# Patient Record
Sex: FEMALE | Race: ASIAN | NOT HISPANIC OR LATINO | URBAN - METROPOLITAN AREA
[De-identification: names, ages, dates, MRNs, and addresses within clinical notes are randomized per-mention and may not be internally consistent; named-entity substitution may affect disease eponyms.]

---

## 2023-05-20 ENCOUNTER — EMERGENCY (EMERGENCY)
Facility: HOSPITAL | Age: 38
LOS: 0 days | Discharge: LEFT BEFORE DISCHARGE | End: 2023-05-20
Attending: EMERGENCY MEDICINE
Payer: MEDICAID

## 2023-05-20 VITALS
OXYGEN SATURATION: 99 % | DIASTOLIC BLOOD PRESSURE: 98 MMHG | WEIGHT: 158.95 LBS | HEIGHT: 61 IN | RESPIRATION RATE: 18 BRPM | TEMPERATURE: 98 F | HEART RATE: 100 BPM | SYSTOLIC BLOOD PRESSURE: 161 MMHG

## 2023-05-20 DIAGNOSIS — Z53.21 PROCEDURE AND TREATMENT NOT CARRIED OUT DUE TO PATIENT LEAVING PRIOR TO BEING SEEN BY HEALTH CARE PROVIDER: ICD-10-CM

## 2023-05-20 PROCEDURE — L9991: CPT

## 2023-05-20 NOTE — ED ADULT NURSE NOTE - CHIEF COMPLAINT QUOTE
pt c/o " I need x ray my stomach keeps getting bigger and bigger " denies any pain; denies is pregnant states LMP was last week

## 2024-05-09 NOTE — ED ADULT TRIAGE NOTE - CHIEF COMPLAINT QUOTE
EP catheter removed from the coronary sinus. pt c/o " I need x ray my stomach keeps getting bigger and bigger " denies any pain pt c/o " I need x ray my stomach keeps getting bigger and bigger " denies any pain; denies is pregnant states LMP was last week

## 2025-05-06 ENCOUNTER — INPATIENT (INPATIENT)
Facility: HOSPITAL | Age: 40
LOS: 20 days | Discharge: HOME | DRG: 182 | End: 2025-05-27
Attending: INTERNAL MEDICINE | Admitting: INTERNAL MEDICINE
Payer: MEDICAID

## 2025-05-06 VITALS
HEART RATE: 118 BPM | OXYGEN SATURATION: 99 % | TEMPERATURE: 99 F | SYSTOLIC BLOOD PRESSURE: 222 MMHG | DIASTOLIC BLOOD PRESSURE: 137 MMHG | WEIGHT: 149.91 LBS | RESPIRATION RATE: 18 BRPM | HEIGHT: 61 IN

## 2025-05-06 DIAGNOSIS — R65.10 SYSTEMIC INFLAMMATORY RESPONSE SYNDROME (SIRS) OF NON-INFECTIOUS ORIGIN WITHOUT ACUTE ORGAN DYSFUNCTION: ICD-10-CM

## 2025-05-06 DIAGNOSIS — D59.4 OTHER NONAUTOIMMUNE HEMOLYTIC ANEMIAS: ICD-10-CM

## 2025-05-06 DIAGNOSIS — E66.9 OBESITY, UNSPECIFIED: ICD-10-CM

## 2025-05-06 DIAGNOSIS — N30.90 CYSTITIS, UNSPECIFIED WITHOUT HEMATURIA: ICD-10-CM

## 2025-05-06 DIAGNOSIS — B97.4 RESPIRATORY SYNCYTIAL VIRUS AS THE CAUSE OF DISEASES CLASSIFIED ELSEWHERE: ICD-10-CM

## 2025-05-06 DIAGNOSIS — K55.8 OTHER VASCULAR DISORDERS OF INTESTINE: ICD-10-CM

## 2025-05-06 DIAGNOSIS — I12.9 HYPERTENSIVE CHRONIC KIDNEY DISEASE WITH STAGE 1 THROUGH STAGE 4 CHRONIC KIDNEY DISEASE, OR UNSPECIFIED CHRONIC KIDNEY DISEASE: ICD-10-CM

## 2025-05-06 DIAGNOSIS — I63.81 OTHER CEREBRAL INFARCTION DUE TO OCCLUSION OR STENOSIS OF SMALL ARTERY: ICD-10-CM

## 2025-05-06 DIAGNOSIS — D50.9 IRON DEFICIENCY ANEMIA, UNSPECIFIED: ICD-10-CM

## 2025-05-06 DIAGNOSIS — D52.9 FOLATE DEFICIENCY ANEMIA, UNSPECIFIED: ICD-10-CM

## 2025-05-06 DIAGNOSIS — Z11.52 ENCOUNTER FOR SCREENING FOR COVID-19: ICD-10-CM

## 2025-05-06 DIAGNOSIS — D59.30 HEMOLYTIC-UREMIC SYNDROME, UNSPECIFIED: ICD-10-CM

## 2025-05-06 DIAGNOSIS — R80.9 PROTEINURIA, UNSPECIFIED: ICD-10-CM

## 2025-05-06 DIAGNOSIS — K92.1 MELENA: ICD-10-CM

## 2025-05-06 DIAGNOSIS — D51.9 VITAMIN B12 DEFICIENCY ANEMIA, UNSPECIFIED: ICD-10-CM

## 2025-05-06 DIAGNOSIS — N17.0 ACUTE KIDNEY FAILURE WITH TUBULAR NECROSIS: ICD-10-CM

## 2025-05-06 DIAGNOSIS — A09 INFECTIOUS GASTROENTERITIS AND COLITIS, UNSPECIFIED: ICD-10-CM

## 2025-05-06 DIAGNOSIS — I67.4 HYPERTENSIVE ENCEPHALOPATHY: ICD-10-CM

## 2025-05-06 DIAGNOSIS — D61.818 OTHER PANCYTOPENIA: ICD-10-CM

## 2025-05-06 DIAGNOSIS — D69.59 OTHER SECONDARY THROMBOCYTOPENIA: ICD-10-CM

## 2025-05-06 DIAGNOSIS — Z91.199 PATIENT'S NONCOMPLIANCE WITH OTHER MEDICAL TREATMENT AND REGIMEN DUE TO UNSPECIFIED REASON: ICD-10-CM

## 2025-05-06 DIAGNOSIS — Z53.29 PROCEDURE AND TREATMENT NOT CARRIED OUT BECAUSE OF PATIENT'S DECISION FOR OTHER REASONS: ICD-10-CM

## 2025-05-06 DIAGNOSIS — E88.09 OTHER DISORDERS OF PLASMA-PROTEIN METABOLISM, NOT ELSEWHERE CLASSIFIED: ICD-10-CM

## 2025-05-06 DIAGNOSIS — F39 UNSPECIFIED MOOD [AFFECTIVE] DISORDER: ICD-10-CM

## 2025-05-06 LAB
ALBUMIN SERPL ELPH-MCNC: 2.8 G/DL — LOW (ref 3.5–5.2)
ALP SERPL-CCNC: 63 U/L — SIGNIFICANT CHANGE UP (ref 30–115)
ALT FLD-CCNC: 10 U/L — SIGNIFICANT CHANGE UP (ref 0–41)
ANION GAP SERPL CALC-SCNC: 15 MMOL/L — HIGH (ref 7–14)
AST SERPL-CCNC: 30 U/L — SIGNIFICANT CHANGE UP (ref 0–41)
BASOPHILS # BLD AUTO: 0.03 K/UL — SIGNIFICANT CHANGE UP (ref 0–0.2)
BASOPHILS NFR BLD AUTO: 0.2 % — SIGNIFICANT CHANGE UP (ref 0–1)
BILIRUB DIRECT SERPL-MCNC: 0.2 MG/DL — SIGNIFICANT CHANGE UP (ref 0–0.3)
BILIRUB INDIRECT FLD-MCNC: 0.5 MG/DL — SIGNIFICANT CHANGE UP (ref 0.2–1.2)
BILIRUB SERPL-MCNC: 0.7 MG/DL — SIGNIFICANT CHANGE UP (ref 0.2–1.2)
BUN SERPL-MCNC: 64 MG/DL — CRITICAL HIGH (ref 10–20)
CALCIUM SERPL-MCNC: 7.6 MG/DL — LOW (ref 8.4–10.5)
CHLORIDE SERPL-SCNC: 100 MMOL/L — SIGNIFICANT CHANGE UP (ref 98–110)
CO2 SERPL-SCNC: 20 MMOL/L — SIGNIFICANT CHANGE UP (ref 17–32)
CREAT SERPL-MCNC: 3.5 MG/DL — HIGH (ref 0.7–1.5)
EGFR: 16 ML/MIN/1.73M2 — LOW
EGFR: 16 ML/MIN/1.73M2 — LOW
EOSINOPHIL # BLD AUTO: 0.02 K/UL — SIGNIFICANT CHANGE UP (ref 0–0.7)
EOSINOPHIL NFR BLD AUTO: 0.1 % — SIGNIFICANT CHANGE UP (ref 0–8)
GLUCOSE SERPL-MCNC: 138 MG/DL — HIGH (ref 70–99)
HCG SERPL QL: NEGATIVE — SIGNIFICANT CHANGE UP
HCT VFR BLD CALC: 26.2 % — LOW (ref 37–47)
HGB BLD-MCNC: 9 G/DL — LOW (ref 12–16)
IMM GRANULOCYTES NFR BLD AUTO: 0.7 % — HIGH (ref 0.1–0.3)
LYMPHOCYTES # BLD AUTO: 0.88 K/UL — LOW (ref 1.2–3.4)
LYMPHOCYTES # BLD AUTO: 4.7 % — LOW (ref 20.5–51.1)
MCHC RBC-ENTMCNC: 30 PG — SIGNIFICANT CHANGE UP (ref 27–31)
MCHC RBC-ENTMCNC: 34.4 G/DL — SIGNIFICANT CHANGE UP (ref 32–37)
MCV RBC AUTO: 87.3 FL — SIGNIFICANT CHANGE UP (ref 81–99)
MONOCYTES # BLD AUTO: 1.26 K/UL — HIGH (ref 0.1–0.6)
MONOCYTES NFR BLD AUTO: 6.7 % — SIGNIFICANT CHANGE UP (ref 1.7–9.3)
NEUTROPHILS # BLD AUTO: 16.4 K/UL — HIGH (ref 1.4–6.5)
NEUTROPHILS NFR BLD AUTO: 87.6 % — HIGH (ref 42.2–75.2)
NRBC BLD AUTO-RTO: 0 /100 WBCS — SIGNIFICANT CHANGE UP (ref 0–0)
PLATELET # BLD AUTO: 86 K/UL — LOW (ref 130–400)
PMV BLD: 13.8 FL — HIGH (ref 7.4–10.4)
POTASSIUM SERPL-MCNC: 3.6 MMOL/L — SIGNIFICANT CHANGE UP (ref 3.5–5)
POTASSIUM SERPL-SCNC: 3.6 MMOL/L — SIGNIFICANT CHANGE UP (ref 3.5–5)
PROT SERPL-MCNC: 4.6 G/DL — LOW (ref 6–8)
RBC # BLD: 3 M/UL — LOW (ref 4.2–5.4)
RBC # FLD: 15.6 % — HIGH (ref 11.5–14.5)
SODIUM SERPL-SCNC: 135 MMOL/L — SIGNIFICANT CHANGE UP (ref 135–146)
WBC # BLD: 18.72 K/UL — HIGH (ref 4.8–10.8)
WBC # FLD AUTO: 18.72 K/UL — HIGH (ref 4.8–10.8)

## 2025-05-06 PROCEDURE — 99285 EMERGENCY DEPT VISIT HI MDM: CPT

## 2025-05-06 RX ORDER — ONDANSETRON HCL/PF 4 MG/2 ML
4 VIAL (ML) INJECTION ONCE
Refills: 0 | Status: COMPLETED | OUTPATIENT
Start: 2025-05-06 | End: 2025-05-06

## 2025-05-06 RX ADMIN — Medication 4 MILLIGRAM(S): at 22:38

## 2025-05-06 RX ADMIN — Medication 1000 MILLILITER(S): at 22:38

## 2025-05-06 NOTE — ED ADULT TRIAGE NOTE - CHIEF COMPLAINT QUOTE
My stomach really hurt for three days, I vomit , diarrhea - patient  Patient reports HTN history, did not take her amlodipine today, pain diffuse around abdomen, reports LMP a few days prior

## 2025-05-07 ENCOUNTER — RESULT REVIEW (OUTPATIENT)
Age: 40
End: 2025-05-07

## 2025-05-07 LAB
ABO RH CONFIRMATION: SIGNIFICANT CHANGE UP
ALBUMIN SERPL ELPH-MCNC: 2.6 G/DL — LOW (ref 3.5–5.2)
ALP SERPL-CCNC: 61 U/L — SIGNIFICANT CHANGE UP (ref 30–115)
ALT FLD-CCNC: 8 U/L — SIGNIFICANT CHANGE UP (ref 0–41)
AMMONIA BLD-MCNC: 20 UMOL/L — SIGNIFICANT CHANGE UP (ref 11–55)
ANION GAP SERPL CALC-SCNC: 16 MMOL/L — HIGH (ref 7–14)
APPEARANCE UR: CLEAR — SIGNIFICANT CHANGE UP
APTT BLD: 29.1 SEC — SIGNIFICANT CHANGE UP (ref 27–39.2)
APTT BLD: 29.8 SEC — SIGNIFICANT CHANGE UP (ref 27–39.2)
AST SERPL-CCNC: 18 U/L — SIGNIFICANT CHANGE UP (ref 0–41)
BACTERIA # UR AUTO: ABNORMAL /HPF
BASE EXCESS BLDV CALC-SCNC: -3.5 MMOL/L — LOW (ref -2–3)
BASOPHILS # BLD AUTO: 0 K/UL — SIGNIFICANT CHANGE UP (ref 0–0.2)
BASOPHILS # BLD AUTO: 0.03 K/UL — SIGNIFICANT CHANGE UP (ref 0–0.2)
BASOPHILS # BLD AUTO: 0.03 K/UL — SIGNIFICANT CHANGE UP (ref 0–0.2)
BASOPHILS NFR BLD AUTO: 0 % — SIGNIFICANT CHANGE UP (ref 0–1)
BASOPHILS NFR BLD AUTO: 0.1 % — SIGNIFICANT CHANGE UP (ref 0–1)
BASOPHILS NFR BLD AUTO: 0.1 % — SIGNIFICANT CHANGE UP (ref 0–1)
BILIRUB SERPL-MCNC: 0.5 MG/DL — SIGNIFICANT CHANGE UP (ref 0.2–1.2)
BILIRUB UR-MCNC: NEGATIVE — SIGNIFICANT CHANGE UP
BLD GP AB SCN SERPL QL: SIGNIFICANT CHANGE UP
BUN SERPL-MCNC: 57 MG/DL — HIGH (ref 10–20)
CA-I SERPL-SCNC: 1.07 MMOL/L — LOW (ref 1.15–1.33)
CALCIUM SERPL-MCNC: 7 MG/DL — LOW (ref 8.4–10.5)
CAST: 1 /LPF — SIGNIFICANT CHANGE UP (ref 0–4)
CHLORIDE SERPL-SCNC: 98 MMOL/L — SIGNIFICANT CHANGE UP (ref 98–110)
CO2 SERPL-SCNC: 18 MMOL/L — SIGNIFICANT CHANGE UP (ref 17–32)
COLOR SPEC: YELLOW — SIGNIFICANT CHANGE UP
CREAT SERPL-MCNC: 3 MG/DL — HIGH (ref 0.7–1.5)
CRP SERPL-MCNC: 139 MG/L — HIGH
D DIMER BLD IA.RAPID-MCNC: 3326 NG/ML DDU — HIGH
DIFF PNL FLD: ABNORMAL
DIR ANTIGLOB POLYSPECIFIC INTERPRETATION: SIGNIFICANT CHANGE UP
EGFR: 20 ML/MIN/1.73M2 — LOW
EGFR: 20 ML/MIN/1.73M2 — LOW
EOSINOPHIL # BLD AUTO: 0 K/UL — SIGNIFICANT CHANGE UP (ref 0–0.7)
EOSINOPHIL # BLD AUTO: 0.03 K/UL — SIGNIFICANT CHANGE UP (ref 0–0.7)
EOSINOPHIL # BLD AUTO: 0.04 K/UL — SIGNIFICANT CHANGE UP (ref 0–0.7)
EOSINOPHIL NFR BLD AUTO: 0 % — SIGNIFICANT CHANGE UP (ref 0–8)
EOSINOPHIL NFR BLD AUTO: 0.1 % — SIGNIFICANT CHANGE UP (ref 0–8)
EOSINOPHIL NFR BLD AUTO: 0.2 % — SIGNIFICANT CHANGE UP (ref 0–8)
ERYTHROCYTE [SEDIMENTATION RATE] IN BLOOD: 45 MM/HR — HIGH (ref 0–20)
FERRITIN SERPL-MCNC: 362 NG/ML — HIGH (ref 15–150)
FLUAV AG NPH QL: SIGNIFICANT CHANGE UP
FLUBV AG NPH QL: SIGNIFICANT CHANGE UP
FOLATE SERPL-MCNC: 3.3 NG/ML — LOW
GAS PNL BLDV: 131 MMOL/L — LOW (ref 136–145)
GAS PNL BLDV: SIGNIFICANT CHANGE UP
GAS PNL BLDV: SIGNIFICANT CHANGE UP
GLUCOSE BLDC GLUCOMTR-MCNC: 152 MG/DL — HIGH (ref 70–99)
GLUCOSE BLDC GLUCOMTR-MCNC: 337 MG/DL — HIGH (ref 70–99)
GLUCOSE SERPL-MCNC: 169 MG/DL — HIGH (ref 70–99)
GLUCOSE UR QL: 100 MG/DL
HCO3 BLDV-SCNC: 21 MMOL/L — LOW (ref 22–29)
HCT VFR BLD CALC: 20.3 % — LOW (ref 37–47)
HCT VFR BLD CALC: 21.3 % — LOW (ref 37–47)
HCT VFR BLD CALC: 24.1 % — LOW (ref 37–47)
HCT VFR BLDA CALC: 26 % — LOW (ref 34.5–46.5)
HGB BLD CALC-MCNC: 8.7 G/DL — LOW (ref 11.7–16.1)
HGB BLD-MCNC: 6.8 G/DL — CRITICAL LOW (ref 12–16)
HGB BLD-MCNC: 7.2 G/DL — LOW (ref 12–16)
HGB BLD-MCNC: 8.1 G/DL — LOW (ref 12–16)
IMM GRANULOCYTES NFR BLD AUTO: 0.5 % — HIGH (ref 0.1–0.3)
IMM GRANULOCYTES NFR BLD AUTO: 0.8 % — HIGH (ref 0.1–0.3)
INR BLD: 0.9 RATIO — SIGNIFICANT CHANGE UP (ref 0.65–1.3)
INR BLD: 0.9 RATIO — SIGNIFICANT CHANGE UP (ref 0.65–1.3)
IRON SATN MFR SERPL: 11 % — LOW (ref 15–50)
IRON SATN MFR SERPL: 18 UG/DL — LOW (ref 35–150)
KETONES UR-MCNC: NEGATIVE MG/DL — SIGNIFICANT CHANGE UP
LACTATE BLDV-MCNC: 0.6 MMOL/L — SIGNIFICANT CHANGE UP (ref 0.5–2)
LACTATE SERPL-SCNC: 0.8 MMOL/L — SIGNIFICANT CHANGE UP (ref 0.7–2)
LACTATE SERPL-SCNC: 1.1 MMOL/L — SIGNIFICANT CHANGE UP (ref 0.7–2)
LDH SERPL L TO P-CCNC: 659 — HIGH (ref 50–242)
LDH SERPL L TO P-CCNC: 739 — HIGH (ref 50–242)
LEUKOCYTE ESTERASE UR-ACNC: ABNORMAL
LIDOCAIN IGE QN: 71 U/L — HIGH (ref 7–60)
LYMPHOCYTES # BLD AUTO: 0.38 K/UL — LOW (ref 1.2–3.4)
LYMPHOCYTES # BLD AUTO: 0.82 K/UL — LOW (ref 1.2–3.4)
LYMPHOCYTES # BLD AUTO: 0.91 K/UL — LOW (ref 1.2–3.4)
LYMPHOCYTES # BLD AUTO: 1.9 % — LOW (ref 20.5–51.1)
LYMPHOCYTES # BLD AUTO: 4.1 % — LOW (ref 20.5–51.1)
LYMPHOCYTES # BLD AUTO: 4.4 % — LOW (ref 20.5–51.1)
MCHC RBC-ENTMCNC: 29.6 PG — SIGNIFICANT CHANGE UP (ref 27–31)
MCHC RBC-ENTMCNC: 29.9 PG — SIGNIFICANT CHANGE UP (ref 27–31)
MCHC RBC-ENTMCNC: 30 PG — SIGNIFICANT CHANGE UP (ref 27–31)
MCHC RBC-ENTMCNC: 33.5 G/DL — SIGNIFICANT CHANGE UP (ref 32–37)
MCHC RBC-ENTMCNC: 33.6 G/DL — SIGNIFICANT CHANGE UP (ref 32–37)
MCHC RBC-ENTMCNC: 33.8 G/DL — SIGNIFICANT CHANGE UP (ref 32–37)
MCV RBC AUTO: 88.3 FL — SIGNIFICANT CHANGE UP (ref 81–99)
MCV RBC AUTO: 88.8 FL — SIGNIFICANT CHANGE UP (ref 81–99)
MCV RBC AUTO: 88.9 FL — SIGNIFICANT CHANGE UP (ref 81–99)
MONOCYTES # BLD AUTO: 0.51 K/UL — SIGNIFICANT CHANGE UP (ref 0.1–0.6)
MONOCYTES # BLD AUTO: 0.54 K/UL — SIGNIFICANT CHANGE UP (ref 0.1–0.6)
MONOCYTES # BLD AUTO: 1.67 K/UL — HIGH (ref 0.1–0.6)
MONOCYTES NFR BLD AUTO: 2.5 % — SIGNIFICANT CHANGE UP (ref 1.7–9.3)
MONOCYTES NFR BLD AUTO: 2.6 % — SIGNIFICANT CHANGE UP (ref 1.7–9.3)
MONOCYTES NFR BLD AUTO: 8.3 % — SIGNIFICANT CHANGE UP (ref 1.7–9.3)
MRSA PCR RESULT.: NEGATIVE — SIGNIFICANT CHANGE UP
NEUTROPHILS # BLD AUTO: 17.46 K/UL — HIGH (ref 1.4–6.5)
NEUTROPHILS # BLD AUTO: 19.17 K/UL — HIGH (ref 1.4–6.5)
NEUTROPHILS # BLD AUTO: 19.18 K/UL — HIGH (ref 1.4–6.5)
NEUTROPHILS NFR BLD AUTO: 86.9 % — HIGH (ref 42.2–75.2)
NEUTROPHILS NFR BLD AUTO: 93 % — HIGH (ref 42.2–75.2)
NEUTROPHILS NFR BLD AUTO: 94.5 % — HIGH (ref 42.2–75.2)
NITRITE UR-MCNC: NEGATIVE — SIGNIFICANT CHANGE UP
NRBC BLD AUTO-RTO: 0 /100 WBCS — SIGNIFICANT CHANGE UP (ref 0–0)
NRBC BLD AUTO-RTO: 0 /100 WBCS — SIGNIFICANT CHANGE UP (ref 0–0)
NT-PROBNP SERPL-SCNC: 8122 PG/ML — HIGH (ref 0–300)
PCO2 BLDV: 37 MMHG — LOW (ref 39–42)
PH BLDV: 7.37 — SIGNIFICANT CHANGE UP (ref 7.32–7.43)
PH UR: 6 — SIGNIFICANT CHANGE UP (ref 5–8)
PLATELET # BLD AUTO: 70 K/UL — LOW (ref 130–400)
PLATELET # BLD AUTO: 76 K/UL — LOW (ref 130–400)
PLATELET # BLD AUTO: 76 K/UL — LOW (ref 130–400)
PMV BLD: 13.5 FL — HIGH (ref 7.4–10.4)
PMV BLD: SIGNIFICANT CHANGE UP (ref 7.4–10.4)
PMV BLD: SIGNIFICANT CHANGE UP (ref 7.4–10.4)
PO2 BLDV: 53 MMHG — HIGH (ref 25–45)
POTASSIUM BLDV-SCNC: 3.8 MMOL/L — SIGNIFICANT CHANGE UP (ref 3.5–5.1)
POTASSIUM SERPL-MCNC: 3.6 MMOL/L — SIGNIFICANT CHANGE UP (ref 3.5–5)
POTASSIUM SERPL-SCNC: 3.6 MMOL/L — SIGNIFICANT CHANGE UP (ref 3.5–5)
PROT SERPL-MCNC: 4.2 G/DL — LOW (ref 6–8)
PROT UR-MCNC: 300 MG/DL
PROTHROM AB SERPL-ACNC: 10.6 SEC — SIGNIFICANT CHANGE UP (ref 9.95–12.87)
PROTHROM AB SERPL-ACNC: 10.6 SEC — SIGNIFICANT CHANGE UP (ref 9.95–12.87)
RBC # BLD: 2.3 M/UL — LOW (ref 4.2–5.4)
RBC # BLD: 2.3 M/UL — LOW (ref 4.2–5.4)
RBC # BLD: 2.4 M/UL — LOW (ref 4.2–5.4)
RBC # BLD: 2.4 M/UL — LOW (ref 4.2–5.4)
RBC # BLD: 2.71 M/UL — LOW (ref 4.2–5.4)
RBC # FLD: 15.8 % — HIGH (ref 11.5–14.5)
RBC # FLD: 15.9 % — HIGH (ref 11.5–14.5)
RBC # FLD: 16.1 % — HIGH (ref 11.5–14.5)
RBC CASTS # UR COMP ASSIST: 1 /HPF — SIGNIFICANT CHANGE UP (ref 0–4)
RETICS #: 174.2 K/UL — HIGH (ref 25–125)
RETICS #: 177.6 K/UL — HIGH (ref 25–125)
RETICS/RBC NFR: 7.3 % — HIGH (ref 0.5–1.5)
RETICS/RBC NFR: 7.7 % — HIGH (ref 0.5–1.5)
RSV RNA NPH QL NAA+NON-PROBE: DETECTED
SAO2 % BLDV: 87.3 % — SIGNIFICANT CHANGE UP (ref 67–88)
SARS-COV-2 RNA SPEC QL NAA+PROBE: SIGNIFICANT CHANGE UP
SODIUM SERPL-SCNC: 132 MMOL/L — LOW (ref 135–146)
SOURCE RESPIRATORY: SIGNIFICANT CHANGE UP
SP GR SPEC: 1.02 — SIGNIFICANT CHANGE UP (ref 1–1.03)
SQUAMOUS # UR AUTO: 7 /HPF — HIGH (ref 0–5)
TIBC SERPL-MCNC: 166 UG/DL — LOW (ref 220–430)
TROPONIN T, HIGH SENSITIVITY RESULT: 46 NG/L — HIGH (ref 6–13)
TROPONIN T, HIGH SENSITIVITY RESULT: 57 NG/L — CRITICAL HIGH (ref 6–13)
TSH SERPL-MCNC: 2.05 UIU/ML — SIGNIFICANT CHANGE UP (ref 0.27–4.2)
UIBC SERPL-MCNC: 148 UG/DL — SIGNIFICANT CHANGE UP (ref 110–370)
UROBILINOGEN FLD QL: 0.2 MG/DL — SIGNIFICANT CHANGE UP (ref 0.2–1)
VIT B12 SERPL-MCNC: 245 PG/ML — SIGNIFICANT CHANGE UP (ref 232–1245)
WBC # BLD: 20.12 K/UL — HIGH (ref 4.8–10.8)
WBC # BLD: 20.3 K/UL — HIGH (ref 4.8–10.8)
WBC # BLD: 20.62 K/UL — HIGH (ref 4.8–10.8)
WBC # FLD AUTO: 20.12 K/UL — HIGH (ref 4.8–10.8)
WBC # FLD AUTO: 20.3 K/UL — HIGH (ref 4.8–10.8)
WBC # FLD AUTO: 20.62 K/UL — HIGH (ref 4.8–10.8)
WBC UR QL: 12 /HPF — HIGH (ref 0–5)

## 2025-05-07 PROCEDURE — 84165 PROTEIN E-PHORESIS SERUM: CPT

## 2025-05-07 PROCEDURE — 87507 IADNA-DNA/RNA PROBE TQ 12-25: CPT

## 2025-05-07 PROCEDURE — 36415 COLL VENOUS BLD VENIPUNCTURE: CPT

## 2025-05-07 PROCEDURE — 70551 MRI BRAIN STEM W/O DYE: CPT

## 2025-05-07 PROCEDURE — 93970 EXTREMITY STUDY: CPT

## 2025-05-07 PROCEDURE — 80053 COMPREHEN METABOLIC PANEL: CPT

## 2025-05-07 PROCEDURE — 83520 IMMUNOASSAY QUANT NOS NONAB: CPT

## 2025-05-07 PROCEDURE — 76770 US EXAM ABDO BACK WALL COMP: CPT

## 2025-05-07 PROCEDURE — 86036 ANCA SCREEN EACH ANTIBODY: CPT

## 2025-05-07 PROCEDURE — 83521 IG LIGHT CHAINS FREE EACH: CPT

## 2025-05-07 PROCEDURE — 87045 FECES CULTURE AEROBIC BACT: CPT

## 2025-05-07 PROCEDURE — 84156 ASSAY OF PROTEIN URINE: CPT

## 2025-05-07 PROCEDURE — 84145 PROCALCITONIN (PCT): CPT

## 2025-05-07 PROCEDURE — 86901 BLOOD TYPING SEROLOGIC RH(D): CPT

## 2025-05-07 PROCEDURE — 86850 RBC ANTIBODY SCREEN: CPT

## 2025-05-07 PROCEDURE — 83010 ASSAY OF HAPTOGLOBIN QUANT: CPT

## 2025-05-07 PROCEDURE — 80307 DRUG TEST PRSMV CHEM ANLYZR: CPT

## 2025-05-07 PROCEDURE — 93307 TTE W/O DOPPLER COMPLETE: CPT

## 2025-05-07 PROCEDURE — 87640 STAPH A DNA AMP PROBE: CPT

## 2025-05-07 PROCEDURE — 86160 COMPLEMENT ANTIGEN: CPT

## 2025-05-07 PROCEDURE — 83880 ASSAY OF NATRIURETIC PEPTIDE: CPT

## 2025-05-07 PROCEDURE — 82306 VITAMIN D 25 HYDROXY: CPT

## 2025-05-07 PROCEDURE — 36514 APHERESIS PLASMA: CPT

## 2025-05-07 PROCEDURE — 86880 COOMBS TEST DIRECT: CPT

## 2025-05-07 PROCEDURE — 85652 RBC SED RATE AUTOMATED: CPT

## 2025-05-07 PROCEDURE — 71045 X-RAY EXAM CHEST 1 VIEW: CPT

## 2025-05-07 PROCEDURE — 87389 HIV-1 AG W/HIV-1&-2 AB AG IA: CPT

## 2025-05-07 PROCEDURE — 71045 X-RAY EXAM CHEST 1 VIEW: CPT | Mod: 26

## 2025-05-07 PROCEDURE — 86900 BLOOD TYPING SEROLOGIC ABO: CPT

## 2025-05-07 PROCEDURE — 93005 ELECTROCARDIOGRAM TRACING: CPT

## 2025-05-07 PROCEDURE — 82607 VITAMIN B-12: CPT

## 2025-05-07 PROCEDURE — 93880 EXTRACRANIAL BILAT STUDY: CPT

## 2025-05-07 PROCEDURE — 83516 IMMUNOASSAY NONANTIBODY: CPT

## 2025-05-07 PROCEDURE — 86038 ANTINUCLEAR ANTIBODIES: CPT

## 2025-05-07 PROCEDURE — 85397 CLOTTING FUNCT ACTIVITY: CPT

## 2025-05-07 PROCEDURE — 83036 HEMOGLOBIN GLYCOSYLATED A1C: CPT

## 2025-05-07 PROCEDURE — 0241U: CPT

## 2025-05-07 PROCEDURE — 83605 ASSAY OF LACTIC ACID: CPT

## 2025-05-07 PROCEDURE — 83921 ORGANIC ACID SINGLE QUANT: CPT

## 2025-05-07 PROCEDURE — 87046 STOOL CULTR AEROBIC BACT EA: CPT

## 2025-05-07 PROCEDURE — 82533 TOTAL CORTISOL: CPT

## 2025-05-07 PROCEDURE — 83090 ASSAY OF HOMOCYSTEINE: CPT

## 2025-05-07 PROCEDURE — 84484 ASSAY OF TROPONIN QUANT: CPT

## 2025-05-07 PROCEDURE — 86157 COLD AGGLUTININ TITER: CPT

## 2025-05-07 PROCEDURE — 71250 CT THORAX DX C-: CPT

## 2025-05-07 PROCEDURE — 85385 FIBRINOGEN ANTIGEN: CPT

## 2025-05-07 PROCEDURE — 82570 ASSAY OF URINE CREATININE: CPT

## 2025-05-07 PROCEDURE — 86923 COMPATIBILITY TEST ELECTRIC: CPT

## 2025-05-07 PROCEDURE — 86334 IMMUNOFIX E-PHORESIS SERUM: CPT

## 2025-05-07 PROCEDURE — 85379 FIBRIN DEGRADATION QUANT: CPT

## 2025-05-07 PROCEDURE — 86140 C-REACTIVE PROTEIN: CPT

## 2025-05-07 PROCEDURE — 83615 LACTATE (LD) (LDH) ENZYME: CPT

## 2025-05-07 PROCEDURE — 85027 COMPLETE CBC AUTOMATED: CPT

## 2025-05-07 PROCEDURE — 84585 ASSAY OF URINE VMA: CPT

## 2025-05-07 PROCEDURE — 94760 N-INVAS EAR/PLS OXIMETRY 1: CPT

## 2025-05-07 PROCEDURE — 86162 COMPLEMENT TOTAL (CH50): CPT

## 2025-05-07 PROCEDURE — 86146 BETA-2 GLYCOPROTEIN ANTIBODY: CPT

## 2025-05-07 PROCEDURE — 86747 PARVOVIRUS ANTIBODY: CPT

## 2025-05-07 PROCEDURE — 84443 ASSAY THYROID STIM HORMONE: CPT

## 2025-05-07 PROCEDURE — 83550 IRON BINDING TEST: CPT

## 2025-05-07 PROCEDURE — 80061 LIPID PANEL: CPT

## 2025-05-07 PROCEDURE — 85730 THROMBOPLASTIN TIME PARTIAL: CPT

## 2025-05-07 PROCEDURE — 84100 ASSAY OF PHOSPHORUS: CPT

## 2025-05-07 PROCEDURE — 93306 TTE W/DOPPLER COMPLETE: CPT | Mod: 26

## 2025-05-07 PROCEDURE — 93010 ELECTROCARDIOGRAM REPORT: CPT

## 2025-05-07 PROCEDURE — 83735 ASSAY OF MAGNESIUM: CPT

## 2025-05-07 PROCEDURE — 83540 ASSAY OF IRON: CPT

## 2025-05-07 PROCEDURE — 85025 COMPLETE CBC W/AUTO DIFF WBC: CPT

## 2025-05-07 PROCEDURE — 82330 ASSAY OF CALCIUM: CPT

## 2025-05-07 PROCEDURE — 93975 VASCULAR STUDY: CPT | Mod: 26

## 2025-05-07 PROCEDURE — 85610 PROTHROMBIN TIME: CPT

## 2025-05-07 PROCEDURE — P9016: CPT

## 2025-05-07 PROCEDURE — 86225 DNA ANTIBODY NATIVE: CPT

## 2025-05-07 PROCEDURE — 87641 MR-STAPH DNA AMP PROBE: CPT

## 2025-05-07 PROCEDURE — 99223 1ST HOSP IP/OBS HIGH 75: CPT

## 2025-05-07 PROCEDURE — 82728 ASSAY OF FERRITIN: CPT

## 2025-05-07 PROCEDURE — 83970 ASSAY OF PARATHORMONE: CPT

## 2025-05-07 PROCEDURE — 74176 CT ABD & PELVIS W/O CONTRAST: CPT

## 2025-05-07 PROCEDURE — 80354 DRUG SCREENING FENTANYL: CPT

## 2025-05-07 PROCEDURE — P9059: CPT

## 2025-05-07 PROCEDURE — 85613 RUSSELL VIPER VENOM DILUTED: CPT

## 2025-05-07 PROCEDURE — 80048 BASIC METABOLIC PNL TOTAL CA: CPT

## 2025-05-07 PROCEDURE — 36430 TRANSFUSION BLD/BLD COMPNT: CPT

## 2025-05-07 PROCEDURE — 86235 NUCLEAR ANTIGEN ANTIBODY: CPT

## 2025-05-07 PROCEDURE — 74174 CTA ABD&PLVS W/CONTRAST: CPT | Mod: 26

## 2025-05-07 PROCEDURE — 85045 AUTOMATED RETICULOCYTE COUNT: CPT

## 2025-05-07 PROCEDURE — 70450 CT HEAD/BRAIN W/O DYE: CPT

## 2025-05-07 PROCEDURE — 93975 VASCULAR STUDY: CPT

## 2025-05-07 PROCEDURE — 86147 CARDIOLIPIN ANTIBODY EA IG: CPT

## 2025-05-07 PROCEDURE — 82746 ASSAY OF FOLIC ACID SERUM: CPT

## 2025-05-07 PROCEDURE — 74176 CT ABD & PELVIS W/O CONTRAST: CPT | Mod: 26,59

## 2025-05-07 PROCEDURE — 84244 ASSAY OF RENIN: CPT

## 2025-05-07 PROCEDURE — 82962 GLUCOSE BLOOD TEST: CPT

## 2025-05-07 PROCEDURE — 82550 ASSAY OF CK (CPK): CPT

## 2025-05-07 PROCEDURE — 82088 ASSAY OF ALDOSTERONE: CPT

## 2025-05-07 PROCEDURE — 82784 ASSAY IGA/IGD/IGG/IGM EACH: CPT

## 2025-05-07 PROCEDURE — 87177 OVA AND PARASITES SMEARS: CPT

## 2025-05-07 PROCEDURE — 84155 ASSAY OF PROTEIN SERUM: CPT

## 2025-05-07 PROCEDURE — 83835 ASSAY OF METANEPHRINES: CPT

## 2025-05-07 PROCEDURE — 80074 ACUTE HEPATITIS PANEL: CPT

## 2025-05-07 PROCEDURE — 99291 CRITICAL CARE FIRST HOUR: CPT

## 2025-05-07 RX ORDER — SODIUM CHLORIDE 9 G/1000ML
1000 INJECTION, SOLUTION INTRAVENOUS
Refills: 0 | Status: DISCONTINUED | OUTPATIENT
Start: 2025-05-07 | End: 2025-05-08

## 2025-05-07 RX ORDER — PIPERACILLIN-TAZO-DEXTROSE,ISO 3.375G/5
3.38 IV SOLUTION, PIGGYBACK PREMIX FROZEN(ML) INTRAVENOUS EVERY 12 HOURS
Refills: 0 | Status: DISCONTINUED | OUTPATIENT
Start: 2025-05-07 | End: 2025-05-08

## 2025-05-07 RX ORDER — DIPHENHYDRAMINE HCL 12.5MG/5ML
25 ELIXIR ORAL ONCE
Refills: 0 | Status: COMPLETED | OUTPATIENT
Start: 2025-05-07 | End: 2025-05-07

## 2025-05-07 RX ORDER — DEXTROSE 50 % IN WATER 50 %
25 SYRINGE (ML) INTRAVENOUS ONCE
Refills: 0 | Status: DISCONTINUED | OUTPATIENT
Start: 2025-05-07 | End: 2025-05-08

## 2025-05-07 RX ORDER — MAGNESIUM, ALUMINUM HYDROXIDE 200-200 MG
30 TABLET,CHEWABLE ORAL EVERY 4 HOURS
Refills: 0 | Status: DISCONTINUED | OUTPATIENT
Start: 2025-05-07 | End: 2025-05-07

## 2025-05-07 RX ORDER — NICARDIPINE HCL 30 MG
5 CAPSULE ORAL
Qty: 40 | Refills: 0 | Status: DISCONTINUED | OUTPATIENT
Start: 2025-05-07 | End: 2025-05-07

## 2025-05-07 RX ORDER — ACETAMINOPHEN 500 MG/5ML
650 LIQUID (ML) ORAL EVERY 6 HOURS
Refills: 0 | Status: DISCONTINUED | OUTPATIENT
Start: 2025-05-07 | End: 2025-05-07

## 2025-05-07 RX ORDER — SODIUM CHLORIDE 9 G/1000ML
1000 INJECTION, SOLUTION INTRAVENOUS
Refills: 0 | Status: DISCONTINUED | OUTPATIENT
Start: 2025-05-07 | End: 2025-05-07

## 2025-05-07 RX ORDER — SODIUM CHLORIDE 9 G/1000ML
1000 INJECTION, SOLUTION INTRAVENOUS ONCE
Refills: 0 | Status: COMPLETED | OUTPATIENT
Start: 2025-05-07 | End: 2025-05-07

## 2025-05-07 RX ORDER — ONDANSETRON HCL/PF 4 MG/2 ML
4 VIAL (ML) INJECTION EVERY 8 HOURS
Refills: 0 | Status: DISCONTINUED | OUTPATIENT
Start: 2025-05-07 | End: 2025-05-07

## 2025-05-07 RX ORDER — DEXTROSE 50 % IN WATER 50 %
12.5 SYRINGE (ML) INTRAVENOUS ONCE
Refills: 0 | Status: DISCONTINUED | OUTPATIENT
Start: 2025-05-07 | End: 2025-05-08

## 2025-05-07 RX ORDER — ACETAMINOPHEN 500 MG/5ML
1000 LIQUID (ML) ORAL ONCE
Refills: 0 | Status: COMPLETED | OUTPATIENT
Start: 2025-05-07 | End: 2025-05-07

## 2025-05-07 RX ORDER — METHYLPREDNISOLONE ACETATE 80 MG/ML
1000 INJECTION, SUSPENSION INTRA-ARTICULAR; INTRALESIONAL; INTRAMUSCULAR; SOFT TISSUE DAILY
Refills: 0 | Status: COMPLETED | OUTPATIENT
Start: 2025-05-07 | End: 2025-05-10

## 2025-05-07 RX ORDER — METOPROLOL SUCCINATE 50 MG/1
10 TABLET, EXTENDED RELEASE ORAL ONCE
Refills: 0 | Status: COMPLETED | OUTPATIENT
Start: 2025-05-07 | End: 2025-05-07

## 2025-05-07 RX ORDER — GLUCAGON 3 MG/1
1 POWDER NASAL ONCE
Refills: 0 | Status: DISCONTINUED | OUTPATIENT
Start: 2025-05-07 | End: 2025-05-11

## 2025-05-07 RX ORDER — PIPERACILLIN-TAZO-DEXTROSE,ISO 3.375G/5
3.38 IV SOLUTION, PIGGYBACK PREMIX FROZEN(ML) INTRAVENOUS ONCE
Refills: 0 | Status: COMPLETED | OUTPATIENT
Start: 2025-05-07 | End: 2025-05-07

## 2025-05-07 RX ORDER — MELATONIN 5 MG
3 TABLET ORAL AT BEDTIME
Refills: 0 | Status: DISCONTINUED | OUTPATIENT
Start: 2025-05-07 | End: 2025-05-12

## 2025-05-07 RX ORDER — NICARDIPINE HCL 30 MG
5 CAPSULE ORAL
Qty: 40 | Refills: 0 | Status: DISCONTINUED | OUTPATIENT
Start: 2025-05-07 | End: 2025-05-11

## 2025-05-07 RX ORDER — DEXTROSE 50 % IN WATER 50 %
15 SYRINGE (ML) INTRAVENOUS ONCE
Refills: 0 | Status: DISCONTINUED | OUTPATIENT
Start: 2025-05-07 | End: 2025-05-08

## 2025-05-07 RX ORDER — INSULIN LISPRO 100 U/ML
INJECTION, SOLUTION INTRAVENOUS; SUBCUTANEOUS
Refills: 0 | Status: DISCONTINUED | OUTPATIENT
Start: 2025-05-07 | End: 2025-05-08

## 2025-05-07 RX ADMIN — Medication 40 MILLIGRAM(S): at 18:22

## 2025-05-07 RX ADMIN — Medication 3.38 GRAM(S): at 05:30

## 2025-05-07 RX ADMIN — SODIUM CHLORIDE 50 MILLILITER(S): 9 INJECTION, SOLUTION INTRAVENOUS at 08:03

## 2025-05-07 RX ADMIN — Medication 25 GRAM(S): at 18:22

## 2025-05-07 RX ADMIN — Medication 25 MG/HR: at 08:07

## 2025-05-07 RX ADMIN — Medication 50 MILLIEQUIVALENT(S): at 15:24

## 2025-05-07 RX ADMIN — SODIUM CHLORIDE 1000 MILLILITER(S): 9 INJECTION, SOLUTION INTRAVENOUS at 05:46

## 2025-05-07 RX ADMIN — Medication 5 MG/HR: at 03:16

## 2025-05-07 RX ADMIN — Medication 25 MG/HR: at 03:16

## 2025-05-07 RX ADMIN — Medication 1000 MILLIGRAM(S): at 15:53

## 2025-05-07 RX ADMIN — Medication 40 MILLIGRAM(S): at 04:47

## 2025-05-07 RX ADMIN — Medication 4 MILLIGRAM(S): at 03:16

## 2025-05-07 RX ADMIN — Medication 400 MILLIGRAM(S): at 15:23

## 2025-05-07 RX ADMIN — SODIUM CHLORIDE 1000 MILLILITER(S): 9 INJECTION, SOLUTION INTRAVENOUS at 03:48

## 2025-05-07 RX ADMIN — METOPROLOL SUCCINATE 10 MILLIGRAM(S): 50 TABLET, EXTENDED RELEASE ORAL at 00:40

## 2025-05-07 RX ADMIN — Medication 50 MILLIEQUIVALENT(S): at 08:47

## 2025-05-07 RX ADMIN — Medication 25 MG/HR: at 02:51

## 2025-05-07 RX ADMIN — Medication 200 GRAM(S): at 04:47

## 2025-05-07 RX ADMIN — Medication 25 MILLIGRAM(S): at 15:23

## 2025-05-07 RX ADMIN — METHYLPREDNISOLONE ACETATE 50 MILLIGRAM(S): 80 INJECTION, SUSPENSION INTRA-ARTICULAR; INTRALESIONAL; INTRAMUSCULAR; SOFT TISSUE at 15:54

## 2025-05-07 NOTE — PROCEDURAL SAFETY CHECKLIST WITH OR WITHOUT SEDATION - ALL CONSENTS ARE PRESENT, ACCURATE, SIGNED
Addended by: Jarrett Duncan on: 11/14/2023 10:16 AM     Modules accepted: Orders Present, accurate, and signed n/a

## 2025-05-07 NOTE — CONSULT NOTE ADULT - SUBJECTIVE AND OBJECTIVE BOX
NEPHROLOGY CONSULTATION NOTE    THIS CONSULT IS INCOMPLETE / FULL CONSULT TO FOLLOW    Patient is a 39y Female whom presented to the hospital with     PAST MEDICAL & SURGICAL HISTORY:  HTN (hypertension)        Allergies:  No Known Allergies    Home Medications Reviewed  Hospital Medications:   MEDICATIONS  (STANDING):  chlorhexidine 2% Cloths 1 Application(s) Topical <User Schedule>  dextrose 5% + lactated ringers. 1000 milliLiter(s) (50 mL/Hr) IV Continuous <Continuous>  niCARdipine Infusion 5 mG/Hr (25 mL/Hr) IV Continuous <Continuous>  pantoprazole  Injectable 40 milliGRAM(s) IV Push two times a day  piperacillin/tazobactam IVPB.. 3.375 Gram(s) IV Intermittent every 12 hours  potassium chloride  20 mEq/100 mL IVPB 20 milliEquivalent(s) IV Intermittent every 2 hours      SOCIAL HISTORY:  Denies ETOH,Smoking,   FAMILY HISTORY:        REVIEW OF SYSTEMS:  CONSTITUTIONAL: No weakness, fevers or chills  EYES/ENT: No visual changes;  No vertigo or throat pain   NECK: No pain or stiffness  RESPIRATORY: No cough, wheezing, hemoptysis; No shortness of breath  CARDIOVASCULAR: No chest pain or palpitations.  GASTROINTESTINAL: No abdominal or epigastric pain. No nausea, vomiting, or hematemesis; No diarrhea or constipation. No melena or hematochezia.  GENITOURINARY: No dysuria, frequency, foamy urine, urinary urgency, incontinence or hematuria  NEUROLOGICAL: No numbness or weakness  SKIN: No itching, burning, rashes, or lesions   VASCULAR: No bilateral lower extremity edema.   All other review of systems is negative unless indicated above.    VITALS:  T(F): 99.1 (25 @ 06:30), Max: 99.2 (25 @ 23:44)  HR: 113 (25 @ 07:00)  BP: 151/86 (25 @ 03:39)  RR: 26 (25 @ 07:00)  SpO2: 95% (25 @ 07:00)    Height (cm): 154.9 (:30)  Weight (kg): 65.8 ( 06:30)  BMI (kg/m2): 27.4 ( 06:30)  BSA (m2): 1.65 (05-07 @ 06:30)    I&O's Detail        PHYSICAL EXAM:  Constitutional: NAD  HEENT: anicteric sclera, oropharynx clear, MMM  Neck: No JVD  Respiratory: CTAB, no wheezes, rales or rhonchi  Cardiovascular: S1, S2, RRR  Gastrointestinal: BS+, soft, NT/ND  Extremities: No cyanosis or clubbing. No peripheral edema  Neurological: A/O x 3, no focal deficits  Psychiatric: Normal mood, normal affect  : No CVA tenderness. No wick.   Skin: No rashes  Vascular Access:    LABS:      135  |  100  |  64[HH]  ----------------------------<  138[H]  3.6   |  20  |  3.5[H]    Ca    7.6[L]      06 May 2025 22:32    TPro  4.6[L]  /  Alb  2.8[L]  /  TBili  0.7  /  DBili  0.2  /  AST  30  /  ALT  10  /  AlkPhos  63      Creatinine Trend: 3.5 <--                        8.1    20.12 )-----------( 70       ( 07 May 2025 04:40 )             24.1     Urine Studies:  Urinalysis Basic - ( 07 May 2025 00:55 )    Color: Yellow / Appearance: Clear / S.016 / pH:   Gluc:  / Ketone: Negative mg/dL  / Bili: Negative / Urobili: 0.2 mg/dL   Blood:  / Protein: 300 mg/dL / Nitrite: Negative   Leuk Esterase: Trace / RBC: 1 /HPF / WBC 12 /HPF   Sq Epi:  / Non Sq Epi: 7 /HPF / Bacteria: Few /HPF                    RADIOLOGY & ADDITIONAL STUDIES:                 NEPHROLOGY CONSULTATION NOTE    39-year-old female with history of hypertension presenting to ER with 5 days of multiple episodes of nonbloody nonbilious vomiting and diarrhea with associated generalized abdominal pain and distention. Patient states that on Saturday she had acute onset of nausea, vomiting, diarrhea and crampy abdominal pain. She has had 4-5 episodes of non-bloody, non-bilious vomiting per day and 4-5 episodes of black diarrhea per day. She initially thought that she had gastroenteritis, but when her symptoms didn't resolve, she decided come to the ED. She denies any current abdominal pain, nausea or vomiting, fever, chest pain, shortness of breath, clotting disorder, past intra-abdominal surgeries, kidney issues, leg pain or swelling.    renal called for JOYCE / HTN rule out TTP     at bedside answers questions appropriately , denied rash gross hematuria any recent fever or complaints     PAST MEDICAL & SURGICAL HISTORY:  HTN (hypertension)        Allergies:  No Known Allergies    Home Medications Reviewed  Hospital Medications:   MEDICATIONS  (STANDING):  chlorhexidine 2% Cloths 1 Application(s) Topical <User Schedule>  dextrose 5% + lactated ringers. 1000 milliLiter(s) (50 mL/Hr) IV Continuous <Continuous>  niCARdipine Infusion 5 mG/Hr (25 mL/Hr) IV Continuous <Continuous>  pantoprazole  Injectable 40 milliGRAM(s) IV Push two times a day  piperacillin/tazobactam IVPB.. 3.375 Gram(s) IV Intermittent every 12 hours  potassium chloride  20 mEq/100 mL IVPB 20 milliEquivalent(s) IV Intermittent every 2 hours      SOCIAL HISTORY:  Denies ETOH,Smoking,   FAMILY HISTORY:        REVIEW OF SYSTEMS:  All other review of systems is negative unless indicated above.    VITALS:  T(F): 99.1 (25 @ 06:30), Max: 99.2 (25 @ 23:44)  HR: 113 (25 @ 07:00)  BP: 151/86 (25 @ 03:39)  RR: 26 (25 @ 07:00)  SpO2: 95% (25 @ 07:00)    Height (cm): 154.9 ( 06:30)  Weight (kg): 65.8 (:30)  BMI (kg/m2): 27.4 (05-07 @ 06:30)  BSA (m2): 1.65 ( @ 06:30)    I&O's Detail        PHYSICAL EXAM:  Constitutional: NAD  Respiratory: CTAB, no wheezes, rales or rhonchi  Cardiovascular: S1, S2, RRR  Gastrointestinal: BS+, soft, NT/ND  Extremities: No cyanosis or clubbing. No peripheral edema  Neurological: A/O x 3, no focal deficits  Psychiatric: Normal mood, normal affect  : No CVA tenderness. No wick.   Skin: No rashes  Vascular Access:    LABS:      135  |  100  |  64[HH]  ----------------------------<  138[H]  3.6   |  20  |  3.5[H]    Ca    7.6[L]      06 May 2025 22:32    TPro  4.6[L]  /  Alb  2.8[L]  /  TBili  0.7  /  DBili  0.2  /  AST  30  /  ALT  10  /  AlkPhos  63      Creatinine Trend: 3.5 <--                        8.1    20.12 )-----------( 70       ( 07 May 2025 04:40 )             24.1     Urine Studies:  Urinalysis Basic - ( 07 May 2025 00:55 )    Color: Yellow / Appearance: Clear / S.016 / pH:   Gluc:  / Ketone: Negative mg/dL  / Bili: Negative / Urobili: 0.2 mg/dL   Blood:  / Protein: 300 mg/dL / Nitrite: Negative   Leuk Esterase: Trace / RBC: 1 /HPF / WBC 12 /HPF   Sq Epi:  / Non Sq Epi: 7 /HPF / Bacteria: Few /HPF                    RADIOLOGY & ADDITIONAL STUDIES:

## 2025-05-07 NOTE — CONSULT NOTE ADULT - ASSESSMENT
ASSESSMENT:  39-year-old female with history of hypertension presenting to ER with 5 days of multiple episodes of nonbloody nonbilious vomiting and diarrhea with associated generalized abdominal pain and distention. Labs significant for WBC 18.72, creatinine 3.5. CTAP with finding of "Edematous distal duodenum and proximal jejunal loops with associated dilatation measuring up to 3.4 cm. Associated marked interloop ascites, mesenteric fat stranding, and prominent mesenteric lymph nodes. Mild-to-moderate ascites. Findings concerning for small bowel ischemia." General surgery was consulted for this finding. Physical exam findings, imaging, and labs as documented above.     PLAN:  - Pending discussion with attending     Above plan to be discussed with Attending Surgeon Dr. Welch.   05-07-25 @ 03:22 ASSESSMENT:  39-year-old female with history of hypertension presenting to ER with 5 days of multiple episodes of nonbloody nonbilious vomiting and diarrhea with associated generalized abdominal pain and distention. Labs significant for WBC 18.72, creatinine 3.5. CTAP with finding of "Edematous distal duodenum and proximal jejunal loops with associated dilatation measuring up to 3.4 cm. Associated marked interloop ascites, mesenteric fat stranding, and prominent mesenteric lymph nodes. Mild-to-moderate ascites. Findings concerning for small bowel ischemia." General surgery was consulted for this finding. Physical exam findings, imaging, and labs as documented above.     PLAN:   - No acute surgical intervention   - Physical exam not concerning for bowel ischemia   - Continue treatment for hypertensive emergency   - Recommend GI consult for EGD/Colonoscopy for melena work up   - NPO w IVF for now   - PPI BID   - TTE   - IV abx   - Trend lactate   - Will follow with serial abdominal exams and monitored bowel function    Above plan to be discussed with Attending Surgeon Dr. Welch.   05-07-25 @ 03:22

## 2025-05-07 NOTE — ED PROVIDER NOTE - OBJECTIVE STATEMENT
39-year-old female with history of hypertension presenting to ER with 5 days of multiple episodes of nonbloody nonbilious vomiting and diarrhea with associated generalized abdominal pain and distention.  She denies any fever, chest pain, shortness of breath, urinary symptoms, flank pain, past intra-abdominal surgeries, leg pain or swelling, syncope.

## 2025-05-07 NOTE — H&P ADULT - NSHPPHYSICALEXAM_GEN_ALL_CORE
CONSTITUTIONAL: Well groomed, no apparent distress  EYES: PERRLA and symmetric, EOMI, No conjunctival or scleral injection, non-icteric  ENMT: Oral mucosa with moist membranes.    RESP: No respiratory distress  CV: RRR, +S1S2, no MRG; no JVD  GI: Soft, non-distended, non-tender, no rebound, no guarding  LYMPH: No cervical LAD or tenderness  MSK: No digital clubbing or cyanosis  SKIN: No rashes or ulcers noted; no subcutaneous nodules or induration palpable  NEURO: CN II-XII intact; normal reflexes in upper and lower extremities, sensation intact in upper and lower extremities b/l to light touch   PSYCH: Appropriate insight/judgment; A+O x 3, mood and affect appropriate, recent/remote memory intact

## 2025-05-07 NOTE — CONSULT NOTE ADULT - SUBJECTIVE AND OBJECTIVE BOX
GENERAL SURGERY CONSULT NOTE    Patient: FRANCISCO DUNLAP , 39y ([birth year])Female   MRN: [id number]  Location: [facility] ED  Visit: 25 Emergency  Date: 25 @ 03:22    HPI: 39-year-old female with history of hypertension presenting to ER with 5 days of multiple episodes of nonbloody nonbilious vomiting and diarrhea with associated generalized abdominal pain and distention. Patient states that on Saturday she had acute onset of nausea, vomiting, diarrhea and crampy abdominal pain. She has had 4-5 episodes of non-bloody, non-bilious vomiting per day and 4-5 episodes of black diarrhea per day. She initially thought that she had gastroenteritis, but when her symptoms didn't resolve, she decided come to the ED. She denies any current abdominal pain, nausea or vomiting, fever, chest pain, shortness of breath, clotting disorder, past intra-abdominal surgeries, kidney issues, leg pain or swelling. She follows with a PCP in a clinic in [place] and has never been told she has had any kidney issues. Labs significant for WBC 18.72, creatinine 3.5. CTAP with finding of "Edematous distal duodenum and proximal jejunal loops with associated dilatation measuring up to 3.4 cm. Associated marked interloop ascites, mesenteric fat stranding, and prominent mesenteric lymph nodes. Mild-to-moderate ascites. Findings concerning for small bowel ischemia." General surgery was consulted for this finding.       PAST MEDICAL & SURGICAL HISTORY:  HTN    Home Medications:  Amlodipine 5mg qd      VITALS:  T(F): 98.3 (25 @ 02:22), Max: 99.2 (25 @ 23:44)  HR: 100 (25 @ 02:51) (97 - 118)  BP: 238/135 (25 @ 02:51) (206/143 - 238/135)  RR: 17 (25 @ 02:51) (17 - 18)  SpO2: 98% (25 @ 02:51) (97% - 100%)    PHYSICAL EXAM:  General: NAD, AAOx3, calm and cooperative  HEENT: NCAT, JOSHUA, EOMI  Cardiac: RRR, very hypertensive to 220s/120, on nicardipine ggt   Respiratory: Normal respiratory effort on room air   Abdomen: Soft, non-distended, non-tender, no rebound, no guarding  Musculoskeletal: compartments soft  Vascular: Extremities well perfused  Skin: Warm/dry, normal color, no jaundice    MEDICATIONS  (STANDING):  niCARdipine Infusion 5 mG/Hr (25 mL/Hr) IV Continuous <Continuous>    MEDICATIONS  (PRN):      LAB/STUDIES:                        9.0    18.72 )-----------( 86       ( 06 May 2025 22:32 )             26.2         135  |  100  |  64[HH]  ----------------------------<  138[H]  3.6   |  20  |  3.5[H]    Ca    7.6[L]      06 May 2025 22:32    TPro  4.6[L]  /  Alb  2.8[L]  /  TBili  0.7  /  DBili  0.2  /  AST  30  /  ALT  10  /  AlkPhos  63        LIVER FUNCTIONS - ( 06 May 2025 22:32 )  Alb: 2.8 g/dL / Pro: 4.6 g/dL / ALK PHOS: 63 U/L / ALT: 10 U/L / AST: 30 U/L / GGT: x           Urinalysis Basic - ( 07 May 2025 00:55 )    Color: Yellow / Appearance: Clear / S.016 / pH: x  Gluc: x / Ketone: Negative mg/dL  / Bili: Negative / Urobili: 0.2 mg/dL   Blood: x / Protein: 300 mg/dL / Nitrite: Negative   Leuk Esterase: Trace / RBC: 1 /HPF / WBC 12 /HPF   Sq Epi: x / Non Sq Epi: 7 /HPF / Bacteria: Few /HPF    Urinalysis with Rflx Culture (collected 07 May 2025 00:55)      IMAGING:  < from: CT Abdomen and Pelvis No Cont (25 @ 01:49) >  Edematous distal duodenum and proximal jejunal loops with associated dilatation measuring up to 3.4 cm. Associated marked interloop ascites, mesenteric fat stranding, and prominent mesenteric lymph nodes. Mild-to-moderate ascites. Findings concerning for small bowel ischemia. No portal venous gas, pneumatosis, or pneumoperitoneum.

## 2025-05-07 NOTE — H&P ADULT - ATTENDING COMMENTS
patient seen and examined agree above note   keep SBP [id number] avoid lower taper nicardipine   renal consult   send CTD   esr , crp, c3, c4   peripheral smear   hematology eval , check retic count , haptoglobin, ferritin , LDH hemolytic work up   GI consult   follow Gsx   D5 LR50cc/ hr while npo follow Gi and G sx if ok to feed   repeat LA   CE , echo   send stool for cx pcr   continue zosyn   ID consult

## 2025-05-07 NOTE — CONSULT NOTE ADULT - SUBJECTIVE AND OBJECTIVE BOX
Gastroenterology Consultation:    Patient is a 39y old  Female who presents with a chief complaint of       Admitted on: 05-07-25      HPI:  39-year-old female with history of hypertension presenting to ER with 5 days of multiple episodes of nonbloody nonbilious vomiting and diarrhea with associated generalized abdominal pain and distention. Patient states that on Saturday she had acute onset of nausea, vomiting, diarrhea and crampy abdominal pain. She has had 4-5 episodes of non-bloody, non-bilious vomiting per day and 4-5 episodes of black diarrhea per day. She initially thought that she had gastroenteritis, but when her symptoms didn't resolve, she decided come to the ED. She denies any current abdominal pain, nausea or vomiting, fever, chest pain, shortness of breath, clotting disorder, past intra-abdominal surgeries, kidney issues, leg pain or swelling.     Labs significant for WBC 18.72k, Hb 9.0(unknown baseline) , Platelets 86k, Creatinine 3.5(unknown baseline). Lipase 71, UA positive      CTAP with finding of Edematous distal duodenum and proximal jejunal loops with associated dilatation measuring up to 3.4 cm. Associated marked interloop ascites, mesenteric fat stranding, and prominent mesenteric lymph nodes. Mild-to-moderate ascites. Findings concerning for small bowel ischemia.     CT Angio Abdomen and Pelvis w/ IV Cont (05.07.25 @ 04:18): Patent SMA, SMV. Redemonstration of distal duodenal and proximal small bowel with marked inflammatory change. Mural enhancement noted throughout   the small bowel. Considerations include severe enteritis, early ischemia, shock bowel.    #ED Vitals:   T(C): 37.2 (05-07-25 @ 05:46), Max: 37.3 (05-06-25 @ 23:44)  HR: 112 (05-07-25 @ 05:46) (97 - 118)  BP: 151/86 (05-07-25 @ 03:39) (151/86 - 238/135)  RR: 17 (05-07-25 @ 05:46) (17 - 19)  SpO2: 99% (05-07-25 @ 05:46) (96% - 100%)    # ED Course:   Zosyn, LR 1L, NS 1L, Zofran, Morphine, Pantoprazole, Reglan, Metoprolol 10 IV   Started on Nicardipine drip   Evaluated by surgery >>> No acute surgical intervention     The patient is being admitted to MICU for the further management.  (07 May 2025 06:03)        Prior EGD:    Prior Colonoscopy:      PAST MEDICAL & SURGICAL HISTORY:  HTN (hypertension)            FAMILY HISTORY:      Social History:  Tobacco:  Alcohol:  Drugs:    Home Medications:        MEDICATIONS  (STANDING):  chlorhexidine 2% Cloths 1 Application(s) Topical <User Schedule>  dextrose 5% + lactated ringers. 1000 milliLiter(s) (50 mL/Hr) IV Continuous <Continuous>  niCARdipine Infusion 5 mG/Hr (25 mL/Hr) IV Continuous <Continuous>  pantoprazole  Injectable 40 milliGRAM(s) IV Push two times a day  piperacillin/tazobactam IVPB.. 3.375 Gram(s) IV Intermittent every 12 hours  potassium chloride  20 mEq/100 mL IVPB 20 milliEquivalent(s) IV Intermittent every 2 hours    MEDICATIONS  (PRN):  melatonin 3 milliGRAM(s) Oral at bedtime PRN Insomnia      Allergies  No Known Allergies      Review of Systems:   Constitutional:  No Fever, No Chills  ENT/Mouth:  No Hearing Changes,  No Difficulty Swallowing  Eyes:  No Eye Pain, No Vision Changes  Cardiovascular:  No Chest Pain, No Palpitations  Respiratory:  No Cough, No Dyspnea  Gastrointestinal:  As described in HPI  Musculoskeletal:  No Joint Swelling, No Back Pain  Skin:  No Skin Lesions, No Jaundice  Neuro:  No Syncope, No Dizziness  Heme/Lymph:  No Bruising, No Bleeding.          Physical Examination:  T(C): 37.3 (05-07-25 @ 06:30), Max: 37.3 (05-06-25 @ 23:44)  HR: 107 (05-07-25 @ 09:00) (97 - 119)  BP: 168/87 (05-07-25 @ 08:30) (151/86 - 238/135)  RR: 21 (05-07-25 @ 09:00) (17 - 32)  SpO2: 95% (05-07-25 @ 09:00) (94% - 100%)  Height (cm): 154.9 (05-07-25 @ 06:30)  Weight (kg): 65.8 (05-07-25 @ 06:30)        GENERAL: AAOx3, no acute distress.  HEAD:  Atraumatic, Normocephalic  EYES: conjunctiva and sclera clear  NECK: Supple, no JVD or thyromegaly  CHEST/LUNG: Clear to auscultation bilaterally; No wheeze, rhonchi, or rales  HEART: Regular rate and rhythm; normal S1, S2, No murmurs.  ABDOMEN: Soft, nontender, nondistended; Bowel sounds present  NEUROLOGY: No asterixis or tremor.   SKIN: Intact, no jaundice        Data:                        8.1    20.12 )-----------( 70       ( 07 May 2025 04:40 )             24.1     Hgb Trend:  8.1  05-07-25 @ 04:40  9.0  05-06-25 @ 22:32      05-06    135  |  100  |  64[HH]  ----------------------------<  138[H]  3.6   |  20  |  3.5[H]    Ca    7.6[L]      06 May 2025 22:32    TPro  4.6[L]  /  Alb  2.8[L]  /  TBili  0.7  /  DBili  0.2  /  AST  30  /  ALT  10  /  AlkPhos  63  05-06    Liver panel trend:  TBili 0.7   /   AST 30   /   ALT 10   /   AlkP 63   /   Tptn 4.6   /   Alb 2.8    /   DBili 0.2      05-06      PT/INR - ( 07 May 2025 03:35 )   PT: 10.60 sec;   INR: 0.90 ratio         PTT - ( 07 May 2025 03:35 )  PTT:29.8 sec    Urinalysis with Rflx Culture (collected 07 May 2025 00:55)          Radiology:  CT Angio Abdomen and Pelvis w/ IV Cont:   ACC: [id number] EXAM:  CT ANGIO ABD PELV (W)AW IC   ORDERED BY: HILARY TORRE     PROCEDURE DATE:  05/07/2025          INTERPRETATION:  CLINICAL HISTORY: Bowel ischemia.    TECHNIQUE: Multislice helical sections were obtained from the domes of   the diaphragm to the pubic symphysis prior to and following the   intravenous administration of 100 cc of Omnipaque 350 utilizing a CT   angiography protocol. Coronal and sagittal reformatted images as well as   MIP reconstructions were obtained.    COMPARISON: Same-date CT abdomen and pelvis.      FINDINGS:    VASCULATURE: Patent celiac, superior mesenteric, inferior mesenteric,   bilateral renal, and common iliac arteries. No evidence of vascular   dissection or aneurysm. Patent portal vein, SMV.    HEPATOBILIARY: Unchanged.    SPLEEN: Unchanged.    PANCREAS: Unchanged.    ADRENAL GLANDS: Unchanged.    KIDNEYS: Symmetric enhancement. No hydronephrosis.    ABDOMINOPELVIC NODES: Unchanged.    PELVIC ORGANS: Unchanged.    PERITONEUM/MESENTERY/BOWEL: Redemonstration and of distal duodenal and   proximal jejunal wall thickening with interloop ascites, mesenteric fat   stranding, and prominent mesenteric lymph nodes. Dilated jejunal loops   measuring up to 3.4 cm, with gradual tapering distally without evidence   of mechanical obstruction. Mild-to-moderate no ascites. No portal venous   gas, pneumoperitoneum or pneumatosis. Mural enhancement noted throughout   the small bowel.    BONES/SOFT TISSUES: Unchanged.      IMPRESSION:    Patent SMA, SMV. Redemonstration of distal duodenal and proximal small   bowel with marked inflammatory change. Mural enhancement noted throughout   the small bowel. Considerations include severe enteritis, early ischemia,   shock bowel.    --- End of Report ---          KIMBERLY HOOVER MD; Resident Radiologist  This document has been electronically signed.  TRINO CLAIRE MD; Attending Radiologist  This document has been electronically signed. May  7 2025  5:42AM (05-07-25 @ 04:18)  CT Abdomen and Pelvis No Cont:   ACC: [id number] EXAM:  CT ABDOMEN AND PELVIS   ORDERED BY: STACY VELASQUEZ     PROCEDURE DATE:  05/07/2025          INTERPRETATION:  CLINICAL STATEMENT: Abdominal pain.    TECHNIQUE: Contiguous axial CT images were obtained from the lower chest   to the pubic symphysis without intravenous contrast. Oral contrast was   not administered. Reformatted images in the coronal and sagittal planes   were acquired.    COMPARISON: None.    FINDINGS:    LOWER CHEST: Cardiomegaly. Trace pericardial effusion. Lung groundglass   opacities, which could represent pulmonary edema in the appropriate   clinical setting.    HEPATOBILIARY: Unremarkable.    SPLEEN: Unremarkable.    PANCREAS: Unremarkable.    ADRENAL GLANDS: Unremarkable.    KIDNEYS: No hydronephrosis or radiopaque calculus.    ABDOMINOPELVIC NODES: Unremarkable.    PELVIC ORGANS: Unremarkable.    PERITONEUM/MESENTERY/BOWEL: Distal duodenal and proximal jejunal wall   thickening with interloop ascites, mesenteric fat stranding, and   prominent mesenteric lymph nodes. Dilated jejunal loops measuring up to   3.4 cm. Mild-to-moderate no ascites. No portal venous gas,   pneumoperitoneum or pneumatosis.    BONES/SOFT TISSUES: Small fat-containing umbilical hernia. Osseous   structures unremarkable.    OTHER: Scattered atherosclerotic calcifications of the infrarenal   abdominal aorta.      IMPRESSION:    Edematous distal duodenum and proximal jejunal loops with associated   dilatation measuring up to 3.4 cm. Associated marked interloop ascites,   mesenteric fat stranding, and prominent mesenteric lymph nodes.   Mild-to-moderate ascites. Findings concerning for small bowel ischemia.    No portal venous gas, pneumatosis, or pneumoperitoneum.    Communication: The summary of above findings were discussed with readback   confirmation with [id number] by radiology resident Kimberly Hoover MD at   2:20 AM on 5/7/2025.    --- End of Report ---          KIMBERLY HOOVER MD; Resident Radiologist  This document has been electronically signed.  TRINO CLAIRE MD; Attending Radiologist  This document has been electronically signed. May  7 2025  2:27AM (05-07-25 @ 01:49)       Gastroenterology Consultation:    Admitted on: 05-07-25      HPI:  39-year-old female with history of hypertension presenting to ER with 5 days of multiple episodes of nonbloody nonbilious vomiting and diarrhea with associated generalized abdominal pain and distention. Patient states that on Saturday she had acute onset of nausea, vomiting, diarrhea and crampy abdominal pain. She has had 4-5 episodes of non-bloody, non-bilious vomiting per day and 4-5 episodes of black diarrhea per day. She initially thought that she had gastroenteritis, but when her symptoms didn't resolve, she decided come to the ED. She denies any current abdominal pain, nausea or vomiting, fever, chest pain, shortness of breath, clotting disorder, past intra-abdominal surgeries, kidney issues, leg pain or swelling.     Labs significant for WBC 18.72k, Hb 9.0(unknown baseline) , Platelets 86k, Creatinine 3.5(unknown baseline). Lipase 71, UA positive      CTAP with finding of Edematous distal duodenum and proximal jejunal loops with associated dilatation measuring up to 3.4 cm. Associated marked interloop ascites, mesenteric fat stranding, and prominent mesenteric lymph nodes. Mild-to-moderate ascites. Findings concerning for small bowel ischemia.     CT Angio Abdomen and Pelvis w/ IV Cont (05.07.25 @ 04:18): Patent SMA, SMV. Redemonstration of distal duodenal and proximal small bowel with marked inflammatory change. Mural enhancement noted throughout   the small bowel. Considerations include severe enteritis, early ischemia, shock bowel.    #ED Vitals:   T(C): 37.2 (05-07-25 @ 05:46), Max: 37.3 (05-06-25 @ 23:44)  HR: 112 (05-07-25 @ 05:46) (97 - 118)  BP: 151/86 (05-07-25 @ 03:39) (151/86 - 238/135)  RR: 17 (05-07-25 @ 05:46) (17 - 19)  SpO2: 99% (05-07-25 @ 05:46) (96% - 100%)    # ED Course:   Zosyn, LR 1L, NS 1L, Zofran, Morphine, Pantoprazole, Reglan, Metoprolol 10 IV   Started on Nicardipine drip   Evaluated by surgery >>> No acute surgical intervention     The patient is being admitted to MICU for the further management.  (07 May 2025 06:03)        Prior EGD: NONE     Prior Colonoscopy: NONE       PAST MEDICAL & SURGICAL HISTORY:  HTN (hypertension)            FAMILY HISTORY: No Hx of GI malignancy or IBD       Social History:  neg x 3   Home Medications:        MEDICATIONS  (STANDING):  chlorhexidine 2% Cloths 1 Application(s) Topical <User Schedule>  dextrose 5% + lactated ringers. 1000 milliLiter(s) (50 mL/Hr) IV Continuous <Continuous>  niCARdipine Infusion 5 mG/Hr (25 mL/Hr) IV Continuous <Continuous>  pantoprazole  Injectable 40 milliGRAM(s) IV Push two times a day  piperacillin/tazobactam IVPB.. 3.375 Gram(s) IV Intermittent every 12 hours  potassium chloride  20 mEq/100 mL IVPB 20 milliEquivalent(s) IV Intermittent every 2 hours    MEDICATIONS  (PRN):  melatonin 3 milliGRAM(s) Oral at bedtime PRN Insomnia      Allergies  No Known Allergies      Review of Systems:   Constitutional:  No Fever, No Chills  ENT/Mouth:  No Hearing Changes,  No Difficulty Swallowing  Eyes:  No Eye Pain, No Vision Changes  Cardiovascular:  No Chest Pain, No Palpitations  Respiratory:  No Cough, No Dyspnea  Gastrointestinal:  As described in HPI            Physical Examination:  T(C): 37.3 (05-07-25 @ 06:30), Max: 37.3 (05-06-25 @ 23:44)  HR: 107 (05-07-25 @ 09:00) (97 - 119)  BP: 168/87 (05-07-25 @ 08:30) (151/86 - 238/135)  RR: 21 (05-07-25 @ 09:00) (17 - 32)  SpO2: 95% (05-07-25 @ 09:00) (94% - 100%)  Height (cm): 154.9 (05-07-25 @ 06:30)  Weight (kg): 65.8 (05-07-25 @ 06:30)        GENERAL: AAOx3, no acute distress.  HEAD:  Atraumatic, Normocephalic  EYES: conjunctiva and sclera clear  NECK: Supple, no JVD or thyromegaly  CHEST/LUNG: Clear to auscultation bilaterally; No wheeze, rhonchi, or rales  HEART: Regular rate and rhythm; normal S1, S2, No murmurs.  ABDOMEN: Soft, nontender, moderately distended; Bowel sounds present          Data:                        8.1    20.12 )-----------( 70       ( 07 May 2025 04:40 )             24.1     Hgb Trend:  8.1  05-07-25 @ 04:40  9.0  05-06-25 @ 22:32      05-06    135  |  100  |  64[HH]  ----------------------------<  138[H]  3.6   |  20  |  3.5[H]    Ca    7.6[L]      06 May 2025 22:32    TPro  4.6[L]  /  Alb  2.8[L]  /  TBili  0.7  /  DBili  0.2  /  AST  30  /  ALT  10  /  AlkPhos  63  05-06    Liver panel trend:  TBili 0.7   /   AST 30   /   ALT 10   /   AlkP 63   /   Tptn 4.6   /   Alb 2.8    /   DBili 0.2      05-06      PT/INR - ( 07 May 2025 03:35 )   PT: 10.60 sec;   INR: 0.90 ratio         PTT - ( 07 May 2025 03:35 )  PTT:29.8 sec    Urinalysis with Rflx Culture (collected 07 May 2025 00:55)          Radiology:  CT Angio Abdomen and Pelvis w/ IV Cont:   ACC: [id number] EXAM:  CT ANGIO ABD PELV (W)AW IC   ORDERED BY: HILARY TORRE     PROCEDURE DATE:  05/07/2025          INTERPRETATION:  CLINICAL HISTORY: Bowel ischemia.    TECHNIQUE: Multislice helical sections were obtained from the domes of   the diaphragm to the pubic symphysis prior to and following the   intravenous administration of 100 cc of Omnipaque 350 utilizing a CT   angiography protocol. Coronal and sagittal reformatted images as well as   MIP reconstructions were obtained.    COMPARISON: Same-date CT abdomen and pelvis.      FINDINGS:    VASCULATURE: Patent celiac, superior mesenteric, inferior mesenteric,   bilateral renal, and common iliac arteries. No evidence of vascular   dissection or aneurysm. Patent portal vein, SMV.    HEPATOBILIARY: Unchanged.    SPLEEN: Unchanged.    PANCREAS: Unchanged.    ADRENAL GLANDS: Unchanged.    KIDNEYS: Symmetric enhancement. No hydronephrosis.    ABDOMINOPELVIC NODES: Unchanged.    PELVIC ORGANS: Unchanged.    PERITONEUM/MESENTERY/BOWEL: Redemonstration and of distal duodenal and   proximal jejunal wall thickening with interloop ascites, mesenteric fat   stranding, and prominent mesenteric lymph nodes. Dilated jejunal loops   measuring up to 3.4 cm, with gradual tapering distally without evidence   of mechanical obstruction. Mild-to-moderate no ascites. No portal venous   gas, pneumoperitoneum or pneumatosis. Mural enhancement noted throughout   the small bowel.    BONES/SOFT TISSUES: Unchanged.      IMPRESSION:    Patent SMA, SMV. Redemonstration of distal duodenal and proximal small   bowel with marked inflammatory change. Mural enhancement noted throughout   the small bowel. Considerations include severe enteritis, early ischemia,   shock bowel.    --- End of Report ---          KIMBERLY HOOVER MD; Resident Radiologist  This document has been electronically signed.  TRINO CLAIRE MD; Attending Radiologist  This document has been electronically signed. May  7 2025  5:42AM (05-07-25 @ 04:18)  CT Abdomen and Pelvis No Cont:   ACC: [id number] EXAM:  CT ABDOMEN AND PELVIS   ORDERED BY: STACY VELASQUEZ     PROCEDURE DATE:  05/07/2025          INTERPRETATION:  CLINICAL STATEMENT: Abdominal pain.    TECHNIQUE: Contiguous axial CT images were obtained from the lower chest   to the pubic symphysis without intravenous contrast. Oral contrast was   not administered. Reformatted images in the coronal and sagittal planes   were acquired.    COMPARISON: None.    FINDINGS:    LOWER CHEST: Cardiomegaly. Trace pericardial effusion. Lung groundglass   opacities, which could represent pulmonary edema in the appropriate   clinical setting.    HEPATOBILIARY: Unremarkable.    SPLEEN: Unremarkable.    PANCREAS: Unremarkable.    ADRENAL GLANDS: Unremarkable.    KIDNEYS: No hydronephrosis or radiopaque calculus.    ABDOMINOPELVIC NODES: Unremarkable.    PELVIC ORGANS: Unremarkable.    PERITONEUM/MESENTERY/BOWEL: Distal duodenal and proximal jejunal wall   thickening with interloop ascites, mesenteric fat stranding, and   prominent mesenteric lymph nodes. Dilated jejunal loops measuring up to   3.4 cm. Mild-to-moderate no ascites. No portal venous gas,   pneumoperitoneum or pneumatosis.    BONES/SOFT TISSUES: Small fat-containing umbilical hernia. Osseous   structures unremarkable.    OTHER: Scattered atherosclerotic calcifications of the infrarenal   abdominal aorta.      IMPRESSION:    Edematous distal duodenum and proximal jejunal loops with associated   dilatation measuring up to 3.4 cm. Associated marked interloop ascites,   mesenteric fat stranding, and prominent mesenteric lymph nodes.   Mild-to-moderate ascites. Findings concerning for small bowel ischemia.    No portal venous gas, pneumatosis, or pneumoperitoneum.    Communication: The summary of above findings were discussed with readback   confirmation with [id number] by radiology resident Kimberly Hoover MD at   2:20 AM on 5/7/2025.    --- End of Report ---          KIMBERLY HOOVER MD; Resident Radiologist  This document has been electronically signed.  TRINO CLAIRE MD; Attending Radiologist  This document has been electronically signed. May  7 2025  2:27AM (05-07-25 @ 01:49)

## 2025-05-07 NOTE — ED PROVIDER NOTE - CLINICAL SUMMARY MEDICAL DECISION MAKING FREE TEXT BOX
Labs, EKG and imaging were ordered, where indicated.  Independent interpretation of any labs, EKG & imaging that was ordered was performed by Dr. Cortney castle. Appropriate medications for patient's presenting complaints were ordered and effects were reassessed, where indicated.  Patient's records (prior hospital, ED visit, and/or nursing home note) were reviewed, if available.  Additional history was obtained from EMS, family, and/or PCP (where available).  Escalation to admission/observation was considered.  Patient requires inpatient hospitalization - monitored setting.     abd pain, hypertensive emergency w/syeda & sb ischemia - gen surg consulted, rec cta ap for complete eval but no acute surgical interventions, cardene ggt for bp control, d/w micu approved for unit admission

## 2025-05-07 NOTE — ED PROVIDER NOTE - CARE PLAN
Principal Discharge DX:	Hypertensive emergency  Secondary Diagnosis:	JOYCE (acute kidney injury)  Secondary Diagnosis:	Small bowel ischemia   1

## 2025-05-07 NOTE — ED ADULT NURSE NOTE - NSFALLHARMRISKINTERV_ED_ALL_ED
Assistance OOB with selected safe patient handling equipment if applicable/Assistance with ambulation/Communicate risk of Fall with Harm to all staff, patient, and family/Encourage patient to sit up slowly, dangle for a short time, stand at bedside before walking/Orthostatic vital signs/Provide visual cue: red socks, yellow wristband, yellow gown, etc/Reinforce activity limits and safety measures with patient and family/Review medications for side effects contributing to fall risk/Toileting schedule using arm’s reach rule for commode and bathroom/Bed in lowest position, wheels locked, appropriate side rails in place/Call bell, personal items and telephone in reach/Instruct patient to call for assistance before getting out of bed/chair/stretcher/Non-slip footwear applied when patient is off stretcher/[place] to call system/Physically safe environment - no spills, clutter or unnecessary equipment/Purposeful Proactive Rounding/Room/bathroom lighting operational, light cord in reach

## 2025-05-07 NOTE — H&P ADULT - NSHPLABSRESULTS_GEN_ALL_CORE
LABS:                         8.1    20.12 )-----------( 70       ( 07 May 2025 04:40 )             24.1         135  |  100  |  64[HH]  ----------------------------<  138[H]  3.6   |  20  |  3.5[H]    Ca    7.6[L]      06 May 2025 22:32    TPro  4.6[L]  /  Alb  2.8[L]  /  TBili  0.7  /  DBili  0.2  /  AST  30  /  ALT  10  /  AlkPhos  63      PT/INR - ( 07 May 2025 03:35 )   PT: 10.60 sec;   INR: 0.90 ratio         PTT - ( 07 May 2025 03:35 )  PTT:29.8 sec  Urinalysis Basic - ( 07 May 2025 00:55 )    Color: Yellow / Appearance: Clear / S.016 / pH: x  Gluc: x / Ketone: Negative mg/dL  / Bili: Negative / Urobili: 0.2 mg/dL   Blood: x / Protein: 300 mg/dL / Nitrite: Negative   Leuk Esterase: Trace / RBC: 1 /HPF / WBC 12 /HPF   Sq Epi: x / Non Sq Epi: 7 /HPF / Bacteria: Few /HPF            Lactate, Blood: 0.8 mmol/L ( @ 22:32)      RADIOLOGY, EKG & ADDITIONAL TESTS: Reviewed.

## 2025-05-07 NOTE — ED ADULT NURSE REASSESSMENT NOTE - NS ED NURSE REASSESS COMMENT FT1
pt upgraded to crit due to HTN emergency. As per BRANDON Correa, pt needs an A-line and to be started on a nicardipine drip. Patient assessed. Pt AxOx4. Vital signs stable. Patient questions and concerns answered. Pt denies CP, SOB, palpitations. IV flushed. Safety measures maintained. Pt instructed to call for assistance before getting out of bed. Significant other at bedside. Report given to KIMBER Rendon.

## 2025-05-07 NOTE — CONSULT NOTE ADULT - ASSESSMENT
39-year-old female with history of hypertension presenting to ER with 5 days of multiple episodes of nonbloody nonbilious vomiting and diarrhea with associated generalized abdominal pain and distention. GI consulted for CT findings.         # CT findings suggestive of small bowel ischemia   # Anemia and thrombocytopenia   # Hypoalbuminemia   On admission hypertensive and tachycardic  Hgb 9 on admission < 8.1   PLT 86< 70   WBC 18k< 20 K   Lactate normal   CTA  AP Edematous distal duodenum and proximal jejunal loops with associated dilatation measuring up to 3.4 cm. Associated marked interloop ascites,   mesenteric fat stranding, and prominent mesenteric lymph nodes.   Mild-to-moderate ascites. Findings concerning for small bowel ischemia.  Patent SMA and SMV   No portal venous gas, pneumatosis, or pneumoperitoneum.  As per surgery no current indication for surgical intervention  39-year-old female with history of hypertension presenting to ER with 5 days of multiple episodes of nonbloody nonbilious vomiting and diarrhea with associated generalized abdominal pain and distention. GI consulted for CT findings.         # CT findings concerning for possible small bowel ischemia likely in the setting of malignant HTN   # Anemia (hemolytic?) and thrombocytopenia   # Hypoalbuminemia   # JOYCE   On admission hypertensive and tachycardic  Hgb 9 on admission < 8.1   PLT 86< 70   WBC 18k< 20 K   Lactate normal   CTA  AP Edematous distal duodenum and proximal jejunal loops with associated dilatation measuring up to 3.4 cm. Associated marked interloop ascites,   mesenteric fat stranding, and prominent mesenteric lymph nodes.   Mild-to-moderate ascites. Findings concerning for small bowel ischemia.  Patent SMA and SMV   No portal venous gas, pneumatosis, or pneumoperitoneum.  As per surgery no current indication for surgical intervention given no concern of bowel ischemia on physical exam       Plan   INCOMPLETE NOTE   - Clear liquid diet  - PPI IV BID for now   - Monitor and record BMs   - IV ABx  - Heme onc consult  39-year-old female with history of hypertension presenting to ER with 5 days of multiple episodes of nonbloody nonbilious vomiting and diarrhea with associated generalized abdominal pain and distention. GI consulted for CT findings.         # CT findings concerning for possible small bowel ischemia likely in the setting of malignant HTN   # Anemia (hemolytic?) and thrombocytopenia   # Hypoalbuminemia   # JOYCE   On admission hypertensive and tachycardic  Hgb 9 on admission < 8.1   PLT 86< 70   WBC 18k< 20 K   Lactate normal   CTA  AP Edematous distal duodenum and proximal jejunal loops with associated dilatation measuring up to 3.4 cm. Associated marked interloop ascites,   mesenteric fat stranding, and prominent mesenteric lymph nodes.   Mild-to-moderate ascites. Findings concerning for small bowel ischemia.  Patent SMA and SMV   No portal venous gas, pneumatosis, or pneumoperitoneum.  As per surgery no current indication for surgical intervention given no concern of bowel ischemia on physical exam       Plan   - Clear liquid diet  - PPI IV BID for now   - Monitor and record BMs   - hold IV ABx in light of possible HUS/TTP  - Heme onc consult and nephrology eval appreciated  - check GI pcr and c diff  - supp care per MICU team  - no endoscopic intervention at this time  - we will follow closely

## 2025-05-07 NOTE — ED PROVIDER NOTE - PHYSICAL EXAMINATION
CONSTITUTIONAL: Well-appearing; well-nourished; in no apparent distress.   EYES: PERRL; EOM intact.   ENT: normal nose; no rhinorrhea; normal pharynx with no tonsillar hypertrophy.   NECK: Supple; non-tender; no cervical lymphadenopathy.    CARDIOVASCULAR: Normal S1, S2; no murmurs, rubs, or gallops. Equal radial pulses  RESPIRATORY: Normal chest excursion with respiration; breath sounds clear and equal bilaterally; no wheezes, rhonchi, or rales.  GI/: Normal bowel sounds; + distended abdomen, soft no ttp. No cva ttp. neg murphys sign  MS: No evidence of trauma or deformity. Normal ROM in all four extremities; non-tender to palpation; distal pulses are normal.   SKIN: Normal for age and race; warm; dry; good turgor; no apparent lesions or exudate.   NEURO/PSYCH: A & O x 4; grossly unremarkable. mood and manner are appropriate. Grooming and personal hygiene are appropriate. No apparent thoughts of harm to self or others.

## 2025-05-07 NOTE — CHART NOTE - NSCHARTNOTEFT_GEN_A_CORE
Patient refused going to the [facility] stating that her brain is "fine".  Explained to her the need to get the head imaging done in order to r/o any possible stroke or bleed especially given her thrombocytopenia but she still adamantly refused to go despite maximal effort in convincing her.  Will reattempt soon

## 2025-05-07 NOTE — CHART NOTE - NSCHARTNOTEFT_GEN_A_CORE
Called the patient's pharmacy [facility] at [facility] [phone] to inquire about home medications, was informed that there are no antihypertensive meds in their records. Could only find influenza vaccine back in 2023

## 2025-05-07 NOTE — H&P ADULT - ASSESSMENT
39-year-old female with history of hypertension presenting to ER with 5 days of multiple episodes of nonbloody nonbilious vomiting and diarrhea with associated generalized abdominal pain and distention. Patient states that on Saturday she had acute onset of nausea, vomiting, diarrhea and crampy abdominal pain. She has had 4-5 episodes of non-bloody, non-bilious vomiting per day and 4-5 episodes of black diarrhea per day. She initially thought that she had gastroenteritis, but when her symptoms didn't resolve, she decided come to the ED.     # IMPRESSION  1) Early small bowel ischemia likely 2/2 severe enteritis  2) Hypertensive Emergency  3) Likely UGIB   4) SIRS/Sepsis   5) Uncomplicated Cystitis   5) JOYCE likely pre-renal   6) Bicytopenia     # PLAN:     CNS : Avoid CNS depressant.  Delirium Precautions    ENT : Oral Care     Pulmonary : Keep Spo2 > 94% .HOB elevation. Supplemental O2 prn.     Cardiology: F/U Troponin. Continue with nicardipine drip, wean off as tolerated. F/U TTE.     GI :   - CT Abdomen and Pelvis noted. Concerning for  severe enteritis, early ischemia, shock bowel.  - Surgery consulted: No acute surgical intervention  - GI consult for EGD/Colonoscopy for melena work up  - NPO for now  - PPI BID  - Continue with Zosyn.  - Trend lactate  - F/U GI PCR   - F/U Bcx   - serial abdominal exams and monitor bowel function    Renal : Trend CMP. Monitor Lytes and replete as needed. Nephro consult if no improvement in kidney function.     ID: Zosyn for now . Follow Blood culture and urine culture. F/U MRSA, RVP. ID consult.     Hem/Onc : Coagulation studies noted. Monitor CBC. Transfuse prn, Hb goal of >7. Anemia work up. Dimer. SCD for DVT ppx.    Endo:  Blood glucose Goal : 140-180.     MSK: Ambulate as tolerated     Code : Full code.      Disp: MICU      39-year-old female with history of hypertension presenting to ER with 5 days of multiple episodes of nonbloody nonbilious vomiting and diarrhea with associated generalized abdominal pain and distention. Patient states that on Saturday she had acute onset of nausea, vomiting, diarrhea and crampy abdominal pain. She has had 4-5 episodes of non-bloody, non-bilious vomiting per day and 4-5 episodes of black diarrhea per day. She initially thought that she had gastroenteritis, but when her symptoms didn't resolve, she decided come to the ED.     # IMPRESSION  1) Early small bowel ischemia likely 2/2 severe enteritis  2) Hypertensive Emergency  3) Likely UGIB   4) SIRS/Sepsis   5) Uncomplicated Cystitis   5) JOYCE likely pre-renal   6) Bicytopenia     # PLAN:     CNS : Avoid CNS depressant.  Delirium Precautions    ENT : Oral Care     Pulmonary : Keep Spo2 > 94% .HOB elevation. Supplemental O2 prn.     Cardiology: F/U Troponin. Continue with nicardipine drip, wean off as tolerated. F/U TTE.     GI :   - CT Abdomen and Pelvis noted. Concerning for  severe enteritis, early ischemia, shock bowel.  - Surgery consulted: No acute surgical intervention  - GI consult for EGD/Colonoscopy for melena work up  - NPO for now  - PPI BID  - Continue with Zosyn.  - Trend lactate  - F/U GI PCR   - F/U Bcx   - serial abdominal exams and monitor bowel function    Renal : Trend CMP. Monitor Lytes and replete as needed. Nephro consult if no improvement in kidney function.     ID: Zosyn for now . Follow Blood culture and urine culture. F/U MRSA, RVP. ID consult.     Hem/Onc : Coagulation studies noted. Monitor CBC. Transfuse prn, Hb goal of >7. Anemia work up. Dimer. SCD for DVT ppx.    Endo:  Blood glucose Goal : 140-180.     MSK: Ambulate as tolerated     Code : Full code.      Disp: MICU     **Patient unsure of her home meds. Called the patient's pharmacy [facility] at [facility] [phone], no answer, please confirm in AM.      39-year-old female with history of hypertension presenting to ER with 5 days of multiple episodes of nonbloody nonbilious vomiting and diarrhea with associated generalized abdominal pain and distention. Patient states that on Saturday she had acute onset of nausea, vomiting, diarrhea and crampy abdominal pain. She has had 4-5 episodes of non-bloody, non-bilious vomiting per day and 4-5 episodes of black diarrhea per day. She initially thought that she had gastroenteritis, but when her symptoms didn't resolve, she decided come to the ED.     # IMPRESSION  1) Early small bowel ischemia likely 2/2 severe enteritis  2) Hypertensive Emergency  3) Likely UGIB   4) SIRS/Sepsis   5) Uncomplicated Cystitis   5) JOYCE likely pre-renal   6) pancytopenia   r/o HUS     # PLAN:     CNS : Avoid CNS depressant.  Delirium Precautions    ENT : Oral Care     Pulmonary : Keep Spo2 > 94% .HOB elevation. Supplemental O2 prn.     Cardiology: F/U Troponin. Continue with nicardipine drip, wean off as tolerated. F/U TTE.     GI :   - CT Abdomen and Pelvis noted. Concerning for  severe enteritis, early ischemia, shock bowel.  - Surgery consulted: No acute surgical intervention  - GI consult for EGD/Colonoscopy for melena work up  - NPO for now  - PPI BID  - Continue with Zosyn.  - Trend lactate  - F/U GI PCR   - F/U Bcx   - serial abdominal exams and monitor bowel function    Renal : Trend CMP. Monitor Lytes and replete as needed. Nephro consult if no improvement in kidney function.     ID: Zosyn for now . Follow Blood culture and urine culture. F/U MRSA, RVP. ID consult.     Hem/Onc : Coagulation studies noted. Monitor CBC. Transfuse prn, Hb goal of >7. Anemia work up. Dimer. SCD for DVT ppx.    Endo:  Blood glucose Goal : 140-180.     MSK: Ambulate as tolerated     Code : Full code.      Disp: MICU     **Patient unsure of her home meds. Called the patient's pharmacy [facility] at [facility] [phone], no answer, please confirm in AM.

## 2025-05-07 NOTE — ED PROVIDER NOTE - CRITICAL CARE ATTENDING CONTRIBUTION TO CARE
I personally saw the patient. BRANDON Correa and I provided critical care for a total of 35 minutes. I provided a substantive portion of the care and the majority of the critical care time.    39-year-old female PMH HTN on amlodipine 5 mg daily presenting with abdominal pain x 3 days.  Patient poor historian, reports intermittent migratory abdominal pain and distention x 3 days.  Accompanied by vomiting and diarrhea.  Patient hypertensive on arrival to 220s over 140s–patient reports general compliance with amlodipine, states she missed a few doses in last few days, states she sometimes left her blood pressure at home last time 2 weeks ago with SBP to 180s.  Patient denies any headache, F/C, dizziness, CP/SOB, flank pain, urinary symptoms, edema, focal numbness weakness.  No sick contacts, recent travel or antibiotics.    PE:  young F, nad  skin warm, dry  ncat  neck supple  rrr nl s1s2 no mrg  ctab no wrr  abd soft distended, minimal ttp throughout, no focal ttp, no palpable masses no rgr  back non-tender no cvat  ext no cce dpi  neuro aaox3, cn 2-12 intact bl no nystagmus or facial droop, 5/5 strength x 4 no drift, gross sensation intact, finger-nose nl

## 2025-05-07 NOTE — CONSULT NOTE ADULT - ASSESSMENT
38 y/o F PMHx HTN presented to ER with 5 days of vomiting and diarrhea and melena with associated generalized abdominal pain and distention.  Admitted for management of hypertensive emergency, bowel ischemia and sepsis in setting of enteritis. Evaluated by surgery no intervention    Heme consulted for normocytic anemia and thrombocytopenia    # Leucocytosis, neutrophilic predominance  # Normocytic anemia  # Thrombocytopenia  - No prior labs for baseline  - No anemia work up  - Normal coags         40 y/o F PMHx HTN presented to ER with 5 days of vomiting and diarrhea and melena with associated generalized abdominal pain and distention.  Admitted for management of hypertensive emergency, bowel ischemia and sepsis in setting of enteritis. Evaluated by surgery no intervention    Heme consulted for normocytic anemia and thrombocytopenia    # Leucocytosis, neutrophilic predominance  # Normocytic anemia  # Thrombocytopenia  # Bowel ischemia  # Renal failure  - No prior labs for baseline  - Normal coags  - Elevated , retic 7%  - Plasmic score 5, intermediate probability of TTP  - Peripheral smear reviewed, noted schistocytosis 5-6/HPF and polychromasia    Plan:  Overall picture concerning for TTP, recommend starting plasma exchange, discussed with Erlinda Varela placed and consent for exchange signed and placed in the chart  Start 1000 mg IV solumedrol daily x 3 days  Please give 2 units FFP while awaiting exchange, premedicate with tylenol  Follow up on [id number], C3, C4  Follow up coomb's, haptoglobin  Check renal doppler  Check CTH non contrast   Check hepatitis B and C

## 2025-05-07 NOTE — H&P ADULT - HISTORY OF PRESENT ILLNESS
39-year-old female with history of hypertension presenting to ER with 5 days of multiple episodes of nonbloody nonbilious vomiting and diarrhea with associated generalized abdominal pain and distention. Patient states that on Saturday she had acute onset of nausea, vomiting, diarrhea and crampy abdominal pain. She has had 4-5 episodes of non-bloody, non-bilious vomiting per day and 4-5 episodes of black diarrhea per day. She initially thought that she had gastroenteritis, but when her symptoms didn't resolve, she decided come to the ED. She denies any current abdominal pain, nausea or vomiting, fever, chest pain, shortness of breath, clotting disorder, past intra-abdominal surgeries, kidney issues, leg pain or swelling.     Labs significant for WBC 18.72k, Hb 9.0(unknown baseline) , Platelets 86k, Creatinine 3.5(unknown baseline). Lipase 71, UA positive      CTAP with finding of Edematous distal duodenum and proximal jejunal loops with associated dilatation measuring up to 3.4 cm. Associated marked interloop ascites, mesenteric fat stranding, and prominent mesenteric lymph nodes. Mild-to-moderate ascites. Findings concerning for small bowel ischemia.     CT Angio Abdomen and Pelvis w/ IV Cont (05.07.25 @ 04:18): Patent SMA, SMV. Redemonstration of distal duodenal and proximal small bowel with marked inflammatory change. Mural enhancement noted throughout   the small bowel. Considerations include severe enteritis, early ischemia, shock bowel.    #ED Vitals:   T(C): 37.2 (05-07-25 @ 05:46), Max: 37.3 (05-06-25 @ 23:44)  HR: 112 (05-07-25 @ 05:46) (97 - 118)  BP: 151/86 (05-07-25 @ 03:39) (151/86 - 238/135)  RR: 17 (05-07-25 @ 05:46) (17 - 19)  SpO2: 99% (05-07-25 @ 05:46) (96% - 100%)    # ED Course:   Zosyn, LR 1L, NS 1L, Zofran, Morphine, Pantoprazole, Reglan, Metoprolol 10 IV   Started on Nicardipine drip   Evaluated by surgery >>> No acute surgical intervention     The patient is being admitted to MICU for the further management.

## 2025-05-07 NOTE — ED ADULT NURSE NOTE - OBJECTIVE STATEMENT
pt c/o of abdominal pain. Pt states she has been having N/V/D. Pt denies any urinary symptoms. Pt states that she also has a hx of HTN but hasn't taken her amlodipine today.

## 2025-05-07 NOTE — CONSULT NOTE ADULT - SUBJECTIVE AND OBJECTIVE BOX
Hematology Consult Note    HPI:  39-year-old female with history of hypertension presenting to ER with 5 days of multiple episodes of nonbloody nonbilious vomiting and diarrhea with associated generalized abdominal pain and distention. Patient states that on Saturday she had acute onset of nausea, vomiting, diarrhea and crampy abdominal pain. She has had 4-5 episodes of non-bloody, non-bilious vomiting per day and 4-5 episodes of black diarrhea per day. She initially thought that she had gastroenteritis, but when her symptoms didn't resolve, she decided come to the ED. She denies any current abdominal pain, nausea or vomiting, fever, chest pain, shortness of breath, clotting disorder, past intra-abdominal surgeries, kidney issues, leg pain or swelling.        Allergies    No Known Allergies    Intolerances        MEDICATIONS  (STANDING):  chlorhexidine 2% Cloths 1 Application(s) Topical <User Schedule>  dextrose 5% + lactated ringers. 1000 milliLiter(s) (50 mL/Hr) IV Continuous <Continuous>  niCARdipine Infusion 5 mG/Hr (25 mL/Hr) IV Continuous <Continuous>  pantoprazole  Injectable 40 milliGRAM(s) IV Push two times a day  piperacillin/tazobactam IVPB.. 3.375 Gram(s) IV Intermittent every 12 hours  potassium chloride  20 mEq/100 mL IVPB 20 milliEquivalent(s) IV Intermittent every 2 hours    MEDICATIONS  (PRN):  melatonin 3 milliGRAM(s) Oral at bedtime PRN Insomnia      PAST MEDICAL & SURGICAL HISTORY:  HTN (hypertension)          FAMILY HISTORY:      SOCIAL HISTORY: No EtOH, no tobacco    REVIEW OF SYSTEMS:    CONSTITUTIONAL: No weakness, fevers or chills  EYES/ENT: No visual changes;  No vertigo or throat pain   NECK: No pain or stiffness  RESPIRATORY: No cough, wheezing, hemoptysis; No shortness of breath  CARDIOVASCULAR: No chest pain or palpitations  GASTROINTESTINAL: No abdominal or epigastric pain. No nausea, vomiting, or hematemesis; No diarrhea or constipation. No melena or hematochezia.  GENITOURINARY: No dysuria, frequency or hematuria  NEUROLOGICAL: No numbness or weakness  SKIN: No itching, burning, rashes, or lesions   All other review of systems is negative unless indicated above.    Height (cm): 154.9 (05-07 @ 06:30)  Weight (kg): 65.8 (05-07 @ 06:30)  BMI (kg/m2): 27.4 (05-07 @ 06:30)  BSA (m2): 1.65 (05-07 @ 06:30)    T(F): 99.1 (05-07-25 @ 06:30), Max: 99.2 (05-06-25 @ 23:44)  HR: 107 (05-07-25 @ 09:00)  BP: 168/87 (05-07-25 @ 08:30)  RR: 21 (05-07-25 @ 09:00)  SpO2: 95% (05-07-25 @ 09:00)  Wt(kg): --    GENERAL: NAD, well-developed  HEAD:  Atraumatic, Normocephalic  EYES: EOMI, PERRLA, conjunctiva and sclera clear  NECK: Supple, No JVD  CHEST/LUNG: Clear to auscultation bilaterally; No wheeze  HEART: Regular rate and rhythm; No murmurs, rubs, or gallops  ABDOMEN: Soft, Nontender, Nondistended; Bowel sounds present  EXTREMITIES:  2+ Peripheral Pulses, No clubbing, cyanosis, or edema  NEUROLOGY: non-focal  SKIN: No rashes or lesions                          8.1    20.12 )-----------( 70       ( 07 May 2025 04:40 )             24.1       05-06    135  |  100  |  64[HH]  ----------------------------<  138[H]  3.6   |  20  |  3.5[H]    Ca    7.6[L]      06 May 2025 22:32    TPro  4.6[L]  /  Alb  2.8[L]  /  TBili  0.7  /  DBili  0.2  /  AST  30  /  ALT  10  /  AlkPhos  63  05-06        < from: CT Abdomen and Pelvis No Cont (05.07.25 @ 01:49) >  Edematous distal duodenum and proximal jejunal loops with associated   dilatation measuring up to 3.4 cm. Associated marked interloop ascites,   mesenteric fat stranding, and prominent mesenteric lymph nodes.   Mild-to-moderate ascites. Findings concerning for small bowel ischemia.    No portal venous gas, pneumatosis, or pneumoperitoneum.    < end of copied text >    < from: CT Angio Abdomen and Pelvis w/ IV Cont (05.07.25 @ 04:18) >    Patent SMA, SMV. Redemonstration of distal duodenal and proximal small   bowel with marked inflammatory change. Mural enhancement noted throughout   the small bowel. Considerations include severe enteritis, early ischemia,   shock bowel.    --- End of Report ---      < end of copied text >

## 2025-05-07 NOTE — CONSULT NOTE ADULT - ASSESSMENT
39-year-old female with history of hypertension presenting to ER with 5 days of multiple episodes of nonbloody nonbilious vomiting and diarrhea with associated generalized abdominal pain and distention    # HTN   # JOYCE rule out ATN   # proteinuria / hematuria   # thrombocytopenia     - picture above can be due to malignant HTN / giving TTP like picture   - DD includes TTP / HUS due to enteric infection / lupus related GN   - will need to check peripheral smear / if positive consider plasmapheresis / hem onc on case   - check c3 c4 JOSE ANTONIO DsDNA ADAMTS 13 UPCR hepatitis and HIV profile   - check Urine tox screen   - continue nicardipine drip  - follow ID eval    renal team will follow

## 2025-05-07 NOTE — ED ADULT NURSE REASSESSMENT NOTE - NS ED NURSE REASSESS COMMENT FT1
Pt upgraded to critical care for hypertensive emergency, started on a Cardene drip & A-line placed at bedside by BRANDON Reina. Pt complaining of mid epigastric x4 days with n/v/d denies any chest pain or SOB. Currently A&Ox4, pending CT scan. Cardiac monitor in place, call bell & bed alarm active.

## 2025-05-08 LAB
A1C WITH ESTIMATED AVERAGE GLUCOSE RESULT: 5.1 % — SIGNIFICANT CHANGE UP (ref 4–5.6)
ALBUMIN SERPL ELPH-MCNC: 3.1 G/DL — LOW (ref 3.5–5.2)
ALP SERPL-CCNC: 71 U/L — SIGNIFICANT CHANGE UP (ref 30–115)
ALT FLD-CCNC: 14 U/L — SIGNIFICANT CHANGE UP (ref 0–41)
ANA TITR SER: NEGATIVE — SIGNIFICANT CHANGE UP
ANION GAP SERPL CALC-SCNC: 14 MMOL/L — SIGNIFICANT CHANGE UP (ref 7–14)
ANION GAP SERPL CALC-SCNC: 17 MMOL/L — HIGH (ref 7–14)
ANION GAP SERPL CALC-SCNC: 17 MMOL/L — HIGH (ref 7–14)
AST SERPL-CCNC: 17 U/L — SIGNIFICANT CHANGE UP (ref 0–41)
B2 GLYCOPROT1 IGA SER QL: <2 U/ML — SIGNIFICANT CHANGE UP
B2 GLYCOPROT1 IGG SER-ACNC: <1.4 U/ML — SIGNIFICANT CHANGE UP
B2 GLYCOPROT1 IGM SER-ACNC: <1.5 U/ML — SIGNIFICANT CHANGE UP
BASOPHILS # BLD AUTO: 0.01 K/UL — SIGNIFICANT CHANGE UP (ref 0–0.2)
BASOPHILS # BLD AUTO: 0.02 K/UL — SIGNIFICANT CHANGE UP (ref 0–0.2)
BASOPHILS NFR BLD AUTO: 0.1 % — SIGNIFICANT CHANGE UP (ref 0–1)
BASOPHILS NFR BLD AUTO: 0.1 % — SIGNIFICANT CHANGE UP (ref 0–1)
BILIRUB SERPL-MCNC: 0.7 MG/DL — SIGNIFICANT CHANGE UP (ref 0.2–1.2)
BUN SERPL-MCNC: 57 MG/DL — HIGH (ref 10–20)
BUN SERPL-MCNC: 58 MG/DL — HIGH (ref 10–20)
BUN SERPL-MCNC: 59 MG/DL — HIGH (ref 10–20)
C3 SERPL-MCNC: 106 MG/DL — SIGNIFICANT CHANGE UP (ref 81–157)
C4 SERPL-MCNC: 22 MG/DL — SIGNIFICANT CHANGE UP (ref 13–39)
CALCIUM SERPL-MCNC: 7.3 MG/DL — LOW (ref 8.4–10.5)
CALCIUM SERPL-MCNC: 7.4 MG/DL — LOW (ref 8.4–10.5)
CALCIUM SERPL-MCNC: 7.8 MG/DL — LOW (ref 8.4–10.5)
CARDIOLIPIN IGM SER-MCNC: <1.5 MPL U/ML — SIGNIFICANT CHANGE UP
CARDIOLIPIN IGM SER-MCNC: <1.6 GPL U/ML — SIGNIFICANT CHANGE UP
CHLORIDE SERPL-SCNC: 100 MMOL/L — SIGNIFICANT CHANGE UP (ref 98–110)
CHLORIDE SERPL-SCNC: 97 MMOL/L — LOW (ref 98–110)
CHLORIDE SERPL-SCNC: 98 MMOL/L — SIGNIFICANT CHANGE UP (ref 98–110)
CO2 SERPL-SCNC: 17 MMOL/L — SIGNIFICANT CHANGE UP (ref 17–32)
CO2 SERPL-SCNC: 20 MMOL/L — SIGNIFICANT CHANGE UP (ref 17–32)
CO2 SERPL-SCNC: 24 MMOL/L — SIGNIFICANT CHANGE UP (ref 17–32)
CREAT ?TM UR-MCNC: 78 MG/DL — SIGNIFICANT CHANGE UP
CREAT SERPL-MCNC: 3.3 MG/DL — HIGH (ref 0.7–1.5)
CREAT SERPL-MCNC: 3.4 MG/DL — HIGH (ref 0.7–1.5)
CREAT SERPL-MCNC: 4.1 MG/DL — CRITICAL HIGH (ref 0.7–1.5)
CULTURE RESULTS: SIGNIFICANT CHANGE UP
DEPRECATED CARDIOLIPIN IGA SER: <2 APL U/ML — SIGNIFICANT CHANGE UP
EGFR: 14 ML/MIN/1.73M2 — LOW
EGFR: 14 ML/MIN/1.73M2 — LOW
EGFR: 17 ML/MIN/1.73M2 — LOW
EGFR: 17 ML/MIN/1.73M2 — LOW
EGFR: 18 ML/MIN/1.73M2 — LOW
EGFR: 18 ML/MIN/1.73M2 — LOW
ENA SCL70 AB SER-ACNC: <0.2 AI — SIGNIFICANT CHANGE UP
EOSINOPHIL # BLD AUTO: 0 K/UL — SIGNIFICANT CHANGE UP (ref 0–0.7)
EOSINOPHIL # BLD AUTO: 0 K/UL — SIGNIFICANT CHANGE UP (ref 0–0.7)
EOSINOPHIL NFR BLD AUTO: 0 % — SIGNIFICANT CHANGE UP (ref 0–8)
EOSINOPHIL NFR BLD AUTO: 0 % — SIGNIFICANT CHANGE UP (ref 0–8)
ESTIMATED AVERAGE GLUCOSE: 100 MG/DL — SIGNIFICANT CHANGE UP (ref 68–114)
GBM IGG SER-ACNC: <0.2 AI — SIGNIFICANT CHANGE UP
GLOMERULAR BASEMENT MEMBRANE A INTERPRETATION: NEGATIVE — SIGNIFICANT CHANGE UP
GLUCOSE BLDC GLUCOMTR-MCNC: 216 MG/DL — HIGH (ref 70–99)
GLUCOSE BLDC GLUCOMTR-MCNC: 222 MG/DL — HIGH (ref 70–99)
GLUCOSE BLDC GLUCOMTR-MCNC: 227 MG/DL — HIGH (ref 70–99)
GLUCOSE BLDC GLUCOMTR-MCNC: 240 MG/DL — HIGH (ref 70–99)
GLUCOSE BLDC GLUCOMTR-MCNC: 264 MG/DL — HIGH (ref 70–99)
GLUCOSE BLDC GLUCOMTR-MCNC: 284 MG/DL — HIGH (ref 70–99)
GLUCOSE SERPL-MCNC: 226 MG/DL — HIGH (ref 70–99)
GLUCOSE SERPL-MCNC: 231 MG/DL — HIGH (ref 70–99)
GLUCOSE SERPL-MCNC: 291 MG/DL — HIGH (ref 70–99)
HAPTOGLOB SERPL-MCNC: 36 MG/DL — SIGNIFICANT CHANGE UP (ref 34–200)
HAPTOGLOB SERPL-MCNC: 65 MG/DL — SIGNIFICANT CHANGE UP (ref 34–200)
HAV IGM SER-ACNC: SIGNIFICANT CHANGE UP
HBV CORE IGM SER-ACNC: SIGNIFICANT CHANGE UP
HBV SURFACE AG SER-ACNC: SIGNIFICANT CHANGE UP
HCT VFR BLD CALC: 20.7 % — LOW (ref 37–47)
HCT VFR BLD CALC: 21.3 % — LOW (ref 37–47)
HCT VFR BLD CALC: 24.9 % — LOW (ref 37–47)
HCV AB S/CO SERPL IA: 0.13 S/CO — SIGNIFICANT CHANGE UP (ref 0–0.79)
HCV AB SERPL-IMP: SIGNIFICANT CHANGE UP
HGB BLD-MCNC: 7 G/DL — LOW (ref 12–16)
HGB BLD-MCNC: 7.6 G/DL — LOW (ref 12–16)
HGB BLD-MCNC: 8.5 G/DL — LOW (ref 12–16)
HIV 1+2 AB+HIV1 P24 AG SERPL QL IA: SIGNIFICANT CHANGE UP
IMM GRANULOCYTES NFR BLD AUTO: 0.8 % — HIGH (ref 0.1–0.3)
IMM GRANULOCYTES NFR BLD AUTO: 0.8 % — HIGH (ref 0.1–0.3)
LACTATE SERPL-SCNC: 2 MMOL/L — SIGNIFICANT CHANGE UP (ref 0.7–2)
LDH SERPL L TO P-CCNC: 312 — HIGH (ref 50–242)
LYMPHOCYTES # BLD AUTO: 0.43 K/UL — LOW (ref 1.2–3.4)
LYMPHOCYTES # BLD AUTO: 0.84 K/UL — LOW (ref 1.2–3.4)
LYMPHOCYTES # BLD AUTO: 2.5 % — LOW (ref 20.5–51.1)
LYMPHOCYTES # BLD AUTO: 5.3 % — LOW (ref 20.5–51.1)
MAGNESIUM SERPL-MCNC: 2.1 MG/DL — SIGNIFICANT CHANGE UP (ref 1.8–2.4)
MCHC RBC-ENTMCNC: 29.8 PG — SIGNIFICANT CHANGE UP (ref 27–31)
MCHC RBC-ENTMCNC: 29.9 PG — SIGNIFICANT CHANGE UP (ref 27–31)
MCHC RBC-ENTMCNC: 30.3 PG — SIGNIFICANT CHANGE UP (ref 27–31)
MCHC RBC-ENTMCNC: 33.8 G/DL — SIGNIFICANT CHANGE UP (ref 32–37)
MCHC RBC-ENTMCNC: 34.1 G/DL — SIGNIFICANT CHANGE UP (ref 32–37)
MCHC RBC-ENTMCNC: 35.7 G/DL — SIGNIFICANT CHANGE UP (ref 32–37)
MCV RBC AUTO: 84.9 FL — SIGNIFICANT CHANGE UP (ref 81–99)
MCV RBC AUTO: 87.4 FL — SIGNIFICANT CHANGE UP (ref 81–99)
MCV RBC AUTO: 88.5 FL — SIGNIFICANT CHANGE UP (ref 81–99)
MONOCYTES # BLD AUTO: 0.09 K/UL — LOW (ref 0.1–0.6)
MONOCYTES # BLD AUTO: 0.22 K/UL — SIGNIFICANT CHANGE UP (ref 0.1–0.6)
MONOCYTES NFR BLD AUTO: 0.6 % — LOW (ref 1.7–9.3)
MONOCYTES NFR BLD AUTO: 1.3 % — LOW (ref 1.7–9.3)
MPO AB + PR3 PNL SER: SIGNIFICANT CHANGE UP
NEUTROPHILS # BLD AUTO: 14.78 K/UL — HIGH (ref 1.4–6.5)
NEUTROPHILS # BLD AUTO: 16.65 K/UL — HIGH (ref 1.4–6.5)
NEUTROPHILS NFR BLD AUTO: 93.2 % — HIGH (ref 42.2–75.2)
NEUTROPHILS NFR BLD AUTO: 95.3 % — HIGH (ref 42.2–75.2)
NRBC BLD AUTO-RTO: 0 /100 WBCS — SIGNIFICANT CHANGE UP (ref 0–0)
PHOSPHATE SERPL-MCNC: 5.4 MG/DL — HIGH (ref 2.1–4.9)
PLATELET # BLD AUTO: 100 K/UL — LOW (ref 130–400)
PLATELET # BLD AUTO: 139 K/UL — SIGNIFICANT CHANGE UP (ref 130–400)
PLATELET # BLD AUTO: 90 K/UL — LOW (ref 130–400)
PMV BLD: 11.5 FL — HIGH (ref 7.4–10.4)
PMV BLD: 12.5 FL — HIGH (ref 7.4–10.4)
PMV BLD: 12.7 FL — HIGH (ref 7.4–10.4)
POTASSIUM SERPL-MCNC: 3.6 MMOL/L — SIGNIFICANT CHANGE UP (ref 3.5–5)
POTASSIUM SERPL-MCNC: 3.6 MMOL/L — SIGNIFICANT CHANGE UP (ref 3.5–5)
POTASSIUM SERPL-MCNC: 3.9 MMOL/L — SIGNIFICANT CHANGE UP (ref 3.5–5)
POTASSIUM SERPL-SCNC: 3.6 MMOL/L — SIGNIFICANT CHANGE UP (ref 3.5–5)
POTASSIUM SERPL-SCNC: 3.6 MMOL/L — SIGNIFICANT CHANGE UP (ref 3.5–5)
POTASSIUM SERPL-SCNC: 3.9 MMOL/L — SIGNIFICANT CHANGE UP (ref 3.5–5)
PROT ?TM UR-MCNC: 143 MG/DL — SIGNIFICANT CHANGE UP
PROT SERPL-MCNC: 4.9 G/DL — LOW (ref 6–8)
PROT/CREAT UR-RTO: 1.8 RATIO — HIGH (ref 0–0.2)
RBC # BLD: 2.34 M/UL — LOW (ref 4.2–5.4)
RBC # BLD: 2.34 M/UL — LOW (ref 4.2–5.4)
RBC # BLD: 2.51 M/UL — LOW (ref 4.2–5.4)
RBC # BLD: 2.85 M/UL — LOW (ref 4.2–5.4)
RBC # FLD: 15.4 % — HIGH (ref 11.5–14.5)
RBC # FLD: 15.5 % — HIGH (ref 11.5–14.5)
RBC # FLD: 16.8 % — HIGH (ref 11.5–14.5)
RETICS #: 156.1 K/UL — HIGH (ref 25–125)
RETICS/RBC NFR: 6.7 % — HIGH (ref 0.5–1.5)
SODIUM SERPL-SCNC: 132 MMOL/L — LOW (ref 135–146)
SODIUM SERPL-SCNC: 135 MMOL/L — SIGNIFICANT CHANGE UP (ref 135–146)
SODIUM SERPL-SCNC: 137 MMOL/L — SIGNIFICANT CHANGE UP (ref 135–146)
SPECIMEN SOURCE: SIGNIFICANT CHANGE UP
TROPONIN T, HIGH SENSITIVITY RESULT: 50 NG/L — HIGH (ref 6–13)
WBC # BLD: 15.85 K/UL — HIGH (ref 4.8–10.8)
WBC # BLD: 17.45 K/UL — HIGH (ref 4.8–10.8)
WBC # BLD: 19.56 K/UL — HIGH (ref 4.8–10.8)
WBC # FLD AUTO: 15.85 K/UL — HIGH (ref 4.8–10.8)
WBC # FLD AUTO: 17.45 K/UL — HIGH (ref 4.8–10.8)
WBC # FLD AUTO: 19.56 K/UL — HIGH (ref 4.8–10.8)

## 2025-05-08 PROCEDURE — 99233 SBSQ HOSP IP/OBS HIGH 50: CPT

## 2025-05-08 PROCEDURE — 99291 CRITICAL CARE FIRST HOUR: CPT

## 2025-05-08 PROCEDURE — 71045 X-RAY EXAM CHEST 1 VIEW: CPT | Mod: 26

## 2025-05-08 PROCEDURE — 90792 PSYCH DIAG EVAL W/MED SRVCS: CPT

## 2025-05-08 PROCEDURE — 99232 SBSQ HOSP IP/OBS MODERATE 35: CPT

## 2025-05-08 PROCEDURE — 36514 APHERESIS PLASMA: CPT

## 2025-05-08 PROCEDURE — 99233 SBSQ HOSP IP/OBS HIGH 50: CPT | Mod: GC

## 2025-05-08 RX ORDER — QUETIAPINE FUMARATE 25 MG/1
25 TABLET ORAL ONCE
Refills: 0 | Status: COMPLETED | OUTPATIENT
Start: 2025-05-08 | End: 2025-05-08

## 2025-05-08 RX ORDER — DEXTROSE 50 % IN WATER 50 %
15 SYRINGE (ML) INTRAVENOUS ONCE
Refills: 0 | Status: DISCONTINUED | OUTPATIENT
Start: 2025-05-08 | End: 2025-05-26

## 2025-05-08 RX ORDER — DEXTROSE 50 % IN WATER 50 %
25 SYRINGE (ML) INTRAVENOUS ONCE
Refills: 0 | Status: DISCONTINUED | OUTPATIENT
Start: 2025-05-08 | End: 2025-05-26

## 2025-05-08 RX ORDER — SODIUM CHLORIDE 9 G/1000ML
1000 INJECTION, SOLUTION INTRAVENOUS
Refills: 0 | Status: DISCONTINUED | OUTPATIENT
Start: 2025-05-08 | End: 2025-05-27

## 2025-05-08 RX ORDER — NIFEDIPINE 30 MG
60 TABLET, EXTENDED RELEASE 24 HR ORAL DAILY
Refills: 0 | Status: DISCONTINUED | OUTPATIENT
Start: 2025-05-08 | End: 2025-05-08

## 2025-05-08 RX ORDER — DEXTROSE 50 % IN WATER 50 %
12.5 SYRINGE (ML) INTRAVENOUS ONCE
Refills: 0 | Status: DISCONTINUED | OUTPATIENT
Start: 2025-05-08 | End: 2025-05-26

## 2025-05-08 RX ORDER — DIPHENHYDRAMINE HCL 12.5MG/5ML
25 ELIXIR ORAL ONCE
Refills: 0 | Status: COMPLETED | OUTPATIENT
Start: 2025-05-08 | End: 2025-05-08

## 2025-05-08 RX ORDER — INSULIN LISPRO 100 U/ML
INJECTION, SOLUTION INTRAVENOUS; SUBCUTANEOUS AT BEDTIME
Refills: 0 | Status: DISCONTINUED | OUTPATIENT
Start: 2025-05-08 | End: 2025-05-26

## 2025-05-08 RX ORDER — INSULIN LISPRO 100 U/ML
INJECTION, SOLUTION INTRAVENOUS; SUBCUTANEOUS
Refills: 0 | Status: DISCONTINUED | OUTPATIENT
Start: 2025-05-08 | End: 2025-05-26

## 2025-05-08 RX ORDER — CARVEDILOL 3.12 MG/1
6.25 TABLET, FILM COATED ORAL EVERY 12 HOURS
Refills: 0 | Status: DISCONTINUED | OUTPATIENT
Start: 2025-05-08 | End: 2025-05-09

## 2025-05-08 RX ORDER — CALCIUM GLUCONATE 20 MG/ML
2 INJECTION, SOLUTION INTRAVENOUS ONCE
Refills: 0 | Status: COMPLETED | OUTPATIENT
Start: 2025-05-08 | End: 2025-05-08

## 2025-05-08 RX ORDER — NIFEDIPINE 30 MG
60 TABLET, EXTENDED RELEASE 24 HR ORAL
Refills: 0 | Status: DISCONTINUED | OUTPATIENT
Start: 2025-05-08 | End: 2025-05-09

## 2025-05-08 RX ADMIN — CARVEDILOL 6.25 MILLIGRAM(S): 3.12 TABLET, FILM COATED ORAL at 10:29

## 2025-05-08 RX ADMIN — INSULIN LISPRO 1: 100 INJECTION, SOLUTION INTRAVENOUS; SUBCUTANEOUS at 21:01

## 2025-05-08 RX ADMIN — CARVEDILOL 6.25 MILLIGRAM(S): 3.12 TABLET, FILM COATED ORAL at 17:05

## 2025-05-08 RX ADMIN — CALCIUM GLUCONATE 200 GRAM(S): 20 INJECTION, SOLUTION INTRAVENOUS at 08:25

## 2025-05-08 RX ADMIN — Medication 60 MILLIGRAM(S): at 20:54

## 2025-05-08 RX ADMIN — METHYLPREDNISOLONE ACETATE 50 MILLIGRAM(S): 80 INJECTION, SUSPENSION INTRA-ARTICULAR; INTRALESIONAL; INTRAMUSCULAR; SOFT TISSUE at 05:23

## 2025-05-08 RX ADMIN — Medication 40 MILLIGRAM(S): at 17:06

## 2025-05-08 RX ADMIN — Medication 25 MILLIGRAM(S): at 03:22

## 2025-05-08 RX ADMIN — Medication 1 APPLICATION(S): at 05:24

## 2025-05-08 RX ADMIN — Medication 60 MILLIGRAM(S): at 08:25

## 2025-05-08 RX ADMIN — QUETIAPINE FUMARATE 25 MILLIGRAM(S): 25 TABLET ORAL at 13:19

## 2025-05-08 RX ADMIN — Medication 40 MILLIGRAM(S): at 05:23

## 2025-05-08 RX ADMIN — Medication 1 UNIT(S)/HR: at 08:25

## 2025-05-08 RX ADMIN — Medication 25 GRAM(S): at 05:23

## 2025-05-08 NOTE — PROGRESS NOTE ADULT - ASSESSMENT
39-year-old female with history of hypertension presenting to ER with 5 days of multiple episodes of nonbloody nonbilious vomiting and diarrhea with associated generalized abdominal pain and distention. Patient states that on Saturday she had acute onset of nausea, vomiting, diarrhea and crampy abdominal pain. She has had 4-5 episodes of non-bloody, non-bilious vomiting per day and 4-5 episodes of black diarrhea per day. She initially thought that she had gastroenteritis, but when her symptoms didn't resolve, she decided come to the ED.     # IMPRESSION  1) Early small bowel ischemia likely 2/2 severe enteritis  possible ttp   2) Hypertensive Emergency  3) Likely UGIB   4) SIRS/Sepsis   5) Uncomplicated Cystitis   5) JOYCE likely pre-renal   6) pancytopenia    RSV infection     # PLAN:     CNS : Avoid CNS depressant.  Delirium Precautions    ENT : Oral Care     Pulmonary : Keep Spo2 > 94% .HOB elevation. Supplemental O2 prn.     Cardiology: F/U Troponin. Continue with nicardipine drip, wean off as tolerated. F/U TTE.   start procardia Xl 60 Q24 hrs   taper off nicardipine   d/c iv fluid     GI : clear liquid as per GI advance as tolerated if ok by gi   follow Gi and G sx   - PPI Q12 hrs   - Trend lactate  - F/U GI PCR   - F/U Bcx   - serial abdominal exams and monitor bowel function    Renal : Trend CMP. Monitor Lytes and replete as needed. Nephro  follow up     ID  d/c abx as per ID an GI   check procal   follow cx     Hem/Onc : Coagulation studies noted.  possible ttp follow work   on steroids   and plasm   monitor h/h closely if stable start heparin SQ   Endo:  Blood glucose Goal : 140-180. insulin drip for now     MSK: Ambulate as tolerated     Code : Full code.      Disp: MICU

## 2025-05-08 NOTE — CONSULT NOTE ADULT - ASSESSMENT
39-year-old female with history of hypertension presenting to ER with 5 days of multiple episodes of nonbloody nonbilious vomiting and diarrhea with associated generalized abdominal pain and distention. Patient states that on Saturday she had acute onset of nausea, vomiting, diarrhea and crampy abdominal pain. She has had 4-5 episodes of non-bloody, non-bilious vomiting per day and 4-5 episodes of black diarrhea per day. She initially thought that she had gastroenteritis, but when her symptoms didn't resolve, she decided come to the ED. She denies any current abdominal pain, nausea or vomiting, fever, chest pain, shortness of breath, clotting disorder, past intra-abdominal surgeries, kidney issues, leg pain or swelling.  Labs significant for WBC 18.72k, Hb 9.0(unknown baseline) , Platelets 86k, Creatinine 3.5(unknown baseline). Lipase 71, UA positive     CTAP with finding of Edematous distal duodenum and proximal jejunal loops with associated dilatation measuring up to 3.4 cm. Associated marked interloop ascites, mesenteric fat stranding, and prominent mesenteric lymph nodes. Mild-to-moderate ascites. Findings concerning for small bowel ischemia.   CT Angio Abdomen and Pelvis w/ IV Cont (05.07.25 @ 04:18): Patent SMA, SMV. Redemonstration of distal duodenal and proximal small bowel with marked inflammatory change. Mural enhancement noted throughout   the small bowel. Considerations include severe enteritis, early ischemia, shock bowel.    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 is being started on nifedipine 60 mg po od. still BP not controlled      # Impression  - HTN emergency   - TTP most likely on the differential. s/p PLEX  - JOYCE. AHMA  - + RSV    # Recs:  - keep in telemetry   - make nifedipine 60 mg po BID + add carvedilol 6.25 mg po BID  - TTE noted for normal LVEF and mild LVH  - TTP treatment as per ICU and oncology team   - keep Hb > 8   - titrate down nicardipine drip 39-year-old female with history of hypertension presenting to ER with 5 days of multiple episodes of nonbloody nonbilious vomiting and diarrhea with associated generalized abdominal pain and distention. Patient states that on Saturday she had acute onset of nausea, vomiting, diarrhea and crampy abdominal pain. She has had 4-5 episodes of non-bloody, non-bilious vomiting per day and 4-5 episodes of black diarrhea per day. She initially thought that she had gastroenteritis, but when her symptoms didn't resolve, she decided come to the ED. She denies any current abdominal pain, nausea or vomiting, fever, chest pain, shortness of breath, clotting disorder, past intra-abdominal surgeries, kidney issues, leg pain or swelling.  Labs significant for WBC 18.72k, Hb 9.0(unknown baseline) , Platelets 86k, Creatinine 3.5(unknown baseline). Lipase 71, UA positive     CTAP with finding of Edematous distal duodenum and proximal jejunal loops with associated dilatation measuring up to 3.4 cm. Associated marked interloop ascites, mesenteric fat stranding, and prominent mesenteric lymph nodes. Mild-to-moderate ascites. Findings concerning for small bowel ischemia.   CT Angio Abdomen and Pelvis w/ IV Cont (05.07.25 @ 04:18): Patent SMA, SMV. Redemonstration of distal duodenal and proximal small bowel with marked inflammatory change. Mural enhancement noted throughout   the small bowel. Considerations include severe enteritis, early ischemia, shock bowel.    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 is being started on nifedipine 60 mg po od. still BP not controlled      # Impression  - HTN emergency   - TTP most likely on the differential. s/p PLEX  - JOYCE. AHMA  - + RSV    # Recs:  - keep on telemetry   - make nifedipine 60 mg po BID + add carvedilol 6.25 mg po BID  - TTE noted for normal LVEF and mild LVH  - TTP treatment as per ICU and oncology team   - keep Hb > 8   - titrate down nicardipine drip

## 2025-05-08 NOTE — BH CONSULTATION LIAISON ASSESSMENT NOTE - NSBHCONSULTFOLLOWAFTERCARE_PSY_A_CORE FT
Per primary team. If amenable patient can be given resources for outpatient psychiatric and psychotherapy services--[facility] ([facility]), [address] [place] [address], phone number : [phone]

## 2025-05-08 NOTE — BH CONSULTATION LIAISON ASSESSMENT NOTE - HPI (INCLUDE ILLNESS QUALITY, SEVERITY, DURATION, TIMING, CONTEXT, MODIFYING FACTORS, ASSOCIATED SIGNS AND SYMPTOMS)
39-year-old female with history of hypertension presenting to ER with 5 days of multiple episodes of nonbloody nonbilious vomiting and diarrhea with associated generalized abdominal pain and distention. Patient states that on Saturday she had acute onset of nausea, vomiting, diarrhea and crampy abdominal pain. She has had 4-5 episodes of non-bloody, non-bilious vomiting per day and 4-5 episodes of black diarrhea per day. She initially thought that she had gastroenteritis, but when her symptoms didn't resolve, she decided come to the ED on 5/6. She denies any current abdominal pain, nausea or vomiting, fever, chest pain, shortness of breath, clotting disorder, past intra-abdominal surgeries, kidney issues, leg pain or swelling. Psychiatry was consulted about depression.    On interview, patient was eating applesauce, calm, pleasant, and cooperative with interview. Patient denied having depressive symptoms. She is not depressed or anhedonic. She denied having suicidal ideation. She endorses she is a "happy" person who wants to live. That's "why I came to the hospital". When she was informed psychiatry was consulted due her to mother's concern about depression. She rolled her eyes and endorsed that she shouldn't be involved in her medical care. She declined outpatient psychiatric services. In terms of clinical course--she denies psychiatric hospitalizations, treatment, or suicide attempts. She is not psychotic or agitated.    Messaged medical resident for purpose of this consult --they placed the consult because the family requested it. Medical resident also concurred that patient denied depression, anhedonia, or suicidal ideation. Concurred that patient does not have active depression. Patient was offered outpatient follow up--but declined.    Patient is future oriented--wanting to go home for Mother's day. Patient has children. She is employed as a home health aid and . No family history of serious mental illness.

## 2025-05-08 NOTE — BH CONSULTATION LIAISON ASSESSMENT NOTE - NSBHCHARTREVIEWVS_PSY_A_CORE FT
Vital Signs Last 24 Hrs  T(C): 36.8 (08 May 2025 16:00), Max: 37.3 (08 May 2025 12:00)  T(F): 98.3 (08 May 2025 16:00), Max: 99.1 (08 May 2025 12:00)  HR: 104 (08 May 2025 20:00) (104 - 133)  BP: --  BP(mean): --  RR: 23 (08 May 2025 20:00) (10 - 48)  SpO2: 94% (08 May 2025 20:00) (91% - 98%)    Parameters below as of 08 May 2025 21:00  Patient On (Oxygen Delivery Method): room air

## 2025-05-08 NOTE — PROGRESS NOTE ADULT - SUBJECTIVE AND OBJECTIVE BOX
[facility]    Home Care Following Endoscopy          Activity:    You have just undergone an endoscopic procedure usually performed with conscious sedation.  Do not work or operate machinery (including a car) for at least 12 hours.      I encourage you to walk and attempt to pass this air as soon as possible.    Diet:    Return to the diet you were on before your procedure but eat lightly for the first 12-24 hours.    Drink plenty of water.    Resume any regular medications unless otherwise advised by your physician.  Please begin any new medication prescribed as a result of your procedure as directed by your physician.     If you had any biopsy or polyp removed please refrain from aspirin or aspirin products for 2 days.    Pain:    You may take Tylenol as needed for pain.  Expected Recovery:    You can expect some mild abdominal fullness and/or discomfort due to the air used to inflate your intestinal tract. It is also normal to have a mild sore throat after upper endoscopy.    Call Your Physician if You Have:    After Colonoscopy:  o Worsening persisting abdominal pain which is worse with activity.  o Fevers (>101 degrees F), chills or shakes.  o Passage of continued blood with bowel movements.   o   Any questions or concerns about your recovery, please call [phone] or after hours [phone] Nurse Advice Line.    Follow-up Care:    You should receive a call or letter with your results within 1 week. Please call if you have not received a notification of your results.    If asked to return to clinic please make an appointment 1 week after your procedure.  Call [phone].     Same-Day Surgery   Adult Discharge Orders & Instructions     For 24 hours after surgery    1. Get plenty of rest.  A responsible adult must stay with you for at least 24 hours after you leave the hospital.   2. Do not drive or use heavy equipment.  If you have weakness or tingling, don't drive or use heavy equipment  Render In Strict Bullet Format?: No until this feeling goes away.  3. Do not drink alcohol.  4. Avoid strenuous or risky activities.  Ask for help when climbing stairs.   5. You may feel lightheaded.  If so, sit for a few minutes before standing.  Have someone help you get up.   6. You may have a slight fever. Call the doctor if your fever is over 100 F (37.7 C) (taken under the tongue) or lasts longer than 24 hours.  7. You may have a dry mouth, a sore throat, muscle aches or trouble sleeping.  These should go away after 24 hours.  8. Do not make important or legal decisions.  Based on the surgery/procedure that you had today, we do not expect that you will have any problems.  However, we want you to know what to do if you have pain, nausea, bleeding,or infection:  To control pain:  Take medicines your physician has prescribed or or over-the counter medicine he or she advises.  Ice packs and periods of rest are often helpful.  For surgery on an arm or leg, raise it on a pillow to ease swelling.  If your pain is not managed with the above methods, contact your physician.  To control nausea:  Take anti-nausea medicine approved by your physician.  Drink clear liquids such as apple juice, ginger ale, broth or 7-Up. Be sure to drink enough fluids.  Move to a regular diet as you feel able.  Rest may also help.  Bleeding:  You may see a little blood on your dressing, about the size of a quarter in the first 24 hours.  If you see this, there is no reason to be alarmed.  However, if this continues to increase in size, apply pressure if able, and notify your physician.  Infection: Please contact your physician if you have any of the following signs:  redness, swelling, heat, increasing pain or foul-smelling drainage at your surgery site, fever or chills.    Call your doctor for any of the followin.  It has been over 8 to 10 hours since surgery and you are still not able to urinate (pass water).    2.  Headache for over 24 hours.        Nurse advice  Detail Level: Zone Gastroenterology progress note:     Patient is a 39y old  Female who presents with a chief complaint of Diarrhea (08 May 2025 07:09)       Admitted on: 05-07-25    We are following the patient for: CT findings of possible bowel ischemia        Interval History:    Pt  s/p plasm for possible TTP         PAST MEDICAL & SURGICAL HISTORY:  HTN (hypertension)          MEDICATIONS  (STANDING):  calcium gluconate IVPB 2 Gram(s) IV Intermittent once  chlorhexidine 2% Cloths 1 Application(s) Topical <User Schedule>  glucagon  Injectable 1 milliGRAM(s) IntraMuscular once  insulin regular Infusion 1 Unit(s)/Hr (1 mL/Hr) IV Continuous <Continuous>  methylPREDNISolone sodium succinate IVPB 1000 milliGRAM(s) IV Intermittent daily  niCARdipine Infusion 5 mG/Hr (25 mL/Hr) IV Continuous <Continuous>  NIFEdipine XL 60 milliGRAM(s) Oral daily  pantoprazole  Injectable 40 milliGRAM(s) IV Push two times a day  potassium chloride  20 mEq/100 mL IVPB 20 milliEquivalent(s) IV Intermittent every 2 hours    MEDICATIONS  (PRN):  melatonin 3 milliGRAM(s) Oral at bedtime PRN Insomnia      Allergies  No Known Allergies      Review of Systems:   Cardiovascular:  No Chest Pain, No Palpitations  Respiratory:  No Cough, No Dyspnea  Gastrointestinal:  As described in HPI  Skin:  No Skin Lesions, No Jaundice  Neuro:  No Syncope, No Dizziness    Physical Examination:  T(C): 36.9 (05-08-25 @ 04:00), Max: 37.1 (05-07-25 @ 08:00)  HR: 114 (05-08-25 @ 06:00) (107 - 129)  BP: 168/87 (05-07-25 @ 08:30) (168/87 - 184/97)  RR: 38 (05-08-25 @ 06:00) (10 - 58)  SpO2: 93% (05-08-25 @ 06:00) (90% - 100%)      05-07-25 @ 07:01  -  05-08-25 @ 07:00  --------------------------------------------------------  IN: 2323.5 mL / OUT: 1750 mL / NET: 573.5 mL        GENERAL: AAOx3, no acute distress.  HEAD:  Atraumatic, Normocephalic  EYES: conjunctiva and sclera clear  NECK: Supple, no JVD or thyromegaly  CHEST/LUNG: Clear to auscultation bilaterally; No wheeze, rhonchi, or rales  HEART: Regular rate and rhythm; normal S1, S2, No murmurs.  ABDOMEN: Soft, nontender, moderately distended; Bowel sounds present  NEUROLOGY: No asterixis or tremor.   SKIN: Intact, no jaundice     Data:                        7.6    15.85 )-----------( 90       ( 08 May 2025 05:20 )             21.3     Hgb trend:  7.6  05-08-25 @ 05:20  8.5  05-08-25 @ 01:00  6.8  05-07-25 @ 18:50  7.2  05-07-25 @ 11:40  8.1  05-07-25 @ 04:40  9.0  05-06-25 @ 22:32      05-07-25 @ 07:01  -  05-08-25 @ 07:00  --------------------------------------------------------  IN: 331 mL      05-08    135  |  97[L]  |  57[H]  ----------------------------<  226[H]  3.6   |  24  |  3.4[H]    Ca    7.4[L]      08 May 2025 05:20  Phos  5.4     05-08  Mg     2.1     05-08    TPro  4.9[L]  /  Alb  3.1[L]  /  TBili  0.7  /  DBili  x   /  AST  17  /  ALT  14  /  AlkPhos  71  05-08    Liver panel trend:  TBili 0.7   /   AST 17   /   ALT 14   /   AlkP 71   /   Tptn 4.9   /   Alb 3.1    /   DBili --      05-08  TBili 0.5   /   AST 18   /   ALT 8   /   AlkP 61   /   Tptn 4.2   /   Alb 2.6    /   DBili --      05-07  TBili 0.7   /   AST 30   /   ALT 10   /   AlkP 63   /   Tptn 4.6   /   Alb 2.8    /   DBili 0.2      05-06      PT/INR - ( 07 May 2025 11:40 )   PT: 10.60 sec;   INR: 0.90 ratio         PTT - ( 07 May 2025 11:40 )  PTT:29.1 sec    Urinalysis with Rflx Culture (collected 07 May 2025 00:55)         Radiology:    < from: CT Angio Abdomen and Pelvis w/ IV Cont (05.07.25 @ 04:18) >  ACC: [id number] EXAM:  CT ANGIO ABD PELV (W)AW IC   ORDERED BY: HILARY TORRE     PROCEDURE DATE:  05/07/2025          INTERPRETATION:  CLINICAL HISTORY: Bowel ischemia.    TECHNIQUE: Multislice helical sections were obtained from the domes of   the diaphragm to the pubic symphysis prior to and following the   intravenous administration of 100 cc of Omnipaque 350 utilizing a CT   angiography protocol. Coronal and sagittal reformatted images as well as   MIP reconstructions were obtained.    COMPARISON: Same-date CT abdomen and pelvis.      FINDINGS:    VASCULATURE: Patent celiac, superior mesenteric, inferior mesenteric,   bilateral renal, and common iliac arteries. No evidence of vascular   dissection or aneurysm. Patent portal vein, SMV.    HEPATOBILIARY: Unchanged.    SPLEEN: Unchanged.    PANCREAS: Unchanged.    ADRENAL GLANDS: Unchanged.    KIDNEYS: Symmetric enhancement. No hydronephrosis.    ABDOMINOPELVIC NODES: Unchanged.    PELVIC ORGANS: Unchanged.    PERITONEUM/MESENTERY/BOWEL: Redemonstration and of distal duodenal and   proximal jejunal wall thickening with interloop ascites, mesenteric fat   stranding, and prominent mesenteric lymph nodes. Dilated jejunal loops   measuring up to 3.4 cm, with gradual tapering distally without evidence   of mechanical obstruction. Mild-to-moderate no ascites. No portal venous   gas, pneumoperitoneum or pneumatosis. Mural enhancement noted throughout   the small bowel.    BONES/SOFT TISSUES: Unchanged.      IMPRESSION:    Patent SMA, SMV. Redemonstration of distal duodenal and proximal small   bowel with marked inflammatory change. Mural enhancement noted throughout   the small bowel. Considerations include severe enteritis, early ischemia,   shock bowel.    --- End of Report ---          RG CHAUDHARY MD; Resident Radiologist  This document has been electronically signed.  TRINO CLAIRE MD; Attending Radiologist  This document has been electronically signed. May  7 2025  5:42AM    < end of copied text >     line: [phone]       Initiate Treatment: ketoconazole 2 % topical cream BID \\nhydrocortisone 2.5 % topical cream BID X 2 weeks then discontinue for 1 week before continuing again

## 2025-05-08 NOTE — BH CONSULTATION LIAISON ASSESSMENT NOTE - NSBHCONSULTRECOMMENDOTHER_PSY_A_CORE FT
-Patient offered melatonin for sleep, but declined.  -Patient offered outpatient psychiatric services, but declined. -Patient offered melatonin for sleep, but declined. Antidepressants not indicated at this time.  -Patient offered outpatient psychiatric services, but declined.  -There is no active treatment goals at this time. Psychiatry will sign off. Please call CL team prior to placing consult if planning to reconsult.

## 2025-05-08 NOTE — PROGRESS NOTE ADULT - SUBJECTIVE AND OBJECTIVE BOX
Patient is a 39y old  Female who presents with a chief complaint of     Over Night Events:  Patient seen and examined.   nicardipine drip   s/p plasm for possible TTP   RSV positive   s/p 1 unit prbc and 2 ffp   ROS:  See HPI    PHYSICAL EXAM    ICU Vital Signs Last 24 Hrs  T(C): 36.9 (08 May 2025 04:00), Max: 37.1 (07 May 2025 08:00)  T(F): 98.4 (08 May 2025 04:00), Max: 98.7 (07 May 2025 08:00)  HR: 114 (08 May 2025 06:00) (107 - 129)  BP: 168/87 (07 May 2025 08:30) (168/87 - 184/97)  BP(mean): 119 (07 May 2025 08:30) (119 - 131)  ABP: 175/87 (08 May 2025 06:00) (133/68 - 262/262)  ABP(mean): 117 (08 May 2025 06:00) (95 - 262)  RR: 38 (08 May 2025 06:00) (10 - 58)  SpO2: 93% (08 May 2025 06:00) (90% - 100%)    O2 Parameters below as of 08 May 2025 06:00  Patient On (Oxygen Delivery Method): nasal cannula  O2 Flow (L/min): 2          General:awake   HEENT:     nicci           Lymph Nodes: NO cervical LN   Lungs: Bilateral BS  Cardiovascular: Regular   Abdomen: Soft, Positive BS  Extremities: No clubbing   Skin: warm   Neurological:  no focal   Musculoskeletal: move all ext     I&O's Detail    07 May 2025 07:01  -  08 May 2025 07:00  --------------------------------------------------------  IN:    dextrose 5% + lactated ringers: 600 mL    IV PiggyBack: 200 mL    Lactated Ringers: 500 mL    NiCARdipine: 162.5 mL    Oral Fluid: 530 mL    PRBCs (Packed Red Blood Cells): 331 mL  Total IN: 2323.5 mL    OUT:    Voided (mL): 1750 mL  Total OUT: 1750 mL    Total NET: 573.5 mL          LABS:                          7.6    15.85 )-----------( 90       ( 08 May 2025 05:20 )             21.3         08 May 2025 05:20    135    |  97     |  57     ----------------------------<  226    3.6     |  24     |  3.4      Ca    7.4        08 May 2025 05:20  Phos  5.4       08 May 2025 05:20  Mg     2.1       08 May 2025 05:20    TPro  4.9    /  Alb  3.1    /  TBili  0.7    /  DBili  x      /  AST  17     /  ALT  14     /  AlkPhos  71     08 May 2025 05:20  Amylase x     lipase x                                                 PT/INR - ( 07 May 2025 11:40 )   PT: 10.60 sec;   INR: 0.90 ratio         PTT - ( 07 May 2025 11:40 )  PTT:29.1 sec                                       Urinalysis Basic - ( 08 May 2025 05:20 )    Color: x / Appearance: x / SG: x / pH: x  Gluc: 226 mg/dL / Ketone: x  / Bili: x / Urobili: x   Blood: x / Protein: x / Nitrite: x   Leuk Esterase: x / RBC: x / WBC x   Sq Epi: x / Non Sq Epi: x / Bacteria: x        Lactate, Blood: 2.0 mmol/L (05-08-25 @ 05:20)  Lactate, Blood: 1.1 mmol/L (05-07-25 @ 04:40)  Lactate, Blood: 0.8 mmol/L (05-06-25 @ 22:32)                                                          Urinalysis with Rflx Culture (collected 07 May 2025 00:55)                                                                                           MEDICATIONS  (STANDING):  chlorhexidine 2% Cloths 1 Application(s) Topical <User Schedule>  dextrose 5%. 1000 milliLiter(s) (50 mL/Hr) IV Continuous <Continuous>  dextrose 5%. 1000 milliLiter(s) (100 mL/Hr) IV Continuous <Continuous>  dextrose 50% Injectable 25 Gram(s) IV Push once  dextrose 50% Injectable 12.5 Gram(s) IV Push once  dextrose 50% Injectable 25 Gram(s) IV Push once  glucagon  Injectable 1 milliGRAM(s) IntraMuscular once  insulin lispro (ADMELOG) corrective regimen sliding scale   SubCutaneous three times a day before meals  lactated ringers. 1000 milliLiter(s) (50 mL/Hr) IV Continuous <Continuous>  methylPREDNISolone sodium succinate IVPB 1000 milliGRAM(s) IV Intermittent daily  niCARdipine Infusion 5 mG/Hr (25 mL/Hr) IV Continuous <Continuous>  pantoprazole  Injectable 40 milliGRAM(s) IV Push two times a day  piperacillin/tazobactam IVPB.. 3.375 Gram(s) IV Intermittent every 12 hours    MEDICATIONS  (PRN):  dextrose Oral Gel 15 Gram(s) Oral once PRN Blood Glucose LESS THAN 70 milliGRAM(s)/deciliter  melatonin 3 milliGRAM(s) Oral at bedtime PRN Insomnia          Xrays:                                                                       ECHO:  [facility] ICU:

## 2025-05-08 NOTE — CONSULT NOTE ADULT - SUBJECTIVE AND OBJECTIVE BOX
FABI FRANCISCO  39y, Female  Allergy: No Known Allergies      CHIEF COMPLAINT:     LOS  1d    HPI:  39-year-old female with history of hypertension presenting to ER with 5 days of multiple episodes of nonbloody nonbilious vomiting and diarrhea with associated generalized abdominal pain and distention. Patient states that on Saturday she had acute onset of nausea, vomiting, diarrhea and crampy abdominal pain. She has had 4-5 episodes of non-bloody, non-bilious vomiting per day and 4-5 episodes of black diarrhea per day. She initially thought that she had gastroenteritis, but when her symptoms didn't resolve, she decided come to the ED. She denies any current abdominal pain, nausea or vomiting, fever, chest pain, shortness of breath, clotting disorder, past intra-abdominal surgeries, kidney issues, leg pain or swelling.     Labs significant for WBC 18.72k, Hb 9.0(unknown baseline) , Platelets 86k, Creatinine 3.5(unknown baseline). Lipase 71, UA positive      CTAP with finding of Edematous distal duodenum and proximal jejunal loops with associated dilatation measuring up to 3.4 cm. Associated marked interloop ascites, mesenteric fat stranding, and prominent mesenteric lymph nodes. Mild-to-moderate ascites. Findings concerning for small bowel ischemia.     CT Angio Abdomen and Pelvis w/ IV Cont (05.07.25 @ 04:18): Patent SMA, SMV. Redemonstration of distal duodenal and proximal small bowel with marked inflammatory change. Mural enhancement noted throughout   the small bowel. Considerations include severe enteritis, early ischemia, shock bowel.    #ED Vitals:   T(C): 37.2 (05-07-25 @ 05:46), Max: 37.3 (05-06-25 @ 23:44)  HR: 112 (05-07-25 @ 05:46) (97 - 118)  BP: 151/86 (05-07-25 @ 03:39) (151/86 - 238/135)  RR: 17 (05-07-25 @ 05:46) (17 - 19)  SpO2: 99% (05-07-25 @ 05:46) (96% - 100%)    # ED Course:   Zosyn, LR 1L, NS 1L, Zofran, Morphine, Pantoprazole, Reglan, Metoprolol 10 IV   Started on Nicardipine drip   Evaluated by surgery >>> No acute surgical intervention     The patient is being admitted to MICU for the further management.  (07 May 2025 06:03)      INFECTIOUS DISEASE HISTORY:  History as above.    PAST MEDICAL & SURGICAL HISTORY:  HTN (hypertension)          FAMILY HISTORY  Family history reviewed and non-contributory      SOCIAL HISTORY  Social History:        ROS  General: Denies rigors, nightsweats  HEENT: Denies headache, rhinorrhea, sore throat, eye pain  CV: Denies CP, palpitations  PULM: Denies wheezing, hemoptysis  GI: Denies hematemesis, hematochezia, melena  : Denies discharge, hematuria  MSK: Denies arthralgias, myalgias  SKIN: Denies rash, lesions  NEURO: Denies paresthesias, weakness  PSYCH: Denies depression, anxiety    VITALS:  T(F): 98.4, Max: 98.7 (05-07-25 @ 08:00)  HR: 114  BP: 168/87  RR: 38Vital Signs Last 24 Hrs  T(C): 36.9 (08 May 2025 04:00), Max: 37.1 (07 May 2025 08:00)  T(F): 98.4 (08 May 2025 04:00), Max: 98.7 (07 May 2025 08:00)  HR: 114 (08 May 2025 06:00) (107 - 129)  BP: 168/87 (07 May 2025 08:30) (168/87 - 184/97)  BP(mean): 119 (07 May 2025 08:30) (119 - 131)  RR: 38 (08 May 2025 06:00) (10 - 58)  SpO2: 93% (08 May 2025 06:00) (90% - 100%)    Parameters below as of 08 May 2025 06:00  Patient On (Oxygen Delivery Method): nasal cannula  O2 Flow (L/min): 2      PHYSICAL EXAM:  Gen: NAD, resting in bed  HEENT: Normocephalic, atraumatic  Neck: supple, no lymphadenopathy  CV: Regular rate & regular rhythm  Lungs: decreased BS at bases, no fremitus  Abdomen: Soft, BS present  Ext: Warm, well perfused  Neuro: non focal, awake  Skin: no rash, no erythema  Lines: no phlebitis    TESTS & MEASUREMENTS:                        7.6    15.85 )-----------( 90       ( 08 May 2025 05:20 )             21.3     05-08    135  |  97[L]  |  57[H]  ----------------------------<  226[H]  3.6   |  24  |  3.4[H]    Ca    7.4[L]      08 May 2025 05:20  Phos  5.4     05-08  Mg     2.1     05-08    TPro  4.9[L]  /  Alb  3.1[L]  /  TBili  0.7  /  DBili  x   /  AST  17  /  ALT  14  /  AlkPhos  71  05-08      LIVER FUNCTIONS - ( 08 May 2025 05:20 )  Alb: 3.1 g/dL / Pro: 4.9 g/dL / ALK PHOS: 71 U/L / ALT: 14 U/L / AST: 17 U/L / GGT: x           Urinalysis Basic - ( 08 May 2025 05:20 )    Color: x / Appearance: x / SG: x / pH: x  Gluc: 226 mg/dL / Ketone: x  / Bili: x / Urobili: x   Blood: x / Protein: x / Nitrite: x   Leuk Esterase: x / RBC: x / WBC x   Sq Epi: x / Non Sq Epi: x / Bacteria: x        Urinalysis with Rflx Culture (collected 05-07-25 @ 00:55)        Lactate, Blood: 2.0 mmol/L (05-08-25 @ 05:20)  Lactate, Blood: 1.1 mmol/L (05-07-25 @ 04:40)  Blood Gas Venous - Lactate: 0.6 mmol/L (05-07-25 @ 00:08)  Lactate, Blood: 0.8 mmol/L (05-06-25 @ 22:32)      INFECTIOUS DISEASES TESTING  HIV-1/2 Combo Result: Nonreact (05-07-25 @ 11:40)  Hepatitis C Virus Interpretation: Nonreact (05-07-25 @ 11:40)  Hepatitis B Surface Antigen: Nonreact (05-07-25 @ 11:40)  MRSA PCR Result.: Negative (05-07-25 @ 07:35)      RADIOLOGY & ADDITIONAL TESTS:  I have personally reviewed the last Chest xray  CXR      CT  CT Abdomen and Pelvis No Cont:   ACC: [id number] EXAM:  CT ABDOMEN AND PELVIS   ORDERED BY: STACY VELASQUEZ     PROCEDURE DATE:  05/07/2025          INTERPRETATION:  CLINICAL STATEMENT: Abdominal pain.    TECHNIQUE: Contiguous axial CT images were obtained from the lower chest   to the pubic symphysis without intravenous contrast. Oral contrast was   not administered. Reformatted images in the coronal and sagittal planes   were acquired.    COMPARISON: None.    FINDINGS:    LOWER CHEST: Cardiomegaly. Trace pericardial effusion. Lung groundglass   opacities, which could represent pulmonary edema in the appropriate   clinical setting.    HEPATOBILIARY: Unremarkable.    SPLEEN: Unremarkable.    PANCREAS: Unremarkable.    ADRENAL GLANDS: Unremarkable.    KIDNEYS: No hydronephrosis or radiopaque calculus.    ABDOMINOPELVIC NODES: Unremarkable.    PELVIC ORGANS: Unremarkable.    PERITONEUM/MESENTERY/BOWEL: Distal duodenal and proximal jejunal wall   thickening with interloop ascites, mesenteric fat stranding, and   prominent mesenteric lymph nodes. Dilated jejunal loops measuring up to   3.4 cm. Mild-to-moderate no ascites. No portal venous gas,   pneumoperitoneum or pneumatosis.    BONES/SOFT TISSUES: Small fat-containing umbilical hernia. Osseous   structures unremarkable.    OTHER: Scattered atherosclerotic calcifications of the infrarenal   abdominal aorta.      IMPRESSION:    Edematous distal duodenum and proximal jejunal loops with associated   dilatation measuring up to 3.4 cm. Associated marked interloop ascites,   mesenteric fat stranding, and prominent mesenteric lymph nodes.   Mild-to-moderate ascites. Findings concerning for small bowel ischemia.    No portal venous gas, pneumatosis, or pneumoperitoneum.    Communication: The summary of above findings were discussed with readback   confirmation with [id number] by radiology resident Kimberly Hoover MD at   2:20 AM on 5/7/2025.    --- End of Report ---          KIMBERLY HOOVER MD; Resident Radiologist  This document has been electronically signed.  TRINO CLAIRE MD; Attending Radiologist  This document has been electronically signed. May  7 2025  2:27AM (05-07-25 @ 01:49)      CARDIOLOGY TESTING  12 Lead ECG:   Ventricular Rate 93 BPM    Atrial Rate 93 BPM    P-R Interval 136 ms    QRS Duration 84 ms    Q-T Interval 412 ms    QTC Calculation(Bazett) 512 ms    P Axis 54 degrees    R Axis 24 degrees    T Axis 180 degrees    Diagnosis Line Normal sinus rhythm  Left ventricular hypertrophy with repolarization abnormality ( R in aVL ,   Sokolow-Vaca , Romhilt-Griffith )  Prolonged QT  Abnormal ECG    Confirmed by Daniel Whalen ([id number]) on 5/7/2025 9:25:18 AM (05-07-25 @ 01:57)      MEDICATIONS  chlorhexidine 2% Cloths 1 Topical <User Schedule>  dextrose 5%. 1000 IV Continuous <Continuous>  dextrose 5%. 1000 IV Continuous <Continuous>  dextrose 50% Injectable 25 IV Push once  dextrose 50% Injectable 12.5 IV Push once  dextrose 50% Injectable 25 IV Push once  glucagon  Injectable 1 IntraMuscular once  insulin lispro (ADMELOG) corrective regimen sliding scale  SubCutaneous three times a day before meals  lactated ringers. 1000 IV Continuous <Continuous>  methylPREDNISolone sodium succinate IVPB 1000 IV Intermittent daily  niCARdipine Infusion 5 IV Continuous <Continuous>  pantoprazole  Injectable 40 IV Push two times a day  piperacillin/tazobactam IVPB.. 3.375 IV Intermittent every 12 hours      Weight  Weight (kg): 65.8 (05-07-25 @ 06:30)    ANTIBIOTICS:  piperacillin/tazobactam IVPB.. 3.375 Gram(s) IV Intermittent every 12 hours      ALLERGIES:  No Known Allergies

## 2025-05-08 NOTE — BH CONSULTATION LIAISON ASSESSMENT NOTE - NSBHREFERDETAILS_PSY_A_CORE_FT
admitted for hypertensive emergency w/ suspicion for TTP. Has Hx of depression per mother (not on any medications at the moment). Agitated and refusing meds. R/O steroid-induced psychosis or worsening depressive symptoms

## 2025-05-08 NOTE — PROGRESS NOTE ADULT - ASSESSMENT
39-year-old female with history of hypertension presenting to ER with 5 days of multiple episodes of nonbloody nonbilious vomiting and diarrhea with associated generalized abdominal pain and distention    # HTN   # JOYCE rule out ATN   # proteinuria / hematuria   # thrombocytopenia     - picture above can be due to malignant HTN / giving TTP like picture   - DD includes TTP / HUS due to enteric infection / lupus related GN   - hem onc appreciated sp plasmapheresis yesterday / following up / platelets up  - check c3 c4 JOSE ANTONIO DsDNA ADAMTS 13 UPCR   - neg hepatitis and HIV profile anti GBM  - check Urine tox screen   - continue nicardipine drip start procardia xl 60 an dlabetalol 200 mg po q8h   - appreciate  ID eval  - creat has been stable no need for RRT   - follow electrolytes with plasmapheresis    renal team will follow

## 2025-05-08 NOTE — CHART NOTE - NSCHARTNOTEFT_GEN_A_CORE
Again, patient refusing to go for CTH despite maximal efforts in explaining her the risks of not undergoing it. Patient aware and understanding the risks of not undergoing the CTH.

## 2025-05-08 NOTE — BH CONSULTATION LIAISON ASSESSMENT NOTE - RISK ASSESSMENT
Risk: reported history of depression  Protective: no active depression/psychosis/antoine, not suicidal, limited access to lethal means, employed, has children, family support

## 2025-05-08 NOTE — BH CONSULTATION LIAISON ASSESSMENT NOTE - CURRENT MEDICATION
MEDICATIONS  (STANDING):  carvedilol 6.25 milliGRAM(s) Oral every 12 hours  chlorhexidine 2% Cloths 1 Application(s) Topical <User Schedule>  dextrose 5%. 1000 milliLiter(s) (50 mL/Hr) IV Continuous <Continuous>  dextrose 5%. 1000 milliLiter(s) (100 mL/Hr) IV Continuous <Continuous>  dextrose 50% Injectable 25 Gram(s) IV Push once  dextrose 50% Injectable 25 Gram(s) IV Push once  dextrose 50% Injectable 12.5 Gram(s) IV Push once  glucagon  Injectable 1 milliGRAM(s) IntraMuscular once  insulin lispro (ADMELOG) corrective regimen sliding scale   SubCutaneous three times a day before meals  insulin lispro (ADMELOG) corrective regimen sliding scale   SubCutaneous at bedtime  methylPREDNISolone sodium succinate IVPB 1000 milliGRAM(s) IV Intermittent daily  niCARdipine Infusion 5 mG/Hr (25 mL/Hr) IV Continuous <Continuous>  NIFEdipine XL 60 milliGRAM(s) Oral two times a day  pantoprazole  Injectable 40 milliGRAM(s) IV Push two times a day    MEDICATIONS  (PRN):  dextrose Oral Gel 15 Gram(s) Oral once PRN Blood Glucose LESS THAN 70 milliGRAM(s)/deciliter  melatonin 3 milliGRAM(s) Oral at bedtime PRN Insomnia

## 2025-05-08 NOTE — PROGRESS NOTE ADULT - SUBJECTIVE AND OBJECTIVE BOX
Nephrology progress note    THIS IS AN INCOMPLETE NOTE . FULL NOTE TO FOLLOW SHORTLY    Patient is seen and examined, events over the last 24 h noted .    Allergies:  No Known Allergies    Hospital Medications:   MEDICATIONS  (STANDING):  chlorhexidine 2% Cloths 1 Application(s) Topical <User Schedule>  glucagon  Injectable 1 milliGRAM(s) IntraMuscular once  insulin regular Infusion 1 Unit(s)/Hr (1 mL/Hr) IV Continuous <Continuous>  methylPREDNISolone sodium succinate IVPB 1000 milliGRAM(s) IV Intermittent daily  niCARdipine Infusion 5 mG/Hr (25 mL/Hr) IV Continuous <Continuous>  NIFEdipine XL 60 milliGRAM(s) Oral daily  pantoprazole  Injectable 40 milliGRAM(s) IV Push two times a day  potassium chloride  20 mEq/100 mL IVPB 20 milliEquivalent(s) IV Intermittent every 2 hours        VITALS:  T(F): 98.4 (05-08-25 @ 04:00), Max: 98.7 (05-07-25 @ 12:00)  HR: 114 (05-08-25 @ 06:00)  BP: --  RR: 38 (05-08-25 @ 06:00)  SpO2: 93% (05-08-25 @ 06:00)  Wt(kg): --    05-07 @ 07:01  -  05-08 @ 07:00  --------------------------------------------------------  IN: 2323.5 mL / OUT: 1750 mL / NET: 573.5 mL          PHYSICAL EXAM:  Constitutional: NAD  HEENT: anicteric sclera, oropharynx clear, MMM  Neck: No JVD  Respiratory: CTAB, no wheezes, rales or rhonchi  Cardiovascular: S1, S2, RRR  Gastrointestinal: BS+, soft, NT/ND  Extremities: No cyanosis or clubbing. No peripheral edema  :  No wick.   Skin: No rashes    LABS:  05-08    135  |  97[L]  |  57[H]  ----------------------------<  226[H]  3.6   |  24  |  3.4[H]    Ca    7.4[L]      08 May 2025 05:20  Phos  5.4     05-08  Mg     2.1     05-08    TPro  4.9[L]  /  Alb  3.1[L]  /  TBili  0.7  /  DBili      /  AST  17  /  ALT  14  /  AlkPhos  71  05-08                          7.6    15.85 )-----------( 90       ( 08 May 2025 05:20 )             21.3       Urine Studies:  Urinalysis Basic - ( 08 May 2025 05:20 )    Color:  / Appearance:  / SG:  / pH:   Gluc: 226 mg/dL / Ketone:   / Bili:  / Urobili:    Blood:  / Protein:  / Nitrite:    Leuk Esterase:  / RBC:  / WBC    Sq Epi:  / Non Sq Epi:  / Bacteria:           Iron 18, TIBC 166, %sat 11      [05-07-25 @ 11:40]  Ferritin 362      [05-07-25 @ 11:40]  TSH 2.05      [05-07-25 @ 11:40]    HBsAg Nonreact      [05-07-25 @ 11:40]  HCV 0.13, Nonreact      [05-07-25 @ 11:40]  HIV Nonreact      [05-07-25 @ 11:40]    anti-GBM <0.2      [05-07-25 @ 11:40]      RADIOLOGY & ADDITIONAL STUDIES:   Nephrology progress note    Patient is seen and examined, events over the last 24 h noted .  Lying in bed   sp plasmapheresis yesterday     Allergies:  No Known Allergies    Hospital Medications:   MEDICATIONS  (STANDING):    glucagon  Injectable 1 milliGRAM(s) IntraMuscular once  insulin regular Infusion 1 Unit(s)/Hr (1 mL/Hr) IV Continuous <Continuous>  methylPREDNISolone sodium succinate IVPB 1000 milliGRAM(s) IV Intermittent daily  niCARdipine Infusion 5 mG/Hr (25 mL/Hr) IV Continuous <Continuous>  NIFEdipine XL 60 milliGRAM(s) Oral daily  pantoprazole  Injectable 40 milliGRAM(s) IV Push two times a day  potassium chloride  20 mEq/100 mL IVPB 20 milliEquivalent(s) IV Intermittent every 2 hours        VITALS:  T(F): 98.4 (05-08-25 @ 04:00), Max: 98.7 (05-07-25 @ 12:00)  HR: 114 (05-08-25 @ 06:00)  BP: --  RR: 38 (05-08-25 @ 06:00)  SpO2: 93% (05-08-25 @ 06:00)      05-07 @ 07:01  -  05-08 @ 07:00  --------------------------------------------------------  IN: 2323.5 mL / OUT: 1750 mL / NET: 573.5 mL          PHYSICAL EXAM:  Constitutional: NAD  Respiratory: CTAB, no wheezes, rales or rhonchi  Cardiovascular: S1, S2, RRR  Gastrointestinal: BS+, soft, NT/ND  Extremities: No cyanosis or clubbing. No peripheral edema  Skin: No rashes    LABS:  05-08    135  |  97[L]  |  57[H]  ----------------------------<  226[H]  3.6   |  24  |  3.4[H]  Creatinine Trend: 3.4<--, 3.3<--, 3.0<--, 3.5<--  Ca    7.4[L]      08 May 2025 05:20  Phos  5.4     05-08  Mg     2.1     05-08    TPro  4.9[L]  /  Alb  3.1[L]  /  TBili  0.7  /  DBili      /  AST  17  /  ALT  14  /  AlkPhos  71  05-08                          7.6    15.85 )-----------( 90       ( 08 May 2025 05:20 )             21.3       Urine Studies:  Urinalysis Basic - ( 08 May 2025 05:20 )    Color:  / Appearance:  / SG:  / pH:   Gluc: 226 mg/dL / Ketone:   / Bili:  / Urobili:    Blood:  / Protein:  / Nitrite:    Leuk Esterase:  / RBC:  / WBC    Sq Epi:  / Non Sq Epi:  / Bacteria:           Iron 18, TIBC 166, %sat 11      [05-07-25 @ 11:40]  Ferritin 362      [05-07-25 @ 11:40]  TSH 2.05      [05-07-25 @ 11:40]    HBsAg Nonreact      [05-07-25 @ 11:40]  HCV 0.13, Nonreact      [05-07-25 @ 11:40]  HIV Nonreact      [05-07-25 @ 11:40]    anti-GBM <0.2      [05-07-25 @ 11:40]      RADIOLOGY & ADDITIONAL STUDIES:

## 2025-05-08 NOTE — PROGRESS NOTE ADULT - ASSESSMENT
39-year-old female with history of hypertension presenting to ER with 5 days of multiple episodes of nonbloody nonbilious vomiting and diarrhea with associated generalized abdominal pain and distention. GI consulted for CT findings.         # CT findings concerning for possible small bowel ischemia DDX :malignant HTN / TTP/ HUS   # Anemia (hemolytic?) and thrombocytopenia   # Hypoalbuminemia   # JOYCE   On admission hypertensive and tachycardic, no fever, no change in mental status   Hgb 9 on admission < 8.1   PLT 86< 70   WBC 18k< 20 K   Lactate normal   CTA  AP Edematous distal duodenum and proximal jejunal loops with associated dilatation measuring up to 3.4 cm. Associated marked interloop ascites,   mesenteric fat stranding, and prominent mesenteric lymph nodes.   Mild-to-moderate ascites. Findings concerning for small bowel ischemia.  Patent SMA and SMV   No portal venous gas, pneumatosis, or pneumoperitoneum.  As per surgery no current indication for surgical intervention given no concern of bowel ischemia on physical exam       Plan   - Clear liquid diet and can advance as tolerated as long as passing BMs   - PPI IV BID for now   - Monitor and record BMs   - Trend lactate   - hold IV ABx in light of possible HUS/TTP  - Heme onc , infectious, and nephrology eval appreciated  - check GI pcr and c diff  - supp care per MICU team  - no endoscopic intervention at this time  - we will follow closely     39-year-old female with history of hypertension presenting to ER with 5 days of multiple episodes of nonbloody nonbilious vomiting and diarrhea with associated generalized abdominal pain and distention. GI consulted for CT findings.         # CT findings concerning for possible small bowel ischemia DDX :malignant HTN / TTP/ HUS   # Anemia (hemolytic?) and thrombocytopenia   # Hypoalbuminemia   # JOYCE   On admission hypertensive and tachycardic, no fever, no change in mental status   Hgb 9 on admission < 8.1   PLT 86< 70   WBC 18k< 20 K   Lactate normal   CTA  AP Edematous distal duodenum and proximal jejunal loops with associated dilatation measuring up to 3.4 cm. Associated marked interloop ascites,   mesenteric fat stranding, and prominent mesenteric lymph nodes.   Mild-to-moderate ascites. Findings concerning for small bowel ischemia.  Patent SMA and SMV   No portal venous gas, pneumatosis, or pneumoperitoneum.  As per surgery no current indication for surgical intervention given no concern of bowel ischemia on physical exam       Plan   - Clear liquid diet and can advance as tolerated as long as passing BMs   - may switch to PO PPI BID for now  - Monitor and record BMs   - Trend lactate   - hold IV ABx in light of possible HUS/TTP  - Heme onc , infectious, and nephrology eval appreciated  - check GI pcr and c diff  - supp care per MICU team  - no endoscopic intervention at this time  - we will follow closely

## 2025-05-08 NOTE — CONSULT NOTE ADULT - ASSESSMENT
ASSESSMENT  39-year-old female with history of hypertension presenting to ER with 5 days of multiple episodes of nonbloody nonbilious vomiting and diarrhea with associated generalized abdominal pain and distention.    IMPRESSION  #Suspected Infectious Enteritis   - CT Abdomen and Pelvis No Cont (05.07.25 @ 01:49): Edematous distal duodenum and proximal jejunal loops with associated  dilatation measuring up to 3.4 cm. Associated marked interloop ascites,   mesenteric fat stranding, and prominent mesenteric lymph nodes.  Mild-to-moderate ascites. Findings concerning for small bowel ischemia. No portal venous gas, pneumatosis, or pneumoperitoneum.  - CT Angio Abdomen and Pelvis w/ IV Cont (05.07.25 @ 04:18) > Patent SMA, SMV. Redemonstration of distal duodenal and proximal small  bowel with marked inflammatory change. Mural enhancement noted throughout   the small bowel. Considerations include severe enteritis, early ischemia,  shock bowel.    #JOYCE with Proteinuria   #Anemia/THrombocytopenia - normal T bili, normal haptoglobin     #Obesity BMI (kg/m2): 27.4  #Abx allergy: No Known Allergies    RECOMMENDATIONS  This is a preliminary incomplete pended note, all final recommendations to follow after interview and examination of the patient.    Please call or message on Microsoft Teams if with any questions.  Spectra [phone]     ASSESSMENT  39-year-old female with history of hypertension presenting to ER with 5 days of multiple episodes of nonbloody nonbilious vomiting and diarrhea with associated generalized abdominal pain and distention.    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 Associated marked interloop ascites,   mesenteric fat stranding, and prominent mesenteric lymph nodes.  Mild-to-moderate ascites. Findings concerning for small bowel ischemia. No portal venous gas, pneumatosis, or pneumoperitoneum.  - CT Angio Abdomen and Pelvis w/ IV Cont (05.07.25 @ 04:18) > Patent SMA, SMV. Redemonstration of distal duodenal and proximal small  bowel with marked inflammatory change. Mural enhancement noted throughout   the small bowel. Considerations include severe enteritis, early ischemia,  shock bowel.    #Hypertensive emergency     #JOYCE with Proteinuria   #Anemia/THrombocytopenia     Monitor labs.  Send [address] 13, complement (C3, C4, CH50), Steve, Serial troponin, hep B, hep C, JOSE ANTONIO, Cardiolipin antibodies).  Start methylprednisolone IV daily for x 3 days.  Initiate plasma exchange ASAP daily (FFP transfusion if PEX is delayed).  Cardiology follow-up for management of hypertensive emergency.  Ultrasound Doppler, CT head without contrast. Hematology will follow.     #Obesity BMI (kg/m2): 27.4  #Abx allergy: No Known Allergies    RECOMMENDATIONS  - Abdominal exam non-tender and denies diarrhea currently -- if with diarrhea, please send for GI PCR and Shiga toxin   - agree with holding antibiotics   - trend leukocytosis and monitor H/H and platelets -- heme-onc on board    Please call or message on Microsoft Teams if with any questions.  Spectra [phone]     ASSESSMENT  39-year-old female with history of hypertension presenting to ER with 5 days of multiple episodes of nonbloody nonbilious vomiting and diarrhea with associated generalized abdominal pain and distention.    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 Associated marked interloop ascites,   mesenteric fat stranding, and prominent mesenteric lymph nodes.  Mild-to-moderate ascites. Findings concerning for small bowel ischemia. No portal venous gas, pneumatosis, or pneumoperitoneum.  - CT Angio Abdomen and Pelvis w/ IV Cont (05.07.25 @ 04:18) > Patent SMA, SMV. Redemonstration of distal duodenal and proximal small  bowel with marked inflammatory change. Mural enhancement noted throughout   the small bowel. Considerations include severe enteritis, early ischemia,  shock bowel.    #Hypertensive emergency     #JOYCE with Proteinuria   #Anemia/THrombocytopenia   #Pyuria without urinary symptoms    Monitor labs.  Send [address] 13, complement (C3, C4, CH50), Harvey, Serial troponin, hep B, hep C, JOSE ANTONIO, Cardiolipin antibodies).  Start methylprednisolone IV daily for x 3 days.  Initiate plasma exchange ASAP daily (FFP transfusion if PEX is delayed).  Cardiology follow-up for management of hypertensive emergency.  Ultrasound Doppler, CT head without contrast. Hematology will follow.     #Obesity BMI (kg/m2): 27.4  #Abx allergy: No Known Allergies    RECOMMENDATIONS  - Abdominal exam non-tender and denies diarrhea currently -- if with diarrhea, please send for GI PCR and Shiga toxin   - agree with holding antibiotics   - trend leukocytosis and monitor H/H and platelets -- heme-onc on board  - follow-up [id number], harvey test, hep panel   - follow-up blood cx    Please call or message on Microsoft Teams if with any questions.  Spectra [phone]     ASSESSMENT  39-year-old female with history of hypertension presenting to ER with 5 days of multiple episodes of nonbloody nonbilious vomiting and diarrhea with associated generalized abdominal pain and distention.    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 Associated marked interloop ascites,   mesenteric fat stranding, and prominent mesenteric lymph nodes.  Mild-to-moderate ascites. Findings concerning for small bowel ischemia. No portal venous gas, pneumatosis, or pneumoperitoneum.  - CT Angio Abdomen and Pelvis w/ IV Cont (05.07.25 @ 04:18) > Patent SMA, SMV. Redemonstration of distal duodenal and proximal small  bowel with marked inflammatory change. Mural enhancement noted throughout   the small bowel. Considerations include severe enteritis, early ischemia,  shock bowel.  - s/p Plasmapharesis 5/7     #Hypertensive emergency     #JOYCE with Proteinuria   #Anemia/THrombocytopenia   #Pyuria without urinary symptoms  #RSV positive - minimal respiratory symptoms     Monitor labs.  Send [address] 13, complement (C3, C4, CH50), Harvey, Serial troponin, hep B, hep C, JOSE ANTONIO, Cardiolipin antibodies).  Start methylprednisolone IV daily for x 3 days.  Initiate plasma exchange ASAP daily (FFP transfusion if PEX is delayed).  Cardiology follow-up for management of hypertensive emergency.  Ultrasound Doppler, CT head without contrast. Hematology will follow.     #Obesity BMI (kg/m2): 27.4  #Abx allergy: No Known Allergies    RECOMMENDATIONS  - Abdominal exam non-tender and denies diarrhea currently -- if with diarrhea, please send for GI PCR and Shiga toxin   - agree with holding antibiotics   - trend leukocytosis and monitor H/H and platelets -- heme-onc on board  - follow-up [id number], harvey test, hep panel   - follow-up blood cx  - supportive care for RSV    Please call or message on Microsoft Teams if with any questions.  Spectra [phone]

## 2025-05-08 NOTE — BH CONSULTATION LIAISON ASSESSMENT NOTE - SUMMARY
39-year-old female with history of hypertension presenting to ER with 5 days of multiple episodes of nonbloody nonbilious vomiting and diarrhea with associated generalized abdominal pain and distention. Patient states that on Saturday she had acute onset of nausea, vomiting, diarrhea and crampy abdominal pain. She has had 4-5 episodes of non-bloody, non-bilious vomiting per day and 4-5 episodes of black diarrhea per day. She initially thought that she had gastroenteritis, but when her symptoms didn't resolve, she decided come to the ED on 5/6. She denies any current abdominal pain, nausea or vomiting, fever, chest pain, shortness of breath, clotting disorder, past intra-abdominal surgeries, kidney issues, leg pain or swelling. Psychiatry was consulted about depression.    Per chart review, patient may have been delirious during hospital course, but is currently alert and oriented x 4 without alteration in alertness. Patient is currently not delirious.    Patient is not actively having a depressive episode. No suicidal ideation/violence/disorganization. Medical resident also concurred patient was not actively endorsing depressive symptoms. There's no indication for IPP. Although patient's mother is requesting a psychiatric evaluation--there is no treatment indicated at this time as patient does not acutely need psychiatric interventions. Patient was offered outpatient resources, but declined.

## 2025-05-08 NOTE — CONSULT NOTE ADULT - SUBJECTIVE AND OBJECTIVE BOX
HPI:  39-year-old female with history of hypertension presenting to ER with 5 days of multiple episodes of nonbloody nonbilious vomiting and diarrhea with associated generalized abdominal pain and distention. Patient states that on Saturday she had acute onset of nausea, vomiting, diarrhea and crampy abdominal pain. She has had 4-5 episodes of non-bloody, non-bilious vomiting per day and 4-5 episodes of black diarrhea per day. She initially thought that she had gastroenteritis, but when her symptoms didn't resolve, she decided come to the ED. She denies any current abdominal pain, nausea or vomiting, fever, chest pain, shortness of breath, clotting disorder, past intra-abdominal surgeries, kidney issues, leg pain or swelling.  Labs significant for WBC 18.72k, Hb 9.0(unknown baseline) , Platelets 86k, Creatinine 3.5(unknown baseline). Lipase 71, UA positive     CTAP with finding of Edematous distal duodenum and proximal jejunal loops with associated dilatation measuring up to 3.4 cm. Associated marked interloop ascites, mesenteric fat stranding, and prominent mesenteric lymph nodes. Mild-to-moderate ascites. Findings concerning for small bowel ischemia.   CT Angio Abdomen and Pelvis w/ IV Cont (05.07.25 @ 04:18): Patent SMA, SMV. Redemonstration of distal duodenal and proximal small bowel with marked inflammatory change. Mural enhancement noted throughout   the small bowel. Considerations include severe enteritis, early ischemia, shock bowel.    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 is being started on nifedipine 60 mg po od. still BP not controlled        PAST MEDICAL & SURGICAL HISTORY  HTN (hypertension)        FAMILY HISTORY:  FAMILY HISTORY:      SOCIAL HISTORY:  []smoker  []Alcohol  []Drug    ALLERGIES:  No Known Allergies      MEDICATIONS:  MEDICATIONS  (STANDING):  carvedilol 6.25 milliGRAM(s) Oral every 12 hours  chlorhexidine 2% Cloths 1 Application(s) Topical <User Schedule>  dextrose 5%. 1000 milliLiter(s) (100 mL/Hr) IV Continuous <Continuous>  dextrose 5%. 1000 milliLiter(s) (50 mL/Hr) IV Continuous <Continuous>  dextrose 50% Injectable 25 Gram(s) IV Push once  dextrose 50% Injectable 12.5 Gram(s) IV Push once  dextrose 50% Injectable 25 Gram(s) IV Push once  glucagon  Injectable 1 milliGRAM(s) IntraMuscular once  insulin lispro (ADMELOG) corrective regimen sliding scale   SubCutaneous three times a day before meals  insulin lispro (ADMELOG) corrective regimen sliding scale   SubCutaneous at bedtime  methylPREDNISolone sodium succinate IVPB 1000 milliGRAM(s) IV Intermittent daily  niCARdipine Infusion 5 mG/Hr (25 mL/Hr) IV Continuous <Continuous>  NIFEdipine XL 60 milliGRAM(s) Oral two times a day  pantoprazole  Injectable 40 milliGRAM(s) IV Push two times a day  QUEtiapine 25 milliGRAM(s) Oral once    MEDICATIONS  (PRN):  dextrose Oral Gel 15 Gram(s) Oral once PRN Blood Glucose LESS THAN 70 milliGRAM(s)/deciliter  melatonin 3 milliGRAM(s) Oral at bedtime PRN Insomnia      HOME MEDICATIONS:  Home Medications:      VITALS:   T(F): 99.1 (05-08 @ 12:00), Max: 99.2 (05-06 @ 23:44)  HR: 110 (05-08 @ 12:00) (97 - 133)  BP: 168/87 (05-07 @ 08:30) (151/86 - 238/135)  BP(mean): 119 (05-07 @ 08:30) (111 - 177)  RR: 40 (05-08 @ 12:00) (10 - 58)  SpO2: 96% (05-08 @ 12:00) (90% - 100%)    I&O's Summary    07 May 2025 07:01  -  08 May 2025 07:00  --------------------------------------------------------  IN: 2323.5 mL / OUT: 1750 mL / NET: 573.5 mL    08 May 2025 07:01  -  08 May 2025 12:26  --------------------------------------------------------  IN: 187.5 mL / OUT: 0 mL / NET: 187.5 mL        REVIEW OF SYSTEMS:  No chest pain,   No SOB,   no palpitations     PHYSICAL EXAM:  NEURO: patient is awake , alert and oriented  GEN: Not in acute distress  NECK: no thyroid enlargement, no JVD  LUNGS: Clear to auscultation bilaterally   CARDIOVASCULAR: S1/S2 present, RRR , no murmurs or rubs, no carotid bruits,  + PP bilaterally  ABD: Soft, non-tender, non-distended, +BS  EXT: No ELIUD  SKIN: Intact    LABS:                        7.6    15.85 )-----------( 90       ( 08 May 2025 05:20 )             21.3     05-08    135  |  97[L]  |  57[H]  ----------------------------<  226[H]  3.6   |  24  |  3.4[H]    Ca    7.4[L]      08 May 2025 05:20  Phos  5.4     05-08  Mg     2.1     05-08    TPro  4.9[L]  /  Alb  3.1[L]  /  TBili  0.7  /  DBili  x   /  AST  17  /  ALT  14  /  AlkPhos  71  05-08    PT/INR - ( 07 May 2025 11:40 )   PT: 10.60 sec;   INR: 0.90 ratio         PTT - ( 07 May 2025 11:40 )  PTT:29.1 sec  Lactate, Blood: 2.0 mmol/L (05-08-25 @ 05:20)          Troponin trend:            RADIOLOGY:  -CXR:  -TTE:      Summary:   1. Normal global left ventricular systolic function with ejection   fraction, by visual estimation, of 65 to 70%. Mild-to-moderate concentric   left ventricular hypertrophy. The mitral in-flow pattern reveals no   discernable A-wave, therefore no comment on diastolic function can be   made.   2. Normal right ventricularsize and function.   3. Mildly enlarged left atrium.   4. No hemodynamically significant valvular disease.   5. Adequate TR velocity was not obtained to accurately assess RVSP. The   IVC is normal in size with >50% respiratory variability.   6. Thereis no evidence of pericardial effusion.    -CCTA:  -STRESS TEST:  -CATHETERIZATION:    ECG: NSR    TELEMETRY EVENTS:

## 2025-05-08 NOTE — PROGRESS NOTE ADULT - PROVIDER SPECIALTY LIST ADULT
Nephrology [Negative] : Psychiatric [Fever] : no fever [Chills] : no chills [Fatigue] : no fatigue [Recent Change In Weight] : ~T no recent weight change [Pain] : no pain [Vision Problems] : no vision problems [Earache] : no earache [Nasal Discharge] : no nasal discharge [Sore Throat] : no sore throat [Chest Pain] : no chest pain [Palpitations] : no palpitations [Lower Ext Edema] : no lower extremity edema [Shortness Of Breath] : no shortness of breath [Wheezing] : no wheezing [Cough] : no cough [Abdominal Pain] : no abdominal pain [Dyspnea on Exertion] : not dyspnea on exertion [Nausea] : no nausea [Diarrhea] : no diarrhea [Vomiting] : no vomiting [Anxiety] : no anxiety [Depression] : no depression [de-identified] : see HPI

## 2025-05-08 NOTE — PROGRESS NOTE ADULT - ASSESSMENT
38 y/o F PMHx HTN presented to ER with 5 days of vomiting and diarrhea and melena with associated generalized abdominal pain and distention.  Admitted for management of hypertensive emergency, bowel ischemia and sepsis in setting of enteritis. Evaluated by surgery no intervention    Heme consulted for normocytic anemia and thrombocytopenia    # Leucocytosis, neutrophilic predominance  # Normocytic anemia  # Thrombocytopenia  # Bowel ischemia  # Renal failure  - No prior labs for baseline  - Normal coags  - Elevated , retic corrected 3, haptoglobin 36 (no hemolysis)  - Plasmic score 5, intermediate probability of TTP  - Peripheral smear reviewed 5.7.25, noted schistocytosis 5-6/HPF and polychromasia  - Coomb's negative    Plan:  Overall picture concerning for TTP, she is s/p Plex x 1 completed on 5.8.25, CBC reviewed today, plan for Plex on 5.9.25  c/w 1000 mg IV solumedrol daily x 3 days (start date 5.7.25)  Daily LDH  Follow up on [id number], C3, C4  Check renal doppler

## 2025-05-08 NOTE — PROGRESS NOTE ADULT - SUBJECTIVE AND OBJECTIVE BOX
GENERAL SURGERY PROGRESS NOTE     FRANCISCO DUNLAP  [address] [place] [address] day :2d    POD:  Procedure:   Surgical Attending: Luann David  Overnight events: No acute events overnight. Pt is s/p plasmapheresis 5/7 and tolerated well.     T(F): 98.7 (05-08-25 @ 08:00), Max: 98.7 (05-07-25 @ 12:00)  HR: 126 (05-08-25 @ 10:30) (107 - 133)  BP: --  ABP: 173/83 (05-08-25 @ 10:30) (133/68 - 262/262)  ABP(mean): 109 (05-08-25 @ 10:30) (95 - 262)  RR: 23 (05-08-25 @ 10:30) (10 - 58)  SpO2: 95% (05-08-25 @ 10:30) (90% - 98%)    IN'S / OUT's:    05-07-25 @ 07:01  -  05-08-25 @ 07:00  --------------------------------------------------------  IN:    dextrose 5% + lactated ringers: 600 mL    IV PiggyBack: 200 mL    Lactated Ringers: 500 mL    NiCARdipine: 162.5 mL    Oral Fluid: 530 mL    PRBCs (Packed Red Blood Cells): 331 mL  Total IN: 2323.5 mL    OUT:    Voided (mL): 1750 mL  Total OUT: 1750 mL    Total NET: 573.5 mL      05-08-25 @ 07:01  -  05-08-25 @ 11:00  --------------------------------------------------------  IN:    IV PiggyBack: 100 mL    NiCARdipine: 87.5 mL  Total IN: 187.5 mL    OUT:  Total OUT: 0 mL    Total NET: 187.5 mL          PHYSICAL EXAM:  GENERAL: NAD  CHEST/LUNG: equal chest rise b/l, non labored breathing  HEART: tachycardic and hypertensive  ABDOMEN: Soft, Nontender, Nondistended; no rebound or guarding  EXTREMITIES:  No clubbing, cyanosis, or edema      LABS  Labs:  CAPILLARY BLOOD GLUCOSE      POCT Blood Glucose.: 222 mg/dL (08 May 2025 09:10)  POCT Blood Glucose.: 227 mg/dL (08 May 2025 07:54)  POCT Blood Glucose.: 240 mg/dL (08 May 2025 05:21)  POCT Blood Glucose.: 337 mg/dL (07 May 2025 21:19)                          7.6    15.85 )-----------( 90       ( 08 May 2025 05:20 )             21.3       Auto Immature Granulocyte %: 0.8 % (05-08-25 @ 05:20)  Auto Immature Granulocyte %: 0.8 % (05-07-25 @ 18:50)    05-08    135  |  97[L]  |  57[H]  ----------------------------<  226[H]  3.6   |  24  |  3.4[H]      Calcium: 7.4 mg/dL (05-08-25 @ 05:20)      LFTs:             4.9  | 0.7  | 17       ------------------[71      ( 08 May 2025 05:20 )  3.1  | x    | 14          Lipase:x      Amylase:x         Lactate, Blood: 2.0 mmol/L (05-08-25 @ 05:20)  Lactate, Blood: 1.1 mmol/L (05-07-25 @ 04:40)  Blood Gas Venous - Lactate: 0.6 mmol/L (05-07-25 @ 00:08)  Lactate, Blood: 0.8 mmol/L (05-06-25 @ 22:32)      Coags:     10.60  ----< 0.90    ( 07 May 2025 11:40 )     29.1                Urinalysis Basic - ( 08 May 2025 05:20 )    Color: x / Appearance: x / SG: x / pH: x  Gluc: 226 mg/dL / Ketone: x  / Bili: x / Urobili: x   Blood: x / Protein: x / Nitrite: x   Leuk Esterase: x / RBC: x / WBC x   Sq Epi: x / Non Sq Epi: x / Bacteria: x        Urinalysis with Rflx Culture (collected 07 May 2025 00:55)          RADIOLOGY & ADDITIONAL TESTS:      A/P:  FRANCISCO DUNLAP is a 39-year-old female with history of hypertension presenting to ER with 5 days of multiple episodes of nonbloody nonbilious vomiting and diarrhea with associated generalized abdominal pain and distention. Labs significant for WBC 18.72, creatinine 3.5. CTAP with finding of "Edematous distal duodenum and proximal jejunal loops with associated dilatation measuring up to 3.4 cm. Associated marked interloop ascites, mesenteric fat stranding, and prominent mesenteric lymph nodes. Mild-to-moderate ascites. Findings concerning for small bowel ischemia." General surgery was consulted for this finding.      PLAN:   - No surgical intervention, exam not concerning for bowel ischemia  - continue medical treatment for hypertensive emergency  - recall surgery as needed       #GI ppx: pantoprazole  Injectable 40 milliGRAM(s) IV Push two times a day    Disposition:  CCU    Above plan to be discussed with Attending Surgeon Dr. David  , patient, patient family, and rest of health care team    TAP (Trauma, Acute care, Pediatrics) Spectra [phone]

## 2025-05-08 NOTE — PROGRESS NOTE ADULT - SUBJECTIVE AND OBJECTIVE BOX
SUBJECTIVE:    Patient is a 39y old Female who presents with a chief complaint of Diarrhea (08 May 2025 07:09)    Currently admitted to medicine with the primary diagnosis of Hypertensive emergency       Today is hospital day 1d. This morning she is resting comfortably in bed, she completed her plasma exchange early this morning at 5 am, patient seemingly has poor insight of her condition, refused to hear our explanation of the condition and treatment stating it was hurting her feelings. (Cantonese speaking RN helped with interpretation)    PAST MEDICAL & SURGICAL HISTORY  HTN (hypertension)      SOCIAL HISTORY:  Negative for smoking/alcohol/drug use.     ALLERGIES:  No Known Allergies    MEDICATIONS:  STANDING MEDICATIONS  carvedilol 6.25 milliGRAM(s) Oral every 12 hours  chlorhexidine 2% Cloths 1 Application(s) Topical <User Schedule>  dextrose 5%. 1000 milliLiter(s) IV Continuous <Continuous>  dextrose 5%. 1000 milliLiter(s) IV Continuous <Continuous>  dextrose 50% Injectable 25 Gram(s) IV Push once  dextrose 50% Injectable 12.5 Gram(s) IV Push once  dextrose 50% Injectable 25 Gram(s) IV Push once  glucagon  Injectable 1 milliGRAM(s) IntraMuscular once  insulin lispro (ADMELOG) corrective regimen sliding scale   SubCutaneous three times a day before meals  insulin lispro (ADMELOG) corrective regimen sliding scale   SubCutaneous at bedtime  methylPREDNISolone sodium succinate IVPB 1000 milliGRAM(s) IV Intermittent daily  niCARdipine Infusion 5 mG/Hr IV Continuous <Continuous>  NIFEdipine XL 60 milliGRAM(s) Oral two times a day  pantoprazole  Injectable 40 milliGRAM(s) IV Push two times a day    PRN MEDICATIONS  dextrose Oral Gel 15 Gram(s) Oral once PRN  melatonin 3 milliGRAM(s) Oral at bedtime PRN    VITALS:   T(F): 98.3  HR: 123  BP: --  RR: 30  SpO2: 95%    LABS:                        7.6    15.85 )-----------( 90       ( 08 May 2025 05:20 )             21.3     05-08    135  |  97[L]  |  57[H]  ----------------------------<  226[H]  3.6   |  24  |  3.4[H]    Ca    7.4[L]      08 May 2025 05:20  Phos  5.4     05-08  Mg     2.1     05-08    TPro  4.9[L]  /  Alb  3.1[L]  /  TBili  0.7  /  DBili  x   /  AST  17  /  ALT  14  /  AlkPhos  71  05-08    PT/INR - ( 07 May 2025 11:40 )   PT: 10.60 sec;   INR: 0.90 ratio         PTT - ( 07 May 2025 11:40 )  PTT:29.1 sec  Urinalysis Basic - ( 08 May 2025 05:20 )    Color: x / Appearance: x / SG: x / pH: x  Gluc: 226 mg/dL / Ketone: x  / Bili: x / Urobili: x   Blood: x / Protein: x / Nitrite: x   Leuk Esterase: x / RBC: x / WBC x   Sq Epi: x / Non Sq Epi: x / Bacteria: x        Lactate, Blood: 2.0 mmol/L (05-08-25 @ 05:20)      Culture - Blood (collected 07 May 2025 04:45)  Source: Blood Blood-Peripheral  Preliminary Report (08 May 2025 14:02):    No growth at 24 hours    Culture - Blood (collected 07 May 2025 04:40)  Source: Blood Blood-Peripheral  Preliminary Report (08 May 2025 14:02):    No growth at 24 hours    Urinalysis with Rflx Culture (collected 07 May 2025 00:55)    Culture - Urine (collected 07 May 2025 00:55)  Source: Clean Catch None  Final Report (08 May 2025 11:55):    >=3 organisms. Probable collection contamination.            PHYSICAL EXAM:  GEN: No acute distress  LUNGS: Clear to auscultation bilaterally   HEART: Regular  ABD: Soft, non-tender, non-distended.  EXT: NC/NC/NE/2+PP/HERRERA/Skin Intact.   NEURO: AAOX3

## 2025-05-08 NOTE — PROGRESS NOTE ADULT - SUBJECTIVE AND OBJECTIVE BOX
Patient is a 39y old  Female who presents with a chief complaint of Diarrhea (08 May 2025 07:09)    HPI:  39-year-old female with history of hypertension presenting to ER with 5 days of multiple episodes of nonbloody nonbilious vomiting and diarrhea with associated generalized abdominal pain and distention. Patient states that on Saturday she had acute onset of nausea, vomiting, diarrhea and crampy abdominal pain. She has had 4-5 episodes of non-bloody, non-bilious vomiting per day and 4-5 episodes of black diarrhea per day. She initially thought that she had gastroenteritis, but when her symptoms didn't resolve, she decided come to the ED. She denies any current abdominal pain, nausea or vomiting, fever, chest pain, shortness of breath, clotting disorder, past intra-abdominal surgeries, kidney issues, leg pain or swelling.     Labs significant for WBC 18.72k, Hb 9.0(unknown baseline) , Platelets 86k, Creatinine 3.5(unknown baseline). Lipase 71, UA positive      CTAP with finding of Edematous distal duodenum and proximal jejunal loops with associated dilatation measuring up to 3.4 cm. Associated marked interloop ascites, mesenteric fat stranding, and prominent mesenteric lymph nodes. Mild-to-moderate ascites. Findings concerning for small bowel ischemia.     CT Angio Abdomen and Pelvis w/ IV Cont (25 @ 04:18): Patent SMA, SMV. Redemonstration of distal duodenal and proximal small bowel with marked inflammatory change. Mural enhancement noted throughout   the small bowel. Considerations include severe enteritis, early ischemia, shock bowel.    #ED Vitals:   T(C): 37.2 (25 @ 05:46), Max: 37.3 (25 @ 23:44)  HR: 112 (25 @ 05:46) (97 - 118)  BP: 151/86 (25 @ 03:39) (151/86 - 238/135)  RR: 17 (25 @ 05:46) (17 - 19)  SpO2: 99% (25 @ 05:46) (96% - 100%)    # ED Course:   Zosyn, LR 1L, NS 1L, Zofran, Morphine, Pantoprazole, Reglan, Metoprolol 10 IV   Started on Nicardipine drip   Evaluated by surgery >>> No acute surgical intervention     The patient is being admitted to MICU for the further management.  (07 May 2025 06:03)       INTERVAL HPI/OVERNIGHT EVENTS:   No overnight events   Afebrile, hemodynamically stable     Subjective:    ICU Vital Signs Last 24 Hrs  T(C): 37.1 (08 May 2025 08:00), Max: 37.1 (07 May 2025 12:00)  T(F): 98.7 (08 May 2025 08:00), Max: 98.7 (07 May 2025 12:00)  HR: 126 (08 May 2025 10:30) (107 - 133)  BP: --  BP(mean): --  ABP: 173/83 (08 May 2025 10:30) (133/68 - 262/262)  ABP(mean): 109 (08 May 2025 10:30) (95 - 262)  RR: 23 (08 May 2025 10:30) (10 - 58)  SpO2: 95% (08 May 2025 10:30) (90% - 98%)    O2 Parameters below as of 08 May 2025 11:00  Patient On (Oxygen Delivery Method): room air          I&O's Summary    07 May 2025 07:01  -  08 May 2025 07:00  --------------------------------------------------------  IN: 2323.5 mL / OUT: 1750 mL / NET: 573.5 mL    08 May 2025 07:01  -  08 May 2025 10:49  --------------------------------------------------------  IN: 187.5 mL / OUT: 0 mL / NET: 187.5 mL          Daily     Daily Weight in k.6 (08 May 2025 06:00)    Adult Advanced Hemodynamics Last 24 Hrs  CVP(mm Hg): --  CVP(cm H2O): --  CO: --  CI: --  PA: --  PA(mean): --  PCWP: --  SVR: --  SVRI: --  PVR: --  PVRI: --    EKG/Telemetry Events:    MEDICATIONS  (STANDING):  carvedilol 6.25 milliGRAM(s) Oral every 12 hours  chlorhexidine 2% Cloths 1 Application(s) Topical <User Schedule>  dextrose 5%. 1000 milliLiter(s) (100 mL/Hr) IV Continuous <Continuous>  dextrose 5%. 1000 milliLiter(s) (50 mL/Hr) IV Continuous <Continuous>  dextrose 50% Injectable 25 Gram(s) IV Push once  dextrose 50% Injectable 12.5 Gram(s) IV Push once  dextrose 50% Injectable 25 Gram(s) IV Push once  glucagon  Injectable 1 milliGRAM(s) IntraMuscular once  insulin lispro (ADMELOG) corrective regimen sliding scale   SubCutaneous three times a day before meals  insulin lispro (ADMELOG) corrective regimen sliding scale   SubCutaneous at bedtime  methylPREDNISolone sodium succinate IVPB 1000 milliGRAM(s) IV Intermittent daily  niCARdipine Infusion 5 mG/Hr (25 mL/Hr) IV Continuous <Continuous>  NIFEdipine XL 60 milliGRAM(s) Oral two times a day  pantoprazole  Injectable 40 milliGRAM(s) IV Push two times a day    MEDICATIONS  (PRN):  dextrose Oral Gel 15 Gram(s) Oral once PRN Blood Glucose LESS THAN 70 milliGRAM(s)/deciliter  melatonin 3 milliGRAM(s) Oral at bedtime PRN Insomnia      PHYSICAL EXAM:  GENERAL: NAD, alert and interactive  HEAD: Atraumatic, normocephalic  EYES: EOMI, conjunctiva and sclera clear  NECK: Supple, no JVD  CHEST/LUNG: Clear to auscultation bilaterally; no rales, rhonchi, or wheezing; normal WOB on RA  HEART: Regular rate and rhythm; S1 S2 normal, no S3; no murmurs, rubs, or gallops  ABDOMEN: Soft, nontender, nondistended; bowel sounds present  EXTREMITIES: No clubbing, cyanosis, or edema  NEURO: Grossly nonfocal  SKIN: No rashes or lesions    LABS:                        7.6    15.85 )-----------( 90       ( 08 May 2025 05:20 )             21.3     05-08    135  |  97[L]  |  57[H]  ----------------------------<  226[H]  3.6   |  24  |  3.4[H]    Ca    7.4[L]      08 May 2025 05:20  Phos  5.4     05-  Mg     2.1     -    TPro  4.9[L]  /  Alb  3.1[L]  /  TBili  0.7  /  DBili  x   /  AST  17  /  ALT  14  /  AlkPhos  71  05-08    LIVER FUNCTIONS - ( 08 May 2025 05:20 )  Alb: 3.1 g/dL / Pro: 4.9 g/dL / ALK PHOS: 71 U/L / ALT: 14 U/L / AST: 17 U/L / GGT: x           PT/INR - ( 07 May 2025 11:40 )   PT: 10.60 sec;   INR: 0.90 ratio         PTT - ( 07 May 2025 11:40 )  PTT:29.1 sec  CAPILLARY BLOOD GLUCOSE      POCT Blood Glucose.: 222 mg/dL (08 May 2025 09:10)  POCT Blood Glucose.: 227 mg/dL (08 May 2025 07:54)  POCT Blood Glucose.: 240 mg/dL (08 May 2025 05:21)  POCT Blood Glucose.: 337 mg/dL (07 May 2025 21:19)            Urinalysis Basic - ( 08 May 2025 05:20 )    Color: x / Appearance: x / SG: x / pH: x  Gluc: 226 mg/dL / Ketone: x  / Bili: x / Urobili: x   Blood: x / Protein: x / Nitrite: x   Leuk Esterase: x / RBC: x / WBC x   Sq Epi: x / Non Sq Epi: x / Bacteria: x        RADIOLOGY & ADDITIONAL TESTS:  CXR:    Care Discussed with Consultants/Other Providers [ x] YES  [ ] NO

## 2025-05-08 NOTE — PROGRESS NOTE ADULT - SUBJECTIVE AND OBJECTIVE BOX
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  First procedure completed.  Current platelet count 90 and .  Goal is Platelet count greater than 150 K for two consecutive days and normal LDH.  Thanks so very much for allowing us to participate in the care of your patient.  Please do not hesitate to contact us with questions or concerns.    Veronika House MD, SHELBIE  [phone]

## 2025-05-09 LAB
ADAMTS13 ACTIVITY: 57 % — SIGNIFICANT CHANGE UP (ref 40–130)
ALBUMIN SERPL ELPH-MCNC: 3.1 G/DL — LOW (ref 3.5–5.2)
ALP SERPL-CCNC: 103 U/L — SIGNIFICANT CHANGE UP (ref 30–115)
ALT FLD-CCNC: 21 U/L — SIGNIFICANT CHANGE UP (ref 0–41)
AMPHET UR-MCNC: NEGATIVE — SIGNIFICANT CHANGE UP
ANION GAP SERPL CALC-SCNC: 15 MMOL/L — HIGH (ref 7–14)
AST SERPL-CCNC: 19 U/L — SIGNIFICANT CHANGE UP (ref 0–41)
BARBITURATES UR SCN-MCNC: NEGATIVE — SIGNIFICANT CHANGE UP
BASOPHILS # BLD AUTO: 0.02 K/UL — SIGNIFICANT CHANGE UP (ref 0–0.2)
BASOPHILS # BLD AUTO: 0.02 K/UL — SIGNIFICANT CHANGE UP (ref 0–0.2)
BASOPHILS NFR BLD AUTO: 0.1 % — SIGNIFICANT CHANGE UP (ref 0–1)
BASOPHILS NFR BLD AUTO: 0.1 % — SIGNIFICANT CHANGE UP (ref 0–1)
BENZODIAZ UR-MCNC: NEGATIVE — SIGNIFICANT CHANGE UP
BILIRUB SERPL-MCNC: 0.4 MG/DL — SIGNIFICANT CHANGE UP (ref 0.2–1.2)
BUN SERPL-MCNC: 64 MG/DL — CRITICAL HIGH (ref 10–20)
CA TITR SERPL: SIGNIFICANT CHANGE UP
CALCIUM SERPL-MCNC: 7.9 MG/DL — LOW (ref 8.4–10.5)
CHLORIDE SERPL-SCNC: 104 MMOL/L — SIGNIFICANT CHANGE UP (ref 98–110)
CO2 SERPL-SCNC: 20 MMOL/L — SIGNIFICANT CHANGE UP (ref 17–32)
COCAINE METAB.OTHER UR-MCNC: NEGATIVE — SIGNIFICANT CHANGE UP
CREAT SERPL-MCNC: 4.2 MG/DL — CRITICAL HIGH (ref 0.7–1.5)
CULTURE RESULTS: SIGNIFICANT CHANGE UP
DSDNA AB SER-ACNC: 2 IU/ML — SIGNIFICANT CHANGE UP
EGFR: 13 ML/MIN/1.73M2 — LOW
EGFR: 13 ML/MIN/1.73M2 — LOW
EOSINOPHIL # BLD AUTO: 0 K/UL — SIGNIFICANT CHANGE UP (ref 0–0.7)
EOSINOPHIL # BLD AUTO: 0 K/UL — SIGNIFICANT CHANGE UP (ref 0–0.7)
EOSINOPHIL NFR BLD AUTO: 0 % — SIGNIFICANT CHANGE UP (ref 0–8)
EOSINOPHIL NFR BLD AUTO: 0 % — SIGNIFICANT CHANGE UP (ref 0–8)
FENTANYL UR QL: NEGATIVE — SIGNIFICANT CHANGE UP
GI PCR PANEL: SIGNIFICANT CHANGE UP
GLUCOSE BLDC GLUCOMTR-MCNC: 203 MG/DL — HIGH (ref 70–99)
GLUCOSE BLDC GLUCOMTR-MCNC: 230 MG/DL — HIGH (ref 70–99)
GLUCOSE BLDC GLUCOMTR-MCNC: 242 MG/DL — HIGH (ref 70–99)
GLUCOSE BLDC GLUCOMTR-MCNC: 251 MG/DL — HIGH (ref 70–99)
GLUCOSE BLDC GLUCOMTR-MCNC: 293 MG/DL — HIGH (ref 70–99)
GLUCOSE SERPL-MCNC: 257 MG/DL — HIGH (ref 70–99)
HAPTOGLOB SERPL-MCNC: 136 MG/DL — SIGNIFICANT CHANGE UP (ref 34–200)
HAPTOGLOB SERPL-MCNC: 158 MG/DL — SIGNIFICANT CHANGE UP (ref 34–200)
HCT VFR BLD CALC: 20.6 % — LOW (ref 37–47)
HCT VFR BLD CALC: 23 % — LOW (ref 37–47)
HCT VFR BLD CALC: 23.7 % — LOW (ref 37–47)
HGB BLD-MCNC: 6.8 G/DL — CRITICAL LOW (ref 12–16)
HGB BLD-MCNC: 7.8 G/DL — LOW (ref 12–16)
HGB BLD-MCNC: 7.9 G/DL — LOW (ref 12–16)
IMM GRANULOCYTES NFR BLD AUTO: 0.9 % — HIGH (ref 0.1–0.3)
IMM GRANULOCYTES NFR BLD AUTO: 1 % — HIGH (ref 0.1–0.3)
LDH SERPL L TO P-CCNC: 311 — HIGH (ref 50–242)
LYMPHOCYTES # BLD AUTO: 0.55 K/UL — LOW (ref 1.2–3.4)
LYMPHOCYTES # BLD AUTO: 0.61 K/UL — LOW (ref 1.2–3.4)
LYMPHOCYTES # BLD AUTO: 2.6 % — LOW (ref 20.5–51.1)
LYMPHOCYTES # BLD AUTO: 2.9 % — LOW (ref 20.5–51.1)
MAGNESIUM SERPL-MCNC: 2.3 MG/DL — SIGNIFICANT CHANGE UP (ref 1.8–2.4)
MCHC RBC-ENTMCNC: 29.5 PG — SIGNIFICANT CHANGE UP (ref 27–31)
MCHC RBC-ENTMCNC: 29.8 PG — SIGNIFICANT CHANGE UP (ref 27–31)
MCHC RBC-ENTMCNC: 30 PG — SIGNIFICANT CHANGE UP (ref 27–31)
MCHC RBC-ENTMCNC: 33 G/DL — SIGNIFICANT CHANGE UP (ref 32–37)
MCHC RBC-ENTMCNC: 33.3 G/DL — SIGNIFICANT CHANGE UP (ref 32–37)
MCHC RBC-ENTMCNC: 33.9 G/DL — SIGNIFICANT CHANGE UP (ref 32–37)
MCV RBC AUTO: 88.4 FL — SIGNIFICANT CHANGE UP (ref 81–99)
MCV RBC AUTO: 88.5 FL — SIGNIFICANT CHANGE UP (ref 81–99)
MCV RBC AUTO: 90.4 FL — SIGNIFICANT CHANGE UP (ref 81–99)
METHADONE UR-MCNC: NEGATIVE — SIGNIFICANT CHANGE UP
MONOCYTES # BLD AUTO: 0.62 K/UL — HIGH (ref 0.1–0.6)
MONOCYTES # BLD AUTO: 0.73 K/UL — HIGH (ref 0.1–0.6)
MONOCYTES NFR BLD AUTO: 3 % — SIGNIFICANT CHANGE UP (ref 1.7–9.3)
MONOCYTES NFR BLD AUTO: 3.4 % — SIGNIFICANT CHANGE UP (ref 1.7–9.3)
NEUTROPHILS # BLD AUTO: 19.39 K/UL — HIGH (ref 1.4–6.5)
NEUTROPHILS # BLD AUTO: 19.7 K/UL — HIGH (ref 1.4–6.5)
NEUTROPHILS NFR BLD AUTO: 92.9 % — HIGH (ref 42.2–75.2)
NEUTROPHILS NFR BLD AUTO: 93.1 % — HIGH (ref 42.2–75.2)
NRBC BLD AUTO-RTO: 0 /100 WBCS — SIGNIFICANT CHANGE UP (ref 0–0)
OPIATES UR-MCNC: NEGATIVE — SIGNIFICANT CHANGE UP
PCP SPEC-MCNC: SIGNIFICANT CHANGE UP
PHOSPHATE SERPL-MCNC: 4.8 MG/DL — SIGNIFICANT CHANGE UP (ref 2.1–4.9)
PLATELET # BLD AUTO: 150 K/UL — SIGNIFICANT CHANGE UP (ref 130–400)
PLATELET # BLD AUTO: 163 K/UL — SIGNIFICANT CHANGE UP (ref 130–400)
PLATELET # BLD AUTO: 192 K/UL — SIGNIFICANT CHANGE UP (ref 130–400)
PMV BLD: 11.1 FL — HIGH (ref 7.4–10.4)
PMV BLD: 11.4 FL — HIGH (ref 7.4–10.4)
PMV BLD: 11.9 FL — HIGH (ref 7.4–10.4)
POTASSIUM SERPL-MCNC: 3.5 MMOL/L — SIGNIFICANT CHANGE UP (ref 3.5–5)
POTASSIUM SERPL-SCNC: 3.5 MMOL/L — SIGNIFICANT CHANGE UP (ref 3.5–5)
PROCALCITONIN SERPL-MCNC: 0.37 NG/ML — HIGH (ref 0.02–0.1)
PROPOXYPHENE QUALITATIVE URINE RESULT: NEGATIVE — SIGNIFICANT CHANGE UP
PROT SERPL-MCNC: 5.1 G/DL — LOW (ref 6–8)
RBC # BLD: 2.28 M/UL — LOW (ref 4.2–5.4)
RBC # BLD: 2.6 M/UL — LOW (ref 4.2–5.4)
RBC # BLD: 2.68 M/UL — LOW (ref 4.2–5.4)
RBC # BLD: 2.68 M/UL — LOW (ref 4.2–5.4)
RBC # FLD: 16.2 % — HIGH (ref 11.5–14.5)
RBC # FLD: 16.6 % — HIGH (ref 11.5–14.5)
RBC # FLD: 17.1 % — HIGH (ref 11.5–14.5)
RETICS #: 150.6 K/UL — HIGH (ref 25–125)
RETICS/RBC NFR: 5.6 % — HIGH (ref 0.5–1.5)
SODIUM SERPL-SCNC: 139 MMOL/L — SIGNIFICANT CHANGE UP (ref 135–146)
SPECIMEN SOURCE: SIGNIFICANT CHANGE UP
SSA-52 (RO52) (ENA) ANTIBODY, IGG: 10 AU/ML — SIGNIFICANT CHANGE UP (ref 0–40)
SSA-60 (RO60) (ENA) ANTIBODY, IGG: 0 AU/ML — SIGNIFICANT CHANGE UP (ref 0–40)
WBC # BLD: 20.83 K/UL — HIGH (ref 4.8–10.8)
WBC # BLD: 21.21 K/UL — HIGH (ref 4.8–10.8)
WBC # BLD: 22.18 K/UL — HIGH (ref 4.8–10.8)
WBC # FLD AUTO: 20.83 K/UL — HIGH (ref 4.8–10.8)
WBC # FLD AUTO: 21.21 K/UL — HIGH (ref 4.8–10.8)
WBC # FLD AUTO: 22.18 K/UL — HIGH (ref 4.8–10.8)

## 2025-05-09 PROCEDURE — 71045 X-RAY EXAM CHEST 1 VIEW: CPT | Mod: 26

## 2025-05-09 PROCEDURE — 99233 SBSQ HOSP IP/OBS HIGH 50: CPT

## 2025-05-09 PROCEDURE — 99291 CRITICAL CARE FIRST HOUR: CPT

## 2025-05-09 RX ORDER — INSULIN GLARGINE-YFGN 100 [IU]/ML
6 INJECTION, SOLUTION SUBCUTANEOUS AT BEDTIME
Refills: 0 | Status: DISCONTINUED | OUTPATIENT
Start: 2025-05-09 | End: 2025-05-26

## 2025-05-09 RX ORDER — LABETALOL HYDROCHLORIDE 200 MG/1
100 TABLET, FILM COATED ORAL EVERY 8 HOURS
Refills: 0 | Status: DISCONTINUED | OUTPATIENT
Start: 2025-05-09 | End: 2025-05-10

## 2025-05-09 RX ORDER — CALCIUM GLUCONATE 20 MG/ML
2 INJECTION, SOLUTION INTRAVENOUS ONCE
Refills: 0 | Status: COMPLETED | OUTPATIENT
Start: 2025-05-09 | End: 2025-05-09

## 2025-05-09 RX ORDER — HEPARIN SODIUM 1000 [USP'U]/ML
5000 INJECTION INTRAVENOUS; SUBCUTANEOUS EVERY 12 HOURS
Refills: 0 | Status: DISCONTINUED | OUTPATIENT
Start: 2025-05-09 | End: 2025-05-12

## 2025-05-09 RX ORDER — CALCIUM CARBONATE 750 MG/1
1 TABLET ORAL EVERY 6 HOURS
Refills: 0 | Status: DISCONTINUED | OUTPATIENT
Start: 2025-05-09 | End: 2025-05-27

## 2025-05-09 RX ORDER — NIFEDIPINE 30 MG
90 TABLET, EXTENDED RELEASE 24 HR ORAL DAILY
Refills: 0 | Status: DISCONTINUED | OUTPATIENT
Start: 2025-05-10 | End: 2025-05-12

## 2025-05-09 RX ORDER — LABETALOL HYDROCHLORIDE 200 MG/1
10 TABLET, FILM COATED ORAL ONCE
Refills: 0 | Status: COMPLETED | OUTPATIENT
Start: 2025-05-09 | End: 2025-05-09

## 2025-05-09 RX ADMIN — Medication 40 MILLIGRAM(S): at 05:02

## 2025-05-09 RX ADMIN — Medication 50 MILLIEQUIVALENT(S): at 11:55

## 2025-05-09 RX ADMIN — CARVEDILOL 6.25 MILLIGRAM(S): 3.12 TABLET, FILM COATED ORAL at 05:00

## 2025-05-09 RX ADMIN — INSULIN LISPRO 6: 100 INJECTION, SOLUTION INTRAVENOUS; SUBCUTANEOUS at 09:33

## 2025-05-09 RX ADMIN — METHYLPREDNISOLONE ACETATE 50 MILLIGRAM(S): 80 INJECTION, SUSPENSION INTRA-ARTICULAR; INTRALESIONAL; INTRAMUSCULAR; SOFT TISSUE at 05:05

## 2025-05-09 RX ADMIN — INSULIN GLARGINE-YFGN 6 UNIT(S): 100 INJECTION, SOLUTION SUBCUTANEOUS at 21:17

## 2025-05-09 RX ADMIN — CALCIUM CARBONATE 1 TABLET(S): 750 TABLET ORAL at 20:19

## 2025-05-09 RX ADMIN — LABETALOL HYDROCHLORIDE 100 MILLIGRAM(S): 200 TABLET, FILM COATED ORAL at 21:17

## 2025-05-09 RX ADMIN — Medication 40 MILLIGRAM(S): at 17:03

## 2025-05-09 RX ADMIN — INSULIN LISPRO 4: 100 INJECTION, SOLUTION INTRAVENOUS; SUBCUTANEOUS at 11:56

## 2025-05-09 RX ADMIN — Medication 25 MG/HR: at 18:55

## 2025-05-09 RX ADMIN — HEPARIN SODIUM 5000 UNIT(S): 1000 INJECTION INTRAVENOUS; SUBCUTANEOUS at 17:03

## 2025-05-09 RX ADMIN — Medication 50 MILLIEQUIVALENT(S): at 13:55

## 2025-05-09 RX ADMIN — INSULIN LISPRO 4: 100 INJECTION, SOLUTION INTRAVENOUS; SUBCUTANEOUS at 16:53

## 2025-05-09 RX ADMIN — LABETALOL HYDROCHLORIDE 10 MILLIGRAM(S): 200 TABLET, FILM COATED ORAL at 18:37

## 2025-05-09 RX ADMIN — Medication 50 MILLIEQUIVALENT(S): at 09:23

## 2025-05-09 RX ADMIN — CARVEDILOL 6.25 MILLIGRAM(S): 3.12 TABLET, FILM COATED ORAL at 17:02

## 2025-05-09 RX ADMIN — CALCIUM GLUCONATE 200 GRAM(S): 20 INJECTION, SOLUTION INTRAVENOUS at 09:30

## 2025-05-09 RX ADMIN — Medication 1 APPLICATION(S): at 05:02

## 2025-05-09 RX ADMIN — Medication 60 MILLIGRAM(S): at 05:00

## 2025-05-09 RX ADMIN — INSULIN LISPRO 0: 100 INJECTION, SOLUTION INTRAVENOUS; SUBCUTANEOUS at 21:19

## 2025-05-09 NOTE — PROGRESS NOTE ADULT - SUBJECTIVE AND OBJECTIVE BOX
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  Second procedure to be completed today.  Current platelet count 163 and .  Goal is Platelet count greater than 150 K for two consecutive days and normal LDH.  Thanks so very much for allowing us to participate in the care of your patient.  Please do not hesitate to contact us with questions or concerns.    Veronika House MD, SHELBIE  [phone]

## 2025-05-09 NOTE — DIETITIAN INITIAL EVALUATION ADULT - ADD RECOMMEND
1) Continue current diet order  2) Monitor PO intake   3) Monitor electrolytes, BG, renal profile  -adjust insulin regimen PRN - noted to be on steroids which is likely increasing BG   4) Monitor BM, GI s/s

## 2025-05-09 NOTE — DIETITIAN INITIAL EVALUATION ADULT - LAB (SPECIFY)
5/9: H/H 7.8 / 23.0 (L), BUN 64 (HH), Cr 4.2 (HH), Glucose 257 9H), Ca 7.9 (L), eGFR 13 (L)   POC Glucose 5/8 - 5/9: 216 - 293

## 2025-05-09 NOTE — DIETITIAN INITIAL EVALUATION ADULT - PERTINENT MEDS FT
MEDICATIONS  (STANDING):  carvedilol 6.25 milliGRAM(s) Oral every 12 hours  chlorhexidine 2% Cloths 1 Application(s) Topical <User Schedule>  dextrose 5%. 1000 milliLiter(s) (100 mL/Hr) IV Continuous <Continuous>  dextrose 5%. 1000 milliLiter(s) (50 mL/Hr) IV Continuous <Continuous>  dextrose 50% Injectable 25 Gram(s) IV Push once  dextrose 50% Injectable 12.5 Gram(s) IV Push once  dextrose 50% Injectable 25 Gram(s) IV Push once  glucagon  Injectable 1 milliGRAM(s) IntraMuscular once  heparin   Injectable 5000 Unit(s) SubCutaneous every 12 hours  insulin glargine Injectable (LANTUS) 6 Unit(s) SubCutaneous at bedtime  insulin lispro (ADMELOG) corrective regimen sliding scale   SubCutaneous three times a day before meals  insulin lispro (ADMELOG) corrective regimen sliding scale   SubCutaneous at bedtime  methylPREDNISolone sodium succinate IVPB 1000 milliGRAM(s) IV Intermittent daily  niCARdipine Infusion 5 mG/Hr (25 mL/Hr) IV Continuous <Continuous>  pantoprazole  Injectable 40 milliGRAM(s) IV Push two times a day    MEDICATIONS  (PRN):  dextrose Oral Gel 15 Gram(s) Oral once PRN Blood Glucose LESS THAN 70 milliGRAM(s)/deciliter  melatonin 3 milliGRAM(s) Oral at bedtime PRN Insomnia   Statement Selected

## 2025-05-09 NOTE — PROGRESS NOTE ADULT - ASSESSMENT
ASSESSMENT  39-year-old female with history of hypertension presenting to ER with 5 days of multiple episodes of nonbloody nonbilious vomiting and diarrhea with associated generalized abdominal pain and distention.    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 Associated marked interloop ascites,   mesenteric fat stranding, and prominent mesenteric lymph nodes.  Mild-to-moderate ascites. Findings concerning for small bowel ischemia. No portal venous gas, pneumatosis, or pneumoperitoneum.  - CT Angio Abdomen and Pelvis w/ IV Cont (05.07.25 @ 04:18) > Patent SMA, SMV. Redemonstration of distal duodenal and proximal small  bowel with marked inflammatory change. Mural enhancement noted throughout   the small bowel. Considerations include severe enteritis, early ischemia,  shock bowel.  - s/p Plasmapharesis  - GI PCR negative   - Blood Cx 5/7 NG     #Hypertensive emergency     #JOYCE with Proteinuria   #Anemia/THrombocytopenia   #Pyuria without urinary symptoms  #RSV positive - minimal respiratory symptoms     Monitor labs.  Send [address], complement (C3, C4, CH50), Steve, Serial troponin, hep B, hep C, JOSE ANTONIO, Cardiolipin antibodies).  Start methylprednisolone IV daily for x 3 days.  Initiate plasma exchange ASAP daily (FFP transfusion if PEX is delayed).  Cardiology follow-up for management of hypertensive emergency.  Ultrasound Doppler, CT head without contrast. Hematology will follow.     #Obesity BMI (kg/m2): 27.4  #Abx allergy: No Known Allergies    RECOMMENDATIONS  - monitor off antibiotics   - follow-up stool Cx  - follow-up [id number]  - ongoing plasmapharesis by primary/heme-onc  - follow-up blood cx  - supportive care for RSV    Please call or message on [employer] Teams if with any questions.  Spectra [phone]

## 2025-05-09 NOTE — DIETITIAN INITIAL EVALUATION ADULT - PERTINENT LABORATORY DATA
05-09    139  |  104  |  64[HH]  ----------------------------<  257[H]  3.5   |  20  |  4.2[HH]    Ca    7.9[L]      09 May 2025 04:15  Phos  4.8     05-09  Mg     2.3     05-09    TPro  5.1[L]  /  Alb  3.1[L]  /  TBili  0.4  /  DBili  x   /  AST  19  /  ALT  21  /  AlkPhos  103  05-09  POCT Blood Glucose.: 230 mg/dL (05-09-25 @ 11:49)  A1C with Estimated Average Glucose Result: 5.1 % (05-08-25 @ 05:20)

## 2025-05-09 NOTE — PROGRESS NOTE ADULT - SUBJECTIVE AND OBJECTIVE BOX
Delirium      Patient is a 39y old  Female who presents with a chief complaint of Diarrhea (08 May 2025 07:09)    HPI:  39-year-old female with history of hypertension presenting to ER with 5 days of multiple episodes of nonbloody nonbilious vomiting and diarrhea with associated generalized abdominal pain and distention. Patient states that on Saturday she had acute onset of nausea, vomiting, diarrhea and crampy abdominal pain. She has had 4-5 episodes of non-bloody, non-bilious vomiting per day and 4-5 episodes of black diarrhea per day. She initially thought that she had gastroenteritis, but when her symptoms didn't resolve, she decided come to the ED. She denies any current abdominal pain, nausea or vomiting, fever, chest pain, shortness of breath, clotting disorder, past intra-abdominal surgeries, kidney issues, leg pain or swelling.     Labs significant for WBC 18.72k, Hb 9.0(unknown baseline) , Platelets 86k, Creatinine 3.5(unknown baseline). Lipase 71, UA positive      CTAP with finding of Edematous distal duodenum and proximal jejunal loops with associated dilatation measuring up to 3.4 cm. Associated marked interloop ascites, mesenteric fat stranding, and prominent mesenteric lymph nodes. Mild-to-moderate ascites. Findings concerning for small bowel ischemia.     CT Angio Abdomen and Pelvis w/ IV Cont (25 @ 04:18): Patent SMA, SMV. Redemonstration of distal duodenal and proximal small bowel with marked inflammatory change. Mural enhancement noted throughout   the small bowel. Considerations include severe enteritis, early ischemia, shock bowel.    #ED Vitals:   T(C): 37.2 (25 @ 05:46), Max: 37.3 (25 @ 23:44)  HR: 112 (25 @ 05:46) (97 - 118)  BP: 151/86 (25 @ 03:39) (151/86 - 238/135)  RR: 17 (25 @ 05:46) (17 - 19)  SpO2: 99% (25 @ 05:46) (96% - 100%)    # ED Course:   Zosyn, LR 1L, NS 1L, Zofran, Morphine, Pantoprazole, Reglan, Metoprolol 10 IV   Started on Nicardipine drip   Evaluated by surgery >>> No acute surgical intervention     The patient is being admitted to MICU for the further management.  (07 May 2025 06:03)       INTERVAL HPI/OVERNIGHT EVENTS:   No overnight events   Afebrile, hemodynamically stable     Subjective:    ICU Vital Signs Last 24 Hrs  T(C): 37 (09 May 2025 08:00), Max: 37.3 (08 May 2025 12:00)  T(F): 98.6 (09 May 2025 08:00), Max: 99.1 (08 May 2025 12:00)  HR: 94 (09 May 2025 09:00) (94 - 126)  BP: --  BP(mean): --  ABP: 152/71 (09 May 2025 09:00) (134/71 - 179/83)  ABP(mean): 98 (09 May 2025 09:00) (93 - 116)  RR: 24 (09 May 2025 09:00) (21 - 48)  SpO2: 92% (09 May 2025 09:00) (92% - 98%)    O2 Parameters below as of 09 May 2025 10:00  Patient On (Oxygen Delivery Method): room air          I&O's Summary    08 May 2025 07:01  -  09 May 2025 07:00  --------------------------------------------------------  IN: 1951.5 mL / OUT: 1100 mL / NET: 851.5 mL    09 May 2025 07:01  -  09 May 2025 10:12  --------------------------------------------------------  IN: 255 mL / OUT: 200 mL / NET: 55 mL          Daily     Daily Weight in k.8 (09 May 2025 05:00)    Adult Advanced Hemodynamics Last 24 Hrs  CVP(mm Hg): --  CVP(cm H2O): --  CO: --  CI: --  PA: --  PA(mean): --  PCWP: --  SVR: --  SVRI: --  PVR: --  PVRI: --    EKG/Telemetry Events:    MEDICATIONS  (STANDING):  carvedilol 6.25 milliGRAM(s) Oral every 12 hours  chlorhexidine 2% Cloths 1 Application(s) Topical <User Schedule>  dextrose 5%. 1000 milliLiter(s) (100 mL/Hr) IV Continuous <Continuous>  dextrose 5%. 1000 milliLiter(s) (50 mL/Hr) IV Continuous <Continuous>  dextrose 50% Injectable 25 Gram(s) IV Push once  dextrose 50% Injectable 12.5 Gram(s) IV Push once  dextrose 50% Injectable 25 Gram(s) IV Push once  glucagon  Injectable 1 milliGRAM(s) IntraMuscular once  insulin glargine Injectable (LANTUS) 6 Unit(s) SubCutaneous at bedtime  insulin lispro (ADMELOG) corrective regimen sliding scale   SubCutaneous three times a day before meals  insulin lispro (ADMELOG) corrective regimen sliding scale   SubCutaneous at bedtime  methylPREDNISolone sodium succinate IVPB 1000 milliGRAM(s) IV Intermittent daily  niCARdipine Infusion 5 mG/Hr (25 mL/Hr) IV Continuous <Continuous>  pantoprazole  Injectable 40 milliGRAM(s) IV Push two times a day  potassium chloride  20 mEq/100 mL IVPB 20 milliEquivalent(s) IV Intermittent every 2 hours    MEDICATIONS  (PRN):  dextrose Oral Gel 15 Gram(s) Oral once PRN Blood Glucose LESS THAN 70 milliGRAM(s)/deciliter  melatonin 3 milliGRAM(s) Oral at bedtime PRN Insomnia      PHYSICAL EXAM:  GENERAL: NAD, alert and interactive  HEAD: Atraumatic, normocephalic  EYES: EOMI, conjunctiva and sclera clear  NECK: Supple, no JVD  CHEST/LUNG: Clear to auscultation bilaterally; no rales, rhonchi, or wheezing; normal WOB on RA  HEART: Regular rate and rhythm; S1 S2 normal, no S3; no murmurs, rubs, or gallops  ABDOMEN: Soft, nontender, nondistended; bowel sounds present  EXTREMITIES: No clubbing, cyanosis, or edema  NEURO: Grossly nonfocal  SKIN: No rashes or lesions    LABS:                        7.9    20.83 )-----------( 163      ( 09 May 2025 04:15 )             23.7     05-09    139  |  104  |  64[HH]  ----------------------------<  257[H]  3.5   |  20  |  4.2[HH]    Ca    7.9[L]      09 May 2025 04:15  Phos  4.8       Mg     2.3         TPro  5.1[L]  /  Alb  3.1[L]  /  TBili  0.4  /  DBili  x   /  AST  19  /  ALT  21  /  AlkPhos  103  05-09    LIVER FUNCTIONS - ( 09 May 2025 04:15 )  Alb: 3.1 g/dL / Pro: 5.1 g/dL / ALK PHOS: 103 U/L / ALT: 21 U/L / AST: 19 U/L / GGT: x           PT/INR - ( 07 May 2025 11:40 )   PT: 10.60 sec;   INR: 0.90 ratio         PTT - ( 07 May 2025 11:40 )  PTT:29.1 sec  CAPILLARY BLOOD GLUCOSE      POCT Blood Glucose.: 251 mg/dL (09 May 2025 09:20)  POCT Blood Glucose.: 293 mg/dL (09 May 2025 04:13)  POCT Blood Glucose.: 284 mg/dL (08 May 2025 20:59)  POCT Blood Glucose.: 216 mg/dL (08 May 2025 16:22)  POCT Blood Glucose.: 264 mg/dL (08 May 2025 11:56)            Urinalysis Basic - ( 09 May 2025 04:15 )    Color: x / Appearance: x / SG: x / pH: x  Gluc: 257 mg/dL / Ketone: x  / Bili: x / Urobili: x   Blood: x / Protein: x / Nitrite: x   Leuk Esterase: x / RBC: x / WBC x   Sq Epi: x / Non Sq Epi: x / Bacteria: x        RADIOLOGY & ADDITIONAL TESTS:  CXR:    Care Discussed with Consultants/Other Providers [ x] YES  [ ] NO

## 2025-05-09 NOTE — DIETITIAN INITIAL EVALUATION ADULT - OTHER INFO
Patient is a 39-year-old female with history of hypertension presenting to ER with 5 days of multiple episodes of nonbloody nonbilious vomiting and diarrhea with associated generalized abdominal pain and distention. Patient states that on Saturday she had acute onset of nausea, vomiting, diarrhea and crampy abdominal pain. She has had 4-5 episodes of non-bloody, non-bilious vomiting per day and 4-5 episodes of black diarrhea per day.    Early small bowel ischemia likely 2/2 severe enteritis  possible TTP/HUS   Hypertensive Emergency  Likely UGIB   SIRS/Sepsis   Uncomplicated Cystitis   JOYCE likely pre-renal   pancytopenia   RSV infection     clear liquid as per GI advance as tolerated if ok by GI  Blood Glucose Goal: 140 - 180. insulin lantus and ssi     UBW reported as 150 lbs  vs current dosing weight 65.8 kg (5/7/25)  indicating 3.6% weight loss over uncertain timeframe    Patient reports tolerating current diet, with good PO intake - consuming >75% of meals currently

## 2025-05-09 NOTE — DIETITIAN INITIAL EVALUATION ADULT - ORAL NUTRITION SUPPLEMENTS
If patient's PO intake declines, ADD Glucerna Shake 2x/day (220 kcal, 10 gm Protein each) to aid in optimizing kcal and protein intake

## 2025-05-09 NOTE — PROGRESS NOTE ADULT - SUBJECTIVE AND OBJECTIVE BOX
SUBJECTIVE:    Patient is a 39y old Female who presents with a chief complaint of Diarrhea (09 May 2025 15:27)    Currently admitted to medicine with the primary diagnosis of Hypertensive emergency       Today is hospital day 2d. This morning she is resting comfortably in bed and reports no new issues or overnight events.     PAST MEDICAL & SURGICAL HISTORY  HTN (hypertension)      SOCIAL HISTORY:  Negative for smoking/alcohol/drug use.     ALLERGIES:  No Known Allergies    MEDICATIONS:  STANDING MEDICATIONS  carvedilol 6.25 milliGRAM(s) Oral every 12 hours  chlorhexidine 2% Cloths 1 Application(s) Topical <User Schedule>  dextrose 5%. 1000 milliLiter(s) IV Continuous <Continuous>  dextrose 5%. 1000 milliLiter(s) IV Continuous <Continuous>  dextrose 50% Injectable 25 Gram(s) IV Push once  dextrose 50% Injectable 12.5 Gram(s) IV Push once  dextrose 50% Injectable 25 Gram(s) IV Push once  glucagon  Injectable 1 milliGRAM(s) IntraMuscular once  heparin   Injectable 5000 Unit(s) SubCutaneous every 12 hours  insulin glargine Injectable (LANTUS) 6 Unit(s) SubCutaneous at bedtime  insulin lispro (ADMELOG) corrective regimen sliding scale   SubCutaneous three times a day before meals  insulin lispro (ADMELOG) corrective regimen sliding scale   SubCutaneous at bedtime  methylPREDNISolone sodium succinate IVPB 1000 milliGRAM(s) IV Intermittent daily  niCARdipine Infusion 5 mG/Hr IV Continuous <Continuous>  pantoprazole  Injectable 40 milliGRAM(s) IV Push two times a day    PRN MEDICATIONS  dextrose Oral Gel 15 Gram(s) Oral once PRN  melatonin 3 milliGRAM(s) Oral at bedtime PRN    VITALS:   T(F): 98.8  HR: 118  BP: --  RR: 36  SpO2: 94%    LABS:                        7.8    22.18 )-----------( 192      ( 09 May 2025 16:26 )             23.0     05-09    139  |  104  |  64[HH]  ----------------------------<  257[H]  3.5   |  20  |  4.2[HH]    Ca    7.9[L]      09 May 2025 04:15  Phos  4.8     05-09  Mg     2.3     05-09    TPro  5.1[L]  /  Alb  3.1[L]  /  TBili  0.4  /  DBili  x   /  AST  19  /  ALT  21  /  AlkPhos  103  05-09      Urinalysis Basic - ( 09 May 2025 04:15 )    Color: x / Appearance: x / SG: x / pH: x  Gluc: 257 mg/dL / Ketone: x  / Bili: x / Urobili: x   Blood: x / Protein: x / Nitrite: x   Leuk Esterase: x / RBC: x / WBC x   Sq Epi: x / Non Sq Epi: x / Bacteria: x            Culture - Blood (collected 07 May 2025 04:45)  Source: Blood Blood-Peripheral  Preliminary Report (09 May 2025 14:01):    No growth at 48 Hours    Culture - Blood (collected 07 May 2025 04:40)  Source: Blood Blood-Peripheral  Preliminary Report (09 May 2025 14:01):    No growth at 48 Hours    Culture - Urine (collected 07 May 2025 00:55)  Source: Clean Catch None  Final Report (08 May 2025 11:55):    >=3 organisms. Probable collection contamination.    Urinalysis with Rflx Culture (collected 07 May 2025 00:55)          PHYSICAL EXAM:  GEN: No acute distress  LUNGS: Clear to auscultation bilaterally   HEART: Regular  ABD: Soft, non-tender, non-distended.  EXT: NC/NC/NE/2+PP/HERRERA/Skin Intact.   NEURO: AAOX3

## 2025-05-09 NOTE — PROGRESS NOTE ADULT - ASSESSMENT
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  Delirium Precautions    ENT : Oral Care     Pulmonary : Keep Spo2 > 94% .HOB elevation. Supplemental O2 prn.     Cardiology: F/U Troponin. Continue with nicardipine drip, wean off as tolerated. F/U TTE.   start procardia Xl 60 Q24 hrs   taper off nicardipine   d/c iv fluid     GI : clear liquid as per GI advance as tolerated if ok by gi   follow Gi and G sx   - PPI Q12 hrs   - Trend lactate  - F/U GI PCR   - F/U Bcx   - serial abdominal exams and monitor bowel function    Renal : Trend CMP. Monitor Lytes and replete as needed. Nephro  follow up     ID  d/c abx as per ID an GI   check procal   follow cx     Hem/Onc : Coagulation studies noted.  possible ttp follow work   on steroids   and plasm   monitor h/h closely if stable start heparin SQ   Endo:  Blood glucose Goal : 140-180. insulin drip for now     MSK: Ambulate as tolerated     Code : Full code.      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  Delirium Precautions    ENT : Oral Care     Pulmonary : Keep Spo2 > 94% .HOB elevation. Supplemental O2 prn.     Cardiology: F/U Troponin. Continue with nicardipine drip, wean off as tolerated. F/U TTE.     procardia Xl 60 Q24 hrs  also started coreg possible switch to labetolol if BP remian high   taper off nicardipine        GI : clear liquid as per GI advance as tolerated if ok by gi   follow Gi and G sx   - PPI Q12 hrs   - Trend lactate  - F/U GI PCR   - F/U Bcx   - serial abdominal exams and monitor bowel function    Renal : Trend CMP. Monitor Lytes and replete as needed. Nephro  follow up   off abx   check procal   follow cx     Hem/Onc : Coagulation studies noted.  possible ttp follow work   on steroids   and plasmapheresis   monitor h/h closely if stable start heparin SQ if ok by hemato  Endo:  Blood glucose Goal : 140-180. insulin drip for now     MSK: Ambulate as tolerated     Code : Full code.      Disp: MICU

## 2025-05-09 NOTE — PROGRESS NOTE ADULT - ASSESSMENT
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  Delirium Precautions    ENT : Oral Care     Pulmonary : Keep Spo2 > 94% .HOB elevation. Supplemental O2 prn.     Cardiology: F/U Troponin. Continue with nicardipine drip, wean off as tolerated. F/U TTE.     procardia Xl 60 Q24 hrs  also started coreg possible switch to labetolol if BP remian high   taper off nicardipine        GI : clear liquid as per GI advance as tolerated if ok by gi   follow Gi and G sx   - PPI Q12 hrs   - Trend lactate  - F/U GI PCR   - F/U Bcx   - serial abdominal exams and monitor bowel function    Renal : Trend CMP. Monitor Lytes and replete as needed. Nephro  follow up   off abx   check procal   follow cx     Hem/Onc : Coagulation studies noted.  possible ttp follow work   on steroids   and plasmapheresis   monitor h/h closely if stable start heparin SQ if ok by hemato  Endo:  Blood glucose Goal : 140-180. insulin lantus and ssi    MSK: Ambulate as tolerated     Code : Full code.      Disp: MICU

## 2025-05-09 NOTE — PROGRESS NOTE ADULT - SUBJECTIVE AND OBJECTIVE BOX
FRANCISCO DUNLAP  39y, Female  Allergy: No Known Allergies      LOS  2d    CHIEF COMPLAINT: Diarrhea (08 May 2025 07:09)      INTERVAL EVENTS/HPI  - No acute events overnight  - T(F): , Max: 99.1 (05-08-25 @ 12:00)  - mild abdominal cramping - no diarrhea   - WBC Count: 20.83 (05-09-25 @ 04:15)  WBC Count: 21.21 (05-08-25 @ 23:32)     - Creatinine: 4.2 (05-09-25 @ 04:15)  Creatinine: 4.1 (05-08-25 @ 19:20)       ROS  General: Denies rigors, nightsweats  HEENT: Denies headache, rhinorrhea, sore throat, eye pain  CV: Denies CP, palpitations  PULM: Denies wheezing, hemoptysis  GI: Denies hematemesis, hematochezia, melena  : Denies discharge, hematuria  MSK: Denies arthralgias, myalgias  SKIN: Denies rash, lesions  NEURO: Denies paresthesias, weakness  PSYCH: Denies depression, anxiety    VITALS:  T(F): 98.6, Max: 99.1 (05-08-25 @ 12:00)  HR: 94  BP: --  RR: 24Vital Signs Last 24 Hrs  T(C): 37 (09 May 2025 08:00), Max: 37.3 (08 May 2025 12:00)  T(F): 98.6 (09 May 2025 08:00), Max: 99.1 (08 May 2025 12:00)  HR: 94 (09 May 2025 09:00) (94 - 126)  BP: --  BP(mean): --  RR: 24 (09 May 2025 09:00) (21 - 48)  SpO2: 92% (09 May 2025 09:00) (92% - 98%)    Parameters below as of 09 May 2025 10:00  Patient On (Oxygen Delivery Method): room air        PHYSICAL EXAM:  Gen: NAD, resting in bed  HEENT: Normocephalic, atraumatic  Neck: supple, no lymphadenopathy  CV: Regular rate & regular rhythm  Lungs: decreased BS at bases, no fremitus  Abdomen: Soft, BS present  Ext: Warm, well perfused  Neuro: non focal, awake  Skin: no rash, no erythema  Lines: no phlebitis    FH: Non-contributory  Social Hx: Non-contributory    TESTS & MEASUREMENTS:                        7.9    20.83 )-----------( 163      ( 09 May 2025 04:15 )             23.7     05-09    139  |  104  |  64[HH]  ----------------------------<  257[H]  3.5   |  20  |  4.2[HH]    Ca    7.9[L]      09 May 2025 04:15  Phos  4.8     05-09  Mg     2.3     05-09    TPro  5.1[L]  /  Alb  3.1[L]  /  TBili  0.4  /  DBili  x   /  AST  19  /  ALT  21  /  AlkPhos  103  05-09      LIVER FUNCTIONS - ( 09 May 2025 04:15 )  Alb: 3.1 g/dL / Pro: 5.1 g/dL / ALK PHOS: 103 U/L / ALT: 21 U/L / AST: 19 U/L / GGT: x           Urinalysis Basic - ( 09 May 2025 04:15 )    Color: x / Appearance: x / SG: x / pH: x  Gluc: 257 mg/dL / Ketone: x  / Bili: x / Urobili: x   Blood: x / Protein: x / Nitrite: x   Leuk Esterase: x / RBC: x / WBC x   Sq Epi: x / Non Sq Epi: x / Bacteria: x        Culture - Blood (collected 05-07-25 @ 04:45)  Source: Blood Blood-Peripheral  Preliminary Report (05-08-25 @ 14:02):    No growth at 24 hours    Culture - Blood (collected 05-07-25 @ 04:40)  Source: Blood Blood-Peripheral  Preliminary Report (05-08-25 @ 14:02):    No growth at 24 hours    Culture - Urine (collected 05-07-25 @ 00:55)  Source: Clean Catch None  Final Report (05-08-25 @ 11:55):    >=3 organisms. Probable collection contamination.    Urinalysis with Rflx Culture (collected 05-07-25 @ 00:55)        Lactate, Blood: 2.0 mmol/L (05-08-25 @ 05:20)  Lactate, Blood: 1.1 mmol/L (05-07-25 @ 04:40)  Blood Gas Venous - Lactate: 0.6 mmol/L (05-07-25 @ 00:08)  Lactate, Blood: 0.8 mmol/L (05-06-25 @ 22:32)      INFECTIOUS DISEASES TESTING  Procalcitonin: 0.37 (05-08-25 @ 09:11)  HIV-1/2 Combo Result: Nonreact (05-07-25 @ 11:40)  MRSA PCR Result.: Negative (05-07-25 @ 07:35)      INFLAMMATORY MARKERS  Sedimentation Rate, Erythrocyte: 45 mm/Hr (05-07-25 @ 11:40)  C-Reactive Protein: 139.0 mg/L (05-07-25 @ 11:40)      RADIOLOGY & ADDITIONAL TESTS:  I have personally reviewed the last available Chest xray  CXR      CT  CT Abdomen and Pelvis No Cont:   ACC: [id number] EXAM:  CT ABDOMEN AND PELVIS   ORDERED BY: STACY VELASQUEZ     PROCEDURE DATE:  05/07/2025          INTERPRETATION:  CLINICAL STATEMENT: Abdominal pain.    TECHNIQUE: Contiguous axial CT images were obtained from the lower chest   to the pubic symphysis without intravenous contrast. Oral contrast was   not administered. Reformatted images in the coronal and sagittal planes   were acquired.    COMPARISON: None.    FINDINGS:    LOWER CHEST: Cardiomegaly. Trace pericardial effusion. Lung groundglass   opacities, which could represent pulmonary edema in the appropriate   clinical setting.    HEPATOBILIARY: Unremarkable.    SPLEEN: Unremarkable.    PANCREAS: Unremarkable.    ADRENAL GLANDS: Unremarkable.    KIDNEYS: No hydronephrosis or radiopaque calculus.    ABDOMINOPELVIC NODES: Unremarkable.    PELVIC ORGANS: Unremarkable.    PERITONEUM/MESENTERY/BOWEL: Distal duodenal and proximal jejunal wall   thickening with interloop ascites, mesenteric fat stranding, and   prominent mesenteric lymph nodes. Dilated jejunal loops measuring up to   3.4 cm. Mild-to-moderate no ascites. No portal venous gas,   pneumoperitoneum or pneumatosis.    BONES/SOFT TISSUES: Small fat-containing umbilical hernia. Osseous   structures unremarkable.    OTHER: Scattered atherosclerotic calcifications of the infrarenal   abdominal aorta.      IMPRESSION:    Edematous distal duodenum and proximal jejunal loops with associated   dilatation measuring up to 3.4 cm. Associated marked interloop ascites,   mesenteric fat stranding, and prominent mesenteric lymph nodes.   Mild-to-moderate ascites. Findings concerning for small bowel ischemia.    No portal venous gas, pneumatosis, or pneumoperitoneum.    Communication: The summary of above findings were discussed with readback   confirmation with [id number] by radiology resident Kimberly Hoover MD at   2:20 AM on 5/7/2025.    --- End of Report ---          KIMBERLY HOOVER MD; Resident Radiologist  This document has been electronically signed.  TRINO CLAIRE MD; Attending Radiologist  This document has been electronically signed. May  7 2025  2:27AM (05-07-25 @ 01:49)      CARDIOLOGY TESTING  12 Lead ECG:   Ventricular Rate 93 BPM    Atrial Rate 93 BPM    P-R Interval 136 ms    QRS Duration 84 ms    Q-T Interval 412 ms    QTC Calculation(Bazett) 512 ms    P Axis 54 degrees    R Axis 24 degrees    T Axis 180 degrees    Diagnosis Line Normal sinus rhythm  Left ventricular hypertrophy with repolarization abnormality ( R in aVL ,   Sokolow-Vaca , Romhilt-Griffith )  Prolonged QT  Abnormal ECG    Confirmed by Daniel Whalen ([id number]) on 5/7/2025 9:25:18 AM (05-07-25 @ 01:57)      MEDICATIONS  carvedilol 6.25 Oral every 12 hours  chlorhexidine 2% Cloths 1 Topical <User Schedule>  dextrose 5%. 1000 IV Continuous <Continuous>  dextrose 5%. 1000 IV Continuous <Continuous>  dextrose 50% Injectable 25 IV Push once  dextrose 50% Injectable 12.5 IV Push once  dextrose 50% Injectable 25 IV Push once  glucagon  Injectable 1 IntraMuscular once  insulin glargine Injectable (LANTUS) 6 SubCutaneous at bedtime  insulin lispro (ADMELOG) corrective regimen sliding scale  SubCutaneous three times a day before meals  insulin lispro (ADMELOG) corrective regimen sliding scale  SubCutaneous at bedtime  methylPREDNISolone sodium succinate IVPB 1000 IV Intermittent daily  niCARdipine Infusion 5 IV Continuous <Continuous>  pantoprazole  Injectable 40 IV Push two times a day  potassium chloride  20 mEq/100 mL IVPB 20 IV Intermittent every 2 hours      WEIGHT  Weight (kg): 65.8 (05-07-25 @ 06:30)  Creatinine: 4.2 mg/dL (05-09-25 @ 04:15)  Creatinine: 4.1 mg/dL (05-08-25 @ 19:20)      ANTIBIOTICS:      All available historical records have been reviewed

## 2025-05-09 NOTE — PROGRESS NOTE ADULT - SUBJECTIVE AND OBJECTIVE BOX
Gastroenterology progress note:     Patient is a 39y old  Female who presents with a chief complaint of Diarrhea (08 May 2025 07:09)       Admitted on: 05-07-25    We are following the patient for: CT findings of bowel ischemia        Interval History:    No acute events overnight. Passing dark BMs. No vomiting.       PAST MEDICAL & SURGICAL HISTORY:  HTN (hypertension)          MEDICATIONS  (STANDING):  carvedilol 6.25 milliGRAM(s) Oral every 12 hours  chlorhexidine 2% Cloths 1 Application(s) Topical <User Schedule>  dextrose 5%. 1000 milliLiter(s) (100 mL/Hr) IV Continuous <Continuous>  dextrose 5%. 1000 milliLiter(s) (50 mL/Hr) IV Continuous <Continuous>  dextrose 50% Injectable 25 Gram(s) IV Push once  dextrose 50% Injectable 12.5 Gram(s) IV Push once  dextrose 50% Injectable 25 Gram(s) IV Push once  glucagon  Injectable 1 milliGRAM(s) IntraMuscular once  heparin   Injectable 5000 Unit(s) SubCutaneous every 12 hours  insulin glargine Injectable (LANTUS) 6 Unit(s) SubCutaneous at bedtime  insulin lispro (ADMELOG) corrective regimen sliding scale   SubCutaneous three times a day before meals  insulin lispro (ADMELOG) corrective regimen sliding scale   SubCutaneous at bedtime  methylPREDNISolone sodium succinate IVPB 1000 milliGRAM(s) IV Intermittent daily  niCARdipine Infusion 5 mG/Hr (25 mL/Hr) IV Continuous <Continuous>  pantoprazole  Injectable 40 milliGRAM(s) IV Push two times a day  potassium chloride  20 mEq/100 mL IVPB 20 milliEquivalent(s) IV Intermittent every 2 hours    MEDICATIONS  (PRN):  dextrose Oral Gel 15 Gram(s) Oral once PRN Blood Glucose LESS THAN 70 milliGRAM(s)/deciliter  melatonin 3 milliGRAM(s) Oral at bedtime PRN Insomnia      Allergies  No Known Allergies      Review of Systems:   Cardiovascular:  No Chest Pain, No Palpitations  Respiratory:  No Cough, No Dyspnea  Gastrointestinal:  As described in HPI  Neuro:  No Syncope, No Dizziness    Physical Examination:  T(C): 36.7 (05-09-25 @ 12:00), Max: 37.3 (05-08-25 @ 12:00)  HR: 107 (05-09-25 @ 11:00) (94 - 124)  BP: --  RR: 30 (05-09-25 @ 11:00) (21 - 40)  SpO2: 94% (05-09-25 @ 11:00) (92% - 98%)      05-08-25 @ 07:01  -  05-09-25 @ 07:00  --------------------------------------------------------  IN: 1951.5 mL / OUT: 1100 mL / NET: 851.5 mL    05-09-25 @ 07:01  -  05-09-25 @ 11:46  --------------------------------------------------------  IN: 1155 mL / OUT: 200 mL / NET: 955 mL        GENERAL: AAOx3, no acute distress.  CHEST/LUNG: Clear to auscultation bilaterally; No wheeze, rhonchi, or rales  HEART: Regular rate and rhythm; normal S1, S2, No murmurs.  ABDOMEN: Soft, nontender, mildly distended; Bowel sounds present  NEUROLOGY: No asterixis or tremor.   SKIN: Intact, no jaundice     Data:                        7.9    20.83 )-----------( 163      ( 09 May 2025 04:15 )             23.7     Hgb trend:  7.9  05-09-25 @ 04:15  6.8  05-08-25 @ 23:32  7.0  05-08-25 @ 19:20  7.6  05-08-25 @ 05:20  8.5  05-08-25 @ 01:00  6.8  05-07-25 @ 18:50  7.2  05-07-25 @ 11:40  8.1  05-07-25 @ 04:40  9.0  05-06-25 @ 22:32      05-07-25 @ 07:01  -  05-08-25 @ 07:00  --------------------------------------------------------  IN: 331 mL    05-08-25 @ 07:01  -  05-09-25 @ 07:00  --------------------------------------------------------  IN: 335 mL      05-09    139  |  104  |  64[HH]  ----------------------------<  257[H]  3.5   |  20  |  4.2[HH]    Ca    7.9[L]      09 May 2025 04:15  Phos  4.8     05-09  Mg     2.3     05-09    TPro  5.1[L]  /  Alb  3.1[L]  /  TBili  0.4  /  DBili  x   /  AST  19  /  ALT  21  /  AlkPhos  103  05-09    Liver panel trend:  TBili 0.4   /   AST 19   /   ALT 21   /   AlkP 103   /   Tptn 5.1   /   Alb 3.1    /   DBili --      05-09  TBili 0.7   /   AST 17   /   ALT 14   /   AlkP 71   /   Tptn 4.9   /   Alb 3.1    /   DBili --      05-08  TBili 0.5   /   AST 18   /   ALT 8   /   AlkP 61   /   Tptn 4.2   /   Alb 2.6    /   DBili --      05-07  TBili 0.7   /   AST 30   /   ALT 10   /   AlkP 63   /   Tptn 4.6   /   Alb 2.8    /   DBili 0.2      05-06          Culture - Blood (collected 07 May 2025 04:45)  Source: Blood Blood-Peripheral  Preliminary Report (08 May 2025 14:02):    No growth at 24 hours    Culture - Blood (collected 07 May 2025 04:40)  Source: Blood Blood-Peripheral  Preliminary Report (08 May 2025 14:02):    No growth at 24 hours    Culture - Urine (collected 07 May 2025 00:55)  Source: Clean Catch None  Final Report (08 May 2025 11:55):    >=3 organisms. Probable collection contamination.    Urinalysis with Rflx Culture (collected 07 May 2025 00:55)         Radiology:    Radiology:    < from: CT Angio Abdomen and Pelvis w/ IV Cont (05.07.25 @ 04:18) >  ACC: [id number] EXAM:  CT ANGIO ABD PELV (W)AW IC   ORDERED BY: HILARY TORRE     PROCEDURE DATE:  05/07/2025          INTERPRETATION:  CLINICAL HISTORY: Bowel ischemia.    TECHNIQUE: Multislice helical sections were obtained from the domes of   the diaphragm to the pubic symphysis prior to and following the   intravenous administration of 100 cc of Omnipaque 350 utilizing a CT   angiography protocol. Coronal and sagittal reformatted images as well as   MIP reconstructions were obtained.    COMPARISON: Same-date CT abdomen and pelvis.      FINDINGS:    VASCULATURE: Patent celiac, superior mesenteric, inferior mesenteric,   bilateral renal, and common iliac arteries. No evidence of vascular   dissection or aneurysm. Patent portal vein, SMV.    HEPATOBILIARY: Unchanged.    SPLEEN: Unchanged.    PANCREAS: Unchanged.    ADRENAL GLANDS: Unchanged.    KIDNEYS: Symmetric enhancement. No hydronephrosis.    ABDOMINOPELVIC NODES: Unchanged.    PELVIC ORGANS: Unchanged.    PERITONEUM/MESENTERY/BOWEL: Redemonstration and of distal duodenal and   proximal jejunal wall thickening with interloop ascites, mesenteric fat   stranding, and prominent mesenteric lymph nodes. Dilated jejunal loops   measuring up to 3.4 cm, with gradual tapering distally without evidence   of mechanical obstruction. Mild-to-moderate no ascites. No portal venous   gas, pneumoperitoneum or pneumatosis. Mural enhancement noted throughout   the small bowel.    BONES/SOFT TISSUES: Unchanged.      IMPRESSION:    Patent SMA, SMV. Redemonstration of distal duodenal and proximal small   bowel with marked inflammatory change. Mural enhancement noted throughout   the small bowel. Considerations include severe enteritis, early ischemia,   shock bowel.    --- End of Report ---          RG CHAUDHARY MD; Resident Radiologist  This document has been electronically signed.  TRINO CLAIRE MD; Attending Radiologist  This document has been electronically signed. May  7 2025  5:42AM    < end of copied text >

## 2025-05-09 NOTE — DIETITIAN INITIAL EVALUATION ADULT - ORAL INTAKE PTA/DIET HISTORY
Patient reports consuming 3 meals/day and usually eating pretty well. No particular diet followed. No chewing/swallowing difficulties reported. NKFA, no food intolerances reported.

## 2025-05-09 NOTE — PROGRESS NOTE ADULT - ASSESSMENT
39-year-old female with history of hypertension presenting to ER with 5 days of multiple episodes of nonbloody nonbilious vomiting and diarrhea with associated generalized abdominal pain and distention. GI following for CT findings.         # CT findings concerning for possible small bowel ischemia DDX :malignant HTN / TTP/ HUS   # s/p plasmapheresis and IV steroids   # Anemia (hemolytic?) and thrombocytopenia   # Hypoalbuminemia   # JOYCE   On admission hypertensive and tachycardic, no fever, no change in mental status   Hgb 9 on admission < 8.1   PLT 86< 70   WBC 18k< 20 K   Lactate normal   CTA  AP Edematous distal duodenum and proximal jejunal loops with associated dilatation measuring up to 3.4 cm. Associated marked interloop ascites,   mesenteric fat stranding, and prominent mesenteric lymph nodes.   Mild-to-moderate ascites. Findings concerning for small bowel ischemia.  Patent SMA and SMV . No portal venous gas, pneumatosis, or pneumoperitoneum.  As per surgery no current indication for surgical intervention given no concern of bowel ischemia on physical exam       Plan   - diet as tolerated as long as passing BMs   - PO PPI BID while on steroids   - Monitor and record BMs   - hold IV ABx in light of possible HUS/TTP  - Heme onc , infectious, and nephrology eval appreciated  - check GI pcr and c diff  - supp care per MICU team  - no endoscopic intervention at this time  - we will follow closely     39-year-old female with history of hypertension presenting to ER with 5 days of multiple episodes of nonbloody nonbilious vomiting and diarrhea with associated generalized abdominal pain and distention. GI following for CT findings.         # CT findings concerning for possible small bowel ischemia DDX :malignant HTN / TTP/ HUS   # s/p plasmapheresis and IV steroids   # Anemia (hemolytic?) and thrombocytopenia   # Hypoalbuminemia   # JOYCE   On admission hypertensive and tachycardic, no fever, no change in mental status   Hgb 9 on admission < 8.1   PLT 86< 70   WBC 18k< 20 K   Lactate normal   CTA  AP Edematous distal duodenum and proximal jejunal loops with associated dilatation measuring up to 3.4 cm. Associated marked interloop ascites,   mesenteric fat stranding, and prominent mesenteric lymph nodes.   Mild-to-moderate ascites. Findings concerning for small bowel ischemia.  Patent SMA and SMV . No portal venous gas, pneumatosis, or pneumoperitoneum.  As per surgery no current indication for surgical intervention given no concern of bowel ischemia on physical exam       Plan   - diet as tolerated as long as passing BMs   - PO PPI BID while on steroids   - Monitor and record BMs   - hold IV ABx in light of possible HUS/TTP  - Heme onc , infectious, and nephrology eval appreciated  - check c diff if pt has diarrhea  - supp care per MICU team  - no endoscopic intervention at this time  - we will follow closely

## 2025-05-09 NOTE — PROGRESS NOTE ADULT - ASSESSMENT
39-year-old female with history of hypertension presenting to ER with 5 days of multiple episodes of nonbloody nonbilious vomiting and diarrhea with associated generalized abdominal pain and distention    # HTN   # JOYCE rule out ATN   # proteinuria / hematuria   # thrombocytopenia     - picture above can be due to malignant HTN / giving TTP like picture   - normal C3 ( not in favor of a HUS)  nl C4 normal JOSE ANTONIO which rule out active lupus / awaiting ADAMTS 13   - hem onc appreciated for plasmapheresis today   - check DAMTS 13   - 1.9 g protein on UPCR   - neg hepatitis and HIV profile anti GBM  - continue nicardipine drip start procardia xl 60 and labetalol 200 mg po q8h   - appreciate  ID eval  - creat noted  no need for RRT   - follow electrolytes with plasmapheresis  - check renal artery dupplex    renal team will follow

## 2025-05-09 NOTE — PROGRESS NOTE ADULT - SUBJECTIVE AND OBJECTIVE BOX
Patient is a 39y old  Female who presents with a chief complaint of Diarrhea (08 May 2025 07:09)        Over Night Events:        ROS:     All ROS are negative except HPI         PHYSICAL EXAM    ICU Vital Signs Last 24 Hrs  T(C): 36.7 (09 May 2025 04:00), Max: 37.3 (08 May 2025 12:00)  T(F): 98 (09 May 2025 04:00), Max: 99.1 (08 May 2025 12:00)  HR: 98 (09 May 2025 07:00) (98 - 133)  BP: --  BP(mean): --  ABP: 173/77 (09 May 2025 07:00) (134/71 - 213/103)  ABP(mean): 108 (09 May 2025 07:00) (93 - 134)  RR: 21 (09 May 2025 07:00) (21 - 48)  SpO2: 95% (09 May 2025 06:00) (92% - 98%)    O2 Parameters below as of 09 May 2025 06:00  Patient On (Oxygen Delivery Method): room air              ENT: Airway patent,  EYES: Pupils equal, Round and reactive to light.  CARDIAC: Normal rate, Regular rhythm.    RESPIRATORY: No wheezing, Bilateral BS, Not tachypneic, No use of accessory muscles  GASTROINTESTINAL: Abdomen soft, Non-tender, No guarding,   NEUROLOGICAL: Alert and oriented   SKIN: Skin normal color for race, Warm and dry and intact.         05-08-25 @ 07:01  -  05-09-25 @ 07:00  --------------------------------------------------------  IN:    IV PiggyBack: 100 mL    NiCARdipine: 356.5 mL    Oral Fluid: 1160 mL    PRBCs (Packed Red Blood Cells): 335 mL  Total IN: 1951.5 mL    OUT:    Voided (mL): 1100 mL  Total OUT: 1100 mL    Total NET: 851.5 mL          LABS:                            7.9    20.83 )-----------( 163      ( 09 May 2025 04:15 )             23.7                                               05-09    139  |  104  |  64[HH]  ----------------------------<  257[H]  3.5   |  20  |  4.2[HH]    Ca    7.9[L]      09 May 2025 04:15  Phos  4.8     05-09  Mg     2.3     05-09    TPro  5.1[L]  /  Alb  3.1[L]  /  TBili  0.4  /  DBili  x   /  AST  19  /  ALT  21  /  AlkPhos  103  05-09      PT/INR - ( 07 May 2025 11:40 )   PT: 10.60 sec;   INR: 0.90 ratio         PTT - ( 07 May 2025 11:40 )  PTT:29.1 sec                                       Urinalysis Basic - ( 09 May 2025 04:15 )    Color: x / Appearance: x / SG: x / pH: x  Gluc: 257 mg/dL / Ketone: x  / Bili: x / Urobili: x   Blood: x / Protein: x / Nitrite: x   Leuk Esterase: x / RBC: x / WBC x   Sq Epi: x / Non Sq Epi: x / Bacteria: x                                                  LIVER FUNCTIONS - ( 09 May 2025 04:15 )  Alb: 3.1 g/dL / Pro: 5.1 g/dL / ALK PHOS: 103 U/L / ALT: 21 U/L / AST: 19 U/L / GGT: x                                                  Culture - Blood (collected 07 May 2025 04:45)  Source: Blood Blood-Peripheral  Preliminary Report (08 May 2025 14:02):    No growth at 24 hours    Culture - Blood (collected 07 May 2025 04:40)  Source: Blood Blood-Peripheral  Preliminary Report (08 May 2025 14:02):    No growth at 24 hours    Culture - Urine (collected 07 May 2025 00:55)  Source: Clean Catch None  Final Report (08 May 2025 11:55):    >=3 organisms. Probable collection contamination.    Urinalysis with Rflx Culture (collected 07 May 2025 00:55)                                                                                           MEDICATIONS  (STANDING):  calcium gluconate IVPB 2 Gram(s) IV Intermittent once  carvedilol 6.25 milliGRAM(s) Oral every 12 hours  chlorhexidine 2% Cloths 1 Application(s) Topical <User Schedule>  dextrose 5%. 1000 milliLiter(s) (100 mL/Hr) IV Continuous <Continuous>  dextrose 5%. 1000 milliLiter(s) (50 mL/Hr) IV Continuous <Continuous>  dextrose 50% Injectable 25 Gram(s) IV Push once  dextrose 50% Injectable 12.5 Gram(s) IV Push once  dextrose 50% Injectable 25 Gram(s) IV Push once  glucagon  Injectable 1 milliGRAM(s) IntraMuscular once  insulin lispro (ADMELOG) corrective regimen sliding scale   SubCutaneous three times a day before meals  insulin lispro (ADMELOG) corrective regimen sliding scale   SubCutaneous at bedtime  methylPREDNISolone sodium succinate IVPB 1000 milliGRAM(s) IV Intermittent daily  niCARdipine Infusion 5 mG/Hr (25 mL/Hr) IV Continuous <Continuous>  NIFEdipine XL 60 milliGRAM(s) Oral two times a day  pantoprazole  Injectable 40 milliGRAM(s) IV Push two times a day    MEDICATIONS  (PRN):  dextrose Oral Gel 15 Gram(s) Oral once PRN Blood Glucose LESS THAN 70 milliGRAM(s)/deciliter  melatonin 3 milliGRAM(s) Oral at bedtime PRN Insomnia         Patient is a 39y old  Female who presents with a chief complaint of Diarrhea (08 May 2025 07:09)        Over Night Events:  awake follow command   still nicardipine   plasma today       ROS:     All ROS are negative except HPI         PHYSICAL EXAM    ICU Vital Signs Last 24 Hrs  T(C): 36.7 (09 May 2025 04:00), Max: 37.3 (08 May 2025 12:00)  T(F): 98 (09 May 2025 04:00), Max: 99.1 (08 May 2025 12:00)  HR: 98 (09 May 2025 07:00) (98 - 133)  BP: --  BP(mean): --  ABP: 173/77 (09 May 2025 07:00) (134/71 - 213/103)  ABP(mean): 108 (09 May 2025 07:00) (93 - 134)  RR: 21 (09 May 2025 07:00) (21 - 48)  SpO2: 95% (09 May 2025 06:00) (92% - 98%)    O2 Parameters below as of 09 May 2025 06:00  Patient On (Oxygen Delivery Method): room air              ENT: Airway patent,  EYES: Pupils equal, Round and reactive to light.  CARDIAC: Normal rate, Regular rhythm.    RESPIRATORY: No wheezing, Bilateral BS, Not tachypneic, No use of accessory muscles  GASTROINTESTINAL: Abdomen soft, Non-tender, No guarding,   NEUROLOGICAL: Alert and oriented   SKIN: Skin normal color for race, Warm and dry and intact.         05-08-25 @ 07:01  -  05-09-25 @ 07:00  --------------------------------------------------------  IN:    IV PiggyBack: 100 mL    NiCARdipine: 356.5 mL    Oral Fluid: 1160 mL    PRBCs (Packed Red Blood Cells): 335 mL  Total IN: 1951.5 mL    OUT:    Voided (mL): 1100 mL  Total OUT: 1100 mL    Total NET: 851.5 mL          LABS:                            7.9    20.83 )-----------( 163      ( 09 May 2025 04:15 )             23.7                                               05-09    139  |  104  |  64[HH]  ----------------------------<  257[H]  3.5   |  20  |  4.2[HH]    Ca    7.9[L]      09 May 2025 04:15  Phos  4.8     05-09  Mg     2.3     05-09    TPro  5.1[L]  /  Alb  3.1[L]  /  TBili  0.4  /  DBili  x   /  AST  19  /  ALT  21  /  AlkPhos  103  05-09      PT/INR - ( 07 May 2025 11:40 )   PT: 10.60 sec;   INR: 0.90 ratio         PTT - ( 07 May 2025 11:40 )  PTT:29.1 sec                                       Urinalysis Basic - ( 09 May 2025 04:15 )    Color: x / Appearance: x / SG: x / pH: x  Gluc: 257 mg/dL / Ketone: x  / Bili: x / Urobili: x   Blood: x / Protein: x / Nitrite: x   Leuk Esterase: x / RBC: x / WBC x   Sq Epi: x / Non Sq Epi: x / Bacteria: x                                                  LIVER FUNCTIONS - ( 09 May 2025 04:15 )  Alb: 3.1 g/dL / Pro: 5.1 g/dL / ALK PHOS: 103 U/L / ALT: 21 U/L / AST: 19 U/L / GGT: x                                                  Culture - Blood (collected 07 May 2025 04:45)  Source: Blood Blood-Peripheral  Preliminary Report (08 May 2025 14:02):    No growth at 24 hours    Culture - Blood (collected 07 May 2025 04:40)  Source: Blood Blood-Peripheral  Preliminary Report (08 May 2025 14:02):    No growth at 24 hours    Culture - Urine (collected 07 May 2025 00:55)  Source: Clean Catch None  Final Report (08 May 2025 11:55):    >=3 organisms. Probable collection contamination.    Urinalysis with Rflx Culture (collected 07 May 2025 00:55)                                                                                           MEDICATIONS  (STANDING):  calcium gluconate IVPB 2 Gram(s) IV Intermittent once  carvedilol 6.25 milliGRAM(s) Oral every 12 hours  chlorhexidine 2% Cloths 1 Application(s) Topical <User Schedule>  dextrose 5%. 1000 milliLiter(s) (100 mL/Hr) IV Continuous <Continuous>  dextrose 5%. 1000 milliLiter(s) (50 mL/Hr) IV Continuous <Continuous>  dextrose 50% Injectable 25 Gram(s) IV Push once  dextrose 50% Injectable 12.5 Gram(s) IV Push once  dextrose 50% Injectable 25 Gram(s) IV Push once  glucagon  Injectable 1 milliGRAM(s) IntraMuscular once  insulin lispro (ADMELOG) corrective regimen sliding scale   SubCutaneous three times a day before meals  insulin lispro (ADMELOG) corrective regimen sliding scale   SubCutaneous at bedtime  methylPREDNISolone sodium succinate IVPB 1000 milliGRAM(s) IV Intermittent daily  niCARdipine Infusion 5 mG/Hr (25 mL/Hr) IV Continuous <Continuous>  NIFEdipine XL 60 milliGRAM(s) Oral two times a day  pantoprazole  Injectable 40 milliGRAM(s) IV Push two times a day    MEDICATIONS  (PRN):  dextrose Oral Gel 15 Gram(s) Oral once PRN Blood Glucose LESS THAN 70 milliGRAM(s)/deciliter  melatonin 3 milliGRAM(s) Oral at bedtime PRN Insomnia

## 2025-05-09 NOTE — DIETITIAN INITIAL EVALUATION ADULT - OTHER CALCULATIONS
Energy: 1316 - 1645 kcal/day (20 - 25 kcal/kg)  Protein: 78 - 92 gm/day (1.2 - 1.5 gm/kg)  Fluid: 1 mL/kcal   estimated needs with consideration for age, BMI, acuity of illness, critical care setting

## 2025-05-09 NOTE — PROGRESS NOTE ADULT - SUBJECTIVE AND OBJECTIVE BOX
Nephrology progress note    Patient is seen and examined, events over the last 24 h noted .  Lying in bed   getting plasmapheresis today     Allergies:  No Known Allergies    Hospital Medications:   MEDICATIONS  (STANDING):  carvedilol 6.25 milliGRAM(s) Oral every 12 hours  chlorhexidine 2% Cloths 1 Application(s) Topical <User Schedule>  glucagon  Injectable 1 milliGRAM(s) IntraMuscular once  insulin glargine Injectable (LANTUS) 6 Unit(s) SubCutaneous at bedtime  insulin lispro (ADMELOG) corrective regimen sliding scale   SubCutaneous three times a day before meals  insulin lispro (ADMELOG) corrective regimen sliding scale   SubCutaneous at bedtime  methylPREDNISolone sodium succinate IVPB 1000 milliGRAM(s) IV Intermittent daily  niCARdipine Infusion 5 mG/Hr (25 mL/Hr) IV Continuous <Continuous>  pantoprazole  Injectable 40 milliGRAM(s) IV Push two times a day  potassium chloride  20 mEq/100 mL IVPB 20 milliEquivalent(s) IV Intermittent every 2 hours        VITALS:  T(F): 98.6 (05-09-25 @ 08:00), Max: 99.1 (05-08-25 @ 12:00)  HR: 94 (05-09-25 @ 09:00)  BP: 150/70  RR: 24 (05-09-25 @ 09:00)  SpO2: 92% (05-09-25 @ 09:00)  Wt(kg): --    05-07 @ 07:01  -  05-08 @ 07:00  --------------------------------------------------------  IN: 2323.5 mL / OUT: 1750 mL / NET: 573.5 mL    05-08 @ 07:01  -  05-09 @ 07:00  --------------------------------------------------------  IN: 1951.5 mL / OUT: 1100 mL / NET: 851.5 mL    05-09 @ 07:01  -  05-09 @ 10:20  --------------------------------------------------------  IN: 255 mL / OUT: 200 mL / NET: 55 mL          PHYSICAL EXAM:  Constitutional: NAD  HEENT: anicteric sclera, oropharynx clear, MMM  Neck: No JVD  Respiratory: CTAB, no wheezes, rales or rhonchi  Cardiovascular: S1, S2, RRR  Gastrointestinal: BS+, soft, NT/ND  :  No wick.   Skin: No rashes    LABS:  05-09    139  |  104  |  64[HH]  ----------------------------<  257[H]  3.5   |  20  |  4.2[HH]    Ca    7.9[L]      09 May 2025 04:15  Phos  4.8     05-09  Mg     2.3     05-09    TPro  5.1[L]  /  Alb  3.1[L]  /  TBili  0.4  /  DBili      /  AST  19  /  ALT  21  /  AlkPhos  103  05-09                          7.9    20.83 )-----------( 163      ( 09 May 2025 04:15 )             23.7       Urine Studies:  Urinalysis Basic - ( 09 May 2025 04:15 )    Color:  / Appearance:  / SG:  / pH:   Gluc: 257 mg/dL / Ketone:   / Bili:  / Urobili:    Blood:  / Protein:  / Nitrite:    Leuk Esterase:  / RBC:  / WBC    Sq Epi:  / Non Sq Epi:  / Bacteria:       Creatinine, Random Urine: 78 mg/dL (05-08 @ 19:49)  Protein/Creatinine Ratio Calculation: 1.8 Ratio (05-08 @ 19:49)      Iron 18, TIBC 166, %sat 11      [05-07-25 @ 11:40]  Ferritin 362      [05-07-25 @ 11:40]  TSH 2.05      [05-07-25 @ 11:40]    HBsAg Nonreact      [05-07-25 @ 11:40]  HCV 0.13, Nonreact      [05-07-25 @ 11:40]  HIV Nonreact      [05-07-25 @ 11:40]    JOSE ANTONIO: titer Negative, pattern --      [05-07-25 @ 11:40]  C3 Complement 106      [05-07-25 @ 11:40]  C4 Complement 22      [05-07-25 @ 11:40]  ANCA: cANCA Negative, pANCA Negative, atypical ANCA Negative      [05-07-25 @ 11:40]  anti-GBM <0.2      [05-07-25 @ 11:40]      RADIOLOGY & ADDITIONAL STUDIES:

## 2025-05-10 LAB
ANION GAP SERPL CALC-SCNC: 16 MMOL/L — HIGH (ref 7–14)
B19V IGG SER-ACNC: 0.09 INDEX — SIGNIFICANT CHANGE UP (ref 0–0.9)
B19V IGG+IGM SER-IMP: NEGATIVE — SIGNIFICANT CHANGE UP
B19V IGG+IGM SER-IMP: SIGNIFICANT CHANGE UP
B19V IGM FLD-ACNC: 0.21 INDEX — SIGNIFICANT CHANGE UP (ref 0–0.9)
B19V IGM SER-ACNC: NEGATIVE — SIGNIFICANT CHANGE UP
BASOPHILS # BLD AUTO: 0.02 K/UL — SIGNIFICANT CHANGE UP (ref 0–0.2)
BASOPHILS NFR BLD AUTO: 0.1 % — SIGNIFICANT CHANGE UP (ref 0–1)
BUN SERPL-MCNC: 65 MG/DL — CRITICAL HIGH (ref 10–20)
CALCIUM SERPL-MCNC: 8.1 MG/DL — LOW (ref 8.4–10.5)
CHLORIDE SERPL-SCNC: 102 MMOL/L — SIGNIFICANT CHANGE UP (ref 98–110)
CO2 SERPL-SCNC: 21 MMOL/L — SIGNIFICANT CHANGE UP (ref 17–32)
CREAT SERPL-MCNC: 3.6 MG/DL — HIGH (ref 0.7–1.5)
CULTURE RESULTS: SIGNIFICANT CHANGE UP
DRVVT RATIO: 1.04 RATIO — SIGNIFICANT CHANGE UP (ref 0–1.21)
DRVVT SCREEN TO CONFIRM RATIO: SIGNIFICANT CHANGE UP
EGFR: 16 ML/MIN/1.73M2 — LOW
EGFR: 16 ML/MIN/1.73M2 — LOW
EOSINOPHIL # BLD AUTO: 0 K/UL — SIGNIFICANT CHANGE UP (ref 0–0.7)
EOSINOPHIL NFR BLD AUTO: 0 % — SIGNIFICANT CHANGE UP (ref 0–8)
FIBRINOGEN AG PPP IA-MCNC: 494 MG/DL — HIGH (ref 206–478)
GLUCOSE BLDC GLUCOMTR-MCNC: 184 MG/DL — HIGH (ref 70–99)
GLUCOSE BLDC GLUCOMTR-MCNC: 209 MG/DL — HIGH (ref 70–99)
GLUCOSE BLDC GLUCOMTR-MCNC: 226 MG/DL — HIGH (ref 70–99)
GLUCOSE BLDC GLUCOMTR-MCNC: 293 MG/DL — HIGH (ref 70–99)
GLUCOSE SERPL-MCNC: 175 MG/DL — HIGH (ref 70–99)
HAPTOGLOB SERPL-MCNC: 137 MG/DL — SIGNIFICANT CHANGE UP (ref 34–200)
HCT VFR BLD CALC: 22.8 % — LOW (ref 37–47)
HGB BLD-MCNC: 7.6 G/DL — LOW (ref 12–16)
IMM GRANULOCYTES NFR BLD AUTO: 1.9 % — HIGH (ref 0.1–0.3)
LDH SERPL L TO P-CCNC: 297 — HIGH (ref 50–242)
LYMPHOCYTES # BLD AUTO: 0.5 K/UL — LOW (ref 1.2–3.4)
LYMPHOCYTES # BLD AUTO: 2.5 % — LOW (ref 20.5–51.1)
MAGNESIUM SERPL-MCNC: 2.1 MG/DL — SIGNIFICANT CHANGE UP (ref 1.8–2.4)
MCHC RBC-ENTMCNC: 29.9 PG — SIGNIFICANT CHANGE UP (ref 27–31)
MCHC RBC-ENTMCNC: 33.3 G/DL — SIGNIFICANT CHANGE UP (ref 32–37)
MCV RBC AUTO: 89.8 FL — SIGNIFICANT CHANGE UP (ref 81–99)
MONOCYTES # BLD AUTO: 1.04 K/UL — HIGH (ref 0.1–0.6)
MONOCYTES NFR BLD AUTO: 5.2 % — SIGNIFICANT CHANGE UP (ref 1.7–9.3)
NEUTROPHILS # BLD AUTO: 18.22 K/UL — HIGH (ref 1.4–6.5)
NEUTROPHILS NFR BLD AUTO: 90.3 % — HIGH (ref 42.2–75.2)
NORMALIZED SCT PPP-RTO: 0.74 RATIO — SIGNIFICANT CHANGE UP (ref 0–1.16)
NORMALIZED SCT PPP-RTO: SIGNIFICANT CHANGE UP
NRBC BLD AUTO-RTO: 0 /100 WBCS — SIGNIFICANT CHANGE UP (ref 0–0)
PHOSPHATE SERPL-MCNC: 4.7 MG/DL — SIGNIFICANT CHANGE UP (ref 2.1–4.9)
PLATELET # BLD AUTO: 203 K/UL — SIGNIFICANT CHANGE UP (ref 130–400)
PMV BLD: 10.7 FL — HIGH (ref 7.4–10.4)
POTASSIUM SERPL-MCNC: 3.9 MMOL/L — SIGNIFICANT CHANGE UP (ref 3.5–5)
POTASSIUM SERPL-SCNC: 3.9 MMOL/L — SIGNIFICANT CHANGE UP (ref 3.5–5)
RBC # BLD: 2.54 M/UL — LOW (ref 4.2–5.4)
RBC # BLD: 2.54 M/UL — LOW (ref 4.2–5.4)
RBC # FLD: 16.5 % — HIGH (ref 11.5–14.5)
RETICS #: 158.5 K/UL — HIGH (ref 25–125)
RETICS/RBC NFR: 6.2 % — HIGH (ref 0.5–1.5)
SODIUM SERPL-SCNC: 139 MMOL/L — SIGNIFICANT CHANGE UP (ref 135–146)
SPECIMEN SOURCE: SIGNIFICANT CHANGE UP
WBC # BLD: 20.16 K/UL — HIGH (ref 4.8–10.8)
WBC # FLD AUTO: 20.16 K/UL — HIGH (ref 4.8–10.8)

## 2025-05-10 PROCEDURE — 99291 CRITICAL CARE FIRST HOUR: CPT

## 2025-05-10 PROCEDURE — 71045 X-RAY EXAM CHEST 1 VIEW: CPT | Mod: 26

## 2025-05-10 RX ORDER — FOLIC ACID 1 MG/1
1 TABLET ORAL DAILY
Refills: 0 | Status: DISCONTINUED | OUTPATIENT
Start: 2025-05-10 | End: 2025-05-27

## 2025-05-10 RX ORDER — CYANOCOBALAMIN 1000 UG/ML
1000 INJECTION INTRAMUSCULAR; SUBCUTANEOUS DAILY
Refills: 0 | Status: DISCONTINUED | OUTPATIENT
Start: 2025-05-11 | End: 2025-05-27

## 2025-05-10 RX ORDER — LABETALOL HYDROCHLORIDE 200 MG/1
200 TABLET, FILM COATED ORAL THREE TIMES A DAY
Refills: 0 | Status: DISCONTINUED | OUTPATIENT
Start: 2025-05-10 | End: 2025-05-12

## 2025-05-10 RX ADMIN — LABETALOL HYDROCHLORIDE 100 MILLIGRAM(S): 200 TABLET, FILM COATED ORAL at 05:29

## 2025-05-10 RX ADMIN — Medication 1 APPLICATION(S): at 05:32

## 2025-05-10 RX ADMIN — INSULIN LISPRO 2: 100 INJECTION, SOLUTION INTRAVENOUS; SUBCUTANEOUS at 09:11

## 2025-05-10 RX ADMIN — INSULIN LISPRO 0: 100 INJECTION, SOLUTION INTRAVENOUS; SUBCUTANEOUS at 21:36

## 2025-05-10 RX ADMIN — Medication 40 MILLIGRAM(S): at 17:49

## 2025-05-10 RX ADMIN — HEPARIN SODIUM 5000 UNIT(S): 1000 INJECTION INTRAVENOUS; SUBCUTANEOUS at 05:31

## 2025-05-10 RX ADMIN — LABETALOL HYDROCHLORIDE 200 MILLIGRAM(S): 200 TABLET, FILM COATED ORAL at 15:36

## 2025-05-10 RX ADMIN — Medication 25 MG/HR: at 01:06

## 2025-05-10 RX ADMIN — Medication 40 MILLIGRAM(S): at 05:32

## 2025-05-10 RX ADMIN — LABETALOL HYDROCHLORIDE 200 MILLIGRAM(S): 200 TABLET, FILM COATED ORAL at 21:29

## 2025-05-10 RX ADMIN — Medication 90 MILLIGRAM(S): at 05:31

## 2025-05-10 RX ADMIN — METHYLPREDNISOLONE ACETATE 50 MILLIGRAM(S): 80 INJECTION, SUSPENSION INTRA-ARTICULAR; INTRALESIONAL; INTRAMUSCULAR; SOFT TISSUE at 05:30

## 2025-05-10 RX ADMIN — HEPARIN SODIUM 5000 UNIT(S): 1000 INJECTION INTRAVENOUS; SUBCUTANEOUS at 17:50

## 2025-05-10 RX ADMIN — Medication 25 MG/HR: at 07:54

## 2025-05-10 NOTE — PROGRESS NOTE ADULT - SUBJECTIVE AND OBJECTIVE BOX
FRANCISCO DUNLAP 39y Female  MRN#: [id number]   Hospital Day: 3d    SUBJECTIVE  Patient is a 39y old Female who presents with a chief complaint of Diarrhea (09 May 2025 15:27)  Currently admitted to medicine with the primary diagnosis of Hypertensive emergency      INTERVAL HPI AND OVERNIGHT EVENTS:  Patient was examined and seen at bedside. This morning she is resting comfortably in bed and reports no issues or overnight events.    REVIEW OF SYMPTOMS:  CONSTITUTIONAL: No weakness, fevers or chills; No headaches  EYES: No visual changes, eye pain, or discharge  ENT: No vertigo; No ear pain or change in hearing; No sore throat or difficulty swallowing  NECK: No pain or stiffness  RESPIRATORY: No cough, wheezing, or hemoptysis; No shortness of breath  CARDIOVASCULAR: No chest pain or palpitations  GASTROINTESTINAL: No abdominal or epigastric pain; No nausea, vomiting, or hematemesis; No diarrhea or constipation; No melena or hematochezia  GENITOURINARY: No dysuria, frequency or hematuria  MUSCULOSKELETAL: No joint pain, no muscle pain, no weakness  NEUROLOGICAL: No numbness or weakness  SKIN: No itching or rashes    OBJECTIVE  PAST MEDICAL & SURGICAL HISTORY  HTN (hypertension)      ALLERGIES:  No Known Allergies    MEDICATIONS:  STANDING MEDICATIONS  chlorhexidine 2% Cloths 1 Application(s) Topical <User Schedule>  dextrose 5%. 1000 milliLiter(s) IV Continuous <Continuous>  dextrose 5%. 1000 milliLiter(s) IV Continuous <Continuous>  dextrose 50% Injectable 25 Gram(s) IV Push once  dextrose 50% Injectable 12.5 Gram(s) IV Push once  dextrose 50% Injectable 25 Gram(s) IV Push once  glucagon  Injectable 1 milliGRAM(s) IntraMuscular once  heparin   Injectable 5000 Unit(s) SubCutaneous every 12 hours  insulin glargine Injectable (LANTUS) 6 Unit(s) SubCutaneous at bedtime  insulin lispro (ADMELOG) corrective regimen sliding scale   SubCutaneous three times a day before meals  insulin lispro (ADMELOG) corrective regimen sliding scale   SubCutaneous at bedtime  labetalol 200 milliGRAM(s) Oral three times a day  niCARdipine Infusion 5 mG/Hr IV Continuous <Continuous>  NIFEdipine XL 90 milliGRAM(s) Oral daily  pantoprazole    Tablet 40 milliGRAM(s) Oral every 12 hours    PRN MEDICATIONS  calcium carbonate   1250 mG (OsCal) 1 Tablet(s) Oral every 6 hours PRN  dextrose Oral Gel 15 Gram(s) Oral once PRN  melatonin 3 milliGRAM(s) Oral at bedtime PRN      VITAL SIGNS: Last 24 Hours  T(C): 36.6 (10 May 2025 04:00), Max: 37.1 (09 May 2025 16:00)  T(F): 97.9 (10 May 2025 04:00), Max: 98.8 (09 May 2025 16:00)  HR: 104 (10 May 2025 08:00) (94 - 125)  BP: --  BP(mean): --  RR: 33 (10 May 2025 08:00) (12 - 70)  SpO2: 93% (10 May 2025 08:00) (89% - 97%)    LABS:                        7.6    20.16 )-----------( 203      ( 10 May 2025 04:20 )             22.8     05-10    139  |  102  |  65[HH]  ----------------------------<  175[H]  3.9   |  21  |  3.6[H]    Ca    8.1[L]      10 May 2025 04:20  Phos  4.7     05-10  Mg     2.1     05-10    TPro  5.1[L]  /  Alb  3.1[L]  /  TBili  0.4  /  DBili  x   /  AST  19  /  ALT  21  /  AlkPhos  103  05-09      Urinalysis Basic - ( 10 May 2025 04:20 )    Color: x / Appearance: x / SG: x / pH: x  Gluc: 175 mg/dL / Ketone: x  / Bili: x / Urobili: x   Blood: x / Protein: x / Nitrite: x   Leuk Esterase: x / RBC: x / WBC x   Sq Epi: x / Non Sq Epi: x / Bacteria: x            Culture - Stool (collected 08 May 2025 11:50)  Source: Stool Feces  Preliminary Report (09 May 2025 21:35):    No enteric pathogens to date: Final culture pending          RADIOLOGY:      PHYSICAL EXAM:  CONSTITUTIONAL: No acute distress, well-developed, well-groomed, AAOx3  HEAD: Atraumatic, normocephalic  EYES: EOM intact, PERRLA, conjunctiva and sclera clear  ENT: Supple, no masses, no thyromegaly, no bruits, no JVD; moist mucous membranes  PULMONARY: Clear to auscultation bilaterally; no wheezes, rales, or rhonchi  CARDIOVASCULAR: Regular rate and rhythm; no murmurs, rubs, or gallops  GASTROINTESTINAL: Soft, non-tender, non-distended; bowel sounds present  MUSCULOSKELETAL: 2+ peripheral pulses; no clubbing, no cyanosis, no edema  NEUROLOGY: non-focal  SKIN: No rashes or lesions; warm and dry    ASSESSMENT & PLAN   FRANCISCO DUNLAP 39y Female  MRN#: [id number]   Hospital Day: 3d    SUBJECTIVE  Patient is a 39y old Female who presents with a chief complaint of Diarrhea (09 May 2025 15:27)  Currently admitted to medicine with the primary diagnosis of Hypertensive emergency      INTERVAL HPI AND OVERNIGHT EVENTS:  Patient was examined and seen at bedside.     REVIEW OF SYMPTOMS:  Sitting on chair comfortably  No complains    OBJECTIVE  PAST MEDICAL & SURGICAL HISTORY  HTN (hypertension)      ALLERGIES:  No Known Allergies    MEDICATIONS:  STANDING MEDICATIONS  chlorhexidine 2% Cloths 1 Application(s) Topical <User Schedule>  dextrose 5%. 1000 milliLiter(s) IV Continuous <Continuous>  dextrose 5%. 1000 milliLiter(s) IV Continuous <Continuous>  dextrose 50% Injectable 25 Gram(s) IV Push once  dextrose 50% Injectable 12.5 Gram(s) IV Push once  dextrose 50% Injectable 25 Gram(s) IV Push once  glucagon  Injectable 1 milliGRAM(s) IntraMuscular once  heparin   Injectable 5000 Unit(s) SubCutaneous every 12 hours  insulin glargine Injectable (LANTUS) 6 Unit(s) SubCutaneous at bedtime  insulin lispro (ADMELOG) corrective regimen sliding scale   SubCutaneous three times a day before meals  insulin lispro (ADMELOG) corrective regimen sliding scale   SubCutaneous at bedtime  labetalol 200 milliGRAM(s) Oral three times a day  niCARdipine Infusion 5 mG/Hr IV Continuous <Continuous>  NIFEdipine XL 90 milliGRAM(s) Oral daily  pantoprazole    Tablet 40 milliGRAM(s) Oral every 12 hours    PRN MEDICATIONS  calcium carbonate   1250 mG (OsCal) 1 Tablet(s) Oral every 6 hours PRN  dextrose Oral Gel 15 Gram(s) Oral once PRN  melatonin 3 milliGRAM(s) Oral at bedtime PRN      VITAL SIGNS: Last 24 Hours  T(C): 36.6 (10 May 2025 04:00), Max: 37.1 (09 May 2025 16:00)  T(F): 97.9 (10 May 2025 04:00), Max: 98.8 (09 May 2025 16:00)  HR: 104 (10 May 2025 08:00) (94 - 125)  BP: --  BP(mean): --  RR: 33 (10 May 2025 08:00) (12 - 70)  SpO2: 93% (10 May 2025 08:00) (89% - 97%)    LABS:                        7.6    20.16 )-----------( 203      ( 10 May 2025 04:20 )             22.8     05-10    139  |  102  |  65[HH]  ----------------------------<  175[H]  3.9   |  21  |  3.6[H]    Ca    8.1[L]      10 May 2025 04:20  Phos  4.7     05-10  Mg     2.1     05-10    TPro  5.1[L]  /  Alb  3.1[L]  /  TBili  0.4  /  DBili  x   /  AST  19  /  ALT  21  /  AlkPhos  103  05-09      Urinalysis Basic - ( 10 May 2025 04:20 )    Color: x / Appearance: x / SG: x / pH: x  Gluc: 175 mg/dL / Ketone: x  / Bili: x / Urobili: x   Blood: x / Protein: x / Nitrite: x   Leuk Esterase: x / RBC: x / WBC x   Sq Epi: x / Non Sq Epi: x / Bacteria: x            Culture - Stool (collected 08 May 2025 11:50)  Source: Stool Feces  Preliminary Report (09 May 2025 21:35):    No enteric pathogens to date: Final culture pending      PHYSICAL EXAM:  CONSTITUTIONAL: No acute distress, well-developed, well-groomed, AAOx3  PULMONARY: Clear to auscultation bilaterally; no wheezes, rales, or rhonchi  CARDIOVASCULAR: Regular rate and rhythm; no murmurs, rubs, or gallops  GASTROINTESTINAL: Soft, non-tender, non-distended; bowel sounds present  MUSCULOSKELETAL: 2+ peripheral pulses; no clubbing, no cyanosis, no edema  NEUROLOGY: non-focal  SKIN: No rashes or lesions; warm and dry

## 2025-05-10 NOTE — PROGRESS NOTE ADULT - ASSESSMENT
Early small bowel ischemia likely 2/2 severe enteritis   TTP/HUS s/p 2 PEX  Hypertensive Emergency  SIRS/Sepsis   Uncomplicated Cystitis   JOYCE likely pre-renal   pancytopenia   RSV infection     # PLAN:     CNS : Avoid CNS depressant.  Delirium Precautions    ENT : Oral Care     Pulmonary : Keep Spo2 > 94% .HOB elevation. Supplemental O2 prn.     Cardiology: F/U Troponin. Continue with nicardipine drip, wean off as tolerated. TTE noted.     procardia Xl 60 Q24 hrs  switched coreg to labetalol    GI : clear liquid as per GI advance as tolerated   - PPI Q12 hrs   - Trend lactate  - GI PCR-neg  - Bcx -NGTD  - serial abdominal exams and monitor bowel function    Renal : Trend CMP. Monitor Lytes and replete as needed. Nephro  follow up   off abx   check procal noted    Hem/Onc : Coagulation studies noted.  possible ttp follow work   cw steroids x 3 days. PEX everyday  monitor h/h closely if stable start heparin SQ if ok by hemato    Endo:  Blood glucose Goal : 140-180. insulin lantus and ssi    MSK: Ambulate as tolerated     Code : Full code.      Disp: MICU

## 2025-05-10 NOTE — PROGRESS NOTE ADULT - SUBJECTIVE AND OBJECTIVE BOX
seen and examined  24 h events noted   no distress         PAST HISTORY  --------------------------------------------------------------------------------  No significant changes to PMH, PSH, FHx, SHx, unless otherwise noted    ALLERGIES & MEDICATIONS  --------------------------------------------------------------------------------  Allergies    No Known Allergies    Intolerances      Standing Inpatient Medications  chlorhexidine 2% Cloths 1 Application(s) Topical <User Schedule>  dextrose 5%. 1000 milliLiter(s) IV Continuous <Continuous>  dextrose 5%. 1000 milliLiter(s) IV Continuous <Continuous>  dextrose 50% Injectable 25 Gram(s) IV Push once  dextrose 50% Injectable 12.5 Gram(s) IV Push once  dextrose 50% Injectable 25 Gram(s) IV Push once  glucagon  Injectable 1 milliGRAM(s) IntraMuscular once  heparin   Injectable 5000 Unit(s) SubCutaneous every 12 hours  insulin glargine Injectable (LANTUS) 6 Unit(s) SubCutaneous at bedtime  insulin lispro (ADMELOG) corrective regimen sliding scale   SubCutaneous three times a day before meals  insulin lispro (ADMELOG) corrective regimen sliding scale   SubCutaneous at bedtime  labetalol 100 milliGRAM(s) Oral every 8 hours  niCARdipine Infusion 5 mG/Hr IV Continuous <Continuous>  NIFEdipine XL 90 milliGRAM(s) Oral daily  pantoprazole    Tablet 40 milliGRAM(s) Oral every 12 hours    PRN Inpatient Medications  calcium carbonate   1250 mG (OsCal) 1 Tablet(s) Oral every 6 hours PRN  dextrose Oral Gel 15 Gram(s) Oral once PRN  melatonin 3 milliGRAM(s) Oral at bedtime PRN      VITALS/PHYSICAL EXAM  --------------------------------------------------------------------------------  T(C): 36.6 (05-10-25 @ 04:00), Max: 37.1 (05-09-25 @ 16:00)  HR: 101 (05-10-25 @ 07:00) (94 - 125)  BP: --  RR: 32 (05-10-25 @ 07:00) (12 - 70)  SpO2: 89% (05-10-25 @ 07:00) (89% - 98%)  Wt(kg): --        05-09-25 @ 07:01  -  05-10-25 @ 07:00  --------------------------------------------------------  IN: 3635 mL / OUT: 500 mL / NET: 3135 mL      Physical Exam:  	Gen: NAD  	Pulm: CTA B/L  	CV:  S1S2; no rub  	Abd: +distended  	LE: no edema  	  LABS/STUDIES  --------------------------------------------------------------------------------              7.6    20.16 >-----------<  203      [05-10-25 @ 04:20]              22.8     139  |  102  |  65  ----------------------------<  175      [05-10-25 @ 04:20]  3.9   |  21  |  3.6        Ca     8.1     [05-10-25 @ 04:20]      Mg     2.1     [05-10-25 @ 04:20]      Phos  4.7     [05-10-25 @ 04:20]    TPro  5.1  /  Alb  3.1  /  TBili  0.4  /  DBili  x   /  AST  19  /  ALT  21  /  AlkPhos  103  [05-09-25 @ 04:15]            [05-10-25 @ 04:20]    Creatinine Trend:  SCr 3.6 [05-10 @ 04:20]  SCr 4.2 [05-09 @ 04:15]  SCr 4.1 [05-08 @ 19:20]  SCr 3.4 [05-08 @ 05:20]  SCr 3.3 [05-07 @ 23:55]    Urinalysis - [05-10-25 @ 04:20]      Color  / Appearance  / SG  / pH       Gluc 175 / Ketone   / Bili  / Urobili        Blood  / Protein  / Leuk Est  / Nitrite       RBC  / WBC  / Hyaline  / Gran  / Sq Epi  / Non Sq Epi  / Bacteria     Urine Creatinine 78      [05-08-25 @ 19:49]  Urine Protein 143      [05-08-25 @ 19:49]    Iron 18, TIBC 166, %sat 11      [05-07-25 @ 11:40]  Ferritin 362      [05-07-25 @ 11:40]  TSH 2.05      [05-07-25 @ 11:40]    HBsAg Nonreact      [05-07-25 @ 11:40]  HCV 0.13, Nonreact      [05-07-25 @ 11:40]  HIV Nonreact      [05-07-25 @ 11:40]    JOSE ANTONIO: titer Negative, pattern --      [05-07-25 @ 11:40]  dsDNA 2      [05-08-25 @ 19:20]  C3 Complement 106      [05-07-25 @ 11:40]  C4 Complement 22      [05-07-25 @ 11:40]  ANCA: cANCA Negative, pANCA Negative, atypical ANCA Negative      [05-07-25 @ 11:40]  anti-GBM <0.2      [05-07-25 @ 11:40]

## 2025-05-10 NOTE — PROGRESS NOTE ADULT - ASSESSMENT
38 y/o F PMHx HTN presented to ER with 5 days of vomiting and diarrhea and melena with associated generalized abdominal pain and distention.  Admitted for management of hypertensive emergency, bowel ischemia and sepsis in setting of enteritis. Evaluated by surgery no intervention    Heme consulted for normocytic anemia and thrombocytopenia    # Leucocytosis, neutrophilic predominance  # Normocytic anemia  # Thrombocytopenia  # Bowel ischemia  # Renal failure  - No prior labs for baseline  - Normal coags  - Elevated , retic corrected 3, haptoglobin 36 (no hemolysis)  - Plasmic score 5, intermediate probability of TTP  - Peripheral smear reviewed 5.7.25, noted schistocytosis 5-6/HPF and polychromasia  - Coomb's negative  - Low B12 and folate    Plan:  Labs concerning for TTP, s/p plasmapheresis on 5/8 and 5/9  s/p solumedrol (5/7-5/9)  ADAMTS 13 activity 57 which rules out TTP, 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  Supplement folic acid, check MMA and start oral vitamin B12 supplementation

## 2025-05-10 NOTE — PROGRESS NOTE ADULT - ASSESSMENT
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  Delirium Precautions    ENT : Oral Care     Pulmonary : Keep Spo2 > 94% .HOB elevation. Supplemental O2 prn.     Cardiology: porcardia 90   labetalol Q8 hrs and d/c coreg   taper nicardipine off        GI : clear liquid as per GI advance as tolerated if ok by gi   follow Gi and G sx   - PPI Q12 hrs   - Trend lactate  - F/U GI PCR   - F/U Bcx   - serial abdominal exams and monitor bowel function    Renal : Trend CMP. Monitor Lytes and replete as needed. Nephro  follow up   off abx    procal   follow cx     Hem/Onc : Coagulation studies noted.   follow hematology regarding the steroids and plasma   RESTREPO 13  negative   on steroids   and plasmapheresis    heparin SQ  Endo:  Blood glucose Goal : 140-180. insulin drip for now     MSK: Ambulate as tolerated     Code : Full code.      Disp: MICU

## 2025-05-10 NOTE — PROCEDURAL SAFETY CHECKLIST WITH OR WITHOUT SEDATION - TEAM MEMBER ANNOUNCES “CONFIRMED” (AVAILABLE DIAGNOSTIC/IMAGING STUDIES HAVE THE CORRECT NAME AND ORIENTATION)
November 30, 2017      Esvin Wilkins  [address]  [place] [address]      Dear Esvin Wilkins,    This letter is to inform you that you are overdue for laboratory testing. Please contact our office at your earliest convenience at [phone] and reference telephone encounter 11/17/17.      Thank you,      The office of Dr. Bari Dejesus  [place] [address] [address]  [place] [address] [phone]       
No
done

## 2025-05-10 NOTE — PROCEDURE NOTE - NSSITEPREP_SKIN_A_CORE
chlorhexidine/Adherence to aseptic technique: hand hygiene prior to donning barriers (gown, gloves), don cap and mask, sterile drape over patient
alcohol/chlorhexidine

## 2025-05-10 NOTE — PROGRESS NOTE ADULT - SUBJECTIVE AND OBJECTIVE BOX
SUBJECTIVE/OVERNIGHT EVENTS  Today is hospital day 3d. This morning patient was seen and examined at bedside, resting comfortably in bed. No acute or major events overnight.    HPI:  39-year-old female with history of hypertension presenting to ER with 5 days of multiple episodes of nonbloody nonbilious vomiting and diarrhea with associated generalized abdominal pain and distention. Patient states that on Saturday she had acute onset of nausea, vomiting, diarrhea and crampy abdominal pain. She has had 4-5 episodes of non-bloody, non-bilious vomiting per day and 4-5 episodes of black diarrhea per day. She initially thought that she had gastroenteritis, but when her symptoms didn't resolve, she decided come to the ED. She denies any current abdominal pain, nausea or vomiting, fever, chest pain, shortness of breath, clotting disorder, past intra-abdominal surgeries, kidney issues, leg pain or swelling.     Labs significant for WBC 18.72k, Hb 9.0(unknown baseline) , Platelets 86k, Creatinine 3.5(unknown baseline). Lipase 71, UA positive      CTAP with finding of Edematous distal duodenum and proximal jejunal loops with associated dilatation measuring up to 3.4 cm. Associated marked interloop ascites, mesenteric fat stranding, and prominent mesenteric lymph nodes. Mild-to-moderate ascites. Findings concerning for small bowel ischemia.     CT Angio Abdomen and Pelvis w/ IV Cont (05.07.25 @ 04:18): Patent SMA, SMV. Redemonstration of distal duodenal and proximal small bowel with marked inflammatory change. Mural enhancement noted throughout   the small bowel. Considerations include severe enteritis, early ischemia, shock bowel.    #ED Vitals:   T(C): 37.2 (05-07-25 @ 05:46), Max: 37.3 (05-06-25 @ 23:44)  HR: 112 (05-07-25 @ 05:46) (97 - 118)  BP: 151/86 (05-07-25 @ 03:39) (151/86 - 238/135)  RR: 17 (05-07-25 @ 05:46) (17 - 19)  SpO2: 99% (05-07-25 @ 05:46) (96% - 100%)    # ED Course:   Zosyn, LR 1L, NS 1L, Zofran, Morphine, Pantoprazole, Reglan, Metoprolol 10 IV   Started on Nicardipine drip   Evaluated by surgery >>> No acute surgical intervention     The patient is being admitted to MICU for the further management.  (07 May 2025 06:03)      MEDICATIONS  STANDING MEDICATIONS  chlorhexidine 2% Cloths 1 Application(s) Topical <User Schedule>  dextrose 5%. 1000 milliLiter(s) IV Continuous <Continuous>  dextrose 5%. 1000 milliLiter(s) IV Continuous <Continuous>  dextrose 50% Injectable 25 Gram(s) IV Push once  dextrose 50% Injectable 12.5 Gram(s) IV Push once  dextrose 50% Injectable 25 Gram(s) IV Push once  glucagon  Injectable 1 milliGRAM(s) IntraMuscular once  heparin   Injectable 5000 Unit(s) SubCutaneous every 12 hours  insulin glargine Injectable (LANTUS) 6 Unit(s) SubCutaneous at bedtime  insulin lispro (ADMELOG) corrective regimen sliding scale   SubCutaneous three times a day before meals  insulin lispro (ADMELOG) corrective regimen sliding scale   SubCutaneous at bedtime  labetalol 100 milliGRAM(s) Oral every 8 hours  methylPREDNISolone sodium succinate IVPB 1000 milliGRAM(s) IV Intermittent daily  niCARdipine Infusion 5 mG/Hr IV Continuous <Continuous>  NIFEdipine XL 90 milliGRAM(s) Oral daily  pantoprazole    Tablet 40 milliGRAM(s) Oral every 12 hours    PRN MEDICATIONS  calcium carbonate   1250 mG (OsCal) 1 Tablet(s) Oral every 6 hours PRN  dextrose Oral Gel 15 Gram(s) Oral once PRN  melatonin 3 milliGRAM(s) Oral at bedtime PRN    VITALS  T(F): 98 (05-10-25 @ 00:00), Max: 98.8 (05-09-25 @ 16:00)  HR: 115 (05-10-25 @ 03:15) (94 - 125)  BP: --  RR: 51 (05-10-25 @ 03:15) (12 - 70)  SpO2: 92% (05-10-25 @ 03:15) (90% - 98%)  POCT Blood Glucose.: 203 mg/dL (05-09-25 @ 21:14)  POCT Blood Glucose.: 242 mg/dL (05-09-25 @ 16:17)  POCT Blood Glucose.: 230 mg/dL (05-09-25 @ 11:49)  POCT Blood Glucose.: 251 mg/dL (05-09-25 @ 09:20)  POCT Blood Glucose.: 293 mg/dL (05-09-25 @ 04:13)          PHYSICAL EXAM      GENERAL: No acute distress, well-developed  HEAD:  Atraumatic, Normocephalic  ENT: PERRL, conjunctiva and sclera clear, neck supple, no JVD, moist mucosa, posterior oropharynx clear  CHEST/LUNG: Clear to auscultation bilaterally; No wheeze, equal breath sounds bilaterally, respirations nonlabored  HEART: Regular rate and rhythm; No murmurs, rubs, or gallops  ABDOMEN: Soft, nontender, nondistended; Bowel sounds present, no organomegaly  BACK: no spinal tenderness, no CVA tenderness  EXTREMITIES:  No clubbing, cyanosis, or edema  PSYCH: Nl behavior, nl affect  NEUROLOGY: AAOx3, non-focal, moves all extremities spontaneously  SKIN: Normal color, No rashes or lesions        PAST MEDICAL & SURGICAL HISTORY:  HTN (hypertension)            LABS             7.8    22.18 )-----------( 192      ( 05-09-25 @ 16:26 )             23.0     139  |  104  |  64  -------------------------<  257   05-09-25 @ 04:15  3.5  |  20  |  4.2    Ca      7.9     05-09-25 @ 04:15  Phos   4.8     05-09-25 @ 04:15  Mg     2.3     05-09-25 @ 04:15    TPro  5.1  /  Alb  3.1  /  TBili  0.4  /  DBili  x   /  AST  19  /  ALT  21  /  AlkPhos  103  /  GGT  x     05-09-25 @ 04:15      Troponin T, High Sensitivity Result: 50 ng/L (05-07-25 @ 23:55)  Troponin T, High Sensitivity Result: 57 ng/L (05-07-25 @ 18:50)  Troponin T, High Sensitivity Result: 46 ng/L (05-07-25 @ 11:40)    Urinalysis Basic - ( 09 May 2025 04:15 )    Color: x / Appearance: x / SG: x / pH: x  Gluc: 257 mg/dL / Ketone: x  / Bili: x / Urobili: x   Blood: x / Protein: x / Nitrite: x   Leuk Esterase: x / RBC: x / WBC x   Sq Epi: x / Non Sq Epi: x / Bacteria: x          Culture - Stool (collected 08 May 2025 11:50)  Source: Stool Feces  Preliminary Report (09 May 2025 21:35):    No enteric pathogens to date: Final culture pending    Culture - Blood (collected 07 May 2025 04:45)  Source: Blood Blood-Peripheral  Preliminary Report (09 May 2025 14:01):    No growth at 48 Hours    Culture - Blood (collected 07 May 2025 04:40)  Source: Blood Blood-Peripheral  Preliminary Report (09 May 2025 14:01):    No growth at 48 Hours      IMAGING

## 2025-05-10 NOTE — PROGRESS NOTE ADULT - ASSESSMENT
39-year-old female with history of hypertension presenting to ER with 5 days of multiple episodes of nonbloody nonbilious vomiting and diarrhea with associated generalized abdominal pain and distention  # HTN   # JOYCE rule out ATN   # proteinuria / hematuria   # thrombocytopenia   - picture above can be due to malignant HTN / giving TTP like picture / ADAMTS 13 not low / however patient received steroids and on plasmapheresis ? timing of blood test   - normal C3 ( not in favor of a HUS)  nl C4 normal JOSE ANTONIO which rule out active lupus  - hem onc appreciated s/p steroids , on plasmapheresis   - 1.9 g protein on UPCR   - ph at goal   - neg hepatitis and HIV profile anti GBM  - follow bp readings / increase labetalol to 200 q 8   - creat noted stable/ non oliguric   - renal artery dupplex no FOUZIA   renal team will follow

## 2025-05-10 NOTE — PROGRESS NOTE ADULT - SUBJECTIVE AND OBJECTIVE BOX
Patient is a 39y old  Female who presents with a chief complaint of Diarrhea (09 May 2025 15:27)      Over Night Events:  Patient seen and examined.   Acuña 13 normal  s/p plasm  x2   still on nicardipine 10     ROS:  See HPI    PHYSICAL EXAM    ICU Vital Signs Last 24 Hrs  T(C): 36.6 (10 May 2025 04:00), Max: 37.1 (09 May 2025 16:00)  T(F): 97.9 (10 May 2025 04:00), Max: 98.8 (09 May 2025 16:00)  HR: 101 (10 May 2025 07:00) (94 - 125)  BP: --  BP(mean): --  ABP: 156/65 (10 May 2025 07:00) (146/66 - 200/61)  ABP(mean): 92 (10 May 2025 07:00) (91 - 166)  RR: 32 (10 May 2025 07:00) (12 - 70)  SpO2: 89% (10 May 2025 07:00) (89% - 98%)    O2 Parameters below as of 10 May 2025 00:00  Patient On (Oxygen Delivery Method): nasal cannula  O2 Flow (L/min): 1          General: awake   HEENT:                Lymph Nodes: NO cervical LN   Lungs: Bilateral BS  Cardiovascular: Regular   Abdomen: Soft, Positive BS  Extremities: No clubbing   Skin: warm   Neurological: follow simple command   Musculoskeletal: move all ext     I&O's Detail    09 May 2025 07:01  -  10 May 2025 07:00  --------------------------------------------------------  IN:    IV PiggyBack: 100 mL    IV PiggyBack: 300 mL    NiCARdipine: 695 mL    Oral Fluid: 2540 mL  Total IN: 3635 mL    OUT:    Voided (mL): 500 mL  Total OUT: 500 mL    Total NET: 3135 mL          LABS:                          7.6    20.16 )-----------( 203      ( 10 May 2025 04:20 )             22.8         10 May 2025 04:20    139    |  102    |  65     ----------------------------<  175    3.9     |  21     |  3.6      Ca    8.1        10 May 2025 04:20  Phos  4.7       10 May 2025 04:20  Mg     2.1       10 May 2025 04:20                                                                                      Urinalysis Basic - ( 10 May 2025 04:20 )    Color: x / Appearance: x / SG: x / pH: x  Gluc: 175 mg/dL / Ketone: x  / Bili: x / Urobili: x   Blood: x / Protein: x / Nitrite: x   Leuk Esterase: x / RBC: x / WBC x   Sq Epi: x / Non Sq Epi: x / Bacteria: x        Lactate, Blood: 2.0 mmol/L (05-08-25 @ 05:20)                                                          Culture - Stool (collected 08 May 2025 11:50)  Source: Stool Feces  Preliminary Report (09 May 2025 21:35):    No enteric pathogens to date: Final culture pending                                                                                           MEDICATIONS  (STANDING):  chlorhexidine 2% Cloths 1 Application(s) Topical <User Schedule>  dextrose 5%. 1000 milliLiter(s) (100 mL/Hr) IV Continuous <Continuous>  dextrose 5%. 1000 milliLiter(s) (50 mL/Hr) IV Continuous <Continuous>  dextrose 50% Injectable 25 Gram(s) IV Push once  dextrose 50% Injectable 12.5 Gram(s) IV Push once  dextrose 50% Injectable 25 Gram(s) IV Push once  glucagon  Injectable 1 milliGRAM(s) IntraMuscular once  heparin   Injectable 5000 Unit(s) SubCutaneous every 12 hours  insulin glargine Injectable (LANTUS) 6 Unit(s) SubCutaneous at bedtime  insulin lispro (ADMELOG) corrective regimen sliding scale   SubCutaneous three times a day before meals  insulin lispro (ADMELOG) corrective regimen sliding scale   SubCutaneous at bedtime  labetalol 200 milliGRAM(s) Oral three times a day  niCARdipine Infusion 5 mG/Hr (25 mL/Hr) IV Continuous <Continuous>  NIFEdipine XL 90 milliGRAM(s) Oral daily  pantoprazole    Tablet 40 milliGRAM(s) Oral every 12 hours    MEDICATIONS  (PRN):  calcium carbonate   1250 mG (OsCal) 1 Tablet(s) Oral every 6 hours PRN Heartburn  dextrose Oral Gel 15 Gram(s) Oral once PRN Blood Glucose LESS THAN 70 milliGRAM(s)/deciliter  melatonin 3 milliGRAM(s) Oral at bedtime PRN Insomnia          Xrays:                                                                               ECHO:  [facility] ICU:

## 2025-05-11 LAB
ANION GAP SERPL CALC-SCNC: 17 MMOL/L — HIGH (ref 7–14)
BASOPHILS # BLD AUTO: 0.02 K/UL — SIGNIFICANT CHANGE UP (ref 0–0.2)
BASOPHILS NFR BLD AUTO: 0.1 % — SIGNIFICANT CHANGE UP (ref 0–1)
BUN SERPL-MCNC: 76 MG/DL — CRITICAL HIGH (ref 10–20)
CALCIUM SERPL-MCNC: 8.2 MG/DL — LOW (ref 8.4–10.5)
CHLORIDE SERPL-SCNC: 102 MMOL/L — SIGNIFICANT CHANGE UP (ref 98–110)
CO2 SERPL-SCNC: 17 MMOL/L — SIGNIFICANT CHANGE UP (ref 17–32)
CREAT SERPL-MCNC: 3.8 MG/DL — HIGH (ref 0.7–1.5)
EGFR: 15 ML/MIN/1.73M2 — LOW
EGFR: 15 ML/MIN/1.73M2 — LOW
EOSINOPHIL # BLD AUTO: 0 K/UL — SIGNIFICANT CHANGE UP (ref 0–0.7)
EOSINOPHIL NFR BLD AUTO: 0 % — SIGNIFICANT CHANGE UP (ref 0–8)
GLUCOSE BLDC GLUCOMTR-MCNC: 194 MG/DL — HIGH (ref 70–99)
GLUCOSE BLDC GLUCOMTR-MCNC: 215 MG/DL — HIGH (ref 70–99)
GLUCOSE BLDC GLUCOMTR-MCNC: 225 MG/DL — HIGH (ref 70–99)
GLUCOSE SERPL-MCNC: 226 MG/DL — HIGH (ref 70–99)
HCT VFR BLD CALC: 21 % — LOW (ref 37–47)
HCT VFR BLD CALC: 23.1 % — LOW (ref 37–47)
HGB BLD-MCNC: 6.9 G/DL — CRITICAL LOW (ref 12–16)
HGB BLD-MCNC: 8 G/DL — LOW (ref 12–16)
IMM GRANULOCYTES NFR BLD AUTO: 2.1 % — HIGH (ref 0.1–0.3)
LDH SERPL L TO P-CCNC: 269 — HIGH (ref 50–242)
LYMPHOCYTES # BLD AUTO: 0.59 K/UL — LOW (ref 1.2–3.4)
LYMPHOCYTES # BLD AUTO: 3.6 % — LOW (ref 20.5–51.1)
MAGNESIUM SERPL-MCNC: 2.2 MG/DL — SIGNIFICANT CHANGE UP (ref 1.8–2.4)
MCHC RBC-ENTMCNC: 29.9 PG — SIGNIFICANT CHANGE UP (ref 27–31)
MCHC RBC-ENTMCNC: 30.9 PG — SIGNIFICANT CHANGE UP (ref 27–31)
MCHC RBC-ENTMCNC: 32.9 G/DL — SIGNIFICANT CHANGE UP (ref 32–37)
MCHC RBC-ENTMCNC: 34.6 G/DL — SIGNIFICANT CHANGE UP (ref 32–37)
MCV RBC AUTO: 89.2 FL — SIGNIFICANT CHANGE UP (ref 81–99)
MCV RBC AUTO: 90.9 FL — SIGNIFICANT CHANGE UP (ref 81–99)
MONOCYTES # BLD AUTO: 0.27 K/UL — SIGNIFICANT CHANGE UP (ref 0.1–0.6)
MONOCYTES NFR BLD AUTO: 1.6 % — LOW (ref 1.7–9.3)
NEUTROPHILS # BLD AUTO: 15.35 K/UL — HIGH (ref 1.4–6.5)
NEUTROPHILS NFR BLD AUTO: 92.6 % — HIGH (ref 42.2–75.2)
NRBC BLD AUTO-RTO: 0 /100 WBCS — SIGNIFICANT CHANGE UP (ref 0–0)
NRBC BLD AUTO-RTO: 0 /100 WBCS — SIGNIFICANT CHANGE UP (ref 0–0)
PHOSPHATE SERPL-MCNC: 6 MG/DL — HIGH (ref 2.1–4.9)
PLATELET # BLD AUTO: 232 K/UL — SIGNIFICANT CHANGE UP (ref 130–400)
PLATELET # BLD AUTO: 236 K/UL — SIGNIFICANT CHANGE UP (ref 130–400)
PMV BLD: 10.1 FL — SIGNIFICANT CHANGE UP (ref 7.4–10.4)
PMV BLD: 11 FL — HIGH (ref 7.4–10.4)
POTASSIUM SERPL-MCNC: 4.4 MMOL/L — SIGNIFICANT CHANGE UP (ref 3.5–5)
POTASSIUM SERPL-SCNC: 4.4 MMOL/L — SIGNIFICANT CHANGE UP (ref 3.5–5)
RBC # BLD: 2.31 M/UL — LOW (ref 4.2–5.4)
RBC # BLD: 2.31 M/UL — LOW (ref 4.2–5.4)
RBC # BLD: 2.59 M/UL — LOW (ref 4.2–5.4)
RBC # FLD: 15 % — HIGH (ref 11.5–14.5)
RBC # FLD: 15.9 % — HIGH (ref 11.5–14.5)
RETICS #: 152.9 K/UL — HIGH (ref 25–125)
RETICS/RBC NFR: 6.6 % — HIGH (ref 0.5–1.5)
SODIUM SERPL-SCNC: 136 MMOL/L — SIGNIFICANT CHANGE UP (ref 135–146)
WBC # BLD: 16.57 K/UL — HIGH (ref 4.8–10.8)
WBC # BLD: 16.94 K/UL — HIGH (ref 4.8–10.8)
WBC # FLD AUTO: 16.57 K/UL — HIGH (ref 4.8–10.8)
WBC # FLD AUTO: 16.94 K/UL — HIGH (ref 4.8–10.8)

## 2025-05-11 PROCEDURE — 99233 SBSQ HOSP IP/OBS HIGH 50: CPT

## 2025-05-11 RX ORDER — DIPHENHYDRAMINE HCL 12.5MG/5ML
25 ELIXIR ORAL ONCE
Refills: 0 | Status: COMPLETED | OUTPATIENT
Start: 2025-05-11 | End: 2025-05-11

## 2025-05-11 RX ORDER — ACETAMINOPHEN 500 MG/5ML
1000 LIQUID (ML) ORAL ONCE
Refills: 0 | Status: COMPLETED | OUTPATIENT
Start: 2025-05-11 | End: 2025-05-11

## 2025-05-11 RX ORDER — DIPHENHYDRAMINE HCL 12.5MG/5ML
50 ELIXIR ORAL ONCE
Refills: 0 | Status: COMPLETED | OUTPATIENT
Start: 2025-05-11 | End: 2025-05-11

## 2025-05-11 RX ADMIN — LABETALOL HYDROCHLORIDE 200 MILLIGRAM(S): 200 TABLET, FILM COATED ORAL at 22:26

## 2025-05-11 RX ADMIN — LABETALOL HYDROCHLORIDE 200 MILLIGRAM(S): 200 TABLET, FILM COATED ORAL at 13:45

## 2025-05-11 RX ADMIN — Medication 40 MILLIGRAM(S): at 05:00

## 2025-05-11 RX ADMIN — FOLIC ACID 1 MILLIGRAM(S): 1 TABLET ORAL at 13:02

## 2025-05-11 RX ADMIN — Medication 1 APPLICATION(S): at 05:00

## 2025-05-11 RX ADMIN — LABETALOL HYDROCHLORIDE 200 MILLIGRAM(S): 200 TABLET, FILM COATED ORAL at 05:02

## 2025-05-11 RX ADMIN — Medication 1000 MILLIGRAM(S): at 04:50

## 2025-05-11 RX ADMIN — CYANOCOBALAMIN 1000 MICROGRAM(S): 1000 INJECTION INTRAMUSCULAR; SUBCUTANEOUS at 13:01

## 2025-05-11 RX ADMIN — Medication 400 MILLIGRAM(S): at 04:08

## 2025-05-11 RX ADMIN — Medication 90 MILLIGRAM(S): at 05:00

## 2025-05-11 RX ADMIN — Medication 50 MILLIGRAM(S): at 22:26

## 2025-05-11 NOTE — PROGRESS NOTE ADULT - ASSESSMENT
IMPRESSION    Early small bowel ischemia likely 2/2 severe enteritis  malignant hypertension induced thrombocytopenia   Hypertensive Emergency  Likely UGIB   anemia   SIRS/Sepsis   Uncomplicated Cystitis   JOYCE likely pre-renal   pancytopenia   RSV infection     # PLAN:     CNS : Avoid CNS depressant.  Delirium Precautions awake follow follow command     ENT : Oral Care     Pulmonary : Keep Spo2 > 94% .HOB elevation. Supplemental O2 prn.     Cardiology: porcardia 90   labetalol Q8 hrs      nicardipine off        GI : follow GI dropping h/h hx possible melena   follow Gi and G sx   - PPI Q12 hrs      -     Renal : Trend CMP. Monitor Lytes and replete as needed. Nephro  follow up   off abx    procal   follow cx     Hem/Onc : Coagulation studies noted.   steroids d/c off plasma   RESTREPO 13  negative    s/p plasmapheresis stopped   s/p 1 unit prbc   follow h/h   Endo:  Blood glucose Goal : 140-180. insulin drip for now     MSK: Ambulate as tolerated     Code : Full code.      Disp:  repeat h/h afternoon if stable and BP remain stable off nicardipine drip   then downgrade to SDU

## 2025-05-11 NOTE — CHART NOTE - NSCHARTNOTEFT_GEN_A_CORE
Writer was called by nurse as patient is refusing medicines and trying to remove the lines and tubes. Writer explained to the patient the situation and the plan and why patient has to stay calm. Patient does not understand and patient lacks insight. Family bedside informed and were asked to help. Writer told family that this is not helping and will worsen prognosis more. Patient seems to lack capacity. Recall Psych in am.

## 2025-05-11 NOTE — PROGRESS NOTE ADULT - ASSESSMENT
39-year-old female with history of hypertension presenting to ER with 5 days of multiple episodes of nonbloody nonbilious vomiting and diarrhea with associated generalized abdominal pain and distention  # HTN   # JOYCE rule out ATN   # proteinuria / hematuria   # thrombocytopenia     - picture above can be due to malignant HTN / giving TTP like picture / ADAMTS 13 not low / however patient received steroids and on plasmapheresis ? timing of blood test   - normal C3 ( not in favor of a HUS)  nl C4 normal JOSE ANTONIO which rule out active lupus  - hem onc appreciated s/p steroids , on plasmapheresis   - creat better   - 1.8 g protein on UPCR will need repeat   - ph at goal   - neg hepatitis and HIV profile anti GBM  - follow bp readings / increase labetalol to 400 q 8   - renal artery dupplex no FOUZIA   renal team will follow

## 2025-05-11 NOTE — PROGRESS NOTE ADULT - SUBJECTIVE AND OBJECTIVE BOX
Patient is a 39y old  Female who presents with a chief complaint of Diarrhea (09 May 2025 15:27)      Over Night Events:  Patient seen and examined.   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   off nicardipine 7 pm   s/p 1 unit prbc   ROS:  See HPI    PHYSICAL EXAM    ICU Vital Signs Last 24 Hrs  T(C): 36.2 (11 May 2025 06:00), Max: 37.2 (10 May 2025 20:00)  T(F): 97.1 (11 May 2025 06:00), Max: 99 (10 May 2025 20:00)  HR: 96 (11 May 2025 07:00) (96 - 126)  BP: 152/70 (10 May 2025 12:00) (152/70 - 152/70)  BP(mean): 100 (10 May 2025 12:00) (100 - 100)  ABP: 155/70 (11 May 2025 07:00) (131/60 - 194/92)  ABP(mean): 98 (11 May 2025 07:00) (83 - 126)  RR: 39 (11 May 2025 07:00) (22 - 55)  SpO2: 95% (11 May 2025 07:00) (91% - 100%)    O2 Parameters below as of 11 May 2025 08:00  Patient On (Oxygen Delivery Method): room air            General:awake   HEENT:        nicci        Lymph Nodes: NO cervical LN   Lungs: Bilateral BS  Cardiovascular: Regular   Abdomen: Soft, Positive BS  Extremities: No clubbing   Skin: warm   Neurological: no focal deficit   Musculoskeletal: move all ext     I&O's Detail    10 May 2025 07:01  -  11 May 2025 07:00  --------------------------------------------------------  IN:    NiCARdipine: 530 mL    Oral Fluid: 1400 mL    PRBCs (Packed Red Blood Cells): 362 mL  Total IN: 2292 mL    OUT:    Voided (mL): 2750 mL  Total OUT: 2750 mL    Total NET: -458 mL      11 May 2025 07:01  -  11 May 2025 08:08  --------------------------------------------------------  IN:  Total IN: 0 mL    OUT:    NiCARdipine: 0 mL  Total OUT: 0 mL    Total NET: 0 mL          LABS:                          6.9    16.57 )-----------( 236      ( 11 May 2025 04:42 )             21.0         11 May 2025 04:42    136    |  102    |  76     ----------------------------<  226    4.4     |  17     |  3.8      Ca    8.2        11 May 2025 04:42  Phos  6.0       11 May 2025 04:42  Mg     2.2       11 May 2025 04:42                                                                                      Urinalysis Basic - ( 11 May 2025 04:42 )    Color: x / Appearance: x / SG: x / pH: x  Gluc: 226 mg/dL / Ketone: x  / Bili: x / Urobili: x   Blood: x / Protein: x / Nitrite: x   Leuk Esterase: x / RBC: x / WBC x   Sq Epi: x / Non Sq Epi: x / Bacteria: x                                                                Culture - Stool (collected 08 May 2025 11:50)  Source: Stool Feces  Final Report (10 May 2025 18:31):    No enteric pathogens isolated.    (Stool culture examined for Salmonella,    Shigella, Campylobacter, Aeromonas, Plesiomonas,    Vibrio, E.coli O157 and Yersinia)                                                                                           MEDICATIONS  (STANDING):  chlorhexidine 2% Cloths 1 Application(s) Topical <User Schedule>  cyanocobalamin 1000 MICROGram(s) Oral daily  dextrose 5%. 1000 milliLiter(s) (100 mL/Hr) IV Continuous <Continuous>  dextrose 5%. 1000 milliLiter(s) (50 mL/Hr) IV Continuous <Continuous>  dextrose 50% Injectable 25 Gram(s) IV Push once  dextrose 50% Injectable 12.5 Gram(s) IV Push once  dextrose 50% Injectable 25 Gram(s) IV Push once  folic acid 1 milliGRAM(s) Oral daily  glucagon  Injectable 1 milliGRAM(s) IntraMuscular once  heparin   Injectable 5000 Unit(s) SubCutaneous every 12 hours  insulin glargine Injectable (LANTUS) 6 Unit(s) SubCutaneous at bedtime  insulin lispro (ADMELOG) corrective regimen sliding scale   SubCutaneous three times a day before meals  insulin lispro (ADMELOG) corrective regimen sliding scale   SubCutaneous at bedtime  labetalol 200 milliGRAM(s) Oral three times a day  niCARdipine Infusion 5 mG/Hr (25 mL/Hr) IV Continuous <Continuous>  NIFEdipine XL 90 milliGRAM(s) Oral daily  pantoprazole    Tablet 40 milliGRAM(s) Oral every 12 hours    MEDICATIONS  (PRN):  calcium carbonate   1250 mG (OsCal) 1 Tablet(s) Oral every 6 hours PRN Heartburn  dextrose Oral Gel 15 Gram(s) Oral once PRN Blood Glucose LESS THAN 70 milliGRAM(s)/deciliter  melatonin 3 milliGRAM(s) Oral at bedtime PRN Insomnia          Xrays:                                                                                 ECHO:  [facility] ICU:

## 2025-05-11 NOTE — PROGRESS NOTE ADULT - SUBJECTIVE AND OBJECTIVE BOX
Delirium      Patient is a 39y old  Female who presents with a chief complaint of Diarrhea (09 May 2025 15:27)    HPI:  39-year-old female with history of hypertension presenting to ER with 5 days of multiple episodes of nonbloody nonbilious vomiting and diarrhea with associated generalized abdominal pain and distention. Patient states that on Saturday she had acute onset of nausea, vomiting, diarrhea and crampy abdominal pain. She has had 4-5 episodes of non-bloody, non-bilious vomiting per day and 4-5 episodes of black diarrhea per day. She initially thought that she had gastroenteritis, but when her symptoms didn't resolve, she decided come to the ED. She denies any current abdominal pain, nausea or vomiting, fever, chest pain, shortness of breath, clotting disorder, past intra-abdominal surgeries, kidney issues, leg pain or swelling.     Labs significant for WBC 18.72k, Hb 9.0(unknown baseline) , Platelets 86k, Creatinine 3.5(unknown baseline). Lipase 71, UA positive      CTAP with finding of Edematous distal duodenum and proximal jejunal loops with associated dilatation measuring up to 3.4 cm. Associated marked interloop ascites, mesenteric fat stranding, and prominent mesenteric lymph nodes. Mild-to-moderate ascites. Findings concerning for small bowel ischemia.     CT Angio Abdomen and Pelvis w/ IV Cont (05.07.25 @ 04:18): Patent SMA, SMV. Redemonstration of distal duodenal and proximal small bowel with marked inflammatory change. Mural enhancement noted throughout   the small bowel. Considerations include severe enteritis, early ischemia, shock bowel.    #ED Vitals:   T(C): 37.2 (05-07-25 @ 05:46), Max: 37.3 (05-06-25 @ 23:44)  HR: 112 (05-07-25 @ 05:46) (97 - 118)  BP: 151/86 (05-07-25 @ 03:39) (151/86 - 238/135)  RR: 17 (05-07-25 @ 05:46) (17 - 19)  SpO2: 99% (05-07-25 @ 05:46) (96% - 100%)    # ED Course:   Zosyn, LR 1L, NS 1L, Zofran, Morphine, Pantoprazole, Reglan, Metoprolol 10 IV   Started on Nicardipine drip   Evaluated by surgery >>> No acute surgical intervention     The patient is being admitted to MICU for the further management.  (07 May 2025 06:03)       INTERVAL HPI/OVERNIGHT EVENTS:   No overnight events   Afebrile, hemodynamically stable     Subjective:    ICU Vital Signs Last 24 Hrs  T(C): 36.8 (11 May 2025 00:00), Max: 37.2 (10 May 2025 20:00)  T(F): 98.2 (11 May 2025 00:00), Max: 99 (10 May 2025 20:00)  HR: 100 (11 May 2025 00:00) (97 - 126)  BP: 152/70 (10 May 2025 12:00) (152/70 - 152/70)  BP(mean): 100 (10 May 2025 12:00) (100 - 100)  ABP: 142/66 (11 May 2025 00:00) (139/63 - 190/68)  ABP(mean): 94 (11 May 2025 00:00) (88 - 103)  RR: 22 (11 May 2025 00:00) (22 - 55)  SpO2: 94% (11 May 2025 00:00) (89% - 100%)    O2 Parameters below as of 11 May 2025 01:00  Patient On (Oxygen Delivery Method): room air          I&O's Summary    09 May 2025 07:01  -  10 May 2025 07:00  --------------------------------------------------------  IN: 3685 mL / OUT: 500 mL / NET: 3185 mL    10 May 2025 07:01  -  11 May 2025 02:10  --------------------------------------------------------  IN: 1730 mL / OUT: 2750 mL / NET: -1020 mL          Daily     Daily     Adult Advanced Hemodynamics Last 24 Hrs  CVP(mm Hg): --  CVP(cm H2O): --  CO: --  CI: --  PA: --  PA(mean): --  PCWP: --  SVR: --  SVRI: --  PVR: --  PVRI: --    EKG/Telemetry Events:    MEDICATIONS  (STANDING):  chlorhexidine 2% Cloths 1 Application(s) Topical <User Schedule>  cyanocobalamin 1000 MICROGram(s) Oral daily  dextrose 5%. 1000 milliLiter(s) (100 mL/Hr) IV Continuous <Continuous>  dextrose 5%. 1000 milliLiter(s) (50 mL/Hr) IV Continuous <Continuous>  dextrose 50% Injectable 25 Gram(s) IV Push once  dextrose 50% Injectable 12.5 Gram(s) IV Push once  dextrose 50% Injectable 25 Gram(s) IV Push once  folic acid 1 milliGRAM(s) Oral daily  glucagon  Injectable 1 milliGRAM(s) IntraMuscular once  heparin   Injectable 5000 Unit(s) SubCutaneous every 12 hours  insulin glargine Injectable (LANTUS) 6 Unit(s) SubCutaneous at bedtime  insulin lispro (ADMELOG) corrective regimen sliding scale   SubCutaneous three times a day before meals  insulin lispro (ADMELOG) corrective regimen sliding scale   SubCutaneous at bedtime  labetalol 200 milliGRAM(s) Oral three times a day  niCARdipine Infusion 5 mG/Hr (25 mL/Hr) IV Continuous <Continuous>  NIFEdipine XL 90 milliGRAM(s) Oral daily  pantoprazole    Tablet 40 milliGRAM(s) Oral every 12 hours    MEDICATIONS  (PRN):  calcium carbonate   1250 mG (OsCal) 1 Tablet(s) Oral every 6 hours PRN Heartburn  dextrose Oral Gel 15 Gram(s) Oral once PRN Blood Glucose LESS THAN 70 milliGRAM(s)/deciliter  melatonin 3 milliGRAM(s) Oral at bedtime PRN Insomnia      PHYSICAL EXAM:  GENERAL: NAD, alert and interactive  HEAD: Atraumatic, normocephalic  EYES: EOMI, conjunctiva and sclera clear  NECK: Supple, no JVD  CHEST/LUNG: Clear to auscultation bilaterally; no rales, rhonchi, or wheezing; normal WOB on RA  HEART: Regular rate and rhythm; S1 S2 normal, no S3; no murmurs, rubs, or gallops  ABDOMEN: Soft, nontender, nondistended; bowel sounds present  EXTREMITIES: No clubbing, cyanosis, or edema  NEURO: Grossly nonfocal  SKIN: No rashes or lesions    LABS:                        7.6    20.16 )-----------( 203      ( 10 May 2025 04:20 )             22.8     05-10    139  |  102  |  65[HH]  ----------------------------<  175[H]  3.9   |  21  |  3.6[H]    Ca    8.1[L]      10 May 2025 04:20  Phos  4.7     05-10  Mg     2.1     05-10    TPro  5.1[L]  /  Alb  3.1[L]  /  TBili  0.4  /  DBili  x   /  AST  19  /  ALT  21  /  AlkPhos  103  05-09    LIVER FUNCTIONS - ( 09 May 2025 04:15 )  Alb: 3.1 g/dL / Pro: 5.1 g/dL / ALK PHOS: 103 U/L / ALT: 21 U/L / AST: 19 U/L / GGT: x             CAPILLARY BLOOD GLUCOSE      POCT Blood Glucose.: 209 mg/dL (10 May 2025 21:32)  POCT Blood Glucose.: 226 mg/dL (10 May 2025 16:20)  POCT Blood Glucose.: 293 mg/dL (10 May 2025 12:40)  POCT Blood Glucose.: 184 mg/dL (10 May 2025 09:04)            Urinalysis Basic - ( 10 May 2025 04:20 )    Color: x / Appearance: x / SG: x / pH: x  Gluc: 175 mg/dL / Ketone: x  / Bili: x / Urobili: x   Blood: x / Protein: x / Nitrite: x   Leuk Esterase: x / RBC: x / WBC x   Sq Epi: x / Non Sq Epi: x / Bacteria: x        RADIOLOGY & ADDITIONAL TESTS:  CXR:    Care Discussed with Consultants/Other Providers [ x] YES  [ ] NO

## 2025-05-11 NOTE — PROGRESS NOTE ADULT - SUBJECTIVE AND OBJECTIVE BOX
Nephrology progress note    Patient is seen and examined, events over the last 24 h noted .  sitting in chair comfortable     Allergies:  No Known Allergies    Hospital Medications:   MEDICATIONS  (STANDING):  chlorhexidine 2% Cloths 1 Application(s) Topical <User Schedule>  cyanocobalamin 1000 MICROGram(s) Oral daily  folic acid 1 milliGRAM(s) Oral daily  glucagon  Injectable 1 milliGRAM(s) IntraMuscular once  heparin   Injectable 5000 Unit(s) SubCutaneous every 12 hours  insulin glargine Injectable (LANTUS) 6 Unit(s) SubCutaneous at bedtime  insulin lispro (ADMELOG) corrective regimen sliding scale   SubCutaneous three times a day before meals  insulin lispro (ADMELOG) corrective regimen sliding scale   SubCutaneous at bedtime  labetalol 200 milliGRAM(s) Oral three times a day  niCARdipine Infusion 5 mG/Hr (25 mL/Hr) IV Continuous <Continuous>  NIFEdipine XL 90 milliGRAM(s) Oral daily  pantoprazole    Tablet 40 milliGRAM(s) Oral every 12 hours        VITALS:  T(F): 97.6 (05-11-25 @ 08:00), Max: 99 (05-10-25 @ 20:00)  HR: 99 (05-11-25 @ 08:00)  BP: 152/70 (05-10-25 @ 12:00)  RR: 45 (05-11-25 @ 08:00)  SpO2: 95% (05-11-25 @ 08:00)      05-09 @ 07:01  -  05-10 @ 07:00  --------------------------------------------------------  IN: 3685 mL / OUT: 500 mL / NET: 3185 mL    05-10 @ 07:01  -  05-11 @ 07:00  --------------------------------------------------------  IN: 2292 mL / OUT: 2750 mL / NET: -458 mL    05-11 @ 07:01  -  05-11 @ 11:13  --------------------------------------------------------  IN: 0 mL / OUT: 800 mL / NET: -800 mL          PHYSICAL EXAM:    Constitutional: NAD  Respiratory: CTAB  Cardiovascular: S1, S2, RRR  Gastrointestinal: BS+, soft, NT/ND  Extremities: No cyanosis or clubbing. No peripheral edema  :  No wick.   Skin: No rashes    LABS:  05-11    136  |  102  |  76[HH]  ----------------------------<  226[H]  4.4   |  17  |  3.8[H]    Creatinine Trend: 3.8<--, 3.6<--, 4.2<--, 4.1<--, 3.4<--, 3.3<--    Ca    8.2[L]      11 May 2025 04:42  Phos  6.0     05-11  Mg     2.2     05-11                            6.9    16.57 )-----------( 236      ( 11 May 2025 04:42 )             21.0       Urine Studies:  Urinalysis Basic - ( 11 May 2025 04:42 )    Color:  / Appearance:  / SG:  / pH:   Gluc: 226 mg/dL / Ketone:   / Bili:  / Urobili:    Blood:  / Protein:  / Nitrite:    Leuk Esterase:  / RBC:  / WBC    Sq Epi:  / Non Sq Epi:  / Bacteria:       Creatinine, Random Urine: 78 mg/dL (05-08 @ 19:49)  Protein/Creatinine Ratio Calculation: 1.8 Ratio (05-08 @ 19:49)      Iron 18, TIBC 166, %sat 11      [05-07-25 @ 11:40]  Ferritin 362      [05-07-25 @ 11:40]  TSH 2.05      [05-07-25 @ 11:40]    HBsAg Nonreact      [05-07-25 @ 11:40]  HCV 0.13, Nonreact      [05-07-25 @ 11:40]  HIV Nonreact      [05-07-25 @ 11:40]    JOSE ANTONIO: titer Negative, pattern --      [05-07-25 @ 11:40]  dsDNA 2      [05-08-25 @ 19:20]  C3 Complement 106      [05-07-25 @ 11:40]  C4 Complement 22      [05-07-25 @ 11:40]  ANCA: cANCA Negative, pANCA Negative, atypical ANCA Negative      [05-07-25 @ 11:40]  anti-GBM <0.2      [05-07-25 @ 11:40]      RADIOLOGY & ADDITIONAL STUDIES:

## 2025-05-11 NOTE — CHART NOTE - NSCHARTNOTEFT_GEN_A_CORE
39-year-old female with history of hypertension presenting to ER with 5 days of multiple episodes of nonbloody nonbilious vomiting and diarrhea with associated generalized abdominal pain and distention. Patient states that on Saturday she had acute onset of nausea, vomiting, diarrhea and crampy abdominal pain. She has had 4-5 episodes of non-bloody, non-bilious vomiting per day and 4-5 episodes of black diarrhea per day. She initially thought that she had gastroenteritis, but when her symptoms didn't resolve, she decided come to the ED. She denies any current abdominal pain, nausea or vomiting, fever, chest pain, shortness of breath, clotting disorder, past intra-abdominal surgeries, kidney issues, leg pain or swelling.     Labs significant for WBC 18.72k, Hb 9.0(unknown baseline) , Platelets 86k, Creatinine 3.5(unknown baseline). Lipase 71, UA positive      CTAP with finding of Edematous distal duodenum and proximal jejunal loops with associated dilatation measuring up to 3.4 cm. Associated marked interloop ascites, mesenteric fat stranding, and prominent mesenteric lymph nodes. Mild-to-moderate ascites. Findings concerning for small bowel ischemia.     CT Angio Abdomen and Pelvis w/ IV Cont (05.07.25 @ 04:18): Patent SMA, SMV. Redemonstration of distal duodenal and proximal small bowel with marked inflammatory change. Mural enhancement noted throughout   the small bowel. Considerations include severe enteritis, early ischemia, shock bowel.    #ED Vitals:   T(C): 37.2 (05-07-25 @ 05:46), Max: 37.3 (05-06-25 @ 23:44)  HR: 112 (05-07-25 @ 05:46) (97 - 118)  BP: 151/86 (05-07-25 @ 03:39) (151/86 - 238/135)  RR: 17 (05-07-25 @ 05:46) (17 - 19)  SpO2: 99% (05-07-25 @ 05:46) (96% - 100%)    # ED Course:   Zosyn, LR 1L, NS 1L, Zofran, Morphine, Pantoprazole, Reglan, Metoprolol 10 IV   Started on Nicardipine drip   Evaluated by surgery >>> No acute surgical intervention     The patient is being admitted to MICU for the further management.     Suspected to have TTP so started on iv steroids and plasmapheresis. hemolytic w/u was positive. adamts 13 negative  started initially on nicardipine drip then switched to po nifedipine and labetalol    Delirium      Patient is a 39y old  Female who presents with a chief complaint of Diarrhea (09 May 2025 15:27)    HPI:  39-year-old female with history of hypertension presenting to ER with 5 days of multiple episodes of nonbloody nonbilious vomiting and diarrhea with associated generalized abdominal pain and distention. Patient states that on Saturday she had acute onset of nausea, vomiting, diarrhea and crampy abdominal pain. She has had 4-5 episodes of non-bloody, non-bilious vomiting per day and 4-5 episodes of black diarrhea per day. She initially thought that she had gastroenteritis, but when her symptoms didn't resolve, she decided come to the ED. She denies any current abdominal pain, nausea or vomiting, fever, chest pain, shortness of breath, clotting disorder, past intra-abdominal surgeries, kidney issues, leg pain or swelling.     Labs significant for WBC 18.72k, Hb 9.0(unknown baseline) , Platelets 86k, Creatinine 3.5(unknown baseline). Lipase 71, UA positive      CTAP with finding of Edematous distal duodenum and proximal jejunal loops with associated dilatation measuring up to 3.4 cm. Associated marked interloop ascites, mesenteric fat stranding, and prominent mesenteric lymph nodes. Mild-to-moderate ascites. Findings concerning for small bowel ischemia.     CT Angio Abdomen and Pelvis w/ IV Cont (05.07.25 @ 04:18): Patent SMA, SMV. Redemonstration of distal duodenal and proximal small bowel with marked inflammatory change. Mural enhancement noted throughout   the small bowel. Considerations include severe enteritis, early ischemia, shock bowel.    #ED Vitals:   T(C): 37.2 (05-07-25 @ 05:46), Max: 37.3 (05-06-25 @ 23:44)  HR: 112 (05-07-25 @ 05:46) (97 - 118)  BP: 151/86 (05-07-25 @ 03:39) (151/86 - 238/135)  RR: 17 (05-07-25 @ 05:46) (17 - 19)  SpO2: 99% (05-07-25 @ 05:46) (96% - 100%)    # ED Course:   Zosyn, LR 1L, NS 1L, Zofran, Morphine, Pantoprazole, Reglan, Metoprolol 10 IV   Started on Nicardipine drip   Evaluated by surgery >>> No acute surgical intervention     The patient is being admitted to MICU for the further management.  (07 May 2025 06:03)       INTERVAL HPI/OVERNIGHT EVENTS:   No overnight events   Afebrile, hemodynamically stable     Subjective:    ICU Vital Signs Last 24 Hrs  T(C): 36.4 (11 May 2025 08:00), Max: 37.2 (10 May 2025 20:00)  T(F): 97.6 (11 May 2025 08:00), Max: 99 (10 May 2025 20:00)  HR: 99 (11 May 2025 08:00) (96 - 126)  BP: 152/70 (10 May 2025 12:00) (152/70 - 152/70)  BP(mean): 100 (10 May 2025 12:00) (100 - 100)  ABP: 168/73 (11 May 2025 08:00) (131/60 - 194/92)  ABP(mean): 104 (11 May 2025 08:00) (83 - 126)  RR: 45 (11 May 2025 08:00) (22 - 55)  SpO2: 95% (11 May 2025 08:00) (91% - 100%)    O2 Parameters below as of 11 May 2025 09:00  Patient On (Oxygen Delivery Method): room air          I&O's Summary    10 May 2025 07:01  -  11 May 2025 07:00  --------------------------------------------------------  IN: 2292 mL / OUT: 2750 mL / NET: -458 mL    11 May 2025 07:01  -  11 May 2025 09:08  --------------------------------------------------------  IN: 0 mL / OUT: 800 mL / NET: -800 mL          Daily     Daily     Adult Advanced Hemodynamics Last 24 Hrs  CVP(mm Hg): --  CVP(cm H2O): --  CO: --  CI: --  PA: --  PA(mean): --  PCWP: --  SVR: --  SVRI: --  PVR: --  PVRI: --    EKG/Telemetry Events:    MEDICATIONS  (STANDING):  chlorhexidine 2% Cloths 1 Application(s) Topical <User Schedule>  cyanocobalamin 1000 MICROGram(s) Oral daily  dextrose 5%. 1000 milliLiter(s) (100 mL/Hr) IV Continuous <Continuous>  dextrose 5%. 1000 milliLiter(s) (50 mL/Hr) IV Continuous <Continuous>  dextrose 50% Injectable 25 Gram(s) IV Push once  dextrose 50% Injectable 12.5 Gram(s) IV Push once  dextrose 50% Injectable 25 Gram(s) IV Push once  folic acid 1 milliGRAM(s) Oral daily  glucagon  Injectable 1 milliGRAM(s) IntraMuscular once  heparin   Injectable 5000 Unit(s) SubCutaneous every 12 hours  insulin glargine Injectable (LANTUS) 6 Unit(s) SubCutaneous at bedtime  insulin lispro (ADMELOG) corrective regimen sliding scale   SubCutaneous three times a day before meals  insulin lispro (ADMELOG) corrective regimen sliding scale   SubCutaneous at bedtime  labetalol 200 milliGRAM(s) Oral three times a day  niCARdipine Infusion 5 mG/Hr (25 mL/Hr) IV Continuous <Continuous>  NIFEdipine XL 90 milliGRAM(s) Oral daily  pantoprazole    Tablet 40 milliGRAM(s) Oral every 12 hours    MEDICATIONS  (PRN):  calcium carbonate   1250 mG (OsCal) 1 Tablet(s) Oral every 6 hours PRN Heartburn  dextrose Oral Gel 15 Gram(s) Oral once PRN Blood Glucose LESS THAN 70 milliGRAM(s)/deciliter  melatonin 3 milliGRAM(s) Oral at bedtime PRN Insomnia      PHYSICAL EXAM:  GENERAL: NAD, alert and interactive  HEAD: Atraumatic, normocephalic  EYES: EOMI, conjunctiva and sclera clear  NECK: Supple, no JVD  CHEST/LUNG: Clear to auscultation bilaterally; no rales, rhonchi, or wheezing; normal WOB on RA  HEART: Regular rate and rhythm; S1 S2 normal, no S3; no murmurs, rubs, or gallops  ABDOMEN: Soft, nontender, nondistended; bowel sounds present  EXTREMITIES: No clubbing, cyanosis, or edema  NEURO: Grossly nonfocal  SKIN: No rashes or lesions    LABS:                        6.9    16.57 )-----------( 236      ( 11 May 2025 04:42 )             21.0     05-11    136  |  102  |  76[HH]  ----------------------------<  226[H]  4.4   |  17  |  3.8[H]    Ca    8.2[L]      11 May 2025 04:42  Phos  6.0     05-11  Mg     2.2     05-11          CAPILLARY BLOOD GLUCOSE      POCT Blood Glucose.: 215 mg/dL (11 May 2025 08:30)  POCT Blood Glucose.: 209 mg/dL (10 May 2025 21:32)  POCT Blood Glucose.: 226 mg/dL (10 May 2025 16:20)  POCT Blood Glucose.: 293 mg/dL (10 May 2025 12:40)            Urinalysis Basic - ( 11 May 2025 04:42 )    Color: x / Appearance: x / SG: x / pH: x  Gluc: 226 mg/dL / Ketone: x  / Bili: x / Urobili: x   Blood: x / Protein: x / Nitrite: x   Leuk Esterase: x / RBC: x / WBC x   Sq Epi: x / Non Sq Epi: x / Bacteria: x        RADIOLOGY & ADDITIONAL TESTS:  CXR:    Care Discussed with Consultants/Other Providers [ x] YES  [ ] NO    IMPRESSION    Early small bowel ischemia likely 2/2 severe enteritis  malignant hypertension induced thrombocytopenia   Hypertensive Emergency  Likely UGIB   anemia   SIRS/Sepsis   Uncomplicated Cystitis   JOYCE likely pre-renal   pancytopenia   RSV infection     # PLAN:     CNS : Avoid CNS depressant.  Delirium Precautions awake follow follow command     ENT : Oral Care     Pulmonary : Keep Spo2 > 94% .HOB elevation. Supplemental O2 prn.     Cardiology: porcardia 90   labetalol Q8 hrs      nicardipine off        GI : follow GI dropping h/h hx possible melena   follow Gi and G sx   - PPI Q12 hrs      -     Renal : Trend CMP. Monitor Lytes and replete as needed. Nephro  follow up   off abx    procal   follow cx     Hem/Onc : Coagulation studies noted.   steroids d/c off plasma   RESTREPO 13  negative    s/p plasmapheresis stopped   s/p 1 unit prbc   follow h/h   Endo:  Blood glucose Goal : 140-180. insulin drip for now     MSK: Ambulate as tolerated     Code : Full code.

## 2025-05-12 LAB
ANION GAP SERPL CALC-SCNC: 11 MMOL/L — SIGNIFICANT CHANGE UP (ref 7–14)
BASOPHILS # BLD AUTO: 0.01 K/UL — SIGNIFICANT CHANGE UP (ref 0–0.2)
BASOPHILS # BLD AUTO: 0.02 K/UL — SIGNIFICANT CHANGE UP (ref 0–0.2)
BASOPHILS NFR BLD AUTO: 0.1 % — SIGNIFICANT CHANGE UP (ref 0–1)
BASOPHILS NFR BLD AUTO: 0.1 % — SIGNIFICANT CHANGE UP (ref 0–1)
BLD GP AB SCN SERPL QL: SIGNIFICANT CHANGE UP
BUN SERPL-MCNC: 79 MG/DL — CRITICAL HIGH (ref 10–20)
CALCIUM SERPL-MCNC: 7.7 MG/DL — LOW (ref 8.4–10.5)
CHLORIDE SERPL-SCNC: 105 MMOL/L — SIGNIFICANT CHANGE UP (ref 98–110)
CK SERPL-CCNC: 136 U/L — SIGNIFICANT CHANGE UP (ref 0–225)
CO2 SERPL-SCNC: 20 MMOL/L — SIGNIFICANT CHANGE UP (ref 17–32)
CREAT SERPL-MCNC: 3.3 MG/DL — HIGH (ref 0.7–1.5)
CULTURE RESULTS: SIGNIFICANT CHANGE UP
CULTURE RESULTS: SIGNIFICANT CHANGE UP
EGFR: 18 ML/MIN/1.73M2 — LOW
EGFR: 18 ML/MIN/1.73M2 — LOW
EOSINOPHIL # BLD AUTO: 0.01 K/UL — SIGNIFICANT CHANGE UP (ref 0–0.7)
EOSINOPHIL # BLD AUTO: 0.04 K/UL — SIGNIFICANT CHANGE UP (ref 0–0.7)
EOSINOPHIL NFR BLD AUTO: 0.1 % — SIGNIFICANT CHANGE UP (ref 0–8)
EOSINOPHIL NFR BLD AUTO: 0.3 % — SIGNIFICANT CHANGE UP (ref 0–8)
GLUCOSE BLDC GLUCOMTR-MCNC: 107 MG/DL — HIGH (ref 70–99)
GLUCOSE BLDC GLUCOMTR-MCNC: 130 MG/DL — HIGH (ref 70–99)
GLUCOSE BLDC GLUCOMTR-MCNC: 134 MG/DL — HIGH (ref 70–99)
GLUCOSE BLDC GLUCOMTR-MCNC: 232 MG/DL — HIGH (ref 70–99)
GLUCOSE SERPL-MCNC: 96 MG/DL — SIGNIFICANT CHANGE UP (ref 70–99)
HAPTOGLOB SERPL-MCNC: 151 MG/DL — SIGNIFICANT CHANGE UP (ref 34–200)
HAPTOGLOB SERPL-MCNC: 177 MG/DL — SIGNIFICANT CHANGE UP (ref 34–200)
HCT VFR BLD CALC: 20.7 % — LOW (ref 37–47)
HCT VFR BLD CALC: 23.3 % — LOW (ref 37–47)
HGB BLD-MCNC: 7 G/DL — LOW (ref 12–16)
HGB BLD-MCNC: 7.9 G/DL — LOW (ref 12–16)
IMM GRANULOCYTES NFR BLD AUTO: 4.1 % — HIGH (ref 0.1–0.3)
IMM GRANULOCYTES NFR BLD AUTO: 4.8 % — HIGH (ref 0.1–0.3)
LDH SERPL L TO P-CCNC: 268 — HIGH (ref 50–242)
LDH SERPL L TO P-CCNC: 298 — HIGH (ref 50–242)
LYMPHOCYTES # BLD AUTO: 1.29 K/UL — SIGNIFICANT CHANGE UP (ref 1.2–3.4)
LYMPHOCYTES # BLD AUTO: 1.81 K/UL — SIGNIFICANT CHANGE UP (ref 1.2–3.4)
LYMPHOCYTES # BLD AUTO: 12.3 % — LOW (ref 20.5–51.1)
LYMPHOCYTES # BLD AUTO: 8.6 % — LOW (ref 20.5–51.1)
MAGNESIUM SERPL-MCNC: 2.1 MG/DL — SIGNIFICANT CHANGE UP (ref 1.8–2.4)
MCHC RBC-ENTMCNC: 30.2 PG — SIGNIFICANT CHANGE UP (ref 27–31)
MCHC RBC-ENTMCNC: 30.5 PG — SIGNIFICANT CHANGE UP (ref 27–31)
MCHC RBC-ENTMCNC: 33.8 G/DL — SIGNIFICANT CHANGE UP (ref 32–37)
MCHC RBC-ENTMCNC: 33.9 G/DL — SIGNIFICANT CHANGE UP (ref 32–37)
MCV RBC AUTO: 89.2 FL — SIGNIFICANT CHANGE UP (ref 81–99)
MCV RBC AUTO: 90 FL — SIGNIFICANT CHANGE UP (ref 81–99)
MONOCYTES # BLD AUTO: 1.12 K/UL — HIGH (ref 0.1–0.6)
MONOCYTES # BLD AUTO: 1.43 K/UL — HIGH (ref 0.1–0.6)
MONOCYTES NFR BLD AUTO: 7.5 % — SIGNIFICANT CHANGE UP (ref 1.7–9.3)
MONOCYTES NFR BLD AUTO: 9.7 % — HIGH (ref 1.7–9.3)
NEUTROPHILS # BLD AUTO: 10.85 K/UL — HIGH (ref 1.4–6.5)
NEUTROPHILS # BLD AUTO: 11.74 K/UL — HIGH (ref 1.4–6.5)
NEUTROPHILS NFR BLD AUTO: 73.7 % — SIGNIFICANT CHANGE UP (ref 42.2–75.2)
NEUTROPHILS NFR BLD AUTO: 78.7 % — HIGH (ref 42.2–75.2)
NRBC BLD AUTO-RTO: 0 /100 WBCS — SIGNIFICANT CHANGE UP (ref 0–0)
NRBC BLD AUTO-RTO: 0 /100 WBCS — SIGNIFICANT CHANGE UP (ref 0–0)
PHOSPHATE SERPL-MCNC: 5.2 MG/DL — HIGH (ref 2.1–4.9)
PLATELET # BLD AUTO: 228 K/UL — SIGNIFICANT CHANGE UP (ref 130–400)
PLATELET # BLD AUTO: 270 K/UL — SIGNIFICANT CHANGE UP (ref 130–400)
PMV BLD: 10.3 FL — SIGNIFICANT CHANGE UP (ref 7.4–10.4)
PMV BLD: 9.9 FL — SIGNIFICANT CHANGE UP (ref 7.4–10.4)
POTASSIUM SERPL-MCNC: 4.2 MMOL/L — SIGNIFICANT CHANGE UP (ref 3.5–5)
POTASSIUM SERPL-SCNC: 4.2 MMOL/L — SIGNIFICANT CHANGE UP (ref 3.5–5)
RBC # BLD: 2.32 M/UL — LOW (ref 4.2–5.4)
RBC # BLD: 2.32 M/UL — LOW (ref 4.2–5.4)
RBC # BLD: 2.59 M/UL — LOW (ref 4.2–5.4)
RBC # BLD: 2.59 M/UL — LOW (ref 4.2–5.4)
RBC # FLD: 15.2 % — HIGH (ref 11.5–14.5)
RBC # FLD: 15.5 % — HIGH (ref 11.5–14.5)
RETICS #: 165 K/UL — HIGH (ref 25–125)
RETICS #: 182.9 K/UL — HIGH (ref 25–125)
RETICS/RBC NFR: 7.1 % — HIGH (ref 0.5–1.5)
RETICS/RBC NFR: 7.1 % — HIGH (ref 0.5–1.5)
SODIUM SERPL-SCNC: 136 MMOL/L — SIGNIFICANT CHANGE UP (ref 135–146)
SPECIMEN SOURCE: SIGNIFICANT CHANGE UP
SPECIMEN SOURCE: SIGNIFICANT CHANGE UP
TOTAL HEM COMP BLD-ACNC: 57 U/ML — SIGNIFICANT CHANGE UP (ref 42–95)
WBC # BLD: 14.71 K/UL — HIGH (ref 4.8–10.8)
WBC # BLD: 14.92 K/UL — HIGH (ref 4.8–10.8)
WBC # FLD AUTO: 14.71 K/UL — HIGH (ref 4.8–10.8)
WBC # FLD AUTO: 14.92 K/UL — HIGH (ref 4.8–10.8)

## 2025-05-12 PROCEDURE — 99233 SBSQ HOSP IP/OBS HIGH 50: CPT

## 2025-05-12 PROCEDURE — 99291 CRITICAL CARE FIRST HOUR: CPT | Mod: GC

## 2025-05-12 RX ORDER — NICARDIPINE HCL 30 MG
5 CAPSULE ORAL
Qty: 40 | Refills: 0 | Status: DISCONTINUED | OUTPATIENT
Start: 2025-05-12 | End: 2025-05-12

## 2025-05-12 RX ORDER — NIFEDIPINE 30 MG
120 TABLET, EXTENDED RELEASE 24 HR ORAL DAILY
Refills: 0 | Status: DISCONTINUED | OUTPATIENT
Start: 2025-05-12 | End: 2025-05-17

## 2025-05-12 RX ORDER — LABETALOL HYDROCHLORIDE 200 MG/1
300 TABLET, FILM COATED ORAL THREE TIMES A DAY
Refills: 0 | Status: DISCONTINUED | OUTPATIENT
Start: 2025-05-12 | End: 2025-05-27

## 2025-05-12 RX ORDER — DIPHENHYDRAMINE HCL 12.5MG/5ML
25 ELIXIR ORAL ONCE
Refills: 0 | Status: COMPLETED | OUTPATIENT
Start: 2025-05-12 | End: 2025-05-12

## 2025-05-12 RX ORDER — NICARDIPINE HCL 30 MG
5 CAPSULE ORAL
Qty: 40 | Refills: 0 | Status: DISCONTINUED | OUTPATIENT
Start: 2025-05-12 | End: 2025-05-15

## 2025-05-12 RX ORDER — MELATONIN 5 MG
5 TABLET ORAL AT BEDTIME
Refills: 0 | Status: DISCONTINUED | OUTPATIENT
Start: 2025-05-12 | End: 2025-05-27

## 2025-05-12 RX ORDER — LABETALOL HYDROCHLORIDE 200 MG/1
10 TABLET, FILM COATED ORAL ONCE
Refills: 0 | Status: COMPLETED | OUTPATIENT
Start: 2025-05-12 | End: 2025-05-12

## 2025-05-12 RX ADMIN — FOLIC ACID 1 MILLIGRAM(S): 1 TABLET ORAL at 12:57

## 2025-05-12 RX ADMIN — Medication 25 MILLIGRAM(S): at 21:14

## 2025-05-12 RX ADMIN — Medication 25 MILLIGRAM(S): at 13:38

## 2025-05-12 RX ADMIN — Medication 120 MILLIGRAM(S): at 04:06

## 2025-05-12 RX ADMIN — Medication 10 MILLIGRAM(S): at 03:20

## 2025-05-12 RX ADMIN — LABETALOL HYDROCHLORIDE 300 MILLIGRAM(S): 200 TABLET, FILM COATED ORAL at 21:14

## 2025-05-12 RX ADMIN — Medication 10 MILLIGRAM(S): at 12:58

## 2025-05-12 RX ADMIN — INSULIN GLARGINE-YFGN 6 UNIT(S): 100 INJECTION, SOLUTION SUBCUTANEOUS at 21:13

## 2025-05-12 RX ADMIN — LABETALOL HYDROCHLORIDE 300 MILLIGRAM(S): 200 TABLET, FILM COATED ORAL at 13:37

## 2025-05-12 RX ADMIN — Medication 25 MG/HR: at 17:08

## 2025-05-12 RX ADMIN — Medication 1 APPLICATION(S): at 05:49

## 2025-05-12 RX ADMIN — Medication 25 MG/HR: at 04:22

## 2025-05-12 RX ADMIN — Medication 5 MILLIGRAM(S): at 21:14

## 2025-05-12 RX ADMIN — LABETALOL HYDROCHLORIDE 10 MILLIGRAM(S): 200 TABLET, FILM COATED ORAL at 02:01

## 2025-05-12 RX ADMIN — CYANOCOBALAMIN 1000 MICROGRAM(S): 1000 INJECTION INTRAMUSCULAR; SUBCUTANEOUS at 12:58

## 2025-05-12 RX ADMIN — LABETALOL HYDROCHLORIDE 300 MILLIGRAM(S): 200 TABLET, FILM COATED ORAL at 04:06

## 2025-05-12 RX ADMIN — Medication 50 MILLIGRAM(S): at 21:14

## 2025-05-12 NOTE — PROGRESS NOTE ADULT - SUBJECTIVE AND OBJECTIVE BOX
seen and examined  24 h events noted   no distress         PAST HISTORY  --------------------------------------------------------------------------------  No significant changes to PMH, PSH, FHx, SHx, unless otherwise noted    ALLERGIES & MEDICATIONS  --------------------------------------------------------------------------------  Allergies    No Known Allergies    Intolerances      Standing Inpatient Medications  chlorhexidine 2% Cloths 1 Application(s) Topical <User Schedule>  cyanocobalamin 1000 MICROGram(s) Oral daily  dextrose 5%. 1000 milliLiter(s) IV Continuous <Continuous>  dextrose 5%. 1000 milliLiter(s) IV Continuous <Continuous>  dextrose 50% Injectable 25 Gram(s) IV Push once  dextrose 50% Injectable 12.5 Gram(s) IV Push once  dextrose 50% Injectable 25 Gram(s) IV Push once  folic acid 1 milliGRAM(s) Oral daily  insulin glargine Injectable (LANTUS) 6 Unit(s) SubCutaneous at bedtime  insulin lispro (ADMELOG) corrective regimen sliding scale   SubCutaneous three times a day before meals  insulin lispro (ADMELOG) corrective regimen sliding scale   SubCutaneous at bedtime  labetalol 300 milliGRAM(s) Oral three times a day  niCARdipine Infusion 5 mG/Hr IV Continuous <Continuous>  NIFEdipine  milliGRAM(s) Oral daily  pantoprazole    Tablet 40 milliGRAM(s) Oral every 12 hours    PRN Inpatient Medications  calcium carbonate   1250 mG (OsCal) 1 Tablet(s) Oral every 6 hours PRN  dextrose Oral Gel 15 Gram(s) Oral once PRN  melatonin 3 milliGRAM(s) Oral at bedtime PRN        VITALS/PHYSICAL EXAM  --------------------------------------------------------------------------------  T(C): 36.2 (05-12-25 @ 04:00), Max: 36.4 (05-11-25 @ 16:00)  HR: 87 (05-12-25 @ 08:00) (80 - 126)  BP: 159/95 (05-11-25 @ 17:00) (159/95 - 159/95)  RR: 17 (05-12-25 @ 08:00) (16 - 44)  SpO2: 99% (05-11-25 @ 16:00) (95% - 99%)  Wt(kg): --        05-11-25 @ 07:01  -  05-12-25 @ 07:00  --------------------------------------------------------  IN: 1440 mL / OUT: 3200 mL / NET: -1760 mL      Physical Exam:  	Gen: NAD  	Pulm: decrease BS  B/L  	CV: S1S2; no rub  	Abd: +distended  	    LABS/STUDIES  --------------------------------------------------------------------------------              7.0    14.71 >-----------<  228      [05-12-25 @ 03:55]              20.7     136  |  105  |  79  ----------------------------<  96      [05-12-25 @ 03:55]  4.2   |  20  |  3.3        Ca     7.7     [05-12-25 @ 03:55]      Mg     2.1     [05-12-25 @ 03:55]      Phos  5.2     [05-12-25 @ 03:55]          [05-12-25 @ 03:55]    Creatinine Trend:  SCr 3.3 [05-12 @ 03:55]  SCr 3.8 [05-11 @ 04:42]  SCr 3.6 [05-10 @ 04:20]  SCr 4.2 [05-09 @ 04:15]  SCr 4.1 [05-08 @ 19:20]    Urinalysis - [05-12-25 @ 03:55]      Color  / Appearance  / SG  / pH       Gluc 96 / Ketone   / Bili  / Urobili        Blood  / Protein  / Leuk Est  / Nitrite       RBC  / WBC  / Hyaline  / Gran  / Sq Epi  / Non Sq Epi  / Bacteria     Urine Creatinine 78      [05-08-25 @ 19:49]  Urine Protein 143      [05-08-25 @ 19:49]    Iron 18, TIBC 166, %sat 11      [05-07-25 @ 11:40]  Ferritin 362      [05-07-25 @ 11:40]  TSH 2.05      [05-07-25 @ 11:40]    HBsAg Nonreact      [05-07-25 @ 11:40]  HCV 0.13, Nonreact      [05-07-25 @ 11:40]  HIV Nonreact      [05-07-25 @ 11:40]    JOSE ANTONIO: titer Negative, pattern --      [05-07-25 @ 11:40]  dsDNA 2      [05-08-25 @ 19:20]  C3 Complement 106      [05-07-25 @ 11:40]  C4 Complement 22      [05-07-25 @ 11:40]  ANCA: cANCA Negative, pANCA Negative, atypical ANCA Negative      [05-07-25 @ 11:40]  anti-GBM <0.2      [05-07-25 @ 11:40]

## 2025-05-12 NOTE — PROGRESS NOTE ADULT - SUBJECTIVE AND OBJECTIVE BOX
[facility]      SUBJECTIVE/OVERNIGHT EVENTS  Today is hospital day 5d. Patient was seen and examined today at bedside. No acute events overnight     HOSPITAL COURSE  Day 1:   Day 2:   Day 3:     CODE STATUS:      MEDICATIONS  STANDING MEDICATIONS  chlorhexidine 2% Cloths 1 Application(s) Topical <User Schedule>  cyanocobalamin 1000 MICROGram(s) Oral daily  dextrose 5%. 1000 milliLiter(s) IV Continuous <Continuous>  dextrose 5%. 1000 milliLiter(s) IV Continuous <Continuous>  dextrose 50% Injectable 25 Gram(s) IV Push once  dextrose 50% Injectable 12.5 Gram(s) IV Push once  dextrose 50% Injectable 25 Gram(s) IV Push once  folic acid 1 milliGRAM(s) Oral daily  hydrALAZINE 25 milliGRAM(s) Oral every 8 hours  insulin glargine Injectable (LANTUS) 6 Unit(s) SubCutaneous at bedtime  insulin lispro (ADMELOG) corrective regimen sliding scale   SubCutaneous three times a day before meals  insulin lispro (ADMELOG) corrective regimen sliding scale   SubCutaneous at bedtime  labetalol 300 milliGRAM(s) Oral three times a day  NIFEdipine  milliGRAM(s) Oral daily  pantoprazole    Tablet 40 milliGRAM(s) Oral every 12 hours    PRN MEDICATIONS  calcium carbonate   1250 mG (OsCal) 1 Tablet(s) Oral every 6 hours PRN  dextrose Oral Gel 15 Gram(s) Oral once PRN  melatonin 3 milliGRAM(s) Oral at bedtime PRN    VITALS  T(F): 96.7 (05-12-25 @ 12:00), Max: 97.5 (05-11-25 @ 16:00)  HR: 109 (05-12-25 @ 14:00) (80 - 126)  BP: 187/101 (05-12-25 @ 13:00) (159/95 - 187/101)  RR: 43 (05-12-25 @ 14:00) (16 - 44)  SpO2: 99% (05-11-25 @ 16:00) (99% - 99%)  POCT Blood Glucose.: 232 mg/dL (05-12-25 @ 12:53)  POCT Blood Glucose.: 107 mg/dL (05-12-25 @ 07:54)  POCT Blood Glucose.: 194 mg/dL (05-11-25 @ 16:19)        PHYSICAL EXAM:  GENERAL: NAD, lying in bed comfortably  HEAD:  Atraumatic, Normocephalic  EYES: EOMI, PERRLA, conjunctiva and sclera clear  ENT: Moist mucous membranes  NECK: Supple, No JVD  CHEST/LUNG: Clear to auscultation bilaterally; No rales, rhonchi, wheezing, or rubs. Unlabored respirations  HEART: Regular rate and rhythm; No murmurs, rubs, or gallops  ABDOMEN: Bowel sounds present; Soft, Nontender, Nondistended. No hepatomegaly  EXTREMITIES:  2+ Peripheral Pulses, brisk capillary refill. No clubbing, cyanosis, or edema  NERVOUS SYSTEM:  Alert & Oriented X3, speech clear. No deficits   MSK: FROM all 4 extremities, full and equal strength  SKIN: No rashes or lesions      LABS             7.0    14.71 )-----------( 228      ( 05-12-25 @ 03:55 )             20.7     136  |  105  |  79  -------------------------<  96   05-12-25 @ 03:55  4.2  |  20  |  3.3    Ca      7.7     05-12-25 @ 03:55  Phos   5.2     05-12-25 @ 03:55  Mg     2.1     05-12-25 @ 03:55          Urinalysis Basic - ( 12 May 2025 03:55 )    Color: x / Appearance: x / SG: x / pH: x  Gluc: 96 mg/dL / Ketone: x  / Bili: x / Urobili: x   Blood: x / Protein: x / Nitrite: x   Leuk Esterase: x / RBC: x / WBC x   Sq Epi: x / Non Sq Epi: x / Bacteria: x          IMAGING [facility]      SUBJECTIVE/OVERNIGHT EVENTS  Today is hospital day 5d. Patient was seen and examined today at bedside. Patient agitated overnight, pulling at lines and refusing medication.         MEDICATIONS  STANDING MEDICATIONS  chlorhexidine 2% Cloths 1 Application(s) Topical <User Schedule>  cyanocobalamin 1000 MICROGram(s) Oral daily  dextrose 5%. 1000 milliLiter(s) IV Continuous <Continuous>  dextrose 5%. 1000 milliLiter(s) IV Continuous <Continuous>  dextrose 50% Injectable 25 Gram(s) IV Push once  dextrose 50% Injectable 12.5 Gram(s) IV Push once  dextrose 50% Injectable 25 Gram(s) IV Push once  folic acid 1 milliGRAM(s) Oral daily  hydrALAZINE 25 milliGRAM(s) Oral every 8 hours  insulin glargine Injectable (LANTUS) 6 Unit(s) SubCutaneous at bedtime  insulin lispro (ADMELOG) corrective regimen sliding scale   SubCutaneous three times a day before meals  insulin lispro (ADMELOG) corrective regimen sliding scale   SubCutaneous at bedtime  labetalol 300 milliGRAM(s) Oral three times a day  NIFEdipine  milliGRAM(s) Oral daily  pantoprazole    Tablet 40 milliGRAM(s) Oral every 12 hours    PRN MEDICATIONS  calcium carbonate   1250 mG (OsCal) 1 Tablet(s) Oral every 6 hours PRN  dextrose Oral Gel 15 Gram(s) Oral once PRN  melatonin 3 milliGRAM(s) Oral at bedtime PRN    VITALS  T(F): 96.7 (05-12-25 @ 12:00), Max: 97.5 (05-11-25 @ 16:00)  HR: 109 (05-12-25 @ 14:00) (80 - 126)  BP: 187/101 (05-12-25 @ 13:00) (159/95 - 187/101)  RR: 43 (05-12-25 @ 14:00) (16 - 44)  SpO2: 99% (05-11-25 @ 16:00) (99% - 99%)  POCT Blood Glucose.: 232 mg/dL (05-12-25 @ 12:53)  POCT Blood Glucose.: 107 mg/dL (05-12-25 @ 07:54)  POCT Blood Glucose.: 194 mg/dL (05-11-25 @ 16:19)        PHYSICAL EXAM:  GENERAL: NAD, lying in bed comfortably  HEAD:  Atraumatic, Normocephalic  EYES: EOMI, PERRLA, conjunctiva and sclera clear  ENT: Moist mucous membranes  NECK: Supple, No JVD  CHEST/LUNG: Clear to auscultation bilaterally; No rales, rhonchi, wheezing, or rubs. Unlabored respirations  HEART: Regular rate and rhythm; No murmurs, rubs, or gallops  ABDOMEN: Bowel sounds present; Soft, Nontender, Nondistended. No hepatomegaly  EXTREMITIES:  2+ Peripheral Pulses, brisk capillary refill. No clubbing, cyanosis, or edema  NERVOUS SYSTEM:  Alert & Oriented X3, speech clear. No deficits,   MSK: FROM all 4 extremities, full and equal strength  SKIN: No rashes or lesions      LABS             7.0    14.71 )-----------( 228      ( 05-12-25 @ 03:55 )             20.7     136  |  105  |  79  -------------------------<  96   05-12-25 @ 03:55  4.2  |  20  |  3.3    Ca      7.7     05-12-25 @ 03:55  Phos   5.2     05-12-25 @ 03:55  Mg     2.1     05-12-25 @ 03:55          Urinalysis Basic - ( 12 May 2025 03:55 )    Color: x / Appearance: x / SG: x / pH: x  Gluc: 96 mg/dL / Ketone: x  / Bili: x / Urobili: x   Blood: x / Protein: x / Nitrite: x   Leuk Esterase: x / RBC: x / WBC x   Sq Epi: x / Non Sq Epi: x / Bacteria: x          IMAGING [facility]      SUBJECTIVE/OVERNIGHT EVENTS  Today is hospital day 5d. Patient was seen and examined today at bedside. Patient agitated overnight, pulling at lines and refusing medication.         MEDICATIONS  STANDING MEDICATIONS  chlorhexidine 2% Cloths 1 Application(s) Topical <User Schedule>  cyanocobalamin 1000 MICROGram(s) Oral daily  dextrose 5%. 1000 milliLiter(s) IV Continuous <Continuous>  dextrose 5%. 1000 milliLiter(s) IV Continuous <Continuous>  dextrose 50% Injectable 25 Gram(s) IV Push once  dextrose 50% Injectable 12.5 Gram(s) IV Push once  dextrose 50% Injectable 25 Gram(s) IV Push once  folic acid 1 milliGRAM(s) Oral daily  hydrALAZINE 25 milliGRAM(s) Oral every 8 hours  insulin glargine Injectable (LANTUS) 6 Unit(s) SubCutaneous at bedtime  insulin lispro (ADMELOG) corrective regimen sliding scale   SubCutaneous three times a day before meals  insulin lispro (ADMELOG) corrective regimen sliding scale   SubCutaneous at bedtime  labetalol 300 milliGRAM(s) Oral three times a day  NIFEdipine  milliGRAM(s) Oral daily  pantoprazole    Tablet 40 milliGRAM(s) Oral every 12 hours    PRN MEDICATIONS  calcium carbonate   1250 mG (OsCal) 1 Tablet(s) Oral every 6 hours PRN  dextrose Oral Gel 15 Gram(s) Oral once PRN  melatonin 3 milliGRAM(s) Oral at bedtime PRN    VITALS  T(F): 96.7 (05-12-25 @ 12:00), Max: 97.5 (05-11-25 @ 16:00)  HR: 109 (05-12-25 @ 14:00) (80 - 126)  BP: 187/101 (05-12-25 @ 13:00) (159/95 - 187/101)  RR: 43 (05-12-25 @ 14:00) (16 - 44)  SpO2: 99% (05-11-25 @ 16:00) (99% - 99%)  POCT Blood Glucose.: 232 mg/dL (05-12-25 @ 12:53)  POCT Blood Glucose.: 107 mg/dL (05-12-25 @ 07:54)  POCT Blood Glucose.: 194 mg/dL (05-11-25 @ 16:19)        PHYSICAL EXAM:  GENERAL: NAD, lying in bed comfortably  HEAD:  Atraumatic, Normocephalic  EYES: EOMI, PERRLA, conjunctiva and sclera clear  ENT: Moist mucous membranes  NECK: Supple, No JVD  CHEST/LUNG: Clear to auscultation bilaterally; No rales, rhonchi, wheezing, or rubs. Unlabored respirations  HEART: Regular rate and rhythm; No murmurs, rubs, or gallops  ABDOMEN: Bowel sounds present; Soft, Nontender, Nondistended. No hepatomegaly  EXTREMITIES:  2+ Peripheral Pulses, 1+ le edema   NERVOUS SYSTEM:  Alert & Oriented X3, speech clear. No deficits,   MSK: FROM all 4 extremities, full and equal strength  SKIN: No rashes or lesions      LABS             7.0    14.71 )-----------( 228      ( 05-12-25 @ 03:55 )             20.7     136  |  105  |  79  -------------------------<  96   05-12-25 @ 03:55  4.2  |  20  |  3.3    Ca      7.7     05-12-25 @ 03:55  Phos   5.2     05-12-25 @ 03:55  Mg     2.1     05-12-25 @ 03:55          Urinalysis Basic - ( 12 May 2025 03:55 )    Color: x / Appearance: x / SG: x / pH: x  Gluc: 96 mg/dL / Ketone: x  / Bili: x / Urobili: x   Blood: x / Protein: x / Nitrite: x   Leuk Esterase: x / RBC: x / WBC x   Sq Epi: x / Non Sq Epi: x / Bacteria: x          IMAGING

## 2025-05-12 NOTE — PROGRESS NOTE ADULT - ASSESSMENT
IMPRESSION    Early small bowel ischemia likely 2/2 severe enteritis  malignant hypertension induced thrombocytopenia   Hypertensive Emergency  Likely UGIB   anemia   SIRS/Sepsis   Uncomplicated Cystitis   JOYCE likely pre-renal   pancytopenia   RSV infection     # PLAN:     CNS : Avoid CNS depressant.  Delirium Precautions awake follow follow command     ENT : Oral Care     Pulmonary : Keep Spo2 > 94% .HOB elevation. Supplemental O2 prn.     Cardiology: porcardia 120, labetalol 200 Q8 hrs, added hydralazine today. Can use nicardipine drip if persistently hypertensive        GI : follow GI dropping h/h hx possible melena, PPI Q12 hrs, no bloody stools reported     Renal : Trend CMP. Monitor Lytes and replete as needed. Nephro  follow up     Hem/Onc : Coagulation studies noted.  s/p plasma exchange (dc because negative TTP workup), no more plasma exchange per Heme onc, can dc udall,   Anemia likely from progression of CKD     Endo:  Blood glucose Goal : 140-180. SS insulin     MSK: Ambulate as tolerated     Code : Full code.     IMPRESSION    Early small bowel ischemia likely 2/2 severe enteritis  malignant hypertension induced thrombocytopenia   Hypertensive Emergency  Likely UGIB   anemia   SIRS/Sepsis   Uncomplicated Cystitis   JOYCE likely pre-renal   pancytopenia   RSV infection     # PLAN:     CNS : Avoid CNS depressant.  Delirium Precautions awake follow follow command     ENT : Oral Care     Pulmonary : Keep Spo2 > 94% .HOB elevation. Supplemental O2 prn.     Cardiology: porcardia 120, labetalol 200 Q8 hrs, added hydralazine today. Can use nicardipine drip if persistently hypertensive        GI : follow GI dropping h/h hx possible melena, PPI Q12 hrs, no bloody stools reported     Renal : Trend CMP. Monitor Lytes and replete as needed. RA U/S negative,     Hem/Onc : Coagulation studies noted.  s/p plasma exchange (dc because negative TTP workup), no more plasma exchange per Heme onc, can dc udall,   Anemia likely from progression of CKD, s/p 3u PRBC     Endo:  Blood glucose Goal : 140-180. SS insulin     MSK: Ambulate as tolerated     Code : Full code.     IMPRESSION    Early small bowel ischemia likely 2/2 severe enteritis, UGIB?  Malignant hypertension induced thrombocytopenia  Normocytic anemia  Hypertensive Emergency  JOYCE likely pre-renal         # PLAN:     CNS : Avoid CNS depressant.  Delirium Precautions awake follow follow command     ENT : Oral Care     Pulmonary : Keep Spo2 > 94% .HOB elevation. Supplemental O2 prn.     Cardiology:  - porcardia 120, labetalol 200 Q8 hrs, added hydralazine today.  - Can use nicardipine drip if persistently hypertensive      -SBP goal 160-180    GI :   -dropping h/h hx possible melena,   -Hgb fluctuating, requiring transfusions but without obvious signs of bleeding  -PATIENT REFUSING FURTHER IMAGING TO HELP EVALUATE FOR POTENTIAL CAUSES OF BLOOD LOSS  PPI Q12 hrs, no bloody stools reported, tolerating PO diet     Renal : Trend CMP. Monitor Lytes and replete as needed. RA U/S negative,     Hem/Onc :   -coagulation studies noted   -Hemolysis panel noted, LDH high, retic count increased, haptoglobin normal, Bili WNL  -suspected TTP early in admission, s/p Plasmapheresis, RESTREPO 13-  -PATIENT REFUSING LE DUPLEX, OFF AC     Endo:  Blood glucose Goal : 140-180. SS insulin     MSK: Ambulate as tolerated     Code : Full code.     IMPRESSION    Early small bowel ischemia likely 2/2 severe enteritis, UGIB?  Malignant hypertension induced thrombocytopenia  Normocytic anemia  Hypertensive Emergency  JOYCE likely pre-renal         # PLAN:     CNS : Avoid CNS depressant.  Delirium Precautions awake follow follow command     ENT : Oral Care     Pulmonary : Keep Spo2 > 94% .HOB elevation. Supplemental O2 prn.     Cardiology:  - porcardia 120, labetalol 200 Q8 hrs, added hydralazine today.  - Can use nicardipine drip if persistently hypertensive      -SBP goal 160-180    GI :   -dropping h/h hx possible melena,   -Hgb fluctuating, requiring transfusions but without obvious signs of bleeding  -PATIENT REFUSING FURTHER IMAGING TO HELP EVALUATE FOR POTENTIAL CAUSES OF BLOOD LOSS  PPI Q12 hrs, no bloody stools reported, tolerating PO diet     Renal :   -Cr mid 3s-4 this admission, unknown baseline  -Trend CMP. Monitor Lytes and replete as needed.   -RA U/S negative,   -C3/4 negative less likely lupus or HUS    Hem/Onc :   -coagulation studies noted   -Hemolysis panel noted, LDH high, retic count increased, haptoglobin normal, Bili WNL  -suspected TTP early in admission, s/p Plasmapheresis, RESTREPO 13-  -PATIENT REFUSING LE DUPLEX, OFF AC     Endo:  Blood glucose Goal : 140-180. SS insulin     MSK: Ambulate as tolerated     Code : Full code.

## 2025-05-12 NOTE — PROGRESS NOTE ADULT - SUBJECTIVE AND OBJECTIVE BOX
FRANCISCO DUNLAP 39y Female  MRN#: [id number]   Hospital Day: 5d    Heme following for anemia, thrombocytopenia     REVIEW OF SYMPTOMS:  denies blood in urine or stool   states she is feeling well   no fever   no headache     OBJECTIVE  PAST MEDICAL & SURGICAL HISTORY  HTN (hypertension)      ALLERGIES:  No Known Allergies    MEDICATIONS:  STANDING MEDICATIONS  chlorhexidine 2% Cloths 1 Application(s) Topical <User Schedule>  cyanocobalamin 1000 MICROGram(s) Oral daily  dextrose 5%. 1000 milliLiter(s) IV Continuous <Continuous>  dextrose 5%. 1000 milliLiter(s) IV Continuous <Continuous>  dextrose 50% Injectable 25 Gram(s) IV Push once  dextrose 50% Injectable 12.5 Gram(s) IV Push once  dextrose 50% Injectable 25 Gram(s) IV Push once  folic acid 1 milliGRAM(s) Oral daily  hydrALAZINE 25 milliGRAM(s) Oral every 8 hours  insulin glargine Injectable (LANTUS) 6 Unit(s) SubCutaneous at bedtime  insulin lispro (ADMELOG) corrective regimen sliding scale   SubCutaneous three times a day before meals  insulin lispro (ADMELOG) corrective regimen sliding scale   SubCutaneous at bedtime  labetalol 300 milliGRAM(s) Oral three times a day  NIFEdipine  milliGRAM(s) Oral daily  pantoprazole    Tablet 40 milliGRAM(s) Oral every 12 hours    PRN MEDICATIONS  calcium carbonate   1250 mG (OsCal) 1 Tablet(s) Oral every 6 hours PRN  dextrose Oral Gel 15 Gram(s) Oral once PRN  melatonin 3 milliGRAM(s) Oral at bedtime PRN      VITAL SIGNS: Last 24 Hours  T(C): 35.9 (12 May 2025 12:00), Max: 36.2 (12 May 2025 04:00)  T(F): 96.7 (12 May 2025 12:00), Max: 97.2 (12 May 2025 04:00)  HR: 93 (12 May 2025 15:00) (80 - 126)  BP: 187/101 (12 May 2025 13:00) (159/95 - 187/101)  BP(mean): 136 (12 May 2025 13:00) (118 - 136)  RR: 19 (12 May 2025 15:00) (16 - 44)  SpO2: --    LABS:                        7.0    14.71 )-----------( 228      ( 12 May 2025 03:55 )             20.7     05-12    136  |  105  |  79[HH]  ----------------------------<  96  4.2   |  20  |  3.3[H]    Ca    7.7[L]      12 May 2025 03:55  Phos  5.2     05-12  Mg     2.1     05-12        Urinalysis Basic - ( 12 May 2025 03:55 )    Color: x / Appearance: x / SG: x / pH: x  Gluc: 96 mg/dL / Ketone: x  / Bili: x / Urobili: x   Blood: x / Protein: x / Nitrite: x   Leuk Esterase: x / RBC: x / WBC x   Sq Epi: x / Non Sq Epi: x / Bacteria: x        General:awake , responds to questions, does not meet eye contact often   HEENT: mucosa moist, conjunctiva pale         Lymph Nodes: NO cervical LN   Lungs: Bilateral BS  Cardiovascular: Regular   Abdomen: Soft, non tender   Extremities: No edema   Skin: warm   Neurological: no focal deficit   Musculoskeletal: move all ext     ASSESSMENT & PLAN:      Heme consulted for normocytic anemia and thrombocytopenia.   Hospital course complicated by small bowel ischemia likely 2/2 severe enteritis, malignant hypertension, SIRS/Sepsis ,Uncomplicated Cystitis          #Micro-angiopathic hemolytic anemia   #Thrombocytopenia   # Bowel ischemia  # Renal failure  - No prior labs for baseline  - Normal coags  -labs on admission were concerning for TTP/HUS ( hapto 36, ldh 739) Peripheral smear reviewed 5.7.25, noted schistocytosis 5-6/HPF and polychromasia and Plasmic score 5, intermediate probability of TTP. She was started on PLEX. Last session on 5.9.25. LELA-TS resulted as 57 and PLEX was dc'd.   - Low B12 and folate  - Iron sat 11, TIBC 166, ferritin 362     Plan:  TTP ruled out with LELA-TS levels. Microangiopathic hemolytic anemia attributed to HTN emergency.  C/w folic acid and b12  Check MM work up ( Spep, UPEP, Serum TAE, free light chains)   Outpatient follow [facility] with [facility]    FRANCISCO DUNLAP 39y Female  MRN#: [id number]   Hospital Day: 5d    Heme following for anemia, thrombocytopenia     REVIEW OF SYMPTOMS:  denies blood in urine or stool   states she is feeling well   no fever   no headache     OBJECTIVE  PAST MEDICAL & SURGICAL HISTORY  HTN (hypertension)      ALLERGIES:  No Known Allergies    MEDICATIONS:  STANDING MEDICATIONS  chlorhexidine 2% Cloths 1 Application(s) Topical <User Schedule>  cyanocobalamin 1000 MICROGram(s) Oral daily  dextrose 5%. 1000 milliLiter(s) IV Continuous <Continuous>  dextrose 5%. 1000 milliLiter(s) IV Continuous <Continuous>  dextrose 50% Injectable 25 Gram(s) IV Push once  dextrose 50% Injectable 12.5 Gram(s) IV Push once  dextrose 50% Injectable 25 Gram(s) IV Push once  folic acid 1 milliGRAM(s) Oral daily  hydrALAZINE 25 milliGRAM(s) Oral every 8 hours  insulin glargine Injectable (LANTUS) 6 Unit(s) SubCutaneous at bedtime  insulin lispro (ADMELOG) corrective regimen sliding scale   SubCutaneous three times a day before meals  insulin lispro (ADMELOG) corrective regimen sliding scale   SubCutaneous at bedtime  labetalol 300 milliGRAM(s) Oral three times a day  NIFEdipine  milliGRAM(s) Oral daily  pantoprazole    Tablet 40 milliGRAM(s) Oral every 12 hours    PRN MEDICATIONS  calcium carbonate   1250 mG (OsCal) 1 Tablet(s) Oral every 6 hours PRN  dextrose Oral Gel 15 Gram(s) Oral once PRN  melatonin 3 milliGRAM(s) Oral at bedtime PRN      VITAL SIGNS: Last 24 Hours  T(C): 35.9 (12 May 2025 12:00), Max: 36.2 (12 May 2025 04:00)  T(F): 96.7 (12 May 2025 12:00), Max: 97.2 (12 May 2025 04:00)  HR: 93 (12 May 2025 15:00) (80 - 126)  BP: 187/101 (12 May 2025 13:00) (159/95 - 187/101)  BP(mean): 136 (12 May 2025 13:00) (118 - 136)  RR: 19 (12 May 2025 15:00) (16 - 44)  SpO2: --    LABS:                        7.0    14.71 )-----------( 228      ( 12 May 2025 03:55 )             20.7     05-12    136  |  105  |  79[HH]  ----------------------------<  96  4.2   |  20  |  3.3[H]    Ca    7.7[L]      12 May 2025 03:55  Phos  5.2     05-12  Mg     2.1     05-12        Urinalysis Basic - ( 12 May 2025 03:55 )    Color: x / Appearance: x / SG: x / pH: x  Gluc: 96 mg/dL / Ketone: x  / Bili: x / Urobili: x   Blood: x / Protein: x / Nitrite: x   Leuk Esterase: x / RBC: x / WBC x   Sq Epi: x / Non Sq Epi: x / Bacteria: x        General:awake , responds to questions, does not meet eye contact often, blunt affect  HEENT: mucosa moist, conjunctiva pale         Lymph Nodes: NO cervical LN   Lungs: Bilateral BS  Cardiovascular: Regular   Abdomen: Soft, non tender   Extremities: No edema   Skin: warm   Neurological: no focal deficit   Musculoskeletal: move all ext     ASSESSMENT & PLAN:      Heme consulted for normocytic anemia and thrombocytopenia.   Hospital course complicated by small bowel ischemia likely 2/2 severe enteritis, malignant hypertension, SIRS/Sepsis ,Uncomplicated Cystitis          #Micro-angiopathic hemolytic anemia   #Thrombocytopenia   # Bowel ischemia  # Renal failure  - No prior labs for baseline  - Normal coags  -labs on admission were concerning for TTP/HUS ( hapto 36, ldh 739) Peripheral smear reviewed 5.7.25, noted schistocytosis 5-6/HPF and polychromasia and Plasmic score 5, intermediate probability of TTP. She was started on PLEX. Last session on 5.9.25. LELA-TS resulted as 57 and PLEX was dc'd.   - Low B12 and folate  - Iron sat 11, TIBC 166, ferritin 362     Plan:  TTP ruled out with LELA-TS levels. Microangiopathic hemolytic anemia attributed to HTN emergency.  C/w folic acid and b12  Check MM work up ( Spep, UPEP, Serum TAE, free light chains)   Outpatient follow up with [facility]

## 2025-05-12 NOTE — PROGRESS NOTE ADULT - ASSESSMENT
39-year-old female with history of hypertension presenting to ER with 5 days of multiple episodes of nonbloody nonbilious vomiting and diarrhea with associated generalized abdominal pain and distention  # HTN   # JOYCE rule out ATN   # proteinuria / hematuria   # thrombocytopenia   - picture above can be due to malignant HTN  / ADAMTS 13 not low / however patient received steroids and on plasmapheresis ? timing of blood test   - normal C3 ( not in favor of a HUS)  nl C4 normal JOSE ANTONIO which rule out active lupus  - hem onc appreciated s/p steroids , s/p  plasmapheresis , now d/tessie  - creat trending down    - 1.8 g protein on UPCR will need repeat   - ph at goal / no binders / check i calcium / vit D/ PTH   -  GN w/up negative   - follow bp readings / add hydralazine 25 q 8 if bp remains elevated   - renal artery dupplex no FOUZIA   renal team will follow

## 2025-05-12 NOTE — PROGRESS NOTE ADULT - ASSESSMENT
IMPRESSION    Early small bowel ischemia likely 2/2 severe enteritis  malignant hypertension induced thrombocytopenia   Hypertensive Emergency  Likely UGIB   anemia   SIRS/Sepsis   Uncomplicated Cystitis   JOYCE likely pre-renal   RSV infection     # PLAN:     CNS : Avoid CNS depressant.  Delirium Precautions awake follow follow command     ENT : Oral Care     Pulmonary : Keep Spo2 > 94% .HOB elevation. Supplemental O2 prn.     Cardiology: porcardia 90   labetalol Q8 hrs    Adding hydralazine 25 mg PO tid  nicardipine off      GI : follow GI dropping h/h hx possible melena   follow Gi and G sx   CBC bid  PPI Q12 hrs     Renal : Trend CMP. Monitor Lytes and replete as needed. Nephro  follow up   off abx   follow cx     Hem/Onc : Coagulation studies noted.   steroids d/c off plasma   RESTREPO 13  negative   s/p plasmapheresis stopped   s/p 1 unit prbc   Monitor CBC  LE duplex.    Endo:  Blood glucose Goal : 140-180. insulin drip for now     MSK: Ambulate as tolerated     Code : Full code.      Disp:  MICU today IMPRESSION    Early small bowel ischemia likely 2/2 severe enteritis  malignant hypertension induced thrombocytopenia   Hypertensive Emergency  Likely UGIB on presentation  anemia - unknown etiology   SIRS/Sepsis   Uncomplicated Cystitis   JOYCE likely pre-renal   RSV infection     # PLAN:     CNS : Avoid CNS depressant.  Delirium Precautions awake follow follow command     ENT : Oral Care     Pulmonary : Keep Spo2 > 94% .HOB elevation. Supplemental O2 prn.     Cardiology: porcardia 90   labetalol Q8 hrs    Adding hydralazine 25 mg PO tid  nicardipine off      GI : follow GI dropping h/h hx possible melena   follow Gi and G sx   CBC bid  PPI Q12 hrs     Renal : Trend CMP. Monitor Lytes and replete as needed. Nephro  follow up   off abx   follow cx     Hem/Onc : Coagulation studies noted.   steroids d/c off plasma   RESTREPO 13  negative   s/p plasmapheresis stopped   s/p 3 unit prbcs now   Monitor CBC  Repeat hemolysis panel.  LE duplex.    Endo:  Blood glucose Goal : 140-180. insulin drip for now     MSK: Ambulate as tolerated     Code : Full code.      Disp:  MICU today

## 2025-05-12 NOTE — PROGRESS NOTE ADULT - ASSESSMENT
Assessment and Plan  Case of a 39 year old previously healthy female patient who presented to the ED on 05/06 for evaluation of generalized abdominal pain, nausea, vomiting, and black loose stools, found to have malignant HTN on arrival with evidence of leukocytosis/acute on chronic anemia/thrombocytopenia/elevated LDH and retic/low haptoglobin/schistocytes on smear/proteinuria/JOYCE on blood work and evidence of distal D/proximal J thickening with dilated jejunal loops to 3.4cm and gradual tapering distally wo SBO along with mural enhancement of SB concerning for severe enteritis VS early ischemia VS shock bowel. 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 We are following for above findings.      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 (JOYCE with leukocytosis, thrombocytopenia, and acute on chronic anemia/ not TTP since RESTREPO -)   * Vitals: 195/85mmHg, HR 88 bpm, no fevers  * Labs: WBC 14.71, Hb 7, Plt 228, Na 136, K 4.2, BUN 79, Cr 3.3 on 05/12  * Hb trend: 9 on 05/06 -> 7.2/6.8 on 05/07 s/p 1 unit pRBC -> 6.8 on 05/08 s/p 1 unit -> 7.6 on 05/10 -> 6.9 on 05/11 s/p 1 unit -> 7 on 05/12  * Liver enzymes: 0.7/71/17/14 on 05/08  * Iron with Total Binding Capacity (05.07.25 @ 11:40)    Iron - Total Binding Capacity.: 166 ug/dL   % Saturation, Iron: 11 %   Iron Total: 18 ug/dL   Unsaturated Iron Binding Capacity: 148 ug/dL  * Elevated LDH and retic, low haptoglobin, smear with schistocytes; proteinuria on ua   * INR 0.9 on 05/07; not on AC  * Refused CHRISTINE on 05/12   * CT Abdomen and Pelvis No Cont (05.07.25 @ 01:49) Edematous distal duodenum and proximal jejunal loops with associated dilatation measuring up to 3.4 cm. Associated marked interloop ascites, mesenteric fat stranding, and prominent mesenteric lymph nodes. Mild-to-moderate ascites. Findings concerning for small bowel ischemia.No portal venous gas, pneumatosis, or pneumoperitoneum.  * CT Angio Abdomen and Pelvis w/ IV Cont (05.07.25 @ 04:18) >Patent SMA, SMV. Redemonstration of distal duodenal and proximal small bowel with marked inflammatory change. Mural enhancement noted throughout the small bowel. Considerations include severe enteritis, early ischemia, shock bowel.  * No previous EGD or colonoscopy  * No FHx of GI cancers    RECOMMENDATIONS  - In the setting of reported black stools with acute on chronic iron deficiency anemia, the patient will ideally benefit from endoscopic evaluation  - However, in the setting of concern for hemolysis (schistocytes/high LDH and retic/ low haptoglobin) and concern for TTP like picture per hematology/nephrology, will hold off endoscopic evaluation for now  - Hematology and nephrology teams on board: concern for 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 s/p plasmapheresis from 05/08-05/09 and s/p IV solumedrol from 05/07-05/10  - Surgery team on board: no acute intervention; unlikely ischemia per clinical exam  - Trend LA; monitor MAP and keep > 65mmHg (avoid malignant HTN; on nicardipine drip; labetalol; nifedipine) -> management per primary team  - Recommend serial abdominal exams. Check daily KUBs. Monitor electrolytes and replete as indicated. Avoid CCBs, sedatives, anticholinergics; limit opioids. Encourage ambulation and incentive spirometry  - Monitor for pain: management per team  - Monitor for nausea: consider antiemetics PRN if QTc is within normal  - Monitor BM. Stool cx -, GI PCR - on 05/08. Check Clostridium difficile if diarrhea recurs  - Holding off Ab for concern for HUS. ID team is on board  - Continue Protonix 40mg BID for now; avoid inessential NSAIDs  - Trend CBC and transfuse as needed; keep active type and screen  - Follow up with our [facility] for EGD/colonoscopy +- VCE (located at [address] [place] [address]. Phone Number: [phone])        Thank you for your consult.  - Please note that plan was communicated with medical team.   - Please reach GI on [phone] during weekdays till 5pm.  - Please call the GI service line after 5pm on Weekdays and anytime on Weekends: [phone].      Art Graham MD  PGY - 5 Gastroenterology Fellow   [facility]     Assessment and Plan  Case of a 39 year old previously healthy female patient who presented to the ED on 05/06 for evaluation of generalized abdominal pain, nausea, vomiting, and black loose stools, found to have malignant HTN on arrival with evidence of leukocytosis/acute on chronic anemia/thrombocytopenia/elevated LDH and retic/low haptoglobin/schistocytes on smear/proteinuria/JOYCE on blood work and evidence of distal D/proximal J thickening with dilated jejunal loops to 3.4cm and gradual tapering distally wo SBO along with mural enhancement of SB concerning for severe enteritis VS early ischemia VS shock bowel. 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 We are following for above findings.      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 (JOYCE with leukocytosis, thrombocytopenia, and acute on chronic anemia/ not TTP since RESTREPO -)   * Vitals: 195/85mmHg, HR 88 bpm, no fevers  * Labs: WBC 14.71, Hb 7, Plt 228, Na 136, K 4.2, BUN 79, Cr 3.3 on 05/12  * Hb trend: 9 on 05/06 -> 7.2/6.8 on 05/07 s/p 1 unit pRBC -> 6.8 on 05/08 s/p 1 unit -> 7.6 on 05/10 -> 6.9 on 05/11 s/p 1 unit -> 7 on 05/12  * Liver enzymes: 0.7/71/17/14 on 05/08  * Iron with Total Binding Capacity (05.07.25 @ 11:40)    Iron - Total Binding Capacity.: 166 ug/dL   % Saturation, Iron: 11 %   Iron Total: 18 ug/dL   Unsaturated Iron Binding Capacity: 148 ug/dL  * Elevated LDH and retic, low haptoglobin, smear with schistocytes; proteinuria on ua   * INR 0.9 on 05/07; not on AC  * Refused CHRISTINE on 05/12   * CT Abdomen and Pelvis No Cont (05.07.25 @ 01:49) Edematous distal duodenum and proximal jejunal loops with associated dilatation measuring up to 3.4 cm. Associated marked interloop ascites, mesenteric fat stranding, and prominent mesenteric lymph nodes. Mild-to-moderate ascites. Findings concerning for small bowel ischemia.No portal venous gas, pneumatosis, or pneumoperitoneum.  * CT Angio Abdomen and Pelvis w/ IV Cont (05.07.25 @ 04:18) >Patent SMA, SMV. Redemonstration of distal duodenal and proximal small bowel with marked inflammatory change. Mural enhancement noted throughout the small bowel. Considerations include severe enteritis, early ischemia, shock bowel.  * No previous EGD or colonoscopy  * No FHx of GI cancers    RECOMMENDATIONS  - In the setting of reported black stools with acute on chronic iron deficiency anemia, the patient will ideally benefit from endoscopic evaluation  - However, in the setting of uncontrolled BP (recent malignant HTN), concern for hemolysis (schistocytes/high LDH and retic/ low haptoglobin), and concern for TTP like picture per hematology/nephrology, will hold off endoscopic evaluation for now  - Hematology and nephrology teams on board: concern for 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 s/p plasmapheresis from 05/08-05/09 and s/p IV solumedrol from 05/07-05/10  - Surgery team on board: no acute intervention; unlikely ischemia per clinical exam  - Trend LA; monitor MAP and keep > 65mmHg (avoid malignant HTN; on nicardipine drip; labetalol; nifedipine) -> management per primary team  - Recommend serial abdominal exams. Check daily KUBs. Monitor electrolytes and replete as indicated. Avoid CCBs, sedatives, anticholinergics; limit opioids. Encourage ambulation and incentive spirometry  - Monitor for pain: management per team  - Monitor for nausea: consider antiemetics PRN if QTc is within normal  - Monitor BM. Stool cx -, GI PCR - on 05/08. Check Clostridium difficile if diarrhea recurs  - Holding off Ab for concern for HUS. ID team is on board  - Continue Protonix 40mg BID for now; avoid inessential NSAIDs  - Trend CBC and transfuse as needed; keep active type and screen  - Follow up with our [facility] for EGD/colonoscopy +- VCE (located at [address] [place] [address]. Phone Number: [phone])        Thank you for your consult.  - Please note that plan was communicated with medical team.   - Please reach GI on [phone] during weekdays till 5pm.  - Please call the GI service line after 5pm on Weekdays and anytime on Weekends: [phone].      Art Graham MD  PGY - 5 Gastroenterology Fellow   [facility]

## 2025-05-12 NOTE — PROGRESS NOTE ADULT - SUBJECTIVE AND OBJECTIVE BOX
Patient is a 39y old  Female who presents with a chief complaint of Diarrhea (09 May 2025 15:27)        Over Night Events: Was on Nicardipine, now off since 6 am. On RA.     ROS:     All ROS are negative except HPI       PHYSICAL EXAM    ICU Vital Signs Last 24 Hrs  T(C): 36.2 (12 May 2025 04:00), Max: 36.4 (11 May 2025 16:00)  T(F): 97.2 (12 May 2025 04:00), Max: 97.5 (11 May 2025 16:00)  HR: 87 (12 May 2025 08:00) (80 - 126)  BP: 159/95 (11 May 2025 17:00) (159/95 - 159/95)  BP(mean): 118 (11 May 2025 17:00) (118 - 118)  ABP: 182/80 (12 May 2025 08:00) (77/9 - 249/108)  ABP(mean): 114 (12 May 2025 08:00) (51 - 160)  RR: 17 (12 May 2025 08:00) (16 - 44)  SpO2: 99% (11 May 2025 16:00) (97% - 99%)    O2 Parameters below as of 11 May 2025 19:00  Patient On (Oxygen Delivery Method): room air      CONSTITUTIONAL:  Well nourished.  NAD    ENT:   Airway patent,   Mouth with normal mucosa.   No thrush    EYES:   Pupils equal,   Round and reactive to light.    CARDIAC:   Normal rate,   Regular rhythm.    No edema      Vascular:  Normal systolic impulse  No Carotid bruits    RESPIRATORY:   No wheezing  Bilateral BS  Normal chest expansion  Not tachypneic,  No use of accessory muscles    GASTROINTESTINAL:  Abdomen soft,   Non-tender,   No guarding,   + BS    MUSCULOSKELETAL:   Range of motion is not limited,  No clubbing, cyanosis    NEUROLOGICAL:   Alert and oriented   No motor  deficits.    SKIN:   Skin normal color for race,   Warm and dry and intact.   No evidence of rash.      05-11-25 @ 07:01  -  05-12-25 @ 07:00  --------------------------------------------------------  IN:    Oral Fluid: 1440 mL  Total IN: 1440 mL    OUT:    NiCARdipine: 0 mL    Voided (mL): 3200 mL  Total OUT: 3200 mL    Total NET: -1760 mL      LABS:                          7.0    14.71 )-----------( 228      ( 12 May 2025 03:55 )             20.7                                               05-12    136  |  105  |  79[HH]  ----------------------------<  96  4.2   |  20  |  3.3[H]    Ca    7.7[L]      12 May 2025 03:55  Phos  5.2     05-12  Mg     2.1     05-12                                               Urinalysis Basic - ( 12 May 2025 03:55 )    Color: x / Appearance: x / SG: x / pH: x  Gluc: 96 mg/dL / Ketone: x  / Bili: x / Urobili: x   Blood: x / Protein: x / Nitrite: x   Leuk Esterase: x / RBC: x / WBC x   Sq Epi: x / Non Sq Epi: x / Bacteria: x                                                                                                                                                                                MEDICATIONS  (STANDING):  chlorhexidine 2% Cloths 1 Application(s) Topical <User Schedule>  cyanocobalamin 1000 MICROGram(s) Oral daily  dextrose 5%. 1000 milliLiter(s) (100 mL/Hr) IV Continuous <Continuous>  dextrose 5%. 1000 milliLiter(s) (50 mL/Hr) IV Continuous <Continuous>  dextrose 50% Injectable 25 Gram(s) IV Push once  dextrose 50% Injectable 12.5 Gram(s) IV Push once  dextrose 50% Injectable 25 Gram(s) IV Push once  folic acid 1 milliGRAM(s) Oral daily  insulin glargine Injectable (LANTUS) 6 Unit(s) SubCutaneous at bedtime  insulin lispro (ADMELOG) corrective regimen sliding scale   SubCutaneous three times a day before meals  insulin lispro (ADMELOG) corrective regimen sliding scale   SubCutaneous at bedtime  labetalol 300 milliGRAM(s) Oral three times a day  niCARdipine Infusion 5 mG/Hr (25 mL/Hr) IV Continuous <Continuous>  NIFEdipine  milliGRAM(s) Oral daily  pantoprazole    Tablet 40 milliGRAM(s) Oral every 12 hours    MEDICATIONS  (PRN):  calcium carbonate   1250 mG (OsCal) 1 Tablet(s) Oral every 6 hours PRN Heartburn  dextrose Oral Gel 15 Gram(s) Oral once PRN Blood Glucose LESS THAN 70 milliGRAM(s)/deciliter  melatonin 3 milliGRAM(s) Oral at bedtime PRN Insomnia      New X-rays reviewed:                                                                                  ECHO    CXR interpreted by me:

## 2025-05-12 NOTE — PROGRESS NOTE ADULT - SUBJECTIVE AND OBJECTIVE BOX
Gastroenterology Follow Up Note      Location: [address] 00[address] ([address])  Patient Name: FRANCISCO DUNLAP  Age: 39y  Gender: Female      Chief Complaint  Patient is a 39y old Female who presents with a chief complaint of Hypertensi (12 May 2025 14:24)  Primary diagnosis of Hypertensive emergency        Reason for Consult  Concern for enteritis/ischemia/TTP like picture from malignant HTN      Progress Note  This morning patient was seen and examined at bedside.    Today is hospital day 5d.  No acute events overnight.   She denies abdominal pain or nausea or vomiting.  She reports 2 black loose stools on 05/11 but adamantly refused CHRISTINE on 05/12.        Vital Signs in the last 24 hours   Vitals Summary T(C): 35.8 (05-12-25 @ 16:00), Max: 36.2 (05-12-25 @ 04:00)  HR: 98 (05-12-25 @ 19:00) (80 - 126)  BP: 187/101 (05-12-25 @ 13:00) (187/101 - 187/101)  RR: 25 (05-12-25 @ 19:00) (16 - 44)  SpO2: --  Vent Data   Intake/ Output   05-11-25 @ 07:01  -  05-12-25 @ 07:00  --------------------------------------------------------  IN: 1440 mL / OUT: 3200 mL / NET: -1760 mL    05-12-25 @ 07:01  -  05-12-25 @ 19:15  --------------------------------------------------------  IN: 312.5 mL / OUT: 1400 mL / NET: -1087.5 mL         Physical Exam  * General Appearance: Alert, cooperative, interactive, oriented to time, place, and person, in no acute distress  * Neck: Supple, symmetrical, trachea midline, no adenopathy   * Lungs: Good bilateral air entry, normal breath sounds  * Heart: Regular Rate and Rhythm, normal S1 and S2, no audible murmur, rub, or gallop  * Abdomen: Symmetric, mildly distended, soft, non-tender, no guarding or rebound tenderness, bowel sounds active all four quadrants  * Refused CHRISTINE on 05/12 as below      Investigations   Laboratory Workup      - CBC:                        7.9    14.92 )-----------( 270      ( 12 May 2025 16:48 )             23.3       - Hgb Trend:  7.9  05-12-25 @ 16:48  7.0  05-12-25 @ 03:55  8.0  05-11-25 @ 16:19  6.9  05-11-25 @ 04:42  7.6  05-10-25 @ 04:20      05-10-25 @ 07:01  -  05-11-25 @ 07:00  --------------------------------------------------------  IN: 362 mL        - Chemistry:  05-12    136  |  105  |  79[HH]  ----------------------------<  96  4.2   |  20  |  3.3[H]    Ca    7.7[L]      12 May 2025 03:55  Phos  5.2     05-12  Mg     2.1     05-12      Liver panel trend:  TBili 0.4   /   AST 19   /   ALT 21   /   AlkP 103   /   Tptn 5.1   /   Alb 3.1    /   DBili --      05-09  TBili 0.7   /   AST 17   /   ALT 14   /   AlkP 71   /   Tptn 4.9   /   Alb 3.1    /   DBili --      05-08  TBili 0.5   /   AST 18   /   ALT 8   /   AlkP 61   /   Tptn 4.2   /   Alb 2.6    /   DBili --      05-07  TBili 0.7   /   AST 30   /   ALT 10   /   AlkP 63   /   Tptn 4.6   /   Alb 2.8    /   DBili 0.2      05-06      - Coagulation Studies:      - ABG:      - Cardiac Markers:        Microbiological Workup  Urinalysis Basic - ( 12 May 2025 03:55 )    Color: x / Appearance: x / SG: x / pH: x  Gluc: 96 mg/dL / Ketone: x  / Bili: x / Urobili: x   Blood: x / Protein: x / Nitrite: x   Leuk Esterase: x / RBC: x / WBC x   Sq Epi: x / Non Sq Epi: x / Bacteria: x          Current Medications  Standing Medications  chlorhexidine 2% Cloths 1 Application(s) Topical <User Schedule>  cyanocobalamin 1000 MICROGram(s) Oral daily  dextrose 5%. 1000 milliLiter(s) (100 mL/Hr) IV Continuous <Continuous>  dextrose 5%. 1000 milliLiter(s) (50 mL/Hr) IV Continuous <Continuous>  dextrose 50% Injectable 25 Gram(s) IV Push once  dextrose 50% Injectable 12.5 Gram(s) IV Push once  dextrose 50% Injectable 25 Gram(s) IV Push once  folic acid 1 milliGRAM(s) Oral daily  hydrALAZINE 25 milliGRAM(s) Oral every 8 hours  insulin glargine Injectable (LANTUS) 6 Unit(s) SubCutaneous at bedtime  insulin lispro (ADMELOG) corrective regimen sliding scale   SubCutaneous three times a day before meals  insulin lispro (ADMELOG) corrective regimen sliding scale   SubCutaneous at bedtime  labetalol 300 milliGRAM(s) Oral three times a day  niCARdipine Infusion 5 mG/Hr (25 mL/Hr) IV Continuous <Continuous>  NIFEdipine  milliGRAM(s) Oral daily  pantoprazole    Tablet 40 milliGRAM(s) Oral every 12 hours    PRN Medications  calcium carbonate   1250 mG (OsCal) 1 Tablet(s) Oral every 6 hours PRN Heartburn  dextrose Oral Gel 15 Gram(s) Oral once PRN Blood Glucose LESS THAN 70 milliGRAM(s)/deciliter  melatonin 3 milliGRAM(s) Oral at bedtime PRN Insomnia    Singles Doses Administered  (ADM OVERRIDE) 1 each &lt;see task&gt; GiveOnce  (ADM OVERRIDE) 1 each &lt;see task&gt; GiveOnce  (ADM OVERRIDE) 2 each &lt;see task&gt; GiveOnce  (ADM OVERRIDE) 1 each &lt;see task&gt; GiveOnce  (ADM OVERRIDE) 1 each &lt;see task&gt; GiveOnce  (ADM OVERRIDE) 1 each &lt;see task&gt; GiveOnce  (ADM OVERRIDE) 1 each &lt;see task&gt; GiveOnce  acetaminophen   IVPB .. 1000 milliGRAM(s) IV Intermittent once  acetaminophen   IVPB .. 1000 milliGRAM(s) IV Intermittent once  calcium gluconate IVPB 2 Gram(s) IV Intermittent once  calcium gluconate IVPB 2 Gram(s) IV Intermittent once  calcium gluconate IVPB 2 Gram(s) IV Intermittent once  diphenhydrAMINE 50 milliGRAM(s) Oral once  diphenhydrAMINE Injectable 25 milliGRAM(s) IV Push once  diphenhydrAMINE Injectable 25 milliGRAM(s) IV Push once  diphenhydrAMINE Injectable 25 milliGRAM(s) IV Push once  hydrALAZINE Injectable 10 milliGRAM(s) IV Push once  hydrALAZINE Injectable 10 milliGRAM(s) IV Push once  labetalol Injectable 10 milliGRAM(s) IV Push once  labetalol Injectable 10 milliGRAM(s) IV Push once  lactated ringers Bolus 1000 milliLiter(s) IV Bolus once  methylPREDNISolone sodium succinate IVPB 1000 milliGRAM(s) IV Intermittent daily  metoprolol tartrate Injectable 10 milliGRAM(s) IV Push Once  morphine  - Injectable 4 milliGRAM(s) IV Push Once  ondansetron Injectable 4 milliGRAM(s) IV Push once  pantoprazole  Injectable 40 milliGRAM(s) IV Push Once  piperacillin/tazobactam IVPB... 3.375 Gram(s) IV Intermittent once  potassium chloride   Powder 40 milliEquivalent(s) Oral every 4 hours  potassium chloride  20 mEq/100 mL IVPB 20 milliEquivalent(s) IV Intermittent every 2 hours  potassium chloride  20 mEq/100 mL IVPB 20 milliEquivalent(s) IV Intermittent every 2 hours  QUEtiapine 25 milliGRAM(s) Oral once  sodium chloride 0.9% Bolus 1000 milliLiter(s) IV Bolus once

## 2025-05-13 DIAGNOSIS — Z86.59 PERSONAL HISTORY OF OTHER MENTAL AND BEHAVIORAL DISORDERS: ICD-10-CM

## 2025-05-13 LAB
24R-OH-CALCIDIOL SERPL-MCNC: 12 NG/ML — SIGNIFICANT CHANGE UP
ALBUMIN SERPL ELPH-MCNC: 2.6 G/DL — LOW (ref 3.5–5.2)
ALP SERPL-CCNC: 70 U/L — SIGNIFICANT CHANGE UP (ref 30–115)
ALT FLD-CCNC: 16 U/L — SIGNIFICANT CHANGE UP (ref 0–41)
ANION GAP SERPL CALC-SCNC: 11 MMOL/L — SIGNIFICANT CHANGE UP (ref 7–14)
AST SERPL-CCNC: 11 U/L — SIGNIFICANT CHANGE UP (ref 0–41)
BASOPHILS # BLD AUTO: 0.02 K/UL — SIGNIFICANT CHANGE UP (ref 0–0.2)
BASOPHILS # BLD AUTO: 0.02 K/UL — SIGNIFICANT CHANGE UP (ref 0–0.2)
BASOPHILS NFR BLD AUTO: 0.1 % — SIGNIFICANT CHANGE UP (ref 0–1)
BASOPHILS NFR BLD AUTO: 0.1 % — SIGNIFICANT CHANGE UP (ref 0–1)
BILIRUB SERPL-MCNC: <0.2 MG/DL — SIGNIFICANT CHANGE UP (ref 0.2–1.2)
BUN SERPL-MCNC: 74 MG/DL — CRITICAL HIGH (ref 10–20)
CALCIUM SERPL-MCNC: 7.6 MG/DL — LOW (ref 8.4–10.5)
CHLORIDE SERPL-SCNC: 106 MMOL/L — SIGNIFICANT CHANGE UP (ref 98–110)
CO2 SERPL-SCNC: 22 MMOL/L — SIGNIFICANT CHANGE UP (ref 17–32)
CREAT ?TM UR-MCNC: 16 MG/DL — SIGNIFICANT CHANGE UP
CREAT SERPL-MCNC: 3.4 MG/DL — HIGH (ref 0.7–1.5)
EGFR: 17 ML/MIN/1.73M2 — LOW
EGFR: 17 ML/MIN/1.73M2 — LOW
EOSINOPHIL # BLD AUTO: 0.1 K/UL — SIGNIFICANT CHANGE UP (ref 0–0.7)
EOSINOPHIL # BLD AUTO: 0.14 K/UL — SIGNIFICANT CHANGE UP (ref 0–0.7)
EOSINOPHIL NFR BLD AUTO: 0.7 % — SIGNIFICANT CHANGE UP (ref 0–8)
EOSINOPHIL NFR BLD AUTO: 0.8 % — SIGNIFICANT CHANGE UP (ref 0–8)
GLUCOSE BLDC GLUCOMTR-MCNC: 186 MG/DL — HIGH (ref 70–99)
GLUCOSE SERPL-MCNC: 130 MG/DL — HIGH (ref 70–99)
HAPTOGLOB SERPL-MCNC: 139 MG/DL — SIGNIFICANT CHANGE UP (ref 34–200)
HAPTOGLOB SERPL-MCNC: 163 MG/DL — SIGNIFICANT CHANGE UP (ref 34–200)
HCT VFR BLD CALC: 20.1 % — LOW (ref 37–47)
HCT VFR BLD CALC: 25 % — LOW (ref 37–47)
HGB BLD-MCNC: 6.7 G/DL — CRITICAL LOW (ref 12–16)
HGB BLD-MCNC: 8.6 G/DL — LOW (ref 12–16)
IGA FLD-MCNC: 76 MG/DL — LOW (ref 84–499)
IGG FLD-MCNC: 429 MG/DL — LOW (ref 610–1660)
IGM SERPL-MCNC: 39 MG/DL — SIGNIFICANT CHANGE UP (ref 35–242)
IMM GRANULOCYTES NFR BLD AUTO: 3.6 % — HIGH (ref 0.1–0.3)
IMM GRANULOCYTES NFR BLD AUTO: 3.9 % — HIGH (ref 0.1–0.3)
KAPPA LC SER QL IFE: 1.81 MG/DL — SIGNIFICANT CHANGE UP (ref 0.33–1.94)
KAPPA LC SER QL IFE: 1.98 MG/DL — HIGH (ref 0.33–1.94)
KAPPA/LAMBDA FREE LIGHT CHAIN RATIO, SERUM: 0.75 RATIO — SIGNIFICANT CHANGE UP (ref 0.26–1.65)
KAPPA/LAMBDA FREE LIGHT CHAIN RATIO, SERUM: 0.86 RATIO — SIGNIFICANT CHANGE UP (ref 0.26–1.65)
LAMBDA LC SER QL IFE: 2.29 MG/DL — SIGNIFICANT CHANGE UP (ref 0.57–2.63)
LAMBDA LC SER QL IFE: 2.42 MG/DL — SIGNIFICANT CHANGE UP (ref 0.57–2.63)
LDH SERPL L TO P-CCNC: 268 — HIGH (ref 50–242)
LYMPHOCYTES # BLD AUTO: 0.96 K/UL — LOW (ref 1.2–3.4)
LYMPHOCYTES # BLD AUTO: 1.34 K/UL — SIGNIFICANT CHANGE UP (ref 1.2–3.4)
LYMPHOCYTES # BLD AUTO: 5.6 % — LOW (ref 20.5–51.1)
LYMPHOCYTES # BLD AUTO: 9.1 % — LOW (ref 20.5–51.1)
MAGNESIUM SERPL-MCNC: 2 MG/DL — SIGNIFICANT CHANGE UP (ref 1.8–2.4)
MCHC RBC-ENTMCNC: 30 PG — SIGNIFICANT CHANGE UP (ref 27–31)
MCHC RBC-ENTMCNC: 30.4 PG — SIGNIFICANT CHANGE UP (ref 27–31)
MCHC RBC-ENTMCNC: 33.3 G/DL — SIGNIFICANT CHANGE UP (ref 32–37)
MCHC RBC-ENTMCNC: 34.4 G/DL — SIGNIFICANT CHANGE UP (ref 32–37)
MCV RBC AUTO: 88.3 FL — SIGNIFICANT CHANGE UP (ref 81–99)
MCV RBC AUTO: 90.1 FL — SIGNIFICANT CHANGE UP (ref 81–99)
MONOCYTES # BLD AUTO: 1.07 K/UL — HIGH (ref 0.1–0.6)
MONOCYTES # BLD AUTO: 1.25 K/UL — HIGH (ref 0.1–0.6)
MONOCYTES NFR BLD AUTO: 6.3 % — SIGNIFICANT CHANGE UP (ref 1.7–9.3)
MONOCYTES NFR BLD AUTO: 8.5 % — SIGNIFICANT CHANGE UP (ref 1.7–9.3)
NEUTROPHILS # BLD AUTO: 11.46 K/UL — HIGH (ref 1.4–6.5)
NEUTROPHILS # BLD AUTO: 14.31 K/UL — HIGH (ref 1.4–6.5)
NEUTROPHILS NFR BLD AUTO: 77.7 % — HIGH (ref 42.2–75.2)
NEUTROPHILS NFR BLD AUTO: 83.6 % — HIGH (ref 42.2–75.2)
NRBC BLD AUTO-RTO: 0 /100 WBCS — SIGNIFICANT CHANGE UP (ref 0–0)
NRBC BLD AUTO-RTO: 0 /100 WBCS — SIGNIFICANT CHANGE UP (ref 0–0)
PHOSPHATE SERPL-MCNC: 5.1 MG/DL — HIGH (ref 2.1–4.9)
PLATELET # BLD AUTO: 250 K/UL — SIGNIFICANT CHANGE UP (ref 130–400)
PLATELET # BLD AUTO: 257 K/UL — SIGNIFICANT CHANGE UP (ref 130–400)
PMV BLD: 10.1 FL — SIGNIFICANT CHANGE UP (ref 7.4–10.4)
PMV BLD: 9.9 FL — SIGNIFICANT CHANGE UP (ref 7.4–10.4)
POTASSIUM SERPL-MCNC: 4.1 MMOL/L — SIGNIFICANT CHANGE UP (ref 3.5–5)
POTASSIUM SERPL-SCNC: 4.1 MMOL/L — SIGNIFICANT CHANGE UP (ref 3.5–5)
PROT ?TM UR-MCNC: 37 MG/DL — SIGNIFICANT CHANGE UP
PROT SERPL-MCNC: 4.2 G/DL — LOW (ref 6–8)
PROT SERPL-MCNC: 4.7 G/DL — LOW (ref 6–8.3)
PROT/CREAT UR-RTO: 2.3 RATIO — HIGH (ref 0–0.2)
PTH-INTACT FLD-MCNC: 193 PG/ML — HIGH (ref 15–65)
RBC # BLD: 2.23 M/UL — LOW (ref 4.2–5.4)
RBC # BLD: 2.23 M/UL — LOW (ref 4.2–5.4)
RBC # BLD: 2.83 M/UL — LOW (ref 4.2–5.4)
RBC # FLD: 15.2 % — HIGH (ref 11.5–14.5)
RBC # FLD: 15.2 % — HIGH (ref 11.5–14.5)
RETICS #: 173.7 K/UL — HIGH (ref 25–125)
RETICS/RBC NFR: 7.8 % — HIGH (ref 0.5–1.5)
RF IGA SER-ACNC: 7 U — HIGH
RF IGG SER-ACNC: <5 U — SIGNIFICANT CHANGE UP
RF IGM SER-ACNC: <5 U — SIGNIFICANT CHANGE UP
SODIUM SERPL-SCNC: 139 MMOL/L — SIGNIFICANT CHANGE UP (ref 135–146)
WBC # BLD: 14.74 K/UL — HIGH (ref 4.8–10.8)
WBC # BLD: 17.12 K/UL — HIGH (ref 4.8–10.8)
WBC # FLD AUTO: 14.74 K/UL — HIGH (ref 4.8–10.8)
WBC # FLD AUTO: 17.12 K/UL — HIGH (ref 4.8–10.8)

## 2025-05-13 PROCEDURE — 70450 CT HEAD/BRAIN W/O DYE: CPT | Mod: 26

## 2025-05-13 PROCEDURE — 99291 CRITICAL CARE FIRST HOUR: CPT | Mod: GC

## 2025-05-13 PROCEDURE — 99233 SBSQ HOSP IP/OBS HIGH 50: CPT

## 2025-05-13 PROCEDURE — 71250 CT THORAX DX C-: CPT | Mod: 26

## 2025-05-13 PROCEDURE — 74176 CT ABD & PELVIS W/O CONTRAST: CPT | Mod: 26

## 2025-05-13 RX ORDER — DIPHENHYDRAMINE HCL 12.5MG/5ML
25 ELIXIR ORAL ONCE
Refills: 0 | Status: COMPLETED | OUTPATIENT
Start: 2025-05-13 | End: 2025-05-13

## 2025-05-13 RX ORDER — ATORVASTATIN CALCIUM 80 MG/1
80 TABLET, FILM COATED ORAL AT BEDTIME
Refills: 0 | Status: DISCONTINUED | OUTPATIENT
Start: 2025-05-13 | End: 2025-05-27

## 2025-05-13 RX ORDER — BUMETANIDE 1 MG/1
2 TABLET ORAL ONCE
Refills: 0 | Status: COMPLETED | OUTPATIENT
Start: 2025-05-13 | End: 2025-05-13

## 2025-05-13 RX ORDER — FUROSEMIDE 10 MG/ML
40 INJECTION INTRAMUSCULAR; INTRAVENOUS EVERY 12 HOURS
Refills: 0 | Status: DISCONTINUED | OUTPATIENT
Start: 2025-05-13 | End: 2025-05-16

## 2025-05-13 RX ORDER — FERROUS SULFATE 137(45) MG
325 TABLET, EXTENDED RELEASE ORAL DAILY
Refills: 0 | Status: DISCONTINUED | OUTPATIENT
Start: 2025-05-13 | End: 2025-05-16

## 2025-05-13 RX ORDER — EPOETIN ALFA 10000 [IU]/ML
10000 SOLUTION INTRAVENOUS; SUBCUTANEOUS
Refills: 0 | Status: DISCONTINUED | OUTPATIENT
Start: 2025-05-13 | End: 2025-05-27

## 2025-05-13 RX ORDER — TRAZODONE HCL 100 MG
25 TABLET ORAL AT BEDTIME
Refills: 0 | Status: DISCONTINUED | OUTPATIENT
Start: 2025-05-13 | End: 2025-05-15

## 2025-05-13 RX ORDER — ASPIRIN 325 MG
81 TABLET ORAL DAILY
Refills: 0 | Status: DISCONTINUED | OUTPATIENT
Start: 2025-05-13 | End: 2025-05-23

## 2025-05-13 RX ADMIN — Medication 25 MILLIGRAM(S): at 05:21

## 2025-05-13 RX ADMIN — FUROSEMIDE 40 MILLIGRAM(S): 10 INJECTION INTRAMUSCULAR; INTRAVENOUS at 11:15

## 2025-05-13 RX ADMIN — Medication 5 MILLIGRAM(S): at 21:08

## 2025-05-13 RX ADMIN — Medication 50 MILLIGRAM(S): at 15:22

## 2025-05-13 RX ADMIN — LABETALOL HYDROCHLORIDE 300 MILLIGRAM(S): 200 TABLET, FILM COATED ORAL at 15:22

## 2025-05-13 RX ADMIN — Medication 40 MILLIGRAM(S): at 05:21

## 2025-05-13 RX ADMIN — LABETALOL HYDROCHLORIDE 300 MILLIGRAM(S): 200 TABLET, FILM COATED ORAL at 21:09

## 2025-05-13 RX ADMIN — Medication 50 MILLIGRAM(S): at 21:08

## 2025-05-13 RX ADMIN — LABETALOL HYDROCHLORIDE 300 MILLIGRAM(S): 200 TABLET, FILM COATED ORAL at 05:20

## 2025-05-13 RX ADMIN — FUROSEMIDE 40 MILLIGRAM(S): 10 INJECTION INTRAMUSCULAR; INTRAVENOUS at 17:39

## 2025-05-13 RX ADMIN — Medication 25 MILLIGRAM(S): at 05:20

## 2025-05-13 RX ADMIN — Medication 25 MILLIGRAM(S): at 21:09

## 2025-05-13 RX ADMIN — Medication 120 MILLIGRAM(S): at 05:21

## 2025-05-13 RX ADMIN — ATORVASTATIN CALCIUM 80 MILLIGRAM(S): 80 TABLET, FILM COATED ORAL at 21:09

## 2025-05-13 RX ADMIN — Medication 1 APPLICATION(S): at 05:22

## 2025-05-13 RX ADMIN — Medication 325 MILLIGRAM(S): at 11:15

## 2025-05-13 RX ADMIN — BUMETANIDE 2 MILLIGRAM(S): 1 TABLET ORAL at 05:45

## 2025-05-13 RX ADMIN — Medication 40 MILLIGRAM(S): at 17:39

## 2025-05-13 NOTE — PROGRESS NOTE ADULT - SUBJECTIVE AND OBJECTIVE BOX
Gastroenterology Follow Up Note      Location: [address] 00[address] ([address])  Patient Name: FRANCISCO DUNLAP  Age: 39y  Gender: Female      Chief Complaint  Patient is a 39y old Female who presents with a chief complaint of Hypertensi (12 May 2025 14:24)  Primary diagnosis of Hypertensive emergency        Reason for Consult  Concern for enteritis/ischemia/TTP like picture from malignant HTN      Progress Note  This morning patient was seen and examined at bedside.    Today is hospital day 6d.  No acute events overnight.   She denies abdominal pain or nausea or vomiting.  She reports 2 black loose stools on 05/11 and 1 on 05/12.  She adamantly refused CHRISTINE on 05/12 and 05/13.        Vital Signs in the last 24 hours   Vital Signs Last 24 Hrs  T(C): 37.1 (13 May 2025 04:00), Max: 37.1 (13 May 2025 04:00)  T(F): 98.7 (13 May 2025 04:00), Max: 98.7 (13 May 2025 04:00)  HR: 120 (13 May 2025 14:00) (85 - 120)  BP: --  BP(mean): --  RR: 25 (13 May 2025 14:00) (17 - 62)  SpO2: 99% (13 May 2025 08:00) (96% - 99%)    Parameters below as of 13 May 2025 07:00  Patient On (Oxygen Delivery Method): room air        Physical Exam  * General Appearance: Alert, not cooperative, interactive, oriented to time, place, and person, in no acute distress  * Neck: Supple, symmetrical, trachea midline, no adenopathy   * Lungs: Good bilateral air entry, normal breath sounds  * Heart: Regular Rate and Rhythm, normal S1 and S2, no audible murmur, rub, or gallop  * Abdomen: Symmetric, mildly distended, soft, non-tender, no guarding or rebound tenderness, bowel sounds active all four quadrants  * Refused CHRISTINE on 05/12 and 05/13 as below      Investigations                          6.7    14.74 )-----------( 250      ( 13 May 2025 04:40 )             20.1     05-13    139  |  106  |  74[HH]  ----------------------------<  130[H]  4.1   |  22  |  3.4[H]    Ca    7.6[L]      13 May 2025 04:40  Phos  5.1     05-13  Mg     2.0     05-13    TPro  4.2[L]  /  Alb  2.6[L]  /  TBili  <0.2  /  DBili  x   /  AST  11  /  ALT  16  /  AlkPhos  70  05-13        LIVER FUNCTIONS - ( 13 May 2025 04:40 )  Alb: 2.6 g/dL / Pro: 4.2 g/dL / ALK PHOS: 70 U/L / ALT: 16 U/L / AST: 11 U/L / GGT: x               Urinalysis Basic - ( 13 May 2025 04:40 )    Color: x / Appearance: x / SG: x / pH: x  Gluc: 130 mg/dL / Ketone: x  / Bili: x / Urobili: x   Blood: x / Protein: x / Nitrite: x   Leuk Esterase: x / RBC: x / WBC x   Sq Epi: x / Non Sq Epi: x / Bacteria: x            Current Medications  Standing Medications  chlorhexidine 2% Cloths 1 Application(s) Topical <User Schedule>  cyanocobalamin 1000 MICROGram(s) Oral daily  dextrose 5%. 1000 milliLiter(s) (100 mL/Hr) IV Continuous <Continuous>  dextrose 5%. 1000 milliLiter(s) (50 mL/Hr) IV Continuous <Continuous>  dextrose 50% Injectable 25 Gram(s) IV Push once  dextrose 50% Injectable 12.5 Gram(s) IV Push once  dextrose 50% Injectable 25 Gram(s) IV Push once  folic acid 1 milliGRAM(s) Oral daily  hydrALAZINE 25 milliGRAM(s) Oral every 8 hours  insulin glargine Injectable (LANTUS) 6 Unit(s) SubCutaneous at bedtime  insulin lispro (ADMELOG) corrective regimen sliding scale   SubCutaneous three times a day before meals  insulin lispro (ADMELOG) corrective regimen sliding scale   SubCutaneous at bedtime  labetalol 300 milliGRAM(s) Oral three times a day  niCARdipine Infusion 5 mG/Hr (25 mL/Hr) IV Continuous <Continuous>  NIFEdipine  milliGRAM(s) Oral daily  pantoprazole    Tablet 40 milliGRAM(s) Oral every 12 hours    PRN Medications  calcium carbonate   1250 mG (OsCal) 1 Tablet(s) Oral every 6 hours PRN Heartburn  dextrose Oral Gel 15 Gram(s) Oral once PRN Blood Glucose LESS THAN 70 milliGRAM(s)/deciliter  melatonin 3 milliGRAM(s) Oral at bedtime PRN Insomnia    Singles Doses Administered  (ADM OVERRIDE) 1 each &lt;see task&gt; GiveOnce  (ADM OVERRIDE) 1 each &lt;see task&gt; GiveOnce  (ADM OVERRIDE) 2 each &lt;see task&gt; GiveOnce  (ADM OVERRIDE) 1 each &lt;see task&gt; GiveOnce  (ADM OVERRIDE) 1 each &lt;see task&gt; GiveOnce  (ADM OVERRIDE) 1 each &lt;see task&gt; GiveOnce  (ADM OVERRIDE) 1 each &lt;see task&gt; GiveOnce  acetaminophen   IVPB .. 1000 milliGRAM(s) IV Intermittent once  acetaminophen   IVPB .. 1000 milliGRAM(s) IV Intermittent once  calcium gluconate IVPB 2 Gram(s) IV Intermittent once  calcium gluconate IVPB 2 Gram(s) IV Intermittent once  calcium gluconate IVPB 2 Gram(s) IV Intermittent once  diphenhydrAMINE 50 milliGRAM(s) Oral once  diphenhydrAMINE Injectable 25 milliGRAM(s) IV Push once  diphenhydrAMINE Injectable 25 milliGRAM(s) IV Push once  diphenhydrAMINE Injectable 25 milliGRAM(s) IV Push once  hydrALAZINE Injectable 10 milliGRAM(s) IV Push once  hydrALAZINE Injectable 10 milliGRAM(s) IV Push once  labetalol Injectable 10 milliGRAM(s) IV Push once  labetalol Injectable 10 milliGRAM(s) IV Push once  lactated ringers Bolus 1000 milliLiter(s) IV Bolus once  methylPREDNISolone sodium succinate IVPB 1000 milliGRAM(s) IV Intermittent daily  metoprolol tartrate Injectable 10 milliGRAM(s) IV Push Once  morphine  - Injectable 4 milliGRAM(s) IV Push Once  ondansetron Injectable 4 milliGRAM(s) IV Push once  pantoprazole  Injectable 40 milliGRAM(s) IV Push Once  piperacillin/tazobactam IVPB... 3.375 Gram(s) IV Intermittent once  potassium chloride   Powder 40 milliEquivalent(s) Oral every 4 hours  potassium chloride  20 mEq/100 mL IVPB 20 milliEquivalent(s) IV Intermittent every 2 hours  potassium chloride  20 mEq/100 mL IVPB 20 milliEquivalent(s) IV Intermittent every 2 hours  QUEtiapine 25 milliGRAM(s) Oral once  sodium chloride 0.9% Bolus 1000 milliLiter(s) IV Bolus once

## 2025-05-13 NOTE — PROGRESS NOTE ADULT - ASSESSMENT
Assessment and Plan  Case of a 39 year old previously healthy female patient who presented to the ED on 05/06 for evaluation of generalized abdominal pain, nausea, vomiting, and black loose stools, found to have malignant HTN on arrival with evidence of leukocytosis/acute on chronic anemia/thrombocytopenia/elevated LDH and retic/low haptoglobin/schistocytes on smear/proteinuria/JOYCE on blood work and evidence of distal D/proximal J thickening with dilated jejunal loops to 3.4cm and gradual tapering distally wo SBO along with mural enhancement of SB concerning for severe enteritis VS early ischemia VS shock bowel. 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 She was also found to have age indeterminate lacunar infarct on CT 05/13. We are following for above findings.      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 (JOYCE with leukocytosis, thrombocytopenia, and acute on chronic anemia/ not TTP since RESTREPO -)   * Vitals: 195/85mmHg, HR 88 bpm, no fevers  * Labs: WBC 14.71, Hb 7, Plt 228, Na 136, K 4.2, BUN 79, Cr 3.3 on 05/12  * Hb trend: 9 on 05/06 -> 7.2/6.8 on 05/07 s/p 1 unit pRBC -> 6.8 on 05/08 s/p 1 unit -> 7.6 on 05/10 -> 6.9 on 05/11 s/p 1 unit -> 7 on 05/12 -> 6.7 on 05/13 s/p 1 unit pRBC  * Liver enzymes: 0.7/71/17/14 on 05/08  * Iron with Total Binding Capacity (05.07.25 @ 11:40)    Iron - Total Binding Capacity.: 166 ug/dL   % Saturation, Iron: 11 %   Iron Total: 18 ug/dL   Unsaturated Iron Binding Capacity: 148 ug/dL  * Elevated LDH and retic, low haptoglobin, smear with schistocytes; proteinuria on ua   * INR 0.9 on 05/07; not on AC  * Refused CHRISTINE on 05/12 and 05/13 despite extensive counseling  * CT Abdomen and Pelvis No Cont (05.07.25 @ 01:49) Edematous distal duodenum and proximal jejunal loops with associated dilatation measuring up to 3.4 cm. Associated marked interloop ascites, mesenteric fat stranding, and prominent mesenteric lymph nodes. Mild-to-moderate ascites. Findings concerning for small bowel ischemia.No portal venous gas, pneumatosis, or pneumoperitoneum.  * CT Angio Abdomen and Pelvis w/ IV Cont (05.07.25 @ 04:18) >Patent SMA, SMV. Redemonstration of distal duodenal and proximal small bowel with marked inflammatory change. Mural enhancement noted throughout the small bowel. Considerations include severe enteritis, early ischemia, shock bowel.  * No previous EGD or colonoscopy  * No FHx of GI cancers    RECOMMENDATIONS  - In the setting of reported black stools with acute on chronic iron deficiency anemia, the patient will ideally benefit from endoscopic evaluation but patient is currently refusing   - However, in the setting of patient refusal, uncontrolled BP (recent malignant HTN), concern for hemolysis (schistocytes/high LDH and retic/ low haptoglobin), and concern for TTP like picture per hematology/nephrology, will hold off endoscopic evaluation for now pending optimization (if patient becomes agreeable)   - Hematology and nephrology teams on board: concern for 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 s/p plasmapheresis from 05/08-05/09 and s/p IV solumedrol from 05/07-05/10  - Surgery team on board: no acute intervention; unlikely ischemia per clinical exam  - Trend LA; monitor MAP and keep > 65mmHg (avoid malignant HTN; on nicardipine drip; labetalol; nifedipine) -> management per primary team  - Recommend serial abdominal exams. Check daily KUBs. Monitor electrolytes and replete as indicated. Avoid CCBs, sedatives, anticholinergics; limit opioids. Encourage ambulation and incentive spirometry  - Monitor for pain: management per team  - Monitor for nausea: consider antiemetics PRN if QTc is within normal  - Monitor BM. Stool cx -, GI PCR - on 05/08. Check Clostridium difficile if diarrhea recurs  - Holding off Ab for concern for HUS. ID team is on board  - Continue Protonix 40mg BID for now; avoid inessential NSAIDs  - Trend CBC and transfuse as needed; keep active type and screen  - Decision to start blood thinners for concern of age indeterminate stroke should be made after discussing risks and benefits in setting of concern for black stools and worsening anemia   - Follow up with our [facility] for EGD/colonoscopy +- VCE (located at [address] [place] [address]. Phone Number: [phone])        Thank you for your consult.  - Please note that plan was communicated with medical team.   - Please reach GI on [phone] during weekdays till 5pm.  - Please call the GI service line after 5pm on Weekdays and anytime on Weekends: [phone].      Art Graham MD  PGY - 5 Gastroenterology Fellow   [facility]

## 2025-05-13 NOTE — PROGRESS NOTE ADULT - SUBJECTIVE AND OBJECTIVE BOX
Nephrology progress note    Patient is seen and examined, events over the last 24 h noted .  confused still BP up and down / fluctuating     Allergies:  No Known Allergies    Hospital Medications:   MEDICATIONS  (STANDING):  cyanocobalamin 1000 MICROGram(s) Oral daily  epoetin sergey-epbx (RETACRIT) Injectable 56692 Unit(s) SubCutaneous every 7 days  ferrous    sulfate 325 milliGRAM(s) Oral daily  folic acid 1 milliGRAM(s) Oral daily  furosemide   Injectable 40 milliGRAM(s) IV Push every 12 hours  hydrALAZINE 50 milliGRAM(s) Oral every 8 hours  insulin glargine Injectable (LANTUS) 6 Unit(s) SubCutaneous at bedtime  insulin lispro (ADMELOG) corrective regimen sliding scale   SubCutaneous three times a day before meals  insulin lispro (ADMELOG) corrective regimen sliding scale   SubCutaneous at bedtime  labetalol 300 milliGRAM(s) Oral three times a day  melatonin 5 milliGRAM(s) Oral at bedtime  niCARdipine Infusion 5 mG/Hr (25 mL/Hr) IV Continuous <Continuous>  NIFEdipine  milliGRAM(s) Oral daily  pantoprazole    Tablet 40 milliGRAM(s) Oral every 12 hours        VITALS:  T(F): 98.7 (05-13-25 @ 04:00), Max: 98.7 (05-13-25 @ 04:00)  HR: 95 (05-13-25 @ 09:00)  BP: 187/101 (05-12-25 @ 13:00)  RR: 24 (05-13-25 @ 09:00)  SpO2: 99% (05-13-25 @ 08:00)  Wt(kg): --    05-11 @ 07:01  -  05-12 @ 07:00  --------------------------------------------------------  IN: 1440 mL / OUT: 3200 mL / NET: -1760 mL    05-12 @ 07:01  -  05-13 @ 07:00  --------------------------------------------------------  IN: 557.5 mL / OUT: 2800 mL / NET: -2242.5 mL    05-13 @ 07:01  -  05-13 @ 10:15  --------------------------------------------------------  IN: 37.5 mL / OUT: 600 mL / NET: -562.5 mL          PHYSICAL EXAM:  Constitutional: NAD  Respiratory: CTAB,  Cardiovascular: S1, S2, RRR  Gastrointestinal: BS+, soft, NT/ND  Extremities: No cyanosis or clubbing. No peripheral edema  :  No wick.   Skin: No rashes    LABS:    05-13    139  |  106  |  74[HH]  ----------------------------<  130[H]  4.1   |  22  |  3.4[H]    Ca    7.6[L]      13 May 2025 04:40  Phos  5.1     05-13  Mg     2.0     05-13    TPro  4.2[L]  /  Alb  2.6[L]  /  TBili  <0.2  /  DBili      /  AST  11  /  ALT  16  /  AlkPhos  70  05-13                          6.7    14.74 )-----------( 250      ( 13 May 2025 04:40 )             20.1       Urine Studies:  Urinalysis Basic - ( 13 May 2025 04:40 )    Color:  / Appearance:  / SG:  / pH:   Gluc: 130 mg/dL / Ketone:   / Bili:  / Urobili:    Blood:  / Protein:  / Nitrite:    Leuk Esterase:  / RBC:  / WBC    Sq Epi:  / Non Sq Epi:  / Bacteria:       Creatinine, Random Urine: 78 mg/dL (05-08 @ 19:49)  Protein/Creatinine Ratio Calculation: 1.8 Ratio (05-08 @ 19:49)      Iron 18, TIBC 166, %sat 11      [05-07-25 @ 11:40]  Ferritin 362      [05-07-25 @ 11:40]  TSH 2.05      [05-07-25 @ 11:40]    HBsAg Nonreact      [05-07-25 @ 11:40]  HCV 0.13, Nonreact      [05-07-25 @ 11:40]  HIV Nonreact      [05-07-25 @ 11:40]    JOSE ANTONIO: titer Negative, pattern --      [05-07-25 @ 11:40]  dsDNA 2      [05-08-25 @ 19:20]  C3 Complement 106      [05-07-25 @ 11:40]  C4 Complement 22      [05-07-25 @ 11:40]  ANCA: cANCA Negative, pANCA Negative, atypical ANCA Negative      [05-07-25 @ 11:40]  anti-GBM <0.2      [05-07-25 @ 11:40]      RADIOLOGY & ADDITIONAL STUDIES:

## 2025-05-13 NOTE — CONSULT NOTE ADULT - ATTENDING COMMENTS
Patient was seen and examined with fellows during inpatient hematology oncology rounds.  Briefly, 39-year-old woman with past medical history of poorly controlled hypertension.  Presented with nausea vomiting as well as abdominal discomfort.  No altered mental status, no fever.      On presentation, hypertensive emergency with blood pressure in 220/110's.  Transferred to ICU.  Labs showed anemia, thrombocytopenia, renal impairment, no evidence of coagulopathy. Images concerning for bowel ischemia.  Peripheral smear review showed presence of significant number of schistocytes.  Elevated reticulocyte, LDH concerning for ongoing MAHA.  Likely due to hypertensive emergency.  However, plasmic score 5 intermediate, raises the possibility of TTP.      Monitor labs.  Send [place] 13, complement (C3, C4, CH50), Steve, Serial troponin, hep B, hep C, JOSE ANTONIO, Cardiolipin antibodies).  Start methylprednisolone IV daily for x 3 days.  Initiate plasma exchange ASAP daily (FFP transfusion if PEX is delayed).  Cardiology follow-up for management of hypertensive emergency.  Ultrasound Doppler, CT head without contrast. Hematology will follow.
Agree with the above.  Patient with acute onset abdominal pain of 5 days duration likely secondary to infectious enterocolitis, admitted with hypertensive emergency and JOYCE, found to have proteinuria and thrombocytopenia.  Abdominal pain resolved now.  Abdominal exam is benign with mild distended abdomen that is soft and nontender to palpation.  She is passing gas and bowel movements.    Given patient's history, infectious etiology is high on the differential with possible HUS / TTP.  Mesenteric ischemia is less likely in light of the absence of pain at the time of exam, lack of tenderness to palpation, and normal lactate after multiple measurements.     Follow-up blood cultures.  Recommend stopping antibiotics for now given possible HUS/TTP.   Check GI PCR and stool c diff if pt has diarrhea.   check lactate in am.   clear liquid diet for now and advance to soft diet in am as tolerated.   Nephrology and hem-onc recs appreciated.   Supportive care per primary team.   Rest of recommendations per the note above.      The note was created using a dictation tool and may contain errors or misinterpretations of spoken words. Please verify all information for accuracy.    Time-based billing (NON-critical care).   75 minutes spent on total encounter which excludes teaching time and/or separately reported services; more than 50% of the visit was spent counseling and / or coordinating care by the attending physician.  The necessity of the time spent during the encounter on this date of service was due to: Coordination of care.
Patient seen, case d/w CCU team on rounds.  Agree with assessment and plan.  Acutely ill patient with evidence of severe colitis, likely TTP, JOYCE.  Cardiology consult requested for treatment of severe HTN.  ECHO findings (normal LVEF and LVH) are consistent with long-standing HTN.  Would start Nifedipine and taper IV Nicardipine.  Add Carvedilol 6.25 mg bid.  No additional cardiac evaluation is needed at this time.
40 yo F w/ h/o HTN currently admitted for uncontrolled HTN with episodes of confusion/odd behavior today calm and back to baseline.  Spoke with pt in Mandarin and much more fluent and coherent in native language.  Gave detailed history of why she's in hospital and BP monitoring routine at home.  No siblings and no significant medical history or family history.  Has 8 yo son but no developmental delays or problems during pregnancy.  No neurologic complaints and notes BP tends to run high at home.  Denies any focal neurologic deficits.  Compliant with medications but only recently diagnosed with HTN.  Transient AMS possibly secondary to HTN encephalopathy with incidental HCT finding of stroke.  Recommend MRI brain/MRA H/N evaluate for underlying vascular disease.  Rest of recommendations as above.

## 2025-05-13 NOTE — CHART NOTE - NSCHARTNOTEFT_GEN_A_CORE
Patient was calling 911 repeatedly to come get her from the hospital, who told her to call the police. We called patient's mother who spoke with her and MAR spoke with her. Patient could not communicate understanding or reasoning into her condition and the risks of leaving. She lacks capacity and insight into her condition, not safe to leave AMA at this time. Police were called, team spoke with them and we explained the situation at hand so as not to respond. Patient was calling 911 repeatedly to come get her from the hospital, who told her to call the police. We called patient's mother who spoke with her and MAR spoke with her( same native language)Also spoke with the patients mom's . Patient could not communicate understanding or reasoning into her condition and the risks of leaving. She lacks capacity and insight into her condition, not safe to leave AMA at this time. Police were called, team spoke with them and we explained the situation at hand so as not to respond.

## 2025-05-13 NOTE — CONSULT NOTE ADULT - ASSESSMENT
39F w/ PMH of HTN who is admitted for hypertensive emergency complicated by severe anemia  for whom neurology is consulted for changes in mental status. Pt had a CTH which showed an age indeterminate lacunar infarct.     Recommendations 39F w/ PMH of HTN who is admitted for hypertensive emergency complicated by severe anemia  for whom neurology is consulted for changes in mental status. Pt had a CTH which showed an age indeterminate lacunar infarct likely an incidental finding as per MICU team no neurologic deficits on exam however this writer could not confirm as pt refused to participate in exam. Waxing and waning mental status could be due to hypertensive encephalopathy possibly.     Recommendations  - MRI brain non con and MRA head and neck for stroke workup  - if patient is amenable obtain routine EEG  - will follow up on patient at a later time to examine 39F w/ PMH of HTN who is admitted for hypertensive emergency complicated by severe anemia  for whom neurology is consulted for changes in mental status. Pt had a CTH which showed an age indeterminate lacunar infarct likely an incidental finding as per MICU team no neurologic deficits on exam however this writer could not confirm as pt refused to participate in exam. Waxing and waning mental status could be due to hypertensive encephalopathy possibly.     Recommendations  - MRI brain non con and MRA head and neck for stroke workup  - if patient is amenable obtain routine EEG  - will follow up on patient at a later time to examine as pt refusing to speak to any doctors at this time 39F w/ PMH of HTN who is admitted for hypertensive emergency complicated by severe anemia  for whom neurology is consulted for changes in mental status. Pt had a CTH which showed an age indeterminate lacunar infarct likely an incidental finding as per MICU team no neurologic deficits on exam however this writer could not confirm as pt refused to participate in exam. Waxing and waning mental status could be due to hypertensive encephalopathy possibly vs ICU delirium vs toxic metabolic encephalopathy.    Recommendations  - MRI brain non-con and MRA head and neck for stroke workup  - if patient is amenable obtain routine EEG  - will follow up on patient at a later time to examine as pt refusing to speak to any doctors at this time

## 2025-05-13 NOTE — CONSULT NOTE ADULT - CONSULT REASON
[FreeTextEntry1] : Painless Hematuria \par new problem, undiagnosed, uncertain prognosis\par in patient with hx of prostate malignancy, will need image studies \par xray kub to eval for stone \par US renal/bladder ordered\par CT Urogram ordered \par UA showing large blood, no evidence of infection at this time\par will culture urine to see if bacteria grows out\par ED precautions given, if noted albert hematuria that persists, he needs to go to ER. \par may require f/u with his urologist based on studies.\par \par f.u plan TBD based on studies\par Patient agrees with plan, all further questions answered during encounter.\par 
AMS
r/o small bowel ischemia
JOYCE
Bicytopenia
CT findings
HTN emergency
Enteritis

## 2025-05-13 NOTE — PROGRESS NOTE ADULT - ASSESSMENT
Early small bowel ischemia likely 2/2 severe enteritis, UGIB?  Malignant hypertension induced thrombocytopenia  Normocytic anemia  Hypertensive Emergency  JOYCE likely pre-renal         # PLAN:     CNS : Avoid CNS depressant.  Delirium Precautions awake follow follow command   -CT Head shows age indeterminant lacunar infarct   -neuro consulted, fu recs  -started on aspirin and stain     ENT : Oral Care     Pulmonary : Keep Spo2 > 94% .HOB elevation. Supplemental O2 prn.     Cardiology:  - porcardia 120, labetalol 200 Q8 hrs, added hydralazine 50 TID   - Can use nicardipine drip if persistently hypertensive      -SBP goal 160-180  -sent secondary htn workup (renin, aldosterone, cortisol, metanephrine, vma )    GI :   -dropping h/h hx possible melena,   -Hgb fluctuating, requiring transfusions but without obvious signs of bleeding  -s/p 1uprbc 5/13   PPI Q12 hrs, no bloody stools reported, tolerating PO diet     Renal :   -Cr mid 3s-4 this admission, unknown baseline  -Trend CMP. Monitor Lytes and replete as needed.   -RA U/S negative,   -C3/4 negative less likely lupus or HUS  -  -sent secondary htn workup (renin, aldosterone, cortisol, metanephrine, vma )  Hem/Onc :   -coagulation studies noted   -Hemolysis panel noted, LDH high, retic count increased, haptoglobin normal, Bili WNL  -suspected TTP early in admission, s/p Plasmapheresis, RESTREPO 13-  -s/p transfusion 5/13     Endo:  Blood glucose Goal : 140-180. SS insulin     MSK: Ambulate as tolerated     Code : Full code.     OFFICE NOTE    NAME OF THE PATIENT: Kallie Dale    YOB: 1965    CHIEF COMPLAINT:  The patient today came here for multiple medical issues.  Overall, she is okay.  ______ controlled.  She has polydipsia and polyuria.  She has some chest pain symptoms.  She came here for follow-up.    PAST MEDICAL HISTORY:  Reviewed on EMR, unchanged.    CURRENT MEDICATIONS:  Reviewed on EMR, unchanged.  List reviewed.    ALLERGIES:  Reviewed on EMR, unchanged.    SOCIAL HISTORY:  Reviewed on EMR, unchanged.  She does not smoke and does not drink alcohol.    FAMILY HISTORY:  Reviewed on EMR, unchanged.    REVIEW OF SYSTEMS:  All 12 systems reviewed and pertaining covered in history and physical.    PHYSICAL EXAMINATION  VITAL SIGNS:  As recorded and reviewed from EMR.  RESPIRATORY:  The patient had normal inspirations and expirations.  The breath sounds were equal bilaterally and clear to auscultation.  CARDIOVASCULAR:  The patient had S1 normal, split S2 without obvious rubs, clicks, or murmurs.    GASTROINTESTINAL:  There was no hepatosplenomegaly.  There were no palpable masses and no inguinal nodes.  EXTREMITIES:  Legs had no edema.  NEUROLOGIC:  The patient had normal cranial nerves.  The reflexes, sensory, and motor examination were grossly within normal limits.    LAB WORK:  Laboratory testing discussed.    ASSESSMENT AND PLAN:  Cardiac.  EKG done ______ stress test.  Pre-diabetic.  We will check hemoglobin A1c.  Weight loss.  Ozempic was working for her and somehow it was stopped.  We will restart again.  Herpes simplex infection.  I told her antibody is positive and she definitely has an infection, which was treated in that area.  She is responded to the treatment.  Every now and then it flares up.  Follow-up appointment with me in a week after test.      Kindly review this note in conjunction with EMR.   Subjective   Patient ID: Kallie Dale is a 57 y.o. female who presents for No chief complaint on  "file..      HPI    Review of Systems    Objective   /72   Ht 1.626 m (5' 4\")   Wt 91.6 kg (202 lb)   BMI 34.67 kg/m²       Physical Exam    Assessment/Plan   Problem List Items Addressed This Visit    None        "   Early small bowel ischemia likely 2/2 severe enteritis, UGIB?  Malignant hypertension induced thrombocytopenia  Normocytic anemia  Hypertensive Emergency  JOYCE likely pre-renal         # PLAN:     CNS : Avoid CNS depressant.  Delirium Precautions awake follow follow command   -CT Head shows age indeterminant lacunar infarct   -neuro consulted, recommended MRI and MRA   -started on aspirin and stain     ENT : Oral Care     Pulmonary : Keep Spo2 > 94% .HOB elevation. Supplemental O2 prn.     Cardiology:  - porcardia 120, labetalol 200 Q8 hrs, added hydralazine 50 TID   - Can use nicardipine drip if persistently hypertensive      -SBP goal 160-180  -sent secondary htn workup (renin, aldosterone, cortisol, metanephrine, vma )    GI :   -dropping h/h hx possible melena,   -Hgb fluctuating, requiring transfusions but without obvious signs of bleeding  -s/p 1uprbc 5/13   PPI Q12 hrs, no bloody stools reported, tolerating PO diet     Renal :   -Cr mid 3s-4 this admission, unknown baseline  -Trend CMP. Monitor Lytes and replete as needed.   -RA U/S negative,   -C3/4 negative less likely lupus or HUS  -sent secondary htn workup (renin, aldosterone, cortisol, metanephrine, vma )    Hem/Onc :   -coagulation studies noted   -Hemolysis panel noted, LDH high, retic count increased, haptoglobin normal, Bili WNL  -suspected TTP early in admission, s/p Plasmapheresis, RESTREPO 13-  -s/p transfusion 5/13     Psych:   -patient evaluated by psychiatry. As per mom was fully functional 3 months ago and then started to have decline (reported staring spells, laughing by herself and watching     Endo:  Blood glucose Goal : 140-180. SS insulin     MSK: Ambulate as tolerated     Code : Full code.

## 2025-05-13 NOTE — PROGRESS NOTE ADULT - ASSESSMENT
39-year-old female with history of hypertension presenting to ER with 5 days of multiple episodes of nonbloody nonbilious vomiting and diarrhea with associated generalized abdominal pain and distention    # HTN   # JOYCE rule out ATN   # proteinuria / hematuria   # thrombocytopenia   - picture above can be due to malignant HTN  / ADAMTS 13 not low / however patient received steroids and on plasmapheresis ? timing of blood test   - normal C3 ( not in favor of a HUS)  nl C4 normal JOSE ANTONIO which rule out active lupus  - hem onc appreciated s/p steroids , s/p  plasmapheresis , now d/tessie  - hb noted can start retacrit / transfuse to Hb >7  - creat trending down    - 1.8 g protein on UPCR will need repeat   - ph at goal / no binders / check i calcium / vit D/ PTH   -  GN w/up negative   - follow bp readings / added hydralazine and lasix today keep on nifedipine / check renin and shanelle/ serum metanephrines / doubt secondary HTN   - renal artery dupplex no FOUZIA   renal team will follow

## 2025-05-13 NOTE — CONSULT NOTE ADULT - CONSULT REQUESTED DATE/TIME
07-May-2025 09:43
08-May-2025 12:26
07-May-2025 08:58
13-May-2025 14:46
07-May-2025 10:31
07-May-2025 03:20
08-May-2025 07:47

## 2025-05-13 NOTE — PROGRESS NOTE ADULT - SUBJECTIVE AND OBJECTIVE BOX
[facility]      SUBJECTIVE/OVERNIGHT EVENTS  Today is hospital day 6d. Patient was seen and examined today at bedside. No acute events overnight. Patient again agitated overnight,  called 911 wanting to leave AMA. Patient has poor insight regarding her medical condition and lacks capacity to make decisions       MEDICATIONS  STANDING MEDICATIONS  aspirin  chewable 81 milliGRAM(s) Oral daily  atorvastatin 80 milliGRAM(s) Oral at bedtime  chlorhexidine 2% Cloths 1 Application(s) Topical <User Schedule>  cyanocobalamin 1000 MICROGram(s) Oral daily  dextrose 5%. 1000 milliLiter(s) IV Continuous <Continuous>  dextrose 5%. 1000 milliLiter(s) IV Continuous <Continuous>  dextrose 50% Injectable 25 Gram(s) IV Push once  dextrose 50% Injectable 12.5 Gram(s) IV Push once  dextrose 50% Injectable 25 Gram(s) IV Push once  epoetin sergey-epbx (RETACRIT) Injectable 17363 Unit(s) SubCutaneous every 7 days  ferrous    sulfate 325 milliGRAM(s) Oral daily  folic acid 1 milliGRAM(s) Oral daily  furosemide   Injectable 40 milliGRAM(s) IV Push every 12 hours  hydrALAZINE 50 milliGRAM(s) Oral every 8 hours  insulin glargine Injectable (LANTUS) 6 Unit(s) SubCutaneous at bedtime  insulin lispro (ADMELOG) corrective regimen sliding scale   SubCutaneous three times a day before meals  insulin lispro (ADMELOG) corrective regimen sliding scale   SubCutaneous at bedtime  labetalol 300 milliGRAM(s) Oral three times a day  melatonin 5 milliGRAM(s) Oral at bedtime  niCARdipine Infusion 5 mG/Hr IV Continuous <Continuous>  NIFEdipine  milliGRAM(s) Oral daily  pantoprazole    Tablet 40 milliGRAM(s) Oral every 12 hours    PRN MEDICATIONS  calcium carbonate   1250 mG (OsCal) 1 Tablet(s) Oral every 6 hours PRN  dextrose Oral Gel 15 Gram(s) Oral once PRN    VITALS  T(F): 98.7 (05-13-25 @ 04:00), Max: 98.7 (05-13-25 @ 04:00)  HR: 97 (05-13-25 @ 10:00) (85 - 117)  BP: --  RR: 26 (05-13-25 @ 10:00) (17 - 62)  SpO2: 99% (05-13-25 @ 08:00) (96% - 99%)  POCT Blood Glucose.: 130 mg/dL (05-12-25 @ 21:05)  POCT Blood Glucose.: 134 mg/dL (05-12-25 @ 17:42)        PHYSICAL EXAM:  GENERAL: NAD, lying in bed comfortably  HEAD:  Atraumatic, Normocephalic  EYES: EOMI, PERRLA, conjunctiva and sclera clear  ENT: Moist mucous membranes  NECK: Supple, No JVD  CHEST/LUNG: Clear to auscultation bilaterally; No rales, rhonchi, wheezing, or rubs. Unlabored respirations  HEART: Regular rate and rhythm; No murmurs, rubs, or gallops  ABDOMEN: Bowel sounds present; Soft, Nontender, Nondistended. No hepatomegaly  EXTREMITIES:  2+ Peripheral Pulses, brisk capillary refill. No clubbing, cyanosis, or edema  NERVOUS SYSTEM:  Alert & Oriented X3, speech clear. No deficits   MSK: FROM all 4 extremities, full and equal strength  SKIN: No rashes or lesions      LABS             6.7    14.74 )-----------( 250      ( 05-13-25 @ 04:40 )             20.1     139  |  106  |  74  -------------------------<  130   05-13-25 @ 04:40  4.1  |  22  |  3.4    Ca      7.6     05-13-25 @ 04:40  Phos   5.1     05-13-25 @ 04:40  Mg     2.0     05-13-25 @ 04:40    TPro  4.2  /  Alb  2.6  /  TBili  <0.2  /  DBili  x   /  AST  11  /  ALT  16  /  AlkPhos  70  /  GGT  x     05-13-25 @ 04:40        Urinalysis Basic - ( 13 May 2025 04:40 )    Color: x / Appearance: x / SG: x / pH: x  Gluc: 130 mg/dL / Ketone: x  / Bili: x / Urobili: x   Blood: x / Protein: x / Nitrite: x   Leuk Esterase: x / RBC: x / WBC x   Sq Epi: x / Non Sq Epi: x / Bacteria: x          [facility]      SUBJECTIVE/OVERNIGHT EVENTS  Today is hospital day 6d. Patient was seen and examined today at bedside. No acute events overnight. Patient again agitated overnight,  called 911 wanting to leave AMA. Patient has poor insight regarding her medical condition and lacks capacity to make decisions. Patient amendable to treatment and testing once she speaks to mother.       MEDICATIONS  STANDING MEDICATIONS  aspirin  chewable 81 milliGRAM(s) Oral daily  atorvastatin 80 milliGRAM(s) Oral at bedtime  chlorhexidine 2% Cloths 1 Application(s) Topical <User Schedule>  cyanocobalamin 1000 MICROGram(s) Oral daily  dextrose 5%. 1000 milliLiter(s) IV Continuous <Continuous>  dextrose 5%. 1000 milliLiter(s) IV Continuous <Continuous>  dextrose 50% Injectable 25 Gram(s) IV Push once  dextrose 50% Injectable 12.5 Gram(s) IV Push once  dextrose 50% Injectable 25 Gram(s) IV Push once  epoetin sergey-epbx (RETACRIT) Injectable 09484 Unit(s) SubCutaneous every 7 days  ferrous    sulfate 325 milliGRAM(s) Oral daily  folic acid 1 milliGRAM(s) Oral daily  furosemide   Injectable 40 milliGRAM(s) IV Push every 12 hours  hydrALAZINE 50 milliGRAM(s) Oral every 8 hours  insulin glargine Injectable (LANTUS) 6 Unit(s) SubCutaneous at bedtime  insulin lispro (ADMELOG) corrective regimen sliding scale   SubCutaneous three times a day before meals  insulin lispro (ADMELOG) corrective regimen sliding scale   SubCutaneous at bedtime  labetalol 300 milliGRAM(s) Oral three times a day  melatonin 5 milliGRAM(s) Oral at bedtime  niCARdipine Infusion 5 mG/Hr IV Continuous <Continuous>  NIFEdipine  milliGRAM(s) Oral daily  pantoprazole    Tablet 40 milliGRAM(s) Oral every 12 hours    PRN MEDICATIONS  calcium carbonate   1250 mG (OsCal) 1 Tablet(s) Oral every 6 hours PRN  dextrose Oral Gel 15 Gram(s) Oral once PRN    VITALS  T(F): 98.7 (05-13-25 @ 04:00), Max: 98.7 (05-13-25 @ 04:00)  HR: 97 (05-13-25 @ 10:00) (85 - 117)  BP: --  RR: 26 (05-13-25 @ 10:00) (17 - 62)  SpO2: 99% (05-13-25 @ 08:00) (96% - 99%)  POCT Blood Glucose.: 130 mg/dL (05-12-25 @ 21:05)  POCT Blood Glucose.: 134 mg/dL (05-12-25 @ 17:42)        PHYSICAL EXAM:  GENERAL: NAD, lying in bed comfortably  HEAD:  Atraumatic, Normocephalic  EYES: EOMI, PERRLA, conjunctiva and sclera clear  ENT: Moist mucous membranes  NECK: Supple, No JVD  CHEST/LUNG: Clear to auscultation bilaterally; No rales, rhonchi, wheezing, or rubs. Unlabored respirations  HEART: Regular rate and rhythm; No murmurs, rubs, or gallops  ABDOMEN: Bowel sounds present; Soft, Nontender, Nondistended. No hepatomegaly  EXTREMITIES:  2+ Peripheral Pulses, brisk capillary refill. No clubbing, cyanosis, or edema  NERVOUS SYSTEM:  Alert & Oriented X3, speech clear. No deficits   MSK: FROM all 4 extremities, full and equal strength  SKIN: No rashes or lesions      LABS             6.7    14.74 )-----------( 250      ( 05-13-25 @ 04:40 )             20.1     139  |  106  |  74  -------------------------<  130   05-13-25 @ 04:40  4.1  |  22  |  3.4    Ca      7.6     05-13-25 @ 04:40  Phos   5.1     05-13-25 @ 04:40  Mg     2.0     05-13-25 @ 04:40    TPro  4.2  /  Alb  2.6  /  TBili  <0.2  /  DBili  x   /  AST  11  /  ALT  16  /  AlkPhos  70  /  GGT  x     05-13-25 @ 04:40        Urinalysis Basic - ( 13 May 2025 04:40 )    Color: x / Appearance: x / SG: x / pH: x  Gluc: 130 mg/dL / Ketone: x  / Bili: x / Urobili: x   Blood: x / Protein: x / Nitrite: x   Leuk Esterase: x / RBC: x / WBC x   Sq Epi: x / Non Sq Epi: x / Bacteria: x          IMAGING

## 2025-05-13 NOTE — CONSULT NOTE ADULT - SUBJECTIVE AND OBJECTIVE BOX
NEUROLOGY CONSULT    HPI: 39F w/ PMH of HTN who is admitted for hypertensive emergency complicated by severe anemia for whom neurology is consulted for changes in mental status. Per primary team patient has periods of time where she becomes altered and agitated refusing all medical interventions and displays regressive childlike behavior. 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 Per MICU team there is no pattern to these episodes where they are occurring at a certain time of the day. When this writer approached the patient to obtain history patient refused to speak saying she does not feel up to speaking to any doctors at this time and seemed upset.          MEDICATIONS  Home Medications:  Norvasc 5 mg oral tablet: 1 tab(s) orally once a day (12 May 2025 11:21)    MEDICATIONS  (STANDING):  aspirin  chewable 81 milliGRAM(s) Oral daily  atorvastatin 80 milliGRAM(s) Oral at bedtime  chlorhexidine 2% Cloths 1 Application(s) Topical <User Schedule>  cyanocobalamin 1000 MICROGram(s) Oral daily  dextrose 5%. 1000 milliLiter(s) (100 mL/Hr) IV Continuous <Continuous>  dextrose 5%. 1000 milliLiter(s) (50 mL/Hr) IV Continuous <Continuous>  dextrose 50% Injectable 25 Gram(s) IV Push once  dextrose 50% Injectable 12.5 Gram(s) IV Push once  dextrose 50% Injectable 25 Gram(s) IV Push once  epoetin sergey-epbx (RETACRIT) Injectable 65184 Unit(s) SubCutaneous every 7 days  ferrous    sulfate 325 milliGRAM(s) Oral daily  folic acid 1 milliGRAM(s) Oral daily  furosemide   Injectable 40 milliGRAM(s) IV Push every 12 hours  hydrALAZINE 50 milliGRAM(s) Oral every 8 hours  insulin glargine Injectable (LANTUS) 6 Unit(s) SubCutaneous at bedtime  insulin lispro (ADMELOG) corrective regimen sliding scale   SubCutaneous three times a day before meals  insulin lispro (ADMELOG) corrective regimen sliding scale   SubCutaneous at bedtime  labetalol 300 milliGRAM(s) Oral three times a day  melatonin 5 milliGRAM(s) Oral at bedtime  niCARdipine Infusion 5 mG/Hr (25 mL/Hr) IV Continuous <Continuous>  NIFEdipine  milliGRAM(s) Oral daily  pantoprazole    Tablet 40 milliGRAM(s) Oral every 12 hours    MEDICATIONS  (PRN):  calcium carbonate   1250 mG (OsCal) 1 Tablet(s) Oral every 6 hours PRN Heartburn  dextrose Oral Gel 15 Gram(s) Oral once PRN Blood Glucose LESS THAN 70 milliGRAM(s)/deciliter      FAMILY HISTORY:    SOCIAL HISTORY: negative for tobacco, alcohol, or ilicit drug use.    Allergies    No Known Allergies    Intolerances        Neurological Examination:  General:  Appearance is consistent with chronologic age.   Cognitive/Language:  Awake, alert, sitting comfortably in bed. no dysarthria, speech fluent  Cranial Nerves  - Eyes: pt refusing to participate in examination  - Ears/Nose/Throat:  Hearing grossly intact  Motor exam: Normal tone and bulk. No tenderness, twitching, tremors or involuntary movements. patient moving b/l UE antigravity not willing to participate in any examination at this time    LABS:                        6.7    14.74 )-----------( 250      ( 13 May 2025 04:40 )             20.1     05-13    139  |  106  |  74[HH]  ----------------------------<  130[H]  4.1   |  22  |  3.4[H]    Ca    7.6[L]      13 May 2025 04:40  Phos  5.1     05-13  Mg     2.0     05-13    TPro  4.2[L]  /  Alb  2.6[L]  /  TBili  <0.2  /  DBili  x   /  AST  11  /  ALT  16  /  AlkPhos  70  05-13    Hemoglobin A1C:   Vitamin B12     CAPILLARY BLOOD GLUCOSE      POCT Blood Glucose.: 130 mg/dL (12 May 2025 21:05)      Urinalysis Basic - ( 13 May 2025 04:40 )    Color: x / Appearance: x / SG: x / pH: x  Gluc: 130 mg/dL / Ketone: x  / Bili: x / Urobili: x   Blood: x / Protein: x / Nitrite: x   Leuk Esterase: x / RBC: x / WBC x   Sq Epi: x / Non Sq Epi: x / Bacteria: x            Microbiology:      RADIOLOGY, EKG AND ADDITIONAL TESTS: Reviewed.           NEUROLOGY CONSULT    HPI: 39F w/ PMH of HTN who is admitted for hypertensive emergency complicated by severe anemia for whom neurology is consulted for changes in mental status. Per primary team patient has periods of time where she becomes altered and agitated refusing all medical interventions and displays regressive childlike behavior. 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 Per MICU team there is no pattern to these episodes where they are occurring at a certain time of the day. When this writer approached the patient to obtain history patient refused to speak saying she does not feel up to speaking to any doctors at this time and seemed upset.          MEDICATIONS  Home Medications:  Norvasc 5 mg oral tablet: 1 tab(s) orally once a day (12 May 2025 11:21)    MEDICATIONS  (STANDING):  aspirin  chewable 81 milliGRAM(s) Oral daily  atorvastatin 80 milliGRAM(s) Oral at bedtime  chlorhexidine 2% Cloths 1 Application(s) Topical <User Schedule>  cyanocobalamin 1000 MICROGram(s) Oral daily  dextrose 5%. 1000 milliLiter(s) (100 mL/Hr) IV Continuous <Continuous>  dextrose 5%. 1000 milliLiter(s) (50 mL/Hr) IV Continuous <Continuous>  dextrose 50% Injectable 25 Gram(s) IV Push once  dextrose 50% Injectable 12.5 Gram(s) IV Push once  dextrose 50% Injectable 25 Gram(s) IV Push once  epoetin sergey-epbx (RETACRIT) Injectable 50083 Unit(s) SubCutaneous every 7 days  ferrous    sulfate 325 milliGRAM(s) Oral daily  folic acid 1 milliGRAM(s) Oral daily  furosemide   Injectable 40 milliGRAM(s) IV Push every 12 hours  hydrALAZINE 50 milliGRAM(s) Oral every 8 hours  insulin glargine Injectable (LANTUS) 6 Unit(s) SubCutaneous at bedtime  insulin lispro (ADMELOG) corrective regimen sliding scale   SubCutaneous three times a day before meals  insulin lispro (ADMELOG) corrective regimen sliding scale   SubCutaneous at bedtime  labetalol 300 milliGRAM(s) Oral three times a day  melatonin 5 milliGRAM(s) Oral at bedtime  niCARdipine Infusion 5 mG/Hr (25 mL/Hr) IV Continuous <Continuous>  NIFEdipine  milliGRAM(s) Oral daily  pantoprazole    Tablet 40 milliGRAM(s) Oral every 12 hours    MEDICATIONS  (PRN):  calcium carbonate   1250 mG (OsCal) 1 Tablet(s) Oral every 6 hours PRN Heartburn  dextrose Oral Gel 15 Gram(s) Oral once PRN Blood Glucose LESS THAN 70 milliGRAM(s)/deciliter      FAMILY HISTORY:    SOCIAL HISTORY: negative for tobacco, alcohol, or ilicit drug use.    Allergies    No Known Allergies    Intolerances        Neurological Examination:  General:  Appearance is consistent with chronologic age.   Cognitive/Language:  Awake, alert, sitting comfortably in bed. no dysarthria, speech fluent, able to verbalize that she did not want to speak to any doctors and refused examination  Cranial Nerves  - Eyes: pt refusing to participate in examination  - Ears/Nose/Throat:  Hearing grossly intact  Motor exam: Normal tone and bulk. No tenderness, twitching, tremors or involuntary movements. patient moving b/l UE antigravity not willing to participate in any examination at this time    LABS:                        6.7    14.74 )-----------( 250      ( 13 May 2025 04:40 )             20.1     05-13    139  |  106  |  74[HH]  ----------------------------<  130[H]  4.1   |  22  |  3.4[H]    Ca    7.6[L]      13 May 2025 04:40  Phos  5.1     05-13  Mg     2.0     05-13    TPro  4.2[L]  /  Alb  2.6[L]  /  TBili  <0.2  /  DBili  x   /  AST  11  /  ALT  16  /  AlkPhos  70  05-13    Hemoglobin A1C:   Vitamin B12     CAPILLARY BLOOD GLUCOSE      POCT Blood Glucose.: 130 mg/dL (12 May 2025 21:05)      Urinalysis Basic - ( 13 May 2025 04:40 )    Color: x / Appearance: x / SG: x / pH: x  Gluc: 130 mg/dL / Ketone: x  / Bili: x / Urobili: x   Blood: x / Protein: x / Nitrite: x   Leuk Esterase: x / RBC: x / WBC x   Sq Epi: x / Non Sq Epi: x / Bacteria: x            Microbiology:      RADIOLOGY, EKG AND ADDITIONAL TESTS: Reviewed.

## 2025-05-13 NOTE — BH CONSULTATION LIAISON PROGRESS NOTE - NSBHFUPINTERVALHXFT_PSY_A_CORE
39-year-old female with history of hypertension presenting to ER with 5 days of multiple episodes of nonbloody nonbilious vomiting and diarrhea with associated generalized abdominal pain and distention. Patient states that on Saturday she had acute onset of nausea, vomiting, diarrhea and crampy abdominal pain. She has had 4-5 episodes of non-bloody, non-bilious vomiting per day and 4-5 episodes of black diarrhea per day. She initially thought that she had gastroenteritis, but when her symptoms didn't resolve, she decided come to the ED on 5/6. She denies any current abdominal pain, nausea or vomiting, fever, chest pain, shortness of breath, clotting disorder, past intra-abdominal surgeries, kidney issues, leg pain or swelling. Psychiatry was consulted about depression. Psychiatry was reconsulted due to concern for psychosis.     On interview, patient would frequently switch between Mandarin, Cantonese, and English. She claimed to be speaking mandarin or English although was actually speaking in Cantonese. Patient's mother was at bedside. Briefly inquired on whether patient understands the reason she is in the hospital--she superficially understands that she is still here for hypertension, but does not seem to understand the extent of her current medical treatment (per medical team requiring several agents to maintain normal blood pressure including IV). Patient's mother at bedside also did not seem to understand and was requesting that psychiatry see her to help her with anxiety. Patient seemed annoyed to talk to psychiatry--mentioning she did not need any psychiatric help.    Spoke to patient's mother's Luz with  [phone] to clarify course of illness:  -She was diagnosed with depression around 2015 at ~ age 30. She went back to [place] and started treatment for depression with some improvement. She got  in 2017 and had a child, but was  ~2023. Patient's mother was worried Louise became more depressed and withdrawn.  -Patient has completed undergrad. 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   -Patient does not have history of suicide attempts, there is not family history of schizophrenia or bipolar disorder or suicide. No history of substance abuse.  -A few months ago--patient's mother noticed patient behaving oddly such as staring at a TV and laughing inappropriately. Patient's behavior was questioned and responded that she was thinking about something funny.  -Mom has not noticed any hallucinations or delusions. She has not noticed behaviors that suggest patient is planning to intentional hurt self or others.

## 2025-05-13 NOTE — PROGRESS NOTE ADULT - ASSESSMENT
IMPRESSION    Malignant hypertension induced thrombocytopenia   Hypertensive Emergency  Likely UGIB on presentation  Anemia - unknown etiology  Early small bowel ischemia likely 2/2 severe enteritis   SIRS/Sepsis   Uncomplicated Cystitis   JOYCE likely pre-renal   RSV infection     PLAN:     CNS : Avoid CNS depressant.  Delirium Precautions awake follow follow command. CT head.  Patient is refusing workup.   Neuro psych eval for behavior changes and capacity    ENT : Oral Care     Pulmonary : Keep Spo2 > 94% .HOB elevation. Supplemental O2 prn.     Cardiology: porcardia 120 XL   labetalol 300 Q8 hrs    Hydralazine 50 mg PO tid  Lasix 40 bid  nicardipine ggt PRN.      GI : follow GI dropping h/h hx possible melena   follow Gi and G sx   CBC bid  PPI Q24  Keep T & S active, CBC q12, 18 G IV x2, Keep Hb > 7    Renal : Trend CMP. Monitor Lytes and replete as needed. Nephro  follow up   RA duplex negative  FU work up for secondary hypertension,     Hem/Onc : Coagulation studies noted.   steroids d/c off plasma   RESTREPO 13  negative   s/p plasmapheresis stopped   Monitor CBC  Repeat hemolysis panel negative  LE duplex - pt refused    Endo:  Blood glucose Goal : 140-180. insulin drip for now     MSK: Ambulate as tolerated     Code : Full code.      Disp:  MICU today

## 2025-05-13 NOTE — CHART NOTE - NSCHARTNOTEFT_GEN_A_CORE
Registered Dietitian Follow-Up     Patient Profile Reviewed                           Yes [x]   No []     Nutrition History Previously Obtained        Yes [x]  No []       Pertinent Subjective Information:  RD spoke with RN - patient with good appetite and PO intake. Consuming >75% noted to have consumed eggs and fruit at breakfast.     Pertinent Medical Interventions:  Early small bowel ischemia likely 2/2 severe enteritis, UGIB?  Malignant hypertension induced thrombocytopenia  Normocytic anemia  Hypertensive Emergency  JOYCE likely pre-renal     -dropping h/h hx possible melena,   -Hgb fluctuating, requiring transfusions but without obvious signs of bleeding  -s/p 1uprbc    PPI Q12 hrs, no bloody stools reported, tolerating PO diet     -patient evaluated by psychiatry. As per mom was fully functional 3 months ago and then started to have decline (reported staring spells, laughing by herself and watching      Diet order:   Diet, Regular:   Consistent Carbohydrate {Evening Snack} (CSTCHOSN)  DASH/TLC {Sodium & Cholesterol Restricted} (DASH) (25 @ 07:45) [Active]    Anthropometrics:  Height (cm): 154.9 (25 @ 06:30)  Weight (kg): 65.8 (25 @ 06:30)  BMI (kg/m2): 27.4 (25 @ 06:30)  IBW: 47.7 kg     Daily Weight in k.3 (), Weight in k.8 (), Weight in k.6 (), Weight in k.8 ()  65.8 kg () vs 75.3 kg ()  indicating 14.4% weight gain x 6 days - likely related to 4+ edema    MEDICATIONS  (STANDING):  aspirin  chewable 81 milliGRAM(s) Oral daily  atorvastatin 80 milliGRAM(s) Oral at bedtime  chlorhexidine 2% Cloths 1 Application(s) Topical <User Schedule>  cyanocobalamin 1000 MICROGram(s) Oral daily  dextrose 5%. 1000 milliLiter(s) (100 mL/Hr) IV Continuous <Continuous>  dextrose 5%. 1000 milliLiter(s) (50 mL/Hr) IV Continuous <Continuous>  dextrose 50% Injectable 25 Gram(s) IV Push once  dextrose 50% Injectable 12.5 Gram(s) IV Push once  dextrose 50% Injectable 25 Gram(s) IV Push once  epoetin sergey-epbx (RETACRIT) Injectable 67341 Unit(s) SubCutaneous every 7 days  ferrous    sulfate 325 milliGRAM(s) Oral daily  folic acid 1 milliGRAM(s) Oral daily  furosemide   Injectable 40 milliGRAM(s) IV Push every 12 hours  hydrALAZINE 50 milliGRAM(s) Oral every 8 hours  insulin glargine Injectable (LANTUS) 6 Unit(s) SubCutaneous at bedtime  insulin lispro (ADMELOG) corrective regimen sliding scale   SubCutaneous three times a day before meals  insulin lispro (ADMELOG) corrective regimen sliding scale   SubCutaneous at bedtime  labetalol 300 milliGRAM(s) Oral three times a day  melatonin 5 milliGRAM(s) Oral at bedtime  niCARdipine Infusion 5 mG/Hr (25 mL/Hr) IV Continuous <Continuous>  NIFEdipine  milliGRAM(s) Oral daily  pantoprazole    Tablet 40 milliGRAM(s) Oral every 12 hours  traZODone 25 milliGRAM(s) Oral at bedtime    MEDICATIONS  (PRN):  calcium carbonate   1250 mG (OsCal) 1 Tablet(s) Oral every 6 hours PRN Heartburn  dextrose Oral Gel 15 Gram(s) Oral once PRN Blood Glucose LESS THAN 70 milliGRAM(s)/deciliter    Pertinent Labs:  @ 04:40: Na 139, BUN 74[HH], Cr 3.4[H], [H], K+ 4.1, Phos 5.1[H], Mg 2.0, Alk Phos 70, ALT/SGPT 16, AST/SGOT 11, HbA1c --    Finger Sticks:  POCT Blood Glucose.: 130 mg/dL ( @ 21:05)    Physical Findings:  - Appearance: AAOx4  - GI function: last BM   - Tubes: n/a - no feeding tubes  - Oral/Mouth cavity: regular texture, thin liquids  - Skin: no pressure injuries documented  - Edema: 4+ edema - left and right foot, left and right leg      Nutrition Requirements:  Weight Used: 65.8 kg - estimated needs with consideration for age, BMI, acuity of illness, critical care setting     Estimated Energy Needs    Continue [x]  Adjust []  1316 - 1645 kcal/day (20 -25 kcal/kg)      Estimated Protein Needs    Continue [x]  Adjust []  78 - 92 gm/day (1.2 - 1.4 gm/kg)      Estimated Fluid Needs        Continue [x]  Adjust []  1 mL/kcal     Nutrient Intake:  Good PO intake - consuming >75% of meals     [x] Previous Nutrition Diagnosis:  Increased Nutrient Needs            [x] Ongoing          [] Resolved    Nutrition Education:  deferred     Nutrition Intervention:  meals and snacks, coordination of care     Goal/Expected Outcome:   Patient to tolerate diet and maintain PO intake >75% of meals and snacks in 5-7 days     Indicator/Monitoring:   energy intake, weight, labs, skin status, NFPF, BM, GI s/s     Recommendation:   1) Continue current diet order  2) Monitor PO intake  3) Monitor electrolytes, BG, renal profile  4) Monitor BM, GI s/s     Patient is at high nutrition risk, RD to f/u  Christina Ley RD [phone] or via Teams Registered Dietitian Follow-Up     Patient Profile Reviewed                           Yes [x]   No []     Nutrition History Previously Obtained        Yes [x]  No []       Pertinent Subjective Information:  RD spoke with RN - patient with good appetite and PO intake. Consuming >75% noted to have consumed eggs and fruit at breakfast.     Pertinent Medical Interventions:  Early small bowel ischemia likely 2/2 severe enteritis, UGIB?  Malignant hypertension induced thrombocytopenia  Normocytic anemia  Hypertensive Emergency  JOYCE likely pre-renal     -dropping h/h hx possible melena,   -Hgb fluctuating, requiring transfusions but without obvious signs of bleeding  -s/p 1uprbc    PPI Q12 hrs, no bloody stools reported, tolerating PO diet     -patient evaluated by psychiatry. As per mom was fully functional 3 months ago and then started to have decline (reported staring spells, laughing by herself and watching      Diet order:   Diet, Regular:   Consistent Carbohydrate {Evening Snack} (CSTCHOSN)  DASH/TLC {Sodium & Cholesterol Restricted} (DASH) (25 @ 07:45) [Active]    Anthropometrics:  Height (cm): 154.9 (25 @ 06:30)  Weight (kg): 65.8 (25 @ 06:30)  BMI (kg/m2): 27.4 (25 @ 06:30)  IBW: 47.7 kg     Daily Weight in k.3 (), Weight in k.8 (), Weight in k.6 (), Weight in k.8 ()  65.8 kg () vs 75.3 kg ()  indicating 14.4% weight gain x 6 days - likely related to 4+ edema    MEDICATIONS  (STANDING):  aspirin  chewable 81 milliGRAM(s) Oral daily  atorvastatin 80 milliGRAM(s) Oral at bedtime  chlorhexidine 2% Cloths 1 Application(s) Topical <User Schedule>  cyanocobalamin 1000 MICROGram(s) Oral daily  dextrose 5%. 1000 milliLiter(s) (100 mL/Hr) IV Continuous <Continuous>  dextrose 5%. 1000 milliLiter(s) (50 mL/Hr) IV Continuous <Continuous>  dextrose 50% Injectable 25 Gram(s) IV Push once  dextrose 50% Injectable 12.5 Gram(s) IV Push once  dextrose 50% Injectable 25 Gram(s) IV Push once  epoetin sergey-epbx (RETACRIT) Injectable 57771 Unit(s) SubCutaneous every 7 days  ferrous    sulfate 325 milliGRAM(s) Oral daily  folic acid 1 milliGRAM(s) Oral daily  furosemide   Injectable 40 milliGRAM(s) IV Push every 12 hours  hydrALAZINE 50 milliGRAM(s) Oral every 8 hours  insulin glargine Injectable (LANTUS) 6 Unit(s) SubCutaneous at bedtime  insulin lispro (ADMELOG) corrective regimen sliding scale   SubCutaneous three times a day before meals  insulin lispro (ADMELOG) corrective regimen sliding scale   SubCutaneous at bedtime  labetalol 300 milliGRAM(s) Oral three times a day  melatonin 5 milliGRAM(s) Oral at bedtime  niCARdipine Infusion 5 mG/Hr (25 mL/Hr) IV Continuous <Continuous>  NIFEdipine  milliGRAM(s) Oral daily  pantoprazole    Tablet 40 milliGRAM(s) Oral every 12 hours  traZODone 25 milliGRAM(s) Oral at bedtime    MEDICATIONS  (PRN):  calcium carbonate   1250 mG (OsCal) 1 Tablet(s) Oral every 6 hours PRN Heartburn  dextrose Oral Gel 15 Gram(s) Oral once PRN Blood Glucose LESS THAN 70 milliGRAM(s)/deciliter    Pertinent Labs:  @ 04:40: Na 139, BUN 74[HH], Cr 3.4[H], [H], K+ 4.1, Phos 5.1[H], Mg 2.0, Alk Phos 70, ALT/SGPT 16, AST/SGOT 11, HbA1c --    Finger Sticks:  POCT Blood Glucose.: 130 mg/dL ( @ 21:05)    Physical Findings:  - Appearance: AAOx4  - GI function: last BM   - Tubes: n/a - no feeding tubes  - Oral/Mouth cavity: regular texture, thin liquids  - Skin: no pressure injuries documented  - Edema: 4+ edema - left and right foot, left and right leg      Nutrition Requirements:  Weight Used: 65.8 kg - estimated needs with consideration for age, BMI, acuity of illness, critical care setting     Estimated Energy Needs    Continue [x]  Adjust []  1316 - 1645 kcal/day (20 -25 kcal/kg)      Estimated Protein Needs    Continue [x]  Adjust []  78 - 92 gm/day (1.2 - 1.4 gm/kg)      Estimated Fluid Needs        Continue [x]  Adjust []  1 mL/kcal     Nutrient Intake:  Good PO intake - consuming >75% of meals     [x] Previous Nutrition Diagnosis:  Increased Nutrient Needs            [x] Ongoing          [] Resolved    Nutrition Education:  deferred     Nutrition Intervention:  meals and snacks, coordination of care     Goal/Expected Outcome:   Patient to tolerate diet and maintain PO intake >75% of meals and snacks in 5-7 days     Indicator/Monitoring:   energy intake, weight, labs, skin status, NFPF, BM, GI s/s     Recommendation:   1) Continue current diet order  2) Monitor PO intake  3) Monitor electrolytes, BG, renal profile  4) Monitor BM, GI s/s     Patient is at moderate nutrition risk, RD to f/u  Christina Ley RD [phone] or via Teams

## 2025-05-13 NOTE — PROGRESS NOTE ADULT - SUBJECTIVE AND OBJECTIVE BOX
Patient is a 39y old  Female who presents with a chief complaint of Hypertensi (12 May 2025 14:24)        Over Night Events: Still on Nicardipine. On RA.        ROS:     All ROS are negative except HPI         PHYSICAL EXAM    ICU Vital Signs Last 24 Hrs  T(C): 37.1 (13 May 2025 04:00), Max: 37.1 (13 May 2025 04:00)  T(F): 98.7 (13 May 2025 04:00), Max: 98.7 (13 May 2025 04:00)  HR: 91 (13 May 2025 08:00) (85 - 117)  BP: 187/101 (12 May 2025 13:00) (187/101 - 187/101)  BP(mean): 136 (12 May 2025 13:00) (136 - 136)  ABP: 194/86 (13 May 2025 08:00) (139/58 - 214/88)  ABP(mean): 118 (13 May 2025 08:00) (82 - 141)  RR: 23 (13 May 2025 08:00) (17 - 62)  SpO2: 99% (13 May 2025 08:00) (96% - 99%)    O2 Parameters below as of 13 May 2025 07:00  Patient On (Oxygen Delivery Method): room air        ENT: Airway patent,  EYES: Pupils equal, Round and reactive to light.  CARDIAC: Normal rate, Regular rhythm.    RESPIRATORY: No wheezing, Bilateral BS, Not tachypneic, No use of accessory muscles  GASTROINTESTINAL: Abdomen soft, Non-tender, No guarding,   NEUROLOGICAL: Alert and oriented   SKIN: Skin normal color for race, Warm and dry and intact.         05-12-25 @ 07:01  -  05-13-25 @ 07:00  --------------------------------------------------------  IN:    NiCARdipine: 62.5 mL    NiCARdipine: 195 mL    Oral Fluid: 300 mL  Total IN: 557.5 mL    OUT:    Voided (mL): 2800 mL  Total OUT: 2800 mL    Total NET: -2242.5 mL      05-13-25 @ 07:01  -  05-13-25 @ 09:01  --------------------------------------------------------  IN:    NiCARdipine: 12.5 mL  Total IN: 12.5 mL    OUT:    Voided (mL): 600 mL  Total OUT: 600 mL    Total NET: -587.5 mL          LABS:                            6.7    14.74 )-----------( 250      ( 13 May 2025 04:40 )             20.1                                               05-13    139  |  106  |  74[HH]  ----------------------------<  130[H]  4.1   |  22  |  3.4[H]    Ca    7.6[L]      13 May 2025 04:40  Phos  5.1     05-13  Mg     2.0     05-13    TPro  4.2[L]  /  Alb  2.6[L]  /  TBili  <0.2  /  DBili  x   /  AST  11  /  ALT  16  /  AlkPhos  70  05-13                                             Urinalysis Basic - ( 13 May 2025 04:40 )    Color: x / Appearance: x / SG: x / pH: x  Gluc: 130 mg/dL / Ketone: x  / Bili: x / Urobili: x   Blood: x / Protein: x / Nitrite: x   Leuk Esterase: x / RBC: x / WBC x   Sq Epi: x / Non Sq Epi: x / Bacteria: x                                                  LIVER FUNCTIONS - ( 13 May 2025 04:40 )  Alb: 2.6 g/dL / Pro: 4.2 g/dL / ALK PHOS: 70 U/L / ALT: 16 U/L / AST: 11 U/L / GGT: x                                                                                                                                   MEDICATIONS  (STANDING):  chlorhexidine 2% Cloths 1 Application(s) Topical <User Schedule>  cyanocobalamin 1000 MICROGram(s) Oral daily  dextrose 5%. 1000 milliLiter(s) (100 mL/Hr) IV Continuous <Continuous>  dextrose 5%. 1000 milliLiter(s) (50 mL/Hr) IV Continuous <Continuous>  dextrose 50% Injectable 25 Gram(s) IV Push once  dextrose 50% Injectable 12.5 Gram(s) IV Push once  dextrose 50% Injectable 25 Gram(s) IV Push once  folic acid 1 milliGRAM(s) Oral daily  hydrALAZINE 25 milliGRAM(s) Oral every 8 hours  insulin glargine Injectable (LANTUS) 6 Unit(s) SubCutaneous at bedtime  insulin lispro (ADMELOG) corrective regimen sliding scale   SubCutaneous three times a day before meals  insulin lispro (ADMELOG) corrective regimen sliding scale   SubCutaneous at bedtime  labetalol 300 milliGRAM(s) Oral three times a day  melatonin 5 milliGRAM(s) Oral at bedtime  niCARdipine Infusion 5 mG/Hr (25 mL/Hr) IV Continuous <Continuous>  NIFEdipine  milliGRAM(s) Oral daily  pantoprazole    Tablet 40 milliGRAM(s) Oral every 12 hours    MEDICATIONS  (PRN):  calcium carbonate   1250 mG (OsCal) 1 Tablet(s) Oral every 6 hours PRN Heartburn  dextrose Oral Gel 15 Gram(s) Oral once PRN Blood Glucose LESS THAN 70 milliGRAM(s)/deciliter

## 2025-05-14 PROBLEM — Z00.00 ENCOUNTER FOR PREVENTIVE HEALTH EXAMINATION: Status: ACTIVE | Noted: 2025-05-14

## 2025-05-14 LAB
% ALBUMIN: 58.9 % — SIGNIFICANT CHANGE UP
% ALPHA 1: 7.1 % — SIGNIFICANT CHANGE UP
% ALPHA 2: 12.1 % — SIGNIFICANT CHANGE UP
% BETA: 12.3 % — SIGNIFICANT CHANGE UP
% GAMMA: 9.6 % — SIGNIFICANT CHANGE UP
A1C WITH ESTIMATED AVERAGE GLUCOSE RESULT: 5.5 % — SIGNIFICANT CHANGE UP (ref 4–5.6)
ALBUMIN SERPL ELPH-MCNC: 2.6 G/DL — LOW (ref 3.5–5.2)
ALBUMIN SERPL ELPH-MCNC: 2.8 G/DL — LOW (ref 3.6–5.5)
ALBUMIN/GLOB SERPL ELPH: 1.5 RATIO — SIGNIFICANT CHANGE UP
ALDOST SERPL-MCNC: 105 NG/DL — HIGH
ALDOSTERONE / DIRECT RENIN RATIO RESULT: SIGNIFICANT CHANGE UP RATIO
ALP SERPL-CCNC: 62 U/L — SIGNIFICANT CHANGE UP (ref 30–115)
ALPHA1 GLOB SERPL ELPH-MCNC: 0.3 G/DL — SIGNIFICANT CHANGE UP (ref 0.1–0.4)
ALPHA2 GLOB SERPL ELPH-MCNC: 0.6 G/DL — SIGNIFICANT CHANGE UP (ref 0.5–1)
ALT FLD-CCNC: 14 U/L — SIGNIFICANT CHANGE UP (ref 0–41)
ANION GAP SERPL CALC-SCNC: 13 MMOL/L — SIGNIFICANT CHANGE UP (ref 7–14)
AST SERPL-CCNC: 9 U/L — SIGNIFICANT CHANGE UP (ref 0–41)
B-GLOBULIN SERPL ELPH-MCNC: 0.6 G/DL — SIGNIFICANT CHANGE UP (ref 0.5–1)
BASOPHILS # BLD AUTO: 0.02 K/UL — SIGNIFICANT CHANGE UP (ref 0–0.2)
BASOPHILS NFR BLD AUTO: 0.1 % — SIGNIFICANT CHANGE UP (ref 0–1)
BILIRUB SERPL-MCNC: 0.2 MG/DL — SIGNIFICANT CHANGE UP (ref 0.2–1.2)
BUN SERPL-MCNC: 63 MG/DL — CRITICAL HIGH (ref 10–20)
CALCIUM SERPL-MCNC: 7.3 MG/DL — LOW (ref 8.4–10.5)
CHLORIDE SERPL-SCNC: 103 MMOL/L — SIGNIFICANT CHANGE UP (ref 98–110)
CHOLEST SERPL-MCNC: 154 MG/DL — SIGNIFICANT CHANGE UP
CO2 SERPL-SCNC: 21 MMOL/L — SIGNIFICANT CHANGE UP (ref 17–32)
CORTIS SERPL-MCNC: 12.9 UG/DL — SIGNIFICANT CHANGE UP
CREAT SERPL-MCNC: 3.2 MG/DL — HIGH (ref 0.7–1.5)
EGFR: 18 ML/MIN/1.73M2 — LOW
EGFR: 18 ML/MIN/1.73M2 — LOW
EOSINOPHIL # BLD AUTO: 0.28 K/UL — SIGNIFICANT CHANGE UP (ref 0–0.7)
EOSINOPHIL NFR BLD AUTO: 1.8 % — SIGNIFICANT CHANGE UP (ref 0–8)
ESTIMATED AVERAGE GLUCOSE: 111 MG/DL — SIGNIFICANT CHANGE UP (ref 68–114)
GAMMA GLOBULIN: 0.5 G/DL — LOW (ref 0.6–1.6)
GLUCOSE BLDC GLUCOMTR-MCNC: 153 MG/DL — HIGH (ref 70–99)
GLUCOSE BLDC GLUCOMTR-MCNC: 177 MG/DL — HIGH (ref 70–99)
GLUCOSE SERPL-MCNC: 175 MG/DL — HIGH (ref 70–99)
HAPTOGLOB SERPL-MCNC: 138 MG/DL — SIGNIFICANT CHANGE UP (ref 34–200)
HCT VFR BLD CALC: 22.1 % — LOW (ref 37–47)
HDLC SERPL-MCNC: 37 MG/DL — LOW
HGB BLD-MCNC: 7.5 G/DL — LOW (ref 12–16)
HOMOCYSTEINE LEVEL: 19.2 UMOL/L — HIGH (ref 0–14.5)
IMM GRANULOCYTES NFR BLD AUTO: 3.1 % — HIGH (ref 0.1–0.3)
INTERPRETATION SERPL IFE-IMP: SIGNIFICANT CHANGE UP
LDLC SERPL-MCNC: 59 MG/DL — SIGNIFICANT CHANGE UP
LIPID PNL WITH DIRECT LDL SERPL: 59 MG/DL — SIGNIFICANT CHANGE UP
LYMPHOCYTES # BLD AUTO: 0.76 K/UL — LOW (ref 1.2–3.4)
LYMPHOCYTES # BLD AUTO: 4.9 % — LOW (ref 20.5–51.1)
MAGNESIUM SERPL-MCNC: 1.8 MG/DL — SIGNIFICANT CHANGE UP (ref 1.8–2.4)
MCHC RBC-ENTMCNC: 30.1 PG — SIGNIFICANT CHANGE UP (ref 27–31)
MCHC RBC-ENTMCNC: 33.9 G/DL — SIGNIFICANT CHANGE UP (ref 32–37)
MCV RBC AUTO: 88.8 FL — SIGNIFICANT CHANGE UP (ref 81–99)
METHYLMALONATE SERPL-SCNC: 409 NMOL/L — HIGH (ref 0–378)
MONOCYTES # BLD AUTO: 1.03 K/UL — HIGH (ref 0.1–0.6)
MONOCYTES NFR BLD AUTO: 6.6 % — SIGNIFICANT CHANGE UP (ref 1.7–9.3)
NEUTROPHILS # BLD AUTO: 12.98 K/UL — HIGH (ref 1.4–6.5)
NEUTROPHILS NFR BLD AUTO: 83.5 % — HIGH (ref 42.2–75.2)
NONHDLC SERPL-MCNC: 117 MG/DL — SIGNIFICANT CHANGE UP
NRBC BLD AUTO-RTO: 0 /100 WBCS — SIGNIFICANT CHANGE UP (ref 0–0)
PLATELET # BLD AUTO: 223 K/UL — SIGNIFICANT CHANGE UP (ref 130–400)
PMV BLD: 10.1 FL — SIGNIFICANT CHANGE UP (ref 7.4–10.4)
POTASSIUM SERPL-MCNC: 3.6 MMOL/L — SIGNIFICANT CHANGE UP (ref 3.5–5)
POTASSIUM SERPL-SCNC: 3.6 MMOL/L — SIGNIFICANT CHANGE UP (ref 3.5–5)
PROT PATTERN SERPL ELPH-IMP: SIGNIFICANT CHANGE UP
PROT SERPL-MCNC: 4.1 G/DL — LOW (ref 6–8)
PROT SERPL-MCNC: 4.7 G/DL — LOW (ref 6–8.3)
PTH-INTACT FLD-MCNC: 194 PG/ML — HIGH (ref 15–65)
RBC # BLD: 2.49 M/UL — LOW (ref 4.2–5.4)
RBC # BLD: 2.49 M/UL — LOW (ref 4.2–5.4)
RBC # FLD: 15.6 % — HIGH (ref 11.5–14.5)
RENIN DIRECT, PLASMA: 52.7 PG/ML — HIGH
RETICS #: 154.6 K/UL — HIGH (ref 25–125)
RETICS/RBC NFR: 6.2 % — HIGH (ref 0.5–1.5)
SODIUM SERPL-SCNC: 137 MMOL/L — SIGNIFICANT CHANGE UP (ref 135–146)
TRIGL SERPL-MCNC: 376 MG/DL — HIGH
WBC # BLD: 15.56 K/UL — HIGH (ref 4.8–10.8)
WBC # FLD AUTO: 15.56 K/UL — HIGH (ref 4.8–10.8)

## 2025-05-14 PROCEDURE — 99233 SBSQ HOSP IP/OBS HIGH 50: CPT

## 2025-05-14 PROCEDURE — 99291 CRITICAL CARE FIRST HOUR: CPT | Mod: GC

## 2025-05-14 PROCEDURE — 70551 MRI BRAIN STEM W/O DYE: CPT | Mod: 26

## 2025-05-14 PROCEDURE — 99222 1ST HOSP IP/OBS MODERATE 55: CPT

## 2025-05-14 RX ORDER — CALCITRIOL 0.5 UG/1
0.25 CAPSULE, GELATIN COATED ORAL DAILY
Refills: 0 | Status: DISCONTINUED | OUTPATIENT
Start: 2025-05-14 | End: 2025-05-27

## 2025-05-14 RX ADMIN — Medication 1 APPLICATION(S): at 05:20

## 2025-05-14 RX ADMIN — Medication 5 MILLIGRAM(S): at 21:36

## 2025-05-14 RX ADMIN — LABETALOL HYDROCHLORIDE 300 MILLIGRAM(S): 200 TABLET, FILM COATED ORAL at 21:36

## 2025-05-14 RX ADMIN — LABETALOL HYDROCHLORIDE 300 MILLIGRAM(S): 200 TABLET, FILM COATED ORAL at 13:10

## 2025-05-14 RX ADMIN — FUROSEMIDE 40 MILLIGRAM(S): 10 INJECTION INTRAMUSCULAR; INTRAVENOUS at 05:19

## 2025-05-14 RX ADMIN — Medication 50 MILLIGRAM(S): at 11:20

## 2025-05-14 RX ADMIN — Medication 25 MG/HR: at 13:10

## 2025-05-14 RX ADMIN — Medication 25 MG/HR: at 21:38

## 2025-05-14 RX ADMIN — LABETALOL HYDROCHLORIDE 300 MILLIGRAM(S): 200 TABLET, FILM COATED ORAL at 05:19

## 2025-05-14 RX ADMIN — Medication 325 MILLIGRAM(S): at 11:20

## 2025-05-14 RX ADMIN — Medication 120 MILLIGRAM(S): at 05:19

## 2025-05-14 RX ADMIN — ATORVASTATIN CALCIUM 80 MILLIGRAM(S): 80 TABLET, FILM COATED ORAL at 21:36

## 2025-05-14 RX ADMIN — Medication 25 MILLIGRAM(S): at 21:36

## 2025-05-14 RX ADMIN — FOLIC ACID 1 MILLIGRAM(S): 1 TABLET ORAL at 11:20

## 2025-05-14 RX ADMIN — CYANOCOBALAMIN 1000 MICROGRAM(S): 1000 INJECTION INTRAMUSCULAR; SUBCUTANEOUS at 11:20

## 2025-05-14 RX ADMIN — FUROSEMIDE 40 MILLIGRAM(S): 10 INJECTION INTRAMUSCULAR; INTRAVENOUS at 17:05

## 2025-05-14 RX ADMIN — Medication 40 MILLIGRAM(S): at 17:05

## 2025-05-14 RX ADMIN — CALCITRIOL 0.25 MICROGRAM(S): 0.5 CAPSULE, GELATIN COATED ORAL at 17:05

## 2025-05-14 RX ADMIN — Medication 50 MILLIGRAM(S): at 05:19

## 2025-05-14 RX ADMIN — Medication 81 MILLIGRAM(S): at 11:20

## 2025-05-14 RX ADMIN — Medication 40 MILLIGRAM(S): at 05:19

## 2025-05-14 RX ADMIN — Medication 50 MILLIGRAM(S): at 17:05

## 2025-05-14 NOTE — PROGRESS NOTE ADULT - ASSESSMENT
39-year-old female with history of hypertension presenting to ER with 5 days of multiple episodes of nonbloody nonbilious vomiting and diarrhea with associated generalized abdominal pain and distention  # HTN   # JOYCE rule out ATN   # proteinuria / hematuria   # thrombocytopenia   - picture above can be due to malignant HTN  / ADAMTS 13 not low / however patient received steroids and on plasmapheresis   - normal C3 ( not in favor of a HUS)  nl C4 normal JOSE ANTONIO which rule out active lupus  - hem onc appreciated s/p steroids , s/p  plasmapheresis , now d/tessie  - hb noted can start retacrit / transfuse to Hb >7/ hold retacrit if BP> 170 or DBP >100  - creat trending down    - 1.8 g protein on UPCR will need repeat   - ph at goal / no binders / check i calcium / pth noted and vit D / replete VIT D/ start calcitriol 0.25 daily   -  GN w/up negative   - follow bp readings / added hydralazine and lasix today keep on nifedipine / check renin and shanelle/ serum metanephrines / doubt secondary HTN   - renal artery dupplex no FOUZIA   - neurology f/up appreciated   renal team will follow

## 2025-05-14 NOTE — PROGRESS NOTE ADULT - SUBJECTIVE AND OBJECTIVE BOX
seen and examined  24 h events noted   no distress       PAST HISTORY  --------------------------------------------------------------------------------  No significant changes to PMH, PSH, FHx, SHx, unless otherwise noted    ALLERGIES & MEDICATIONS  --------------------------------------------------------------------------------  Allergies    No Known Allergies    Intolerances      Standing Inpatient Medications  aspirin  chewable 81 milliGRAM(s) Oral daily  atorvastatin 80 milliGRAM(s) Oral at bedtime  chlorhexidine 2% Cloths 1 Application(s) Topical <User Schedule>  cyanocobalamin 1000 MICROGram(s) Oral daily  dextrose 5%. 1000 milliLiter(s) IV Continuous <Continuous>  dextrose 5%. 1000 milliLiter(s) IV Continuous <Continuous>  dextrose 50% Injectable 25 Gram(s) IV Push once  dextrose 50% Injectable 12.5 Gram(s) IV Push once  dextrose 50% Injectable 25 Gram(s) IV Push once  epoetin sergey-epbx (RETACRIT) Injectable 78043 Unit(s) SubCutaneous every 7 days  ferrous    sulfate 325 milliGRAM(s) Oral daily  folic acid 1 milliGRAM(s) Oral daily  furosemide   Injectable 40 milliGRAM(s) IV Push every 12 hours  hydrALAZINE 50 milliGRAM(s) Oral every 6 hours  insulin glargine Injectable (LANTUS) 6 Unit(s) SubCutaneous at bedtime  insulin lispro (ADMELOG) corrective regimen sliding scale   SubCutaneous three times a day before meals  insulin lispro (ADMELOG) corrective regimen sliding scale   SubCutaneous at bedtime  labetalol 300 milliGRAM(s) Oral three times a day  melatonin 5 milliGRAM(s) Oral at bedtime  niCARdipine Infusion 5 mG/Hr IV Continuous <Continuous>  NIFEdipine  milliGRAM(s) Oral daily  pantoprazole    Tablet 40 milliGRAM(s) Oral every 12 hours  traZODone 25 milliGRAM(s) Oral at bedtime    PRN Inpatient Medications  calcium carbonate   1250 mG (OsCal) 1 Tablet(s) Oral every 6 hours PRN  dextrose Oral Gel 15 Gram(s) Oral once PRN        VITALS/PHYSICAL EXAM  --------------------------------------------------------------------------------  T(C): 37.1 (05-14-25 @ 08:00), Max: 37.1 (05-14-25 @ 08:00)  HR: 100 (05-14-25 @ 11:00) (82 - 120)  BP: 146/63 (05-14-25 @ 09:02) (136/65 - 146/63)  RR: 25 (05-14-25 @ 11:00) (13 - 38)  SpO2: 97% (05-13-25 @ 23:00) (96% - 98%)  Wt(kg): --        05-13-25 @ 07:01  -  05-14-25 @ 07:00  --------------------------------------------------------  IN: 1004 mL / OUT: 4750 mL / NET: -3746 mL    05-14-25 @ 07:01  -  05-14-25 @ 12:23  --------------------------------------------------------  IN: 701.8 mL / OUT: 0 mL / NET: 701.8 mL      Physical Exam:  	Gen: NAD  	Pulm: decrease BS  B/L  	CV: S1S2; no rub  	Abd: +distended        LABS/STUDIES  --------------------------------------------------------------------------------              7.5    15.56 >-----------<  223      [05-14-25 @ 04:20]              22.1     137  |  103  |  63  ----------------------------<  175      [05-14-25 @ 04:20]  3.6   |  21  |  3.2        Ca     7.3     [05-14-25 @ 04:20]      Mg     1.8     [05-14-25 @ 04:20]      Phos  5.1     [05-13-25 @ 04:40]    TPro  4.1  /  Alb  2.6  /  TBili  0.2  /  DBili  x   /  AST  9   /  ALT  14  /  AlkPhos  62  [05-14-25 @ 04:20]          [05-13-25 @ 04:40]  Creatinine Trend:  SCr 3.2 [05-14 @ 04:20]  SCr 3.4 [05-13 @ 04:40]  SCr 3.3 [05-12 @ 03:55]  SCr 3.8 [05-11 @ 04:42]  SCr 3.6 [05-10 @ 04:20]    Urinalysis - [05-14-25 @ 04:20]      Color  / Appearance  / SG  / pH       Gluc 175 / Ketone   / Bili  / Urobili        Blood  / Protein  / Leuk Est  / Nitrite       RBC  / WBC  / Hyaline  / Gran  / Sq Epi  / Non Sq Epi  / Bacteria     Urine Creatinine 16      [05-13-25 @ 12:24]  Urine Protein 37      [05-13-25 @ 12:24]    Iron 18, TIBC 166, %sat 11      [05-07-25 @ 11:40]  Ferritin 362      [05-07-25 @ 11:40]  PTH -- (Ca --)      [05-13-25 @ 12:08]   194  PTH -- (Ca --)      [05-12-25 @ 16:48]   193  Vitamin D (25OH) 12      [05-12-25 @ 16:48]  TSH 2.05      [05-07-25 @ 11:40]  Lipid: chol 154, , HDL 37, LDL --      [05-14-25 @ 04:20]    HBsAg Nonreact      [05-07-25 @ 11:40]  HCV 0.13, Nonreact      [05-07-25 @ 11:40]  HIV Nonreact      [05-07-25 @ 11:40]    JOSE ANTONIO: titer Negative, pattern --      [05-07-25 @ 11:40]  dsDNA 2      [05-08-25 @ 19:20]  C3 Complement 106      [05-07-25 @ 11:40]  C4 Complement 22      [05-07-25 @ 11:40]  ANCA: cANCA Negative, pANCA Negative, atypical ANCA Negative      [05-07-25 @ 11:40]  anti-GBM <0.2      [05-07-25 @ 11:40]  Free Light Chains: kappa 1.98, lambda 2.29, ratio = 0.86      [05-13 @ 04:40]

## 2025-05-14 NOTE — PROGRESS NOTE ADULT - SUBJECTIVE AND OBJECTIVE BOX
Gastroenterology Follow Up Note      Location: [address] ([address])  Patient Name: FRANCISCO DUNLAP  Age: 39y  Gender: Female      Chief Complaint  Patient is a 39y old Female who presents with a chief complaint of Hypertensi (12 May 2025 14:24)  Primary diagnosis of Hypertensive emergency        Reason for Consult  Concern for enteritis/ischemia/TTP like picture from malignant HTN      Progress Note  This morning patient was seen and examined at bedside.    Today is hospital day 7d.  No acute events overnight.   She denies abdominal pain or nausea or vomiting.  She reports 2 black loose stools on 05/11 and 1 on 05/12.  She reports brown stools x2 on 05/13.  She adamantly refused CHRISTINE on 05/12-05/14.        Vital Signs in the last 24 hours   Vital Signs Last 24 Hrs  T(C): 37.1 (14 May 2025 16:00), Max: 37.1 (14 May 2025 08:00)  T(F): 98.7 (14 May 2025 16:00), Max: 98.8 (14 May 2025 08:00)  HR: 94 (14 May 2025 18:00) (82 - 110)  BP: 146/63 (14 May 2025 09:02) (136/65 - 146/63)  BP(mean): 91 (14 May 2025 09:02) (91 - 91)  RR: 29 (14 May 2025 18:00) (13 - 38)  SpO2: 97% (13 May 2025 23:00) (96% - 98%)    Parameters below as of 14 May 2025 18:00  Patient On (Oxygen Delivery Method): room air        Physical Exam  * General Appearance: Alert, not cooperative, interactive, oriented to time, place, and person, in no acute distress  * Neck: Supple, symmetrical, trachea midline, no adenopathy   * Lungs: Good bilateral air entry, normal breath sounds  * Heart: Regular Rate and Rhythm, normal S1 and S2, no audible murmur, rub, or gallop  * Abdomen: Symmetric, mildly distended, soft, non-tender, no guarding or rebound tenderness, bowel sounds active all four quadrants  * Refused CHRISTINE on 05/12 and 05/13 as below      Investigations                                     7.5    15.56 )-----------( 223      ( 14 May 2025 04:20 )             22.1     05-14    137  |  103  |  63[HH]  ----------------------------<  175[H]  3.6   |  21  |  3.2[H]    Ca    7.3[L]      14 May 2025 04:20  Phos  5.1     05-13  Mg     1.8     05-14    TPro  4.1[L]  /  Alb  2.6[L]  /  TBili  0.2  /  DBili  x   /  AST  9   /  ALT  14  /  AlkPhos  62  05-14        LIVER FUNCTIONS - ( 14 May 2025 04:20 )  Alb: 2.6 g/dL / Pro: 4.1 g/dL / ALK PHOS: 62 U/L / ALT: 14 U/L / AST: 9 U/L / GGT: x               Urinalysis Basic - ( 14 May 2025 04:20 )    Color: x / Appearance: x / SG: x / pH: x  Gluc: 175 mg/dL / Ketone: x  / Bili: x / Urobili: x   Blood: x / Protein: x / Nitrite: x   Leuk Esterase: x / RBC: x / WBC x   Sq Epi: x / Non Sq Epi: x / Bacteria: x        Current Medications  Standing Medications  chlorhexidine 2% Cloths 1 Application(s) Topical <User Schedule>  cyanocobalamin 1000 MICROGram(s) Oral daily  dextrose 5%. 1000 milliLiter(s) (100 mL/Hr) IV Continuous <Continuous>  dextrose 5%. 1000 milliLiter(s) (50 mL/Hr) IV Continuous <Continuous>  dextrose 50% Injectable 25 Gram(s) IV Push once  dextrose 50% Injectable 12.5 Gram(s) IV Push once  dextrose 50% Injectable 25 Gram(s) IV Push once  folic acid 1 milliGRAM(s) Oral daily  hydrALAZINE 25 milliGRAM(s) Oral every 8 hours  insulin glargine Injectable (LANTUS) 6 Unit(s) SubCutaneous at bedtime  insulin lispro (ADMELOG) corrective regimen sliding scale   SubCutaneous three times a day before meals  insulin lispro (ADMELOG) corrective regimen sliding scale   SubCutaneous at bedtime  labetalol 300 milliGRAM(s) Oral three times a day  niCARdipine Infusion 5 mG/Hr (25 mL/Hr) IV Continuous <Continuous>  NIFEdipine  milliGRAM(s) Oral daily  pantoprazole    Tablet 40 milliGRAM(s) Oral every 12 hours    PRN Medications  calcium carbonate   1250 mG (OsCal) 1 Tablet(s) Oral every 6 hours PRN Heartburn  dextrose Oral Gel 15 Gram(s) Oral once PRN Blood Glucose LESS THAN 70 milliGRAM(s)/deciliter  melatonin 3 milliGRAM(s) Oral at bedtime PRN Insomnia    Singles Doses Administered  (ADM OVERRIDE) 1 each &lt;see task&gt; GiveOnce  (ADM OVERRIDE) 1 each &lt;see task&gt; GiveOnce  (ADM OVERRIDE) 2 each &lt;see task&gt; GiveOnce  (ADM OVERRIDE) 1 each &lt;see task&gt; GiveOnce  (ADM OVERRIDE) 1 each &lt;see task&gt; GiveOnce  (ADM OVERRIDE) 1 each &lt;see task&gt; GiveOnce  (ADM OVERRIDE) 1 each &lt;see task&gt; GiveOnce  acetaminophen   IVPB .. 1000 milliGRAM(s) IV Intermittent once  acetaminophen   IVPB .. 1000 milliGRAM(s) IV Intermittent once  calcium gluconate IVPB 2 Gram(s) IV Intermittent once  calcium gluconate IVPB 2 Gram(s) IV Intermittent once  calcium gluconate IVPB 2 Gram(s) IV Intermittent once  diphenhydrAMINE 50 milliGRAM(s) Oral once  diphenhydrAMINE Injectable 25 milliGRAM(s) IV Push once  diphenhydrAMINE Injectable 25 milliGRAM(s) IV Push once  diphenhydrAMINE Injectable 25 milliGRAM(s) IV Push once  hydrALAZINE Injectable 10 milliGRAM(s) IV Push once  hydrALAZINE Injectable 10 milliGRAM(s) IV Push once  labetalol Injectable 10 milliGRAM(s) IV Push once  labetalol Injectable 10 milliGRAM(s) IV Push once  lactated ringers Bolus 1000 milliLiter(s) IV Bolus once  methylPREDNISolone sodium succinate IVPB 1000 milliGRAM(s) IV Intermittent daily  metoprolol tartrate Injectable 10 milliGRAM(s) IV Push Once  morphine  - Injectable 4 milliGRAM(s) IV Push Once  ondansetron Injectable 4 milliGRAM(s) IV Push once  pantoprazole  Injectable 40 milliGRAM(s) IV Push Once  piperacillin/tazobactam IVPB... 3.375 Gram(s) IV Intermittent once  potassium chloride   Powder 40 milliEquivalent(s) Oral every 4 hours  potassium chloride  20 mEq/100 mL IVPB 20 milliEquivalent(s) IV Intermittent every 2 hours  potassium chloride  20 mEq/100 mL IVPB 20 milliEquivalent(s) IV Intermittent every 2 hours  QUEtiapine 25 milliGRAM(s) Oral once  sodium chloride 0.9% Bolus 1000 milliLiter(s) IV Bolus once

## 2025-05-14 NOTE — PROGRESS NOTE ADULT - SUBJECTIVE AND OBJECTIVE BOX
Metabolic encephalopathy      Patient is a 39y old  Female who presents with a chief complaint of Hypertensi (12 May 2025 14:24)    HPI:  39-year-old female with history of hypertension presenting to ER with 5 days of multiple episodes of nonbloody nonbilious vomiting and diarrhea with associated generalized abdominal pain and distention. Patient states that on Saturday she had acute onset of nausea, vomiting, diarrhea and crampy abdominal pain. She has had 4-5 episodes of non-bloody, non-bilious vomiting per day and 4-5 episodes of black diarrhea per day. She initially thought that she had gastroenteritis, but when her symptoms didn't resolve, she decided come to the ED. She denies any current abdominal pain, nausea or vomiting, fever, chest pain, shortness of breath, clotting disorder, past intra-abdominal surgeries, kidney issues, leg pain or swelling.     Labs significant for WBC 18.72k, Hb 9.0(unknown baseline) , Platelets 86k, Creatinine 3.5(unknown baseline). Lipase 71, UA positive      CTAP with finding of Edematous distal duodenum and proximal jejunal loops with associated dilatation measuring up to 3.4 cm. Associated marked interloop ascites, mesenteric fat stranding, and prominent mesenteric lymph nodes. Mild-to-moderate ascites. Findings concerning for small bowel ischemia.     CT Angio Abdomen and Pelvis w/ IV Cont (05.07.25 @ 04:18): Patent SMA, SMV. Redemonstration of distal duodenal and proximal small bowel with marked inflammatory change. Mural enhancement noted throughout   the small bowel. Considerations include severe enteritis, early ischemia, shock bowel.    #ED Vitals:   T(C): 37.2 (05-07-25 @ 05:46), Max: 37.3 (05-06-25 @ 23:44)  HR: 112 (05-07-25 @ 05:46) (97 - 118)  BP: 151/86 (05-07-25 @ 03:39) (151/86 - 238/135)  RR: 17 (05-07-25 @ 05:46) (17 - 19)  SpO2: 99% (05-07-25 @ 05:46) (96% - 100%)    # ED Course:   Zosyn, LR 1L, NS 1L, Zofran, Morphine, Pantoprazole, Reglan, Metoprolol 10 IV   Started on Nicardipine drip   Evaluated by surgery >>> No acute surgical intervention     The patient is being admitted to MICU for the further management.  (07 May 2025 06:03)       INTERVAL HPI/OVERNIGHT EVENTS:   No overnight events   Afebrile, hemodynamically stable     Subjective:    ICU Vital Signs Last 24 Hrs  T(C): 37.1 (14 May 2025 08:00), Max: 37.1 (14 May 2025 08:00)  T(F): 98.8 (14 May 2025 08:00), Max: 98.8 (14 May 2025 08:00)  HR: 94 (14 May 2025 10:00) (82 - 120)  BP: 146/63 (14 May 2025 09:02) (136/65 - 146/63)  BP(mean): 91 (14 May 2025 09:02) (91 - 91)  ABP: 172/65 (14 May 2025 10:00) (112/110 - 223/85)  ABP(mean): 90 (14 May 2025 10:00) (70 - 122)  RR: 23 (14 May 2025 10:00) (13 - 38)  SpO2: 97% (13 May 2025 23:00) (96% - 98%)    O2 Parameters below as of 14 May 2025 10:00  Patient On (Oxygen Delivery Method): room air          I&O's Summary    13 May 2025 07:01  -  14 May 2025 07:00  --------------------------------------------------------  IN: 1004 mL / OUT: 4750 mL / NET: -3746 mL    14 May 2025 07:01  -  14 May 2025 10:26  --------------------------------------------------------  IN: 361.8 mL / OUT: 0 mL / NET: 361.8 mL        MEDICATIONS  (STANDING):  aspirin  chewable 81 milliGRAM(s) Oral daily  atorvastatin 80 milliGRAM(s) Oral at bedtime  chlorhexidine 2% Cloths 1 Application(s) Topical <User Schedule>  cyanocobalamin 1000 MICROGram(s) Oral daily  dextrose 5%. 1000 milliLiter(s) (100 mL/Hr) IV Continuous <Continuous>  dextrose 5%. 1000 milliLiter(s) (50 mL/Hr) IV Continuous <Continuous>  dextrose 50% Injectable 25 Gram(s) IV Push once  dextrose 50% Injectable 12.5 Gram(s) IV Push once  dextrose 50% Injectable 25 Gram(s) IV Push once  epoetin sergey-epbx (RETACRIT) Injectable 67004 Unit(s) SubCutaneous every 7 days  ferrous    sulfate 325 milliGRAM(s) Oral daily  folic acid 1 milliGRAM(s) Oral daily  furosemide   Injectable 40 milliGRAM(s) IV Push every 12 hours  hydrALAZINE 50 milliGRAM(s) Oral every 6 hours  insulin glargine Injectable (LANTUS) 6 Unit(s) SubCutaneous at bedtime  insulin lispro (ADMELOG) corrective regimen sliding scale   SubCutaneous three times a day before meals  insulin lispro (ADMELOG) corrective regimen sliding scale   SubCutaneous at bedtime  labetalol 300 milliGRAM(s) Oral three times a day  melatonin 5 milliGRAM(s) Oral at bedtime  niCARdipine Infusion 5 mG/Hr (25 mL/Hr) IV Continuous <Continuous>  NIFEdipine  milliGRAM(s) Oral daily  pantoprazole    Tablet 40 milliGRAM(s) Oral every 12 hours  traZODone 25 milliGRAM(s) Oral at bedtime    MEDICATIONS  (PRN):  calcium carbonate   1250 mG (OsCal) 1 Tablet(s) Oral every 6 hours PRN Heartburn  dextrose Oral Gel 15 Gram(s) Oral once PRN Blood Glucose LESS THAN 70 milliGRAM(s)/deciliter      PHYSICAL EXAM:  GENERAL:   HEAD:  Atraumatic, Normocephalic  EYES: EOMI, PERRLA, conjunctiva and sclera clear  NECK: Supple, No JVD, Normal thyroid, no enlarged nodes  NERVOUS SYSTEM:  Alert & Awake.   CHEST/LUNG: B/L good air entry; No rales, rhonchi, or wheezing  HEART: S1S2 normal, no S3, Regular rate and rhythm; No murmurs  ABDOMEN: Soft, Nontender, Nondistended; Bowel sounds present  EXTREMITIES:  2+ Peripheral Pulses, No clubbing, cyanosis, or edema  LYMPH: No lymphadenopathy noted  SKIN: No rashes or lesions    LABS:                        7.5    15.56 )-----------( 223      ( 14 May 2025 04:20 )             22.1     05-14    137  |  103  |  63[HH]  ----------------------------<  175[H]  3.6   |  21  |  3.2[H]    Ca    7.3[L]      14 May 2025 04:20  Phos  5.1     05-13  Mg     1.8     05-14    TPro  4.1[L]  /  Alb  2.6[L]  /  TBili  0.2  /  DBili  x   /  AST  9   /  ALT  14  /  AlkPhos  62  05-14    LIVER FUNCTIONS - ( 14 May 2025 04:20 )  Alb: 2.6 g/dL / Pro: 4.1 g/dL / ALK PHOS: 62 U/L / ALT: 14 U/L / AST: 9 U/L / GGT: x             CAPILLARY BLOOD GLUCOSE      POCT Blood Glucose.: 186 mg/dL (13 May 2025 20:22)            Urinalysis Basic - ( 14 May 2025 04:20 )    Color: x / Appearance: x / SG: x / pH: x  Gluc: 175 mg/dL / Ketone: x  / Bili: x / Urobili: x   Blood: x / Protein: x / Nitrite: x   Leuk Esterase: x / RBC: x / WBC x   Sq Epi: x / Non Sq Epi: x / Bacteria: x      Care Discussed with Consultants/Other Providers [ x] YES  [ ] NO

## 2025-05-14 NOTE — PROGRESS NOTE ADULT - ASSESSMENT
· Assessment	  IMPRESSION    Malignant hypertension   Hypertensive Emergency  Likely UGIB on presentation  Anemia - unknown etiology  Thrombocytopenia - resolved  Early small bowel ischemia likely 2/2 severe enteritis   SIRS/Sepsis   Uncomplicated Cystitis   JOYCE likely pre-renal   RSV infection     PLAN:     CNS : Avoid CNS depressant.  Delirium Precautions awake follow follow command. CT head noted. MRI noted. Precedex if needed.  Patient is refusing workup.     ENT : Oral Care     Pulmonary : Keep Spo2 > 94% .HOB elevation. Supplemental O2 prn.     Cardiology: ECHO LVH.  Porcardia 120 XL   labetalol 300 Q8 hrs    Hydralazine 50 mg PO q6  Lasix 40 bid  nicardipine ggt PRN.      GI : follow GI dropping h/h hx possible melena   follow Gi and G sx   CBC bid  PPI Q24  Keep T & S active, CBC q24, 18 G IV x2, Keep Hb > 7    Renal : Trend CMP. Monitor Lytes and replete as needed. Nephro  follow up   RA duplex negative  FU work up for secondary hypertension,     Hem/Onc : Coagulation studies noted.   steroids d/c off plasma   RESTREPO 13  negative   s/p plasmapheresis stopped   Monitor CBC  Repeat hemolysis panel negative  LE duplex - pt refused    Endo:  Blood glucose Goal : 140-180. insulin drip for now     MSK: Ambulate as tolerated     Code : Full code.      Disp:  MICU today

## 2025-05-14 NOTE — PROGRESS NOTE ADULT - ASSESSMENT
Neuropsychology Consult      Neuropsychology was consulted to evaluate this 39-year-old female with hypertensive emergency with altered mental status and behavioral dysregulation to assess decision-making capacity and rule out underlying cognitive disorder    Per Hematology   History of microangiopathic hemolytic anemia and thrombocytopenia worked up for TTP, but [id number] normal (57) TTP unlikely.  Plasmapheresis and steroids stopped.  No active bleeding. Hematoma ruled out by CT.  Continue supportive care; monitor CBC and coagulation.    Per Neurology:   Patient refused participation in the neurological exam.  Mental status changes may reflect hypertensive encephalopathy, ICU delirium, or toxic/metabolic causes.  CT showed age indeterminate lacunar infarct; likely incidental.  MRI brain and MRA head/neck ordered.  Routine EEG recommended if patient consents.  Will continue to monitor    Per psychiatry: Consulted for behavioral changes and possible hallucinations; concern for delirium in the setting of malignant hypertension, ICU admission, and encephalopathy.  Plan: Start Trazodone 25 mg QHS for sleep/delirium-related agitation.  Use Zyprexa PRN for acute behavioral issues or agitation.  Patient may require minimal sedation (e.g., Precedex) during MRI due to poor cooperation.  Monitoring and treatment will focus on delirium    Baseline Cognitive and Functional Status:  Reported history of depression (since 2015).  According to her mother, she has had progressive difficulties with IADLs (e.g., caring for her daughter) over the past year, worsening significantly over the past two months suggesting possible chronic neurocognitive decline predating the current admission.  No prior diagnosis of a neurocognitive disorder on record.    Acute Cognitive and Behavioral Findings Neuroimaging (CT 05/13/25):   Age-indeterminate lacunar infarct in the left external capsule- not likely accounting for her current cognitive symptoms  Neuro exam: Limited due to patient non-cooperation; no motor deficits documented.  AMS     Neurocognitive Findings:    Orientation, attention, and working memory: intact    Abstract reasoning: poor    Judgment: basic judgment intact, but qualitative inconsistencies and reduced insight noted (e.g., denying treatment refusal despite documented refusals)    Memory:  Impaired rote verbal memory, but improved with cues which suggests retrieval-based difficulties  Semantic/episodic and visual memory: intact    Language: mild weaknesses, likely due to second-language testing effects    Behavior: pleasant but reserved, required repetition, clarification, and prompting  Mood/Affect: Congruent; cooperative behavior during testing  Insight: Fair; lacks full comprehension of illness severity    Neuropsychological Impression:  The patient’s cognitive presentation is characterized by dysexecutive features, retrieval-based memory difficulties, and fluctuating attention, consistent with an acute encephalopathy. Clinical and contextual data suggest this is likely multifactorial in nature, with contributions from hypertensive encephalopathy, toxic-metabolic disturbances ( anemia, renal dysfunction, elevated glucose), and possible ICU related delirium. While a left external capsule lacunar infarct was noted on CT, this is not consistent with the observed cognitive profile.    Importantly, collateral history indicates a decline in instrumental activities of daily living over the past year, raising concern for an underlying neurocognitive disorder.  Differential diagnosis includes a mood-related cognitive disorder or evolving mild neurocognitive impairment. Further outpatient neuropsychological evaluation is recommended following medical stabilization to clarify baseline functioning and assess for possible progressive cognitive decline.    Of note: Observational behavior indicates that the patient tends to initially respond with "no" to yes/no questions. This pattern followed by agreement upon further discussion suggests that her responses may be influenced by a combination of language barriers, cognitive inefficiency, and fluctuating awareness or insight. As such, initial refusals may not reflect true opposition to treatment. Consider repeated clarification, use of clear and simple language, and a structured, supportive communication style are recommended to enhance her comprehension and engagement with medical care.    MRI pending likely to provide diagnostic clarity.     No acute follow up required. but outpatient referral to out department is highly recommended.  Neuropsychology Consult      Neuropsychology was consulted to evaluate this 39-year-old female with hypertensive emergency with altered mental status and behavioral dysregulation to assess decision-making capacity and rule out underlying cognitive disorder    Per Hematology   History of microangiopathic hemolytic anemia and thrombocytopenia worked up for TTP, but [id number] normal (57) TTP unlikely.  Plasmapheresis and steroids stopped.  No active bleeding. Hematoma ruled out by CT.  Continue supportive care; monitor CBC and coagulation.    Per Neurology:   Patient refused participation in the neurological exam.  Mental status changes may reflect hypertensive encephalopathy, ICU delirium, or toxic/metabolic causes.  CT showed age indeterminate lacunar infarct; likely incidental.  MRI brain and MRA head/neck ordered.  Routine EEG recommended if patient consents.  Will continue to monitor    Per psychiatry: Consulted for behavioral changes and possible hallucinations; concern for delirium in the setting of malignant hypertension, ICU admission, and encephalopathy.  Plan: Start Trazodone 25 mg QHS for sleep/delirium-related agitation.  Use Zyprexa PRN for acute behavioral issues or agitation.  Patient may require minimal sedation (e.g., Precedex) during MRI due to poor cooperation.  Monitoring and treatment will focus on delirium    Baseline Cognitive and Functional Status:  Reported history of depression (since 2015).  According to her mother, she has had progressive difficulties with IADLs (e.g., caring for her daughter) over the past year, worsening significantly over the past two months suggesting possible chronic neurocognitive decline predating the current admission.  No prior diagnosis of a neurocognitive disorder on record.    Acute Cognitive and Behavioral Findings Neuroimaging (CT 05/13/25):   Age-indeterminate lacunar infarct in the left external capsule- not likely accounting for her current cognitive symptoms  Neuro exam: Limited due to patient non-cooperation; no motor deficits documented.  AMS     Neurocognitive Findings:    Orientation, attention, and working memory: intact    Abstract reasoning: poor    Judgment: basic judgment intact, but qualitative inconsistencies and reduced insight noted (e.g., denying treatment refusal despite documented refusals)    Memory:  Impaired rote verbal memory, but improved with cues which suggests retrieval-based difficulties  Semantic/episodic and visual memory: intact    Language: mild weaknesses, likely due to second-language testing effects    Behavior: pleasant but reserved, required repetition, clarification, and prompting  Mood/Affect: Congruent; cooperative behavior during testing  Insight: Fair; lacks full comprehension of illness severity    Neuropsychological Impression:  The patient’s cognitive presentation is characterized by dysexecutive features, retrieval-based memory difficulties, and fluctuating attention, consistent with an acute encephalopathy. Clinical and contextual data suggest this is likely multifactorial in nature, with contributions from hypertensive encephalopathy, toxic-metabolic disturbances ( anemia, renal dysfunction, elevated glucose), and possible ICU related delirium. While a left external capsule lacunar infarct was noted on CT, this is not consistent with the observed cognitive profile.    Importantly, collateral history indicates a decline in instrumental activities of daily living over the past year, raising concern for an underlying neurocognitive disorder.  Differential diagnosis includes a mood-related cognitive disorder or evolving mild neurocognitive impairment. Further outpatient neuropsychological evaluation is recommended following medical stabilization to clarify baseline functioning and assess for possible progressive cognitive decline.     Overall, the patient's improving medical status suggests a reversible cognitive presentation. If the primary diagnosis is hypertensive encephalopathy, her cognitive symptoms are expected to resolve with medical stabilization. At this time, she demonstrates capacity to make medical decisions. However, given the complexity of her medical history and recent critical illness, continued monitoring is warranted, as she remains at high risk for delirium and associated cognitive fluctuations.    Of note: Observational behavior indicates that the patient tends to initially respond with "no" to yes/no questions. This pattern followed by agreement upon further discussion suggests that her responses may be influenced by a combination of language barriers, cognitive inefficiency, and fluctuating awareness or insight. As such, initial refusals may not reflect true opposition to treatment. Consider repeated clarification, use of clear and simple language, and a structured, supportive communication style are recommended to enhance her comprehension and engagement with medical care.    MRI pending likely to provide diagnostic clarity.     No acute follow up required. but outpatient referral to out department is highly recommended.

## 2025-05-14 NOTE — PROGRESS NOTE ADULT - SUBJECTIVE AND OBJECTIVE BOX
Patient is a 39y old  Female who presents with a chief complaint of Hypertensi (12 May 2025 14:24)        Over Night Events: on RA. On nicardipine.      ROS:     All ROS are negative except HPI       PHYSICAL EXAM    ICU Vital Signs Last 24 Hrs  T(C): 37.1 (14 May 2025 08:00), Max: 37.1 (14 May 2025 08:00)  T(F): 98.8 (14 May 2025 08:00), Max: 98.8 (14 May 2025 08:00)  HR: 84 (14 May 2025 08:00) (82 - 120)  ABP: 169/62 (14 May 2025 08:00) (112/110 - 223/85)  ABP(mean): 87 (14 May 2025 08:00) (70 - 122)  RR: 18 (14 May 2025 08:00) (13 - 38)  SpO2: 97% (13 May 2025 23:00) (96% - 98%)    O2 Parameters below as of 14 May 2025 09:00  Patient On (Oxygen Delivery Method): room air        CONSTITUTIONAL:  Well nourished.  NAD    ENT:   Airway patent,   Mouth with normal mucosa.     EYES:   Pupils equal,   Round and reactive to light.    CARDIAC:   Normal rate,   Regular rhythm.    No edema    Vascular:  Normal systolic impulse  No Carotid bruits    RESPIRATORY:   No wheezing  Bilateral BS  Normal chest expansion  Not tachypneic,  No use of accessory muscles    GASTROINTESTINAL:  Abdomen soft,   Non-tender,   No guarding,   + BS    MUSCULOSKELETAL:   Range of motion is not limited,  No clubbing, cyanosis    NEUROLOGICAL:   Alert and oriented   No motor  deficits.    SKIN:   Skin normal color for race,   Warm and dry and intact.   No evidence of rash.      05-13-25 @ 07:01  -  05-14-25 @ 07:00  --------------------------------------------------------  IN:    NiCARdipine: 575 mL    Oral Fluid: 100 mL    PRBCs (Packed Red Blood Cells): 329 mL  Total IN: 1004 mL    OUT:    Voided (mL): 4750 mL  Total OUT: 4750 mL    Total NET: -3746 mL          LABS:                            7.5    15.56 )-----------( 223      ( 14 May 2025 04:20 )             22.1                                               05-14    137  |  103  |  63[HH]  ----------------------------<  175[H]  3.6   |  21  |  3.2[H]    Ca    7.3[L]      14 May 2025 04:20  Phos  5.1     05-13  Mg     1.8     05-14    TPro  4.1[L]  /  Alb  2.6[L]  /  TBili  0.2  /  DBili  x   /  AST  9   /  ALT  14  /  AlkPhos  62  05-14                                             Urinalysis Basic - ( 14 May 2025 04:20 )    Color: x / Appearance: x / SG: x / pH: x  Gluc: 175 mg/dL / Ketone: x  / Bili: x / Urobili: x   Blood: x / Protein: x / Nitrite: x   Leuk Esterase: x / RBC: x / WBC x   Sq Epi: x / Non Sq Epi: x / Bacteria: x                                                  LIVER FUNCTIONS - ( 14 May 2025 04:20 )  Alb: 2.6 g/dL / Pro: 4.1 g/dL / ALK PHOS: 62 U/L / ALT: 14 U/L / AST: 9 U/L / GGT: x                                                                                                                                       MEDICATIONS  (STANDING):  aspirin  chewable 81 milliGRAM(s) Oral daily  atorvastatin 80 milliGRAM(s) Oral at bedtime  chlorhexidine 2% Cloths 1 Application(s) Topical <User Schedule>  cyanocobalamin 1000 MICROGram(s) Oral daily  dextrose 5%. 1000 milliLiter(s) (100 mL/Hr) IV Continuous <Continuous>  dextrose 5%. 1000 milliLiter(s) (50 mL/Hr) IV Continuous <Continuous>  dextrose 50% Injectable 25 Gram(s) IV Push once  dextrose 50% Injectable 12.5 Gram(s) IV Push once  dextrose 50% Injectable 25 Gram(s) IV Push once  epoetin sergey-epbx (RETACRIT) Injectable 08079 Unit(s) SubCutaneous every 7 days  ferrous    sulfate 325 milliGRAM(s) Oral daily  folic acid 1 milliGRAM(s) Oral daily  furosemide   Injectable 40 milliGRAM(s) IV Push every 12 hours  hydrALAZINE 50 milliGRAM(s) Oral every 6 hours  insulin glargine Injectable (LANTUS) 6 Unit(s) SubCutaneous at bedtime  insulin lispro (ADMELOG) corrective regimen sliding scale   SubCutaneous three times a day before meals  insulin lispro (ADMELOG) corrective regimen sliding scale   SubCutaneous at bedtime  labetalol 300 milliGRAM(s) Oral three times a day  melatonin 5 milliGRAM(s) Oral at bedtime  niCARdipine Infusion 5 mG/Hr (25 mL/Hr) IV Continuous <Continuous>  NIFEdipine  milliGRAM(s) Oral daily  pantoprazole    Tablet 40 milliGRAM(s) Oral every 12 hours  traZODone 25 milliGRAM(s) Oral at bedtime    MEDICATIONS  (PRN):  calcium carbonate   1250 mG (OsCal) 1 Tablet(s) Oral every 6 hours PRN Heartburn  dextrose Oral Gel 15 Gram(s) Oral once PRN Blood Glucose LESS THAN 70 milliGRAM(s)/deciliter      New X-rays reviewed:                                                                                  ECHO    CXR interpreted by me:

## 2025-05-14 NOTE — PROGRESS NOTE ADULT - ASSESSMENT
Assessment and Plan  Case of a 39 year old previously healthy female patient who presented to the ED on 05/06 for evaluation of generalized abdominal pain, nausea, vomiting, and black loose stools, found to have malignant HTN on arrival with evidence of leukocytosis/acute on chronic anemia/thrombocytopenia/elevated LDH and retic/low haptoglobin/schistocytes on smear/proteinuria/JOYCE on blood work and evidence of distal D/proximal J thickening with dilated jejunal loops to 3.4cm and gradual tapering distally wo SBO along with mural enhancement of SB concerning for severe enteritis VS early ischemia VS shock bowel. 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 She was also found to have age indeterminate lacunar infarct on CT 05/13. We are following for above findings.      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 (JOYCE with leukocytosis, thrombocytopenia, and acute on chronic anemia/ not TTP since RESTREPO -)   * Vitals: 195/85mmHg, HR 88 bpm, no fevers  * Labs: WBC 14.71, Hb 7, Plt 228, Na 136, K 4.2, BUN 79, Cr 3.3 on 05/12  * Hb trend: 9 on 05/06 -> 7.2/6.8 on 05/07 s/p 1 unit pRBC -> 6.8 on 05/08 s/p 1 unit -> 7.6 on 05/10 -> 6.9 on 05/11 s/p 1 unit -> 7 on 05/12 -> 6.7 on 05/13 s/p 1 unit pRBC -> 8.6 on 05/13 -> 7.5 on 05/14  * Liver enzymes: 0.7/71/17/14 on 05/08  * Iron with Total Binding Capacity (05.07.25 @ 11:40)    Iron - Total Binding Capacity.: 166 ug/dL   % Saturation, Iron: 11 %   Iron Total: 18 ug/dL   Unsaturated Iron Binding Capacity: 148 ug/dL  * Elevated LDH and retic, low haptoglobin, smear with schistocytes; proteinuria on ua   * INR 0.9 on 05/07; not on AC  * Refused CHRISTINE on 05/12-05/14 despite extensive counseling  * CT Abdomen and Pelvis No Cont (05.07.25 @ 01:49) Edematous distal duodenum and proximal jejunal loops with associated dilatation measuring up to 3.4 cm. Associated marked interloop ascites, mesenteric fat stranding, and prominent mesenteric lymph nodes. Mild-to-moderate ascites. Findings concerning for small bowel ischemia.No portal venous gas, pneumatosis, or pneumoperitoneum.  * CT Angio Abdomen and Pelvis w/ IV Cont (05.07.25 @ 04:18) >Patent SMA, SMV. Redemonstration of distal duodenal and proximal small bowel with marked inflammatory change. Mural enhancement noted throughout the small bowel. Considerations include severe enteritis, early ischemia, shock bowel.  * CT Abdomen and Pelvis No Cont (05.13.25 @ 12:47) No evidence of bowel obstruction.Interval improvement of the proximal small bowel inflammatory change, decreased distention.Please see separate report for concurrent evaluation of thorax.  * No previous EGD or colonoscopy  * No FHx of GI cancers    RECOMMENDATIONS  - In the setting of reported black stools with acute on chronic iron deficiency anemia, the patient will ideally benefit from endoscopic evaluation    - However, in the setting of patient refusal, change to brown stools per patient, uncontrolled BP (recent malignant HTN), concern for hemolysis (schistocytes/high LDH and retic/ low haptoglobin), and concern for TTP like picture per hematology/nephrology, will hold off endoscopic evaluation for now pending optimization (if patient becomes agreeable)   - Hematology and nephrology teams on board: concern for 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 s/p plasmapheresis from 05/08-05/09 and s/p IV solumedrol from 05/07-05/10  - Surgery team on board: no acute intervention; unlikely ischemia per clinical exam  - Trend LA; monitor MAP and keep > 65mmHg (avoid malignant HTN; on nicardipine drip; labetalol; nifedipine) -> management per primary team  - Recommend serial abdominal exams. Check daily KUBs. Monitor electrolytes and replete as indicated. Avoid CCBs, sedatives, anticholinergics; limit opioids. Encourage ambulation and incentive spirometry  - Monitor for pain: management per team  - Monitor for nausea: consider antiemetics PRN if QTc is within normal  - Monitor BM. Stool cx -, GI PCR - on 05/08. Check Clostridium difficile if diarrhea recurs  - Holding off Ab for concern for HUS. ID team is on board  - Continue Protonix 40mg BID for now; avoid inessential NSAIDs  - Trend CBC and transfuse as needed; keep active type and screen  - Decision to continue blood thinners for concern of age indeterminate stroke should be made after discussing risks and benefits in setting of concern for black stools and worsening anemia   - Follow up with our [facility] for EGD/colonoscopy +- VCE (located at [address] [place] [address]. Phone Number: [phone])        Thank you for your consult.  - Please note that plan was communicated with medical team.   - Please reach GI on [phone] during weekdays till 5pm.  - Please call the GI service line after 5pm on Weekdays and anytime on Weekends: [phone].      Art rGaham MD  PGY - 5 Gastroenterology Fellow   [facility]     Assessment and Plan  Case of a 39 year old previously healthy female patient who presented to the ED on 05/06 for evaluation of generalized abdominal pain, nausea, vomiting, and black loose stools, found to have malignant HTN on arrival with evidence of leukocytosis/acute on chronic anemia/thrombocytopenia/elevated LDH and retic/low haptoglobin/schistocytes on smear/proteinuria/JOYCE on blood work and evidence of distal D/proximal J thickening with dilated jejunal loops to 3.4cm and gradual tapering distally wo SBO along with mural enhancement of SB concerning for severe enteritis VS early ischemia VS shock bowel. 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 She was also found to have age indeterminate lacunar infarct on CT 05/13. We are following for above findings.      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 (JOYCE with leukocytosis, thrombocytopenia, and acute on chronic anemia/ not TTP since RESTREPO -)   * Vitals: 195/85mmHg, HR 88 bpm, no fevers  * Labs: WBC 14.71, Hb 7, Plt 228, Na 136, K 4.2, BUN 79, Cr 3.3 on 05/12  * Hb trend: 9 on 05/06 -> 7.2/6.8 on 05/07 s/p 1 unit pRBC -> 6.8 on 05/08 s/p 1 unit -> 7.6 on 05/10 -> 6.9 on 05/11 s/p 1 unit -> 7 on 05/12 -> 6.7 on 05/13 s/p 1 unit pRBC -> 8.6 on 05/13 -> 7.5 on 05/14  * Liver enzymes: 0.7/71/17/14 on 05/08  * Iron with Total Binding Capacity (05.07.25 @ 11:40)    Iron - Total Binding Capacity.: 166 ug/dL   % Saturation, Iron: 11 %   Iron Total: 18 ug/dL   Unsaturated Iron Binding Capacity: 148 ug/dL  * Elevated LDH and retic, low haptoglobin, smear with schistocytes; proteinuria on ua   * INR 0.9 on 05/07; not on AC  * Refused CHRISTINE on 05/12-05/14 despite extensive counseling  * CT Abdomen and Pelvis No Cont (05.07.25 @ 01:49) Edematous distal duodenum and proximal jejunal loops with associated dilatation measuring up to 3.4 cm. Associated marked interloop ascites, mesenteric fat stranding, and prominent mesenteric lymph nodes. Mild-to-moderate ascites. Findings concerning for small bowel ischemia.No portal venous gas, pneumatosis, or pneumoperitoneum.  * CT Angio Abdomen and Pelvis w/ IV Cont (05.07.25 @ 04:18) >Patent SMA, SMV. Redemonstration of distal duodenal and proximal small bowel with marked inflammatory change. Mural enhancement noted throughout the small bowel. Considerations include severe enteritis, early ischemia, shock bowel.  * CT Abdomen and Pelvis No Cont (05.13.25 @ 12:47) No evidence of bowel obstruction.Interval improvement of the proximal small bowel inflammatory change, decreased distention.Please see separate report for concurrent evaluation of thorax.  * No previous EGD or colonoscopy  * No FHx of GI cancers    RECOMMENDATIONS  - In the setting of reported black stools with acute on chronic iron deficiency anemia, the patient will ideally benefit from endoscopic evaluation    - However, in the setting of patient refusal, change to brown stools per patient, uncontrolled BP (recent malignant HTN), concern for hemolysis (schistocytes/high LDH and retic/ low haptoglobin), and concern for TTP like picture per hematology/nephrology, will hold off endoscopic evaluation for now pending optimization (if patient becomes agreeable)   - Hematology and nephrology teams on board: concern for 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 s/p plasmapheresis from 05/08-05/09 and s/p IV solumedrol from 05/07-05/10  - Surgery team on board: no acute intervention; unlikely ischemia per clinical exam  - Trend LA; monitor MAP and keep > 65mmHg (avoid malignant HTN; on nicardipine drip; labetalol; nifedipine) -> management per primary team  - Recommend serial abdominal exams. Check daily KUBs. Monitor electrolytes and replete as indicated. Avoid CCBs, sedatives, anticholinergics; limit opioids. Encourage ambulation and incentive spirometry  - Monitor for pain: management per team  - Monitor for nausea: consider antiemetics PRN if QTc is within normal  - Monitor BM. Stool cx -, GI PCR - on 05/08. Check Clostridium difficile if diarrhea recurs  - Holding off Ab for concern for HUS. ID team is on board  - Continue Protonix 40mg BID for now; avoid inessential NSAIDs  - Trend CBC and transfuse as needed; keep active type and screen  - Decision to continue blood thinners for concern of age indeterminate stroke should be made after discussing risks and benefits in setting of concern for black stools and worsening anemia   - Follow up with our [facility] for EGD/colonoscopy +- VCE (located at [address] [place] [address]. Phone Number: [phone])        Thank you for your consult.  - Please note that plan was communicated with medical team.   - Please reach GI on [phone] during weekdays till 5pm.  - Please call the GI service line after 5pm on Weekdays and anytime on Weekends: [phone].      Art Graham MD  PGY - 5 Gastroenterology Fellow   [facility]

## 2025-05-14 NOTE — PROGRESS NOTE ADULT - ASSESSMENT
IMPRESSION    Malignant hypertension   Hypertensive Emergency  Likely UGIB on presentation  Anemia - unknown etiology  Thrombocytopenia - resolved  Early small bowel ischemia likely 2/2 severe enteritis   SIRS/Sepsis   Uncomplicated Cystitis   JOYCE likely pre-renal   RSV infection     PLAN:     CNS : Avoid CNS depressant.  Delirium Precautions awake follow follow command. CT head noted. MRI noted. Precedex if needed.  Patient is refusing workup.     ENT : Oral Care     Pulmonary : Keep Spo2 > 94% .HOB elevation. Supplemental O2 prn.     Cardiology: ECHO LVH.  Porcardia 120 XL   labetalol 300 Q8 hrs    Hydralazine 50 mg PO q6  Lasix 40 bid  nicardipine ggt PRN.      GI : follow GI dropping h/h hx possible melena   follow Gi and G sx   CBC bid  PPI Q24  Keep T & S active, CBC q24, 18 G IV x2, Keep Hb > 7    Renal : Trend CMP. Monitor Lytes and replete as needed. Nephro  follow up   RA duplex negative  FU work up for secondary hypertension,     Hem/Onc : Coagulation studies noted.   steroids d/c off plasma   RESTREPO 13  negative   s/p plasmapheresis stopped   Monitor CBC  Repeat hemolysis panel negative  LE duplex - pt refused    Endo:  Blood glucose Goal : 140-180. insulin drip for now     MSK: Ambulate as tolerated     Code : Full code.      Disp:  MICU today

## 2025-05-14 NOTE — PROGRESS NOTE ADULT - CRITICAL CARE ATTENDING COMMENT
I have personally seen and examined this patient.    I have reviewed all pertinent clinical information and reviewed all relevant imaging and diagnostic studies personally.   I discussed recommendations with the primary team.
This patient is critically ill due to the following:  * Multiple organ failure requiring complex decision-making, and there is a high probability of imminent or life-threatening deterioration in the patient’s condition  * The patient required frequent reassessments and monitoring to ensure response to interventions and therapies.    Critical care time includes time spent evaluating and treating the patient's acute illness as well as time spent reviewing labs, radiology,  and discussing the case with a multidisciplinary team in an effort to prevent further life threatening deterioration or end organ damage. This time is independent of any procedures performed.
This patient is critically ill due to the following:  * Hemodynamic instability requiring titration of vasopressors or other vasoactive agents  * Multiple organ failure requiring complex decision-making, and there is a high probability of imminent or life-threatening deterioration in the patient’s condition  * The patient required frequent reassessments and monitoring to ensure response to interventions and therapies.    Critical care time includes time spent evaluating and treating the patient's acute illness as well as time spent reviewing labs, radiology,  and discussing the case with a multidisciplinary team in an effort to prevent further life threatening deterioration or end organ damage. This time is independent of any procedures performed.

## 2025-05-15 LAB
ALBUMIN SERPL ELPH-MCNC: 2.9 G/DL — LOW (ref 3.5–5.2)
ALP SERPL-CCNC: 88 U/L — SIGNIFICANT CHANGE UP (ref 30–115)
ALT FLD-CCNC: 14 U/L — SIGNIFICANT CHANGE UP (ref 0–41)
ANION GAP SERPL CALC-SCNC: 14 MMOL/L — SIGNIFICANT CHANGE UP (ref 7–14)
AST SERPL-CCNC: 11 U/L — SIGNIFICANT CHANGE UP (ref 0–41)
BASOPHILS # BLD AUTO: 0.01 K/UL — SIGNIFICANT CHANGE UP (ref 0–0.2)
BASOPHILS NFR BLD AUTO: 0.1 % — SIGNIFICANT CHANGE UP (ref 0–1)
BILIRUB SERPL-MCNC: 0.3 MG/DL — SIGNIFICANT CHANGE UP (ref 0.2–1.2)
BUN SERPL-MCNC: 62 MG/DL — CRITICAL HIGH (ref 10–20)
CALCIUM SERPL-MCNC: 7.4 MG/DL — LOW (ref 8.4–10.5)
CHLORIDE SERPL-SCNC: 105 MMOL/L — SIGNIFICANT CHANGE UP (ref 98–110)
CO2 SERPL-SCNC: 21 MMOL/L — SIGNIFICANT CHANGE UP (ref 17–32)
CREAT SERPL-MCNC: 3.5 MG/DL — HIGH (ref 0.7–1.5)
EGFR: 16 ML/MIN/1.73M2 — LOW
EGFR: 16 ML/MIN/1.73M2 — LOW
EOSINOPHIL # BLD AUTO: 0.18 K/UL — SIGNIFICANT CHANGE UP (ref 0–0.7)
EOSINOPHIL NFR BLD AUTO: 1.2 % — SIGNIFICANT CHANGE UP (ref 0–8)
GLUCOSE BLDC GLUCOMTR-MCNC: 142 MG/DL — HIGH (ref 70–99)
GLUCOSE BLDC GLUCOMTR-MCNC: 144 MG/DL — HIGH (ref 70–99)
GLUCOSE BLDC GLUCOMTR-MCNC: 185 MG/DL — HIGH (ref 70–99)
GLUCOSE SERPL-MCNC: 129 MG/DL — HIGH (ref 70–99)
HCT VFR BLD CALC: 22.7 % — LOW (ref 37–47)
HGB BLD-MCNC: 7.7 G/DL — LOW (ref 12–16)
IMM GRANULOCYTES NFR BLD AUTO: 1.9 % — HIGH (ref 0.1–0.3)
LYMPHOCYTES # BLD AUTO: 0.78 K/UL — LOW (ref 1.2–3.4)
LYMPHOCYTES # BLD AUTO: 5.4 % — LOW (ref 20.5–51.1)
MAGNESIUM SERPL-MCNC: 1.9 MG/DL — SIGNIFICANT CHANGE UP (ref 1.8–2.4)
MCHC RBC-ENTMCNC: 30.1 PG — SIGNIFICANT CHANGE UP (ref 27–31)
MCHC RBC-ENTMCNC: 33.9 G/DL — SIGNIFICANT CHANGE UP (ref 32–37)
MCV RBC AUTO: 88.7 FL — SIGNIFICANT CHANGE UP (ref 81–99)
MONOCYTES # BLD AUTO: 0.89 K/UL — HIGH (ref 0.1–0.6)
MONOCYTES NFR BLD AUTO: 6.2 % — SIGNIFICANT CHANGE UP (ref 1.7–9.3)
NEUTROPHILS # BLD AUTO: 12.32 K/UL — HIGH (ref 1.4–6.5)
NEUTROPHILS NFR BLD AUTO: 85.2 % — HIGH (ref 42.2–75.2)
NRBC BLD AUTO-RTO: 0 /100 WBCS — SIGNIFICANT CHANGE UP (ref 0–0)
PLATELET # BLD AUTO: 244 K/UL — SIGNIFICANT CHANGE UP (ref 130–400)
PMV BLD: 9.9 FL — SIGNIFICANT CHANGE UP (ref 7.4–10.4)
POTASSIUM SERPL-MCNC: 3.8 MMOL/L — SIGNIFICANT CHANGE UP (ref 3.5–5)
POTASSIUM SERPL-SCNC: 3.8 MMOL/L — SIGNIFICANT CHANGE UP (ref 3.5–5)
PROT SERPL-MCNC: 4.6 G/DL — LOW (ref 6–8)
RBC # BLD: 2.56 M/UL — LOW (ref 4.2–5.4)
RBC # FLD: 15.1 % — HIGH (ref 11.5–14.5)
SODIUM SERPL-SCNC: 140 MMOL/L — SIGNIFICANT CHANGE UP (ref 135–146)
WBC # BLD: 14.46 K/UL — HIGH (ref 4.8–10.8)
WBC # FLD AUTO: 14.46 K/UL — HIGH (ref 4.8–10.8)

## 2025-05-15 PROCEDURE — 99233 SBSQ HOSP IP/OBS HIGH 50: CPT

## 2025-05-15 PROCEDURE — 99233 SBSQ HOSP IP/OBS HIGH 50: CPT | Mod: GC

## 2025-05-15 RX ORDER — LORAZEPAM 4 MG/ML
2 VIAL (ML) INJECTION ONCE
Refills: 0 | Status: DISCONTINUED | OUTPATIENT
Start: 2025-05-15 | End: 2025-05-15

## 2025-05-15 RX ORDER — ACETAMINOPHEN 500 MG/5ML
650 LIQUID (ML) ORAL EVERY 6 HOURS
Refills: 0 | Status: DISCONTINUED | OUTPATIENT
Start: 2025-05-15 | End: 2025-05-27

## 2025-05-15 RX ORDER — TRAZODONE HCL 100 MG
50 TABLET ORAL AT BEDTIME
Refills: 0 | Status: DISCONTINUED | OUTPATIENT
Start: 2025-05-15 | End: 2025-05-27

## 2025-05-15 RX ORDER — DEXMEDETOMIDINE HYDROCHLORIDE IN SODIUM CHLORIDE 4 UG/ML
0.5 INJECTION INTRAVENOUS
Qty: 200 | Refills: 0 | Status: DISCONTINUED | OUTPATIENT
Start: 2025-05-15 | End: 2025-05-15

## 2025-05-15 RX ADMIN — Medication 50 MILLIGRAM(S): at 01:44

## 2025-05-15 RX ADMIN — Medication 5 MILLIGRAM(S): at 21:22

## 2025-05-15 RX ADMIN — FUROSEMIDE 40 MILLIGRAM(S): 10 INJECTION INTRAMUSCULAR; INTRAVENOUS at 17:17

## 2025-05-15 RX ADMIN — Medication 40 MILLIGRAM(S): at 05:51

## 2025-05-15 RX ADMIN — FOLIC ACID 1 MILLIGRAM(S): 1 TABLET ORAL at 13:15

## 2025-05-15 RX ADMIN — Medication 81 MILLIGRAM(S): at 11:49

## 2025-05-15 RX ADMIN — Medication 120 MILLIGRAM(S): at 05:51

## 2025-05-15 RX ADMIN — LABETALOL HYDROCHLORIDE 300 MILLIGRAM(S): 200 TABLET, FILM COATED ORAL at 21:22

## 2025-05-15 RX ADMIN — Medication 50 MILLIGRAM(S): at 11:49

## 2025-05-15 RX ADMIN — CYANOCOBALAMIN 1000 MICROGRAM(S): 1000 INJECTION INTRAMUSCULAR; SUBCUTANEOUS at 11:49

## 2025-05-15 RX ADMIN — Medication 50 MILLIGRAM(S): at 23:35

## 2025-05-15 RX ADMIN — ATORVASTATIN CALCIUM 80 MILLIGRAM(S): 80 TABLET, FILM COATED ORAL at 21:22

## 2025-05-15 RX ADMIN — Medication 50 MILLIGRAM(S): at 08:27

## 2025-05-15 RX ADMIN — CALCITRIOL 0.25 MICROGRAM(S): 0.5 CAPSULE, GELATIN COATED ORAL at 11:49

## 2025-05-15 RX ADMIN — Medication 650 MILLIGRAM(S): at 05:26

## 2025-05-15 RX ADMIN — DEXMEDETOMIDINE HYDROCHLORIDE IN SODIUM CHLORIDE 8.23 MICROGRAM(S)/KG/HR: 4 INJECTION INTRAVENOUS at 16:34

## 2025-05-15 RX ADMIN — Medication 2 MILLIGRAM(S): at 21:21

## 2025-05-15 RX ADMIN — Medication 325 MILLIGRAM(S): at 11:49

## 2025-05-15 RX ADMIN — Medication 650 MILLIGRAM(S): at 04:27

## 2025-05-15 RX ADMIN — LABETALOL HYDROCHLORIDE 300 MILLIGRAM(S): 200 TABLET, FILM COATED ORAL at 05:51

## 2025-05-15 RX ADMIN — Medication 50 MILLIGRAM(S): at 17:17

## 2025-05-15 RX ADMIN — Medication 50 MILLIGRAM(S): at 21:22

## 2025-05-15 RX ADMIN — Medication 40 MILLIGRAM(S): at 17:16

## 2025-05-15 RX ADMIN — FUROSEMIDE 40 MILLIGRAM(S): 10 INJECTION INTRAMUSCULAR; INTRAVENOUS at 08:20

## 2025-05-15 RX ADMIN — LABETALOL HYDROCHLORIDE 300 MILLIGRAM(S): 200 TABLET, FILM COATED ORAL at 13:16

## 2025-05-15 NOTE — PROGRESS NOTE ADULT - ASSESSMENT
· Assessment	  IMPRESSION    Malignant hypertension   Hypertensive Emergency  Likely UGIB on presentation  Anemia - unknown etiology  Thrombocytopenia - resolved  Early small bowel ischemia likely 2/2 severe enteritis   SIRS/Sepsis   Uncomplicated Cystitis   JOYCE likely pre-renal   RSV infection     PLAN:     CNS : Avoid CNS depressant.  Delirium Precautions awake follow follow command. CT head noted. MRI noted. Precedex if needed.  ENT : Oral Care     Pulmonary : Keep Spo2 > 94% .HOB elevation. Supplemental O2 prn.     Cardiology: ECHO LVH.  Porcardia 120 XL   labetalol 300 Q8 hrs    Hydralazine 50 mg PO q6  Lasix 40 bid  nicardipine ggt PRN.      GI : follow GI dropping h/h hx possible melena   follow Gi and G sx   CBC bid  PPI Q24  Keep T & S active, CBC q24, 18 G IV x2, Keep Hb > 7    Renal : Trend CMP. Monitor Lytes and replete as needed. Nephro  follow up   RA duplex negative  FU work up for secondary hypertension,     Hem/Onc : Coagulation studies noted.   steroids d/c off plasma   RESTREPO 13  negative   s/p plasmapheresis stopped   Monitor CBC  Repeat hemolysis panel negative  LE duplex - pt refused    Endo:  Blood glucose Goal : 140-180.     MSK: Ambulate as tolerated     Code : Full code.      Disp:  MICU today

## 2025-05-15 NOTE — PROGRESS NOTE ADULT - SUBJECTIVE AND OBJECTIVE BOX
[facility]      SUBJECTIVE/OVERNIGHT EVENTS  Today is hospital day 8d. Patient was seen and examined today at bedside. No acute events overnight. Patient could not tolerate MRI yesterday, will retry again today with sedation       MEDICATIONS  STANDING MEDICATIONS  aspirin  chewable 81 milliGRAM(s) Oral daily  atorvastatin 80 milliGRAM(s) Oral at bedtime  calcitriol   Capsule 0.25 MICROGram(s) Oral daily  chlorhexidine 2% Cloths 1 Application(s) Topical <User Schedule>  cyanocobalamin 1000 MICROGram(s) Oral daily  dextrose 5%. 1000 milliLiter(s) IV Continuous <Continuous>  dextrose 5%. 1000 milliLiter(s) IV Continuous <Continuous>  dextrose 50% Injectable 25 Gram(s) IV Push once  dextrose 50% Injectable 12.5 Gram(s) IV Push once  dextrose 50% Injectable 25 Gram(s) IV Push once  epoetin sergey-epbx (RETACRIT) Injectable 11717 Unit(s) SubCutaneous every 7 days  ferrous    sulfate 325 milliGRAM(s) Oral daily  folic acid 1 milliGRAM(s) Oral daily  furosemide   Injectable 40 milliGRAM(s) IV Push every 12 hours  hydrALAZINE 50 milliGRAM(s) Oral every 6 hours  insulin glargine Injectable (LANTUS) 6 Unit(s) SubCutaneous at bedtime  insulin lispro (ADMELOG) corrective regimen sliding scale   SubCutaneous three times a day before meals  insulin lispro (ADMELOG) corrective regimen sliding scale   SubCutaneous at bedtime  labetalol 300 milliGRAM(s) Oral three times a day  melatonin 5 milliGRAM(s) Oral at bedtime  NIFEdipine  milliGRAM(s) Oral daily  pantoprazole    Tablet 40 milliGRAM(s) Oral every 12 hours  traZODone 50 milliGRAM(s) Oral at bedtime    PRN MEDICATIONS  acetaminophen     Tablet .. 650 milliGRAM(s) Oral every 6 hours PRN  calcium carbonate   1250 mG (OsCal) 1 Tablet(s) Oral every 6 hours PRN  dextrose Oral Gel 15 Gram(s) Oral once PRN    VITALS  T(F): 97.5 (05-14-25 @ 21:00), Max: 98.7 (05-14-25 @ 16:00)  HR: 78 (05-15-25 @ 10:00) (78 - 107)  BP: 134/69 (05-14-25 @ 21:00) (132/67 - 134/69)  RR: 24 (05-15-25 @ 10:00) (17 - 38)  SpO2: 97% (05-15-25 @ 05:00) (95% - 97%)  POCT Blood Glucose.: 144 mg/dL (05-15-25 @ 08:45)  POCT Blood Glucose.: 153 mg/dL (05-14-25 @ 21:41)        PHYSICAL EXAM:  GENERAL: NAD, lying in bed comfortably  HEAD:  Atraumatic, Normocephalic  EYES: EOMI, PERRLA, conjunctiva and sclera clear  ENT: Moist mucous membranes  NECK: Supple, No JVD  CHEST/LUNG: Clear to auscultation bilaterally; No rales, rhonchi, wheezing, or rubs. Unlabored respirations  HEART: Regular rate and rhythm; No murmurs, rubs, or gallops  ABDOMEN: Bowel sounds present; Soft, Nontender, Nondistended. No hepatomegaly  EXTREMITIES:  2+ Peripheral Pulses, brisk capillary refill. No clubbing, cyanosis, or edema  NERVOUS SYSTEM:  Alert & Oriented X3, speech clear. No deficits   MSK: FROM all 4 extremities, full and equal strength  SKIN: No rashes or lesions      LABS             7.7    14.46 )-----------( 244      ( 05-15-25 @ 04:54 )             22.7     140  |  105  |  62  -------------------------<  129   05-15-25 @ 04:54  3.8  |  21  |  3.5    Ca      7.4     05-15-25 @ 04:54  Mg     1.9     05-15-25 @ 04:54    TPro  4.6  /  Alb  2.9  /  TBili  0.3  /  DBili  x   /  AST  11  /  ALT  14  /  AlkPhos  88  /  GGT  x     05-15-25 @ 04:54        Urinalysis Basic - ( 15 May 2025 04:54 )    Color: x / Appearance: x / SG: x / pH: x  Gluc: 129 mg/dL / Ketone: x  / Bili: x / Urobili: x   Blood: x / Protein: x / Nitrite: x   Leuk Esterase: x / RBC: x / WBC x   Sq Epi: x / Non Sq Epi: x / Bacteria: x          IMAGING

## 2025-05-15 NOTE — PROGRESS NOTE ADULT - ASSESSMENT
Assessment and Plan  Case of a 39 year old previously healthy female patient who presented to the ED on 05/06 for evaluation of generalized abdominal pain, nausea, vomiting, and black loose stools, found to have malignant HTN on arrival with evidence of leukocytosis/acute on chronic anemia/thrombocytopenia/elevated LDH and retic/low haptoglobin/schistocytes on smear/proteinuria/JOYCE on blood work and evidence of distal D/proximal J thickening with dilated jejunal loops to 3.4cm and gradual tapering distally wo SBO along with mural enhancement of SB concerning for severe enteritis VS early ischemia VS shock bowel. 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 She was also found to have age indeterminate lacunar infarct on CT 05/13. We are following for above findings.      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 (JOYCE with leukocytosis, thrombocytopenia, and acute on chronic anemia/ not TTP since RESTREPO -)   * Vitals: 195/85mmHg, HR 88 bpm, no fevers  * Labs: WBC 14.71, Hb 7, Plt 228, Na 136, K 4.2, BUN 79, Cr 3.3 on 05/12  * Hb trend: 9 on 05/06 -> 7.2/6.8 on 05/07 s/p 1 unit pRBC -> 6.8 on 05/08 s/p 1 unit -> 7.6 on 05/10 -> 6.9 on 05/11 s/p 1 unit -> 7 on 05/12 -> 6.7 on 05/13 s/p 1 unit pRBC -> 8.6 on 05/13 -> 7.5 on 05/14 -> 7.7 on 05/15  * Liver enzymes: 0.7/71/17/14 on 05/08  * Iron with Total Binding Capacity (05.07.25 @ 11:40): Iron - Total Binding Capacity.: 166 ug/dL; % Saturation, Iron: 11 %; Iron Total: 18 ug/dL; Unsaturated Iron Binding Capacity: 148 ug/dL  * Elevated LDH and retic, low haptoglobin, smear with schistocytes; proteinuria on ua   * INR 0.9 on 05/07; not on AC  * Refused CHRISTINE on 05/12-05/14 despite extensive counseling  * CT Abdomen and Pelvis No Cont (05.07.25 @ 01:49) Edematous distal duodenum and proximal jejunal loops with associated dilatation measuring up to 3.4 cm. Associated marked interloop ascites, mesenteric fat stranding, and prominent mesenteric lymph nodes. Mild-to-moderate ascites. Findings concerning for small bowel ischemia.No portal venous gas, pneumatosis, or pneumoperitoneum.  * CT Angio Abdomen and Pelvis w/ IV Cont (05.07.25 @ 04:18) >Patent SMA, SMV. Redemonstration of distal duodenal and proximal small bowel with marked inflammatory change. Mural enhancement noted throughout the small bowel. Considerations include severe enteritis, early ischemia, shock bowel.  * CT Abdomen and Pelvis No Cont (05.13.25 @ 12:47) No evidence of bowel obstruction.Interval improvement of the proximal small bowel inflammatory change, decreased distention.Please see separate report for concurrent evaluation of thorax.  * No previous EGD or colonoscopy  * No FHx of GI cancers    RECOMMENDATIONS  - In the setting of previously reported black stools with acute on chronic iron deficiency anemia, the patient will ideally benefit from endoscopic evaluation    - However, in the setting of patient refusal, change to brown stools per patient since 05/13, uncontrolled BP (recent malignant HTN), concern for hemolysis (schistocytes/high LDH and retic/ low haptoglobin), and concern for TTP like picture per hematology/nephrology, will hold off endoscopic evaluation for now pending optimization (if patient becomes agreeable)   - Hematology and nephrology teams on board: 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 s/p plasmapheresis from 05/08-05/09 and s/p IV solumedrol from 05/07-05/10  - Surgery team on board: no acute intervention; unlikely ischemia per clinical exam  - Trend LA; monitor MAP and keep > 65mmHg (avoid malignant HTN; on nicardipine drip; labetalol; nifedipine) -> management per primary team  - Recommend serial abdominal exams. Check daily KUBs. Monitor electrolytes and replete as indicated. Avoid CCBs, sedatives, anticholinergics; limit opioids. Encourage ambulation and incentive spirometry  - Monitor for pain: management per team  - Monitor for nausea: consider antiemetics PRN if QTc is within normal  - Monitor BM. Stool cx -, GI PCR - on 05/08. Check Clostridium difficile if diarrhea recurs  - Holding off Ab for concern for HUS. ID team is on board  - Continue Protonix 40mg BID for now; avoid inessential NSAIDs  - Trend CBC and transfuse as needed; keep active type and screen  - Decision to continue blood thinners for concern of age indeterminate stroke should be made after discussing risks and benefits  - Follow up with our [facility] for EGD/colonoscopy +- VCE (located at [address] [place] [address]. Phone Number: [phone])        Thank you for your consult.  - Please note that plan was communicated with medical team.   - Please reach GI on [phone] during weekdays till 5pm.  - Please call the GI service line after 5pm on Weekdays and anytime on Weekends: [phone].      Art Graham MD  PGY - 5 Gastroenterology Fellow   [facility]

## 2025-05-15 NOTE — PROGRESS NOTE ADULT - SUBJECTIVE AND OBJECTIVE BOX
Patient is a 39y old  Female who presents with a chief complaint of Hypertensi (12 May 2025 14:24)        Over Night Events:        ROS:     All ROS are negative except HPI         PHYSICAL EXAM    ICU Vital Signs Last 24 Hrs  T(C): 36.4 (14 May 2025 21:00), Max: 37.1 (14 May 2025 16:00)  T(F): 97.5 (14 May 2025 21:00), Max: 98.7 (14 May 2025 16:00)  HR: 91 (15 May 2025 07:00) (82 - 107)  BP: 134/69 (14 May 2025 21:00) (132/67 - 146/63)  BP(mean): 94 (14 May 2025 21:00) (91 - 94)  ABP: 138/59 (15 May 2025 07:00) (126/58 - 185/71)  ABP(mean): 83 (15 May 2025 07:00) (79 - 99)  RR: 17 (15 May 2025 07:00) (17 - 38)  SpO2: 97% (15 May 2025 05:00) (95% - 97%)    O2 Parameters below as of 15 May 2025 07:00  Patient On (Oxygen Delivery Method): room air            CONSTITUTIONAL:  Well nourished.  NAD    ENT:   Airway patent,   Mouth with normal mucosa.   No thrush    EYES:   Pupils equal,   Round and reactive to light.    CARDIAC:   Normal rate,   Regular rhythm.    No edema      Vascular:  Normal systolic impulse  No Carotid bruits    RESPIRATORY:   No wheezing  Bilateral BS  Normal chest expansion  Not tachypneic,  No use of accessory muscles    GASTROINTESTINAL:  Abdomen soft,   Non-tender,   No guarding,   + BS    MUSCULOSKELETAL:   Range of motion is not limited,  No clubbing, cyanosis    NEUROLOGICAL:   Alert and oriented   No motor  deficits.    SKIN:   Skin normal color for race,   Warm and dry and intact.   No evidence of rash.    PSYCHIATRIC:   Normal mood and affect.   No apparent risk to self or others.    HEMATOLOGICAL:  No cervical  lymphadenopathy.  no inguinal lymphadenopathy      05-14-25 @ 07:01  -  05-15-25 @ 07:00  --------------------------------------------------------  IN:    NiCARdipine: 796.8 mL    Oral Fluid: 1400 mL  Total IN: 2196.8 mL    OUT:    Voided (mL): 2560 mL  Total OUT: 2560 mL    Total NET: -363.2 mL          LABS:                            7.7    14.46 )-----------( 244      ( 15 May 2025 04:54 )             22.7                                               05-15    140  |  105  |  62[HH]  ----------------------------<  129[H]  3.8   |  21  |  3.5[H]    Ca    7.4[L]      15 May 2025 04:54  Mg     1.9     05-15    TPro  4.6[L]  /  Alb  2.9[L]  /  TBili  0.3  /  DBili  x   /  AST  11  /  ALT  14  /  AlkPhos  88  05-15                                             Urinalysis Basic - ( 15 May 2025 04:54 )    Color: x / Appearance: x / SG: x / pH: x  Gluc: 129 mg/dL / Ketone: x  / Bili: x / Urobili: x   Blood: x / Protein: x / Nitrite: x   Leuk Esterase: x / RBC: x / WBC x   Sq Epi: x / Non Sq Epi: x / Bacteria: x                                                  LIVER FUNCTIONS - ( 15 May 2025 04:54 )  Alb: 2.9 g/dL / Pro: 4.6 g/dL / ALK PHOS: 88 U/L / ALT: 14 U/L / AST: 11 U/L / GGT: x                                                                                                                                       MEDICATIONS  (STANDING):  aspirin  chewable 81 milliGRAM(s) Oral daily  atorvastatin 80 milliGRAM(s) Oral at bedtime  calcitriol   Capsule 0.25 MICROGram(s) Oral daily  chlorhexidine 2% Cloths 1 Application(s) Topical <User Schedule>  cyanocobalamin 1000 MICROGram(s) Oral daily  dextrose 5%. 1000 milliLiter(s) (100 mL/Hr) IV Continuous <Continuous>  dextrose 5%. 1000 milliLiter(s) (50 mL/Hr) IV Continuous <Continuous>  dextrose 50% Injectable 25 Gram(s) IV Push once  dextrose 50% Injectable 12.5 Gram(s) IV Push once  dextrose 50% Injectable 25 Gram(s) IV Push once  epoetin sergey-epbx (RETACRIT) Injectable 42332 Unit(s) SubCutaneous every 7 days  ferrous    sulfate 325 milliGRAM(s) Oral daily  folic acid 1 milliGRAM(s) Oral daily  furosemide   Injectable 40 milliGRAM(s) IV Push every 12 hours  hydrALAZINE 50 milliGRAM(s) Oral every 6 hours  insulin glargine Injectable (LANTUS) 6 Unit(s) SubCutaneous at bedtime  insulin lispro (ADMELOG) corrective regimen sliding scale   SubCutaneous three times a day before meals  insulin lispro (ADMELOG) corrective regimen sliding scale   SubCutaneous at bedtime  labetalol 300 milliGRAM(s) Oral three times a day  melatonin 5 milliGRAM(s) Oral at bedtime  NIFEdipine  milliGRAM(s) Oral daily  pantoprazole    Tablet 40 milliGRAM(s) Oral every 12 hours  traZODone 50 milliGRAM(s) Oral at bedtime    MEDICATIONS  (PRN):  acetaminophen     Tablet .. 650 milliGRAM(s) Oral every 6 hours PRN Mild Pain (1 - 3)  calcium carbonate   1250 mG (OsCal) 1 Tablet(s) Oral every 6 hours PRN Heartburn  dextrose Oral Gel 15 Gram(s) Oral once PRN Blood Glucose LESS THAN 70 milliGRAM(s)/deciliter      New X-rays reviewed:                                                                                  ECHO    CXR interpreted by me:       Patient is a 39y old  Female who presents with a chief complaint of Hypertensi (12 May 2025 14:24)        Over Night Events: Off Nicardipine since 2 am.        ROS:     All ROS are negative except HPI         PHYSICAL EXAM    ICU Vital Signs Last 24 Hrs  T(C): 36.4 (14 May 2025 21:00), Max: 37.1 (14 May 2025 16:00)  T(F): 97.5 (14 May 2025 21:00), Max: 98.7 (14 May 2025 16:00)  HR: 91 (15 May 2025 07:00) (82 - 107)  BP: 134/69 (14 May 2025 21:00) (132/67 - 146/63)  BP(mean): 94 (14 May 2025 21:00) (91 - 94)  ABP: 138/59 (15 May 2025 07:00) (126/58 - 185/71)  ABP(mean): 83 (15 May 2025 07:00) (79 - 99)  RR: 17 (15 May 2025 07:00) (17 - 38)  SpO2: 97% (15 May 2025 05:00) (95% - 97%)    O2 Parameters below as of 15 May 2025 07:00  Patient On (Oxygen Delivery Method): room air            CONSTITUTIONAL:  Well nourished.  NAD    ENT:   Airway patent,   Mouth with normal mucosa.   No thrush    EYES:   Pupils equal,   Round and reactive to light.    CARDIAC:   Normal rate,   Regular rhythm.    No edema      Vascular:  Normal systolic impulse  No Carotid bruits    RESPIRATORY:   No wheezing  Bilateral BS  Normal chest expansion  Not tachypneic,  No use of accessory muscles    GASTROINTESTINAL:  Abdomen soft,   Non-tender,   No guarding,   + BS    MUSCULOSKELETAL:   Range of motion is not limited,  No clubbing, cyanosis    NEUROLOGICAL:   Alert and oriented   No motor  deficits.    SKIN:   Skin normal color for race,   Warm and dry and intact.   No evidence of rash.          05-14-25 @ 07:01  -  05-15-25 @ 07:00  --------------------------------------------------------  IN:    NiCARdipine: 796.8 mL    Oral Fluid: 1400 mL  Total IN: 2196.8 mL    OUT:    Voided (mL): 2560 mL  Total OUT: 2560 mL    Total NET: -363.2 mL          LABS:                            7.7    14.46 )-----------( 244      ( 15 May 2025 04:54 )             22.7                                               05-15    140  |  105  |  62[HH]  ----------------------------<  129[H]  3.8   |  21  |  3.5[H]    Ca    7.4[L]      15 May 2025 04:54  Mg     1.9     05-15    TPro  4.6[L]  /  Alb  2.9[L]  /  TBili  0.3  /  DBili  x   /  AST  11  /  ALT  14  /  AlkPhos  88  05-15                                             Urinalysis Basic - ( 15 May 2025 04:54 )    Color: x / Appearance: x / SG: x / pH: x  Gluc: 129 mg/dL / Ketone: x  / Bili: x / Urobili: x   Blood: x / Protein: x / Nitrite: x   Leuk Esterase: x / RBC: x / WBC x   Sq Epi: x / Non Sq Epi: x / Bacteria: x                                                  LIVER FUNCTIONS - ( 15 May 2025 04:54 )  Alb: 2.9 g/dL / Pro: 4.6 g/dL / ALK PHOS: 88 U/L / ALT: 14 U/L / AST: 11 U/L / GGT: x                                                                                                                                       MEDICATIONS  (STANDING):  aspirin  chewable 81 milliGRAM(s) Oral daily  atorvastatin 80 milliGRAM(s) Oral at bedtime  calcitriol   Capsule 0.25 MICROGram(s) Oral daily  chlorhexidine 2% Cloths 1 Application(s) Topical <User Schedule>  cyanocobalamin 1000 MICROGram(s) Oral daily  dextrose 5%. 1000 milliLiter(s) (100 mL/Hr) IV Continuous <Continuous>  dextrose 5%. 1000 milliLiter(s) (50 mL/Hr) IV Continuous <Continuous>  dextrose 50% Injectable 25 Gram(s) IV Push once  dextrose 50% Injectable 12.5 Gram(s) IV Push once  dextrose 50% Injectable 25 Gram(s) IV Push once  epoetin sergey-epbx (RETACRIT) Injectable 03202 Unit(s) SubCutaneous every 7 days  ferrous    sulfate 325 milliGRAM(s) Oral daily  folic acid 1 milliGRAM(s) Oral daily  furosemide   Injectable 40 milliGRAM(s) IV Push every 12 hours  hydrALAZINE 50 milliGRAM(s) Oral every 6 hours  insulin glargine Injectable (LANTUS) 6 Unit(s) SubCutaneous at bedtime  insulin lispro (ADMELOG) corrective regimen sliding scale   SubCutaneous three times a day before meals  insulin lispro (ADMELOG) corrective regimen sliding scale   SubCutaneous at bedtime  labetalol 300 milliGRAM(s) Oral three times a day  melatonin 5 milliGRAM(s) Oral at bedtime  NIFEdipine  milliGRAM(s) Oral daily  pantoprazole    Tablet 40 milliGRAM(s) Oral every 12 hours  traZODone 50 milliGRAM(s) Oral at bedtime    MEDICATIONS  (PRN):  acetaminophen     Tablet .. 650 milliGRAM(s) Oral every 6 hours PRN Mild Pain (1 - 3)  calcium carbonate   1250 mG (OsCal) 1 Tablet(s) Oral every 6 hours PRN Heartburn  dextrose Oral Gel 15 Gram(s) Oral once PRN Blood Glucose LESS THAN 70 milliGRAM(s)/deciliter      New X-rays reviewed:                                                                                  ECHO    CXR interpreted by me:

## 2025-05-15 NOTE — PROGRESS NOTE ADULT - ASSESSMENT
IMPRESSION    Malignant hypertension   Hypertensive Emergency  Likely UGIB on presentation  Anemia - unknown etiology  Thrombocytopenia - resolved  Early small bowel ischemia likely 2/2 severe enteritis   SIRS/Sepsis   Uncomplicated Cystitis   JOYCE likely pre-renal   RSV infection     PLAN:     CNS : Avoid CNS depressant.  Delirium Precautions awake follow follow command. CT head noted. MRI pending. Precedex if needed.  Patient is refusing workup.     ENT : Oral Care     Pulmonary : Keep Spo2 > 94% .HOB elevation. Supplemental O2 prn.     Cardiology: ECHO LVH.  Porcardia 120 XL   labetalol 300 Q8 hrs    Hydralazine 50 mg PO q6  Lasix 40 bid  nicardipine ggt PRN.      GI : follow GI dropping h/h hx possible melena   follow Gi and G sx   CBC bid  PPI Q24  Keep T & S active, CBC q24, 18 G IV x2, Keep Hb > 7    Renal : Trend CMP. Monitor Lytes and replete as needed. Nephro  follow up   RA duplex negative  FU work up for secondary hypertension,   Elevated renin and Alex - Renal following    Hem/Onc : Coagulation studies noted.   steroids d/c off plasma   RESTREPO 13  negative   s/p plasmapheresis stopped   Monitor CBC  Repeat hemolysis panel negative  LE duplex - pt refused    Endo:  Blood glucose Goal : 140-180. insulin drip for now     MSK: Ambulate as tolerated     Code : Full code.      Disp:  MICU today

## 2025-05-15 NOTE — PROGRESS NOTE ADULT - SUBJECTIVE AND OBJECTIVE BOX
Gastroenterology Follow Up Note      Location: [address] ([address])  Patient Name: FRANCISCO DUNLAP  Age: 39y  Gender: Female      Chief Complaint  Patient is a 39y old Female who presents with a chief complaint of Hypertensi (12 May 2025 14:24)  Primary diagnosis of Hypertensive emergency        Reason for Consult  Concern for enteritis/ischemia/TTP like picture from malignant HTN      Progress Note  This morning patient was seen and examined at bedside.    Today is hospital day 8d.  No acute events overnight.   She denies abdominal pain or nausea or vomiting.  She reports 2 black loose stools on 05/11 and 1 on 05/12.  She reports brown stools x2 on 05/13.  She adamantly refused CHRISTINE on 05/12-05/14.        Vital Signs in the last 24 hours   Vital Signs Last 24 Hrs  T(C): 36.4 (14 May 2025 21:00), Max: 37.1 (14 May 2025 16:00)  T(F): 97.5 (14 May 2025 21:00), Max: 98.7 (14 May 2025 16:00)  HR: 78 (15 May 2025 10:00) (78 - 107)  BP: 134/69 (14 May 2025 21:00) (132/67 - 134/69)  BP(mean): 94 (14 May 2025 21:00) (92 - 94)  RR: 24 (15 May 2025 10:00) (17 - 38)  SpO2: 97% (15 May 2025 05:00) (95% - 97%)    Parameters below as of 15 May 2025 11:00  Patient On (Oxygen Delivery Method): room air        Physical Exam  * General Appearance: Alert, not cooperative, interactive, oriented to time, place, and person, in no acute distress  * Neck: Supple, symmetrical, trachea midline, no adenopathy   * Lungs: Good bilateral air entry, normal breath sounds  * Heart: Regular Rate and Rhythm, normal S1 and S2, no audible murmur, rub, or gallop  * Abdomen: Symmetric, mildly distended, soft, non-tender, no guarding or rebound tenderness, bowel sounds active all four quadrants  * Refused CHRISTINE on 05/12 and 05/13 as below      Investigations                            7.7    14.46 )-----------( 244      ( 15 May 2025 04:54 )             22.7     05-15    140  |  105  |  62[HH]  ----------------------------<  129[H]  3.8   |  21  |  3.5[H]    Ca    7.4[L]      15 May 2025 04:54  Mg     1.9     05-15    TPro  4.6[L]  /  Alb  2.9[L]  /  TBili  0.3  /  DBili  x   /  AST  11  /  ALT  14  /  AlkPhos  88  05-15        LIVER FUNCTIONS - ( 15 May 2025 04:54 )  Alb: 2.9 g/dL / Pro: 4.6 g/dL / ALK PHOS: 88 U/L / ALT: 14 U/L / AST: 11 U/L / GGT: x               Urinalysis Basic - ( 15 May 2025 04:54 )    Color: x / Appearance: x / SG: x / pH: x  Gluc: 129 mg/dL / Ketone: x  / Bili: x / Urobili: x   Blood: x / Protein: x / Nitrite: x   Leuk Esterase: x / RBC: x / WBC x   Sq Epi: x / Non Sq Epi: x / Bacteria: x      Current Medications  Standing Medications  chlorhexidine 2% Cloths 1 Application(s) Topical <User Schedule>  cyanocobalamin 1000 MICROGram(s) Oral daily  dextrose 5%. 1000 milliLiter(s) (100 mL/Hr) IV Continuous <Continuous>  dextrose 5%. 1000 milliLiter(s) (50 mL/Hr) IV Continuous <Continuous>  dextrose 50% Injectable 25 Gram(s) IV Push once  dextrose 50% Injectable 12.5 Gram(s) IV Push once  dextrose 50% Injectable 25 Gram(s) IV Push once  folic acid 1 milliGRAM(s) Oral daily  hydrALAZINE 25 milliGRAM(s) Oral every 8 hours  insulin glargine Injectable (LANTUS) 6 Unit(s) SubCutaneous at bedtime  insulin lispro (ADMELOG) corrective regimen sliding scale   SubCutaneous three times a day before meals  insulin lispro (ADMELOG) corrective regimen sliding scale   SubCutaneous at bedtime  labetalol 300 milliGRAM(s) Oral three times a day  niCARdipine Infusion 5 mG/Hr (25 mL/Hr) IV Continuous <Continuous>  NIFEdipine  milliGRAM(s) Oral daily  pantoprazole    Tablet 40 milliGRAM(s) Oral every 12 hours    PRN Medications  calcium carbonate   1250 mG (OsCal) 1 Tablet(s) Oral every 6 hours PRN Heartburn  dextrose Oral Gel 15 Gram(s) Oral once PRN Blood Glucose LESS THAN 70 milliGRAM(s)/deciliter  melatonin 3 milliGRAM(s) Oral at bedtime PRN Insomnia    Singles Doses Administered  (ADM OVERRIDE) 1 each &lt;see task&gt; GiveOnce  (ADM OVERRIDE) 1 each &lt;see task&gt; GiveOnce  (ADM OVERRIDE) 2 each &lt;see task&gt; GiveOnce  (ADM OVERRIDE) 1 each &lt;see task&gt; GiveOnce  (ADM OVERRIDE) 1 each &lt;see task&gt; GiveOnce  (ADM OVERRIDE) 1 each &lt;see task&gt; GiveOnce  (ADM OVERRIDE) 1 each &lt;see task&gt; GiveOnce  acetaminophen   IVPB .. 1000 milliGRAM(s) IV Intermittent once  acetaminophen   IVPB .. 1000 milliGRAM(s) IV Intermittent once  calcium gluconate IVPB 2 Gram(s) IV Intermittent once  calcium gluconate IVPB 2 Gram(s) IV Intermittent once  calcium gluconate IVPB 2 Gram(s) IV Intermittent once  diphenhydrAMINE 50 milliGRAM(s) Oral once  diphenhydrAMINE Injectable 25 milliGRAM(s) IV Push once  diphenhydrAMINE Injectable 25 milliGRAM(s) IV Push once  diphenhydrAMINE Injectable 25 milliGRAM(s) IV Push once  hydrALAZINE Injectable 10 milliGRAM(s) IV Push once  hydrALAZINE Injectable 10 milliGRAM(s) IV Push once  labetalol Injectable 10 milliGRAM(s) IV Push once  labetalol Injectable 10 milliGRAM(s) IV Push once  lactated ringers Bolus 1000 milliLiter(s) IV Bolus once  methylPREDNISolone sodium succinate IVPB 1000 milliGRAM(s) IV Intermittent daily  metoprolol tartrate Injectable 10 milliGRAM(s) IV Push Once  morphine  - Injectable 4 milliGRAM(s) IV Push Once  ondansetron Injectable 4 milliGRAM(s) IV Push once  pantoprazole  Injectable 40 milliGRAM(s) IV Push Once  piperacillin/tazobactam IVPB... 3.375 Gram(s) IV Intermittent once  potassium chloride   Powder 40 milliEquivalent(s) Oral every 4 hours  potassium chloride  20 mEq/100 mL IVPB 20 milliEquivalent(s) IV Intermittent every 2 hours  potassium chloride  20 mEq/100 mL IVPB 20 milliEquivalent(s) IV Intermittent every 2 hours  QUEtiapine 25 milliGRAM(s) Oral once  sodium chloride 0.9% Bolus 1000 milliLiter(s) IV Bolus once

## 2025-05-15 NOTE — PROGRESS NOTE ADULT - ASSESSMENT
39-year-old female with history of hypertension presenting to ER with 5 days of multiple episodes of nonbloody nonbilious vomiting and diarrhea with associated generalized abdominal pain and distention  # HTN   # JOYCE rule out ATN   # proteinuria / hematuria   # thrombocytopenia   - picture above can be due to malignant HTN  / ADAMTS 13 not low / however patient received steroids and on plasmapheresis   - normal C3 ( not in favor of a HUS)  nl C4 normal JOSE ANTONIO which rule out active lupus  - hem onc appreciated s/p steroids , s/p  plasmapheresis , now d/tessie  - hb noted can start retacrit / transfuse to Hb >7/ hold retacrit if BP> 170 or DBP >100  - creat stable   - 1.8 g protein on UPCR will need repeat   - last  ph at goal / no binders / check i calcium / pth noted and vit D / replete VIT D/ start calcitriol 0.25 daily   -  GN w/up negative   - follow bp readings / off cardene drip / added hydralazine and lasix change to po  today keep on nifedipine / renin elevated / aldosterone > 100 , please repeat ? due to malignant hypertension / no FOUZIA/ ?  renin secreting tumors  - renal artery dupplex no FOUZIA   renal team will follow

## 2025-05-15 NOTE — PROGRESS NOTE ADULT - SUBJECTIVE AND OBJECTIVE BOX
seen and examined  24 h events noted   no distress         PAST HISTORY  --------------------------------------------------------------------------------  No significant changes to PMH, PSH, FHx, SHx, unless otherwise noted    ALLERGIES & MEDICATIONS  --------------------------------------------------------------------------------  Allergies    No Known Allergies    Intolerances      Standing Inpatient Medications  aspirin  chewable 81 milliGRAM(s) Oral daily  atorvastatin 80 milliGRAM(s) Oral at bedtime  calcitriol   Capsule 0.25 MICROGram(s) Oral daily  chlorhexidine 2% Cloths 1 Application(s) Topical <User Schedule>  cyanocobalamin 1000 MICROGram(s) Oral daily  dextrose 5%. 1000 milliLiter(s) IV Continuous <Continuous>  dextrose 5%. 1000 milliLiter(s) IV Continuous <Continuous>  dextrose 50% Injectable 25 Gram(s) IV Push once  dextrose 50% Injectable 12.5 Gram(s) IV Push once  dextrose 50% Injectable 25 Gram(s) IV Push once  epoetin sergey-epbx (RETACRIT) Injectable 97258 Unit(s) SubCutaneous every 7 days  ferrous    sulfate 325 milliGRAM(s) Oral daily  folic acid 1 milliGRAM(s) Oral daily  furosemide   Injectable 40 milliGRAM(s) IV Push every 12 hours  hydrALAZINE 50 milliGRAM(s) Oral every 6 hours  insulin glargine Injectable (LANTUS) 6 Unit(s) SubCutaneous at bedtime  insulin lispro (ADMELOG) corrective regimen sliding scale   SubCutaneous three times a day before meals  insulin lispro (ADMELOG) corrective regimen sliding scale   SubCutaneous at bedtime  labetalol 300 milliGRAM(s) Oral three times a day  melatonin 5 milliGRAM(s) Oral at bedtime  NIFEdipine  milliGRAM(s) Oral daily  pantoprazole    Tablet 40 milliGRAM(s) Oral every 12 hours  traZODone 50 milliGRAM(s) Oral at bedtime    PRN Inpatient Medications  acetaminophen     Tablet .. 650 milliGRAM(s) Oral every 6 hours PRN  calcium carbonate   1250 mG (OsCal) 1 Tablet(s) Oral every 6 hours PRN  dextrose Oral Gel 15 Gram(s) Oral once PRN          VITALS/PHYSICAL EXAM  --------------------------------------------------------------------------------  T(C): 36.4 (05-14-25 @ 21:00), Max: 37.1 (05-14-25 @ 08:00)  HR: 91 (05-15-25 @ 07:00) (82 - 107)  BP: 134/69 (05-14-25 @ 21:00) (132/67 - 146/63)  RR: 17 (05-15-25 @ 07:00) (17 - 38)  SpO2: 97% (05-15-25 @ 05:00) (95% - 97%)  Wt(kg): --        05-14-25 @ 07:01  -  05-15-25 @ 07:00  --------------------------------------------------------  IN: 2196.8 mL / OUT: 2560 mL / NET: -363.2 mL          LABS/STUDIES  --------------------------------------------------------------------------------              7.7    14.46 >-----------<  244      [05-15-25 @ 04:54]              22.7     140  |  105  |  62  ----------------------------<  129      [05-15-25 @ 04:54]  3.8   |  21  |  3.5        Ca     7.4     [05-15-25 @ 04:54]      Mg     1.9     [05-15-25 @ 04:54]    TPro  4.6  /  Alb  2.9  /  TBili  0.3  /  DBili  x   /  AST  11  /  ALT  14  /  AlkPhos  88  [05-15-25 @ 04:54]      Creatinine Trend:  SCr 3.5 [05-15 @ 04:54]  SCr 3.2 [05-14 @ 04:20]  SCr 3.4 [05-13 @ 04:40]  SCr 3.3 [05-12 @ 03:55]  SCr 3.8 [05-11 @ 04:42]    Urinalysis - [05-15-25 @ 04:54]      Color  / Appearance  / SG  / pH       Gluc 129 / Ketone   / Bili  / Urobili        Blood  / Protein  / Leuk Est  / Nitrite       RBC  / WBC  / Hyaline  / Gran  / Sq Epi  / Non Sq Epi  / Bacteria     Urine Creatinine 16      [05-13-25 @ 12:24]  Urine Protein 37      [05-13-25 @ 12:24]    Iron 18, TIBC 166, %sat 11      [05-07-25 @ 11:40]  Ferritin 362      [05-07-25 @ 11:40]  PTH -- (Ca --)      [05-13-25 @ 12:08]   194  PTH -- (Ca --)      [05-12-25 @ 16:48]   193  Vitamin D (25OH) 12      [05-12-25 @ 16:48]  TSH 2.05      [05-07-25 @ 11:40]  Lipid: chol 154, , HDL 37, LDL --      [05-14-25 @ 04:20]    HBsAg Nonreact      [05-07-25 @ 11:40]  HCV 0.13, Nonreact      [05-07-25 @ 11:40]  HIV Nonreact      [05-07-25 @ 11:40]    JOSE ANTONIO: titer Negative, pattern --      [05-07-25 @ 11:40]  dsDNA 2      [05-08-25 @ 19:20]  C3 Complement 106      [05-07-25 @ 11:40]  C4 Complement 22      [05-07-25 @ 11:40]  ANCA: cANCA Negative, pANCA Negative, atypical ANCA Negative      [05-07-25 @ 11:40]  anti-GBM <0.2      [05-07-25 @ 11:40]  Free Light Chains: kappa 1.98, lambda 2.29, ratio = 0.86      [05-13 @ 04:40]  Immunofixation Serum:   No Monoclonal Band Identified      Reference Range: None Detected      [05-12-25 @ 16:48]  SPEP Interpretation: Hypogammaglobulinemia      [05-12-25 @ 16:48]

## 2025-05-16 LAB
ALBUMIN SERPL ELPH-MCNC: 2.8 G/DL — LOW (ref 3.5–5.2)
ALDOST SERPL-MCNC: 94 NG/DL — HIGH
ALDOSTERONE / DIRECT RENIN RATIO RESULT: SIGNIFICANT CHANGE UP RATIO
ALP SERPL-CCNC: 76 U/L — SIGNIFICANT CHANGE UP (ref 30–115)
ALT FLD-CCNC: 11 U/L — SIGNIFICANT CHANGE UP (ref 0–41)
ANION GAP SERPL CALC-SCNC: 13 MMOL/L — SIGNIFICANT CHANGE UP (ref 7–14)
AST SERPL-CCNC: 10 U/L — SIGNIFICANT CHANGE UP (ref 0–41)
BASOPHILS # BLD AUTO: 0.01 K/UL — SIGNIFICANT CHANGE UP (ref 0–0.2)
BASOPHILS NFR BLD AUTO: 0.1 % — SIGNIFICANT CHANGE UP (ref 0–1)
BILIRUB SERPL-MCNC: 0.3 MG/DL — SIGNIFICANT CHANGE UP (ref 0.2–1.2)
BUN SERPL-MCNC: 56 MG/DL — HIGH (ref 10–20)
CA-I BLD-SCNC: 4.6 MG/DL — SIGNIFICANT CHANGE UP (ref 4.5–5.6)
CALCIUM SERPL-MCNC: 7.7 MG/DL — LOW (ref 8.4–10.5)
CHLORIDE SERPL-SCNC: 103 MMOL/L — SIGNIFICANT CHANGE UP (ref 98–110)
CO2 SERPL-SCNC: 22 MMOL/L — SIGNIFICANT CHANGE UP (ref 17–32)
CREAT SERPL-MCNC: 3.6 MG/DL — HIGH (ref 0.7–1.5)
EGFR: 16 ML/MIN/1.73M2 — LOW
EGFR: 16 ML/MIN/1.73M2 — LOW
EOSINOPHIL # BLD AUTO: 0.17 K/UL — SIGNIFICANT CHANGE UP (ref 0–0.7)
EOSINOPHIL NFR BLD AUTO: 1.4 % — SIGNIFICANT CHANGE UP (ref 0–8)
GLUCOSE BLDC GLUCOMTR-MCNC: 176 MG/DL — HIGH (ref 70–99)
GLUCOSE BLDC GLUCOMTR-MCNC: 190 MG/DL — HIGH (ref 70–99)
GLUCOSE SERPL-MCNC: 104 MG/DL — HIGH (ref 70–99)
HCT VFR BLD CALC: 20.4 % — LOW (ref 37–47)
HGB BLD-MCNC: 6.9 G/DL — CRITICAL LOW (ref 12–16)
IMM GRANULOCYTES NFR BLD AUTO: 2.5 % — HIGH (ref 0.1–0.3)
LYMPHOCYTES # BLD AUTO: 0.8 K/UL — LOW (ref 1.2–3.4)
LYMPHOCYTES # BLD AUTO: 6.4 % — LOW (ref 20.5–51.1)
MAGNESIUM SERPL-MCNC: 1.8 MG/DL — SIGNIFICANT CHANGE UP (ref 1.8–2.4)
MCHC RBC-ENTMCNC: 30.3 PG — SIGNIFICANT CHANGE UP (ref 27–31)
MCHC RBC-ENTMCNC: 33.8 G/DL — SIGNIFICANT CHANGE UP (ref 32–37)
MCV RBC AUTO: 89.5 FL — SIGNIFICANT CHANGE UP (ref 81–99)
MONOCYTES # BLD AUTO: 1.16 K/UL — HIGH (ref 0.1–0.6)
MONOCYTES NFR BLD AUTO: 9.3 % — SIGNIFICANT CHANGE UP (ref 1.7–9.3)
NEUTROPHILS # BLD AUTO: 9.99 K/UL — HIGH (ref 1.4–6.5)
NEUTROPHILS NFR BLD AUTO: 80.3 % — HIGH (ref 42.2–75.2)
NRBC BLD AUTO-RTO: 0 /100 WBCS — SIGNIFICANT CHANGE UP (ref 0–0)
PLATELET # BLD AUTO: 231 K/UL — SIGNIFICANT CHANGE UP (ref 130–400)
PMV BLD: 10.2 FL — SIGNIFICANT CHANGE UP (ref 7.4–10.4)
POTASSIUM SERPL-MCNC: 3.5 MMOL/L — SIGNIFICANT CHANGE UP (ref 3.5–5)
POTASSIUM SERPL-SCNC: 3.5 MMOL/L — SIGNIFICANT CHANGE UP (ref 3.5–5)
PROT SERPL-MCNC: 4.2 G/DL — LOW (ref 6–8)
RBC # BLD: 2.28 M/UL — LOW (ref 4.2–5.4)
RBC # FLD: 15.2 % — HIGH (ref 11.5–14.5)
RENIN DIRECT, PLASMA: 51.8 PG/ML — HIGH
SODIUM SERPL-SCNC: 138 MMOL/L — SIGNIFICANT CHANGE UP (ref 135–146)
WBC # BLD: 12.44 K/UL — HIGH (ref 4.8–10.8)
WBC # FLD AUTO: 12.44 K/UL — HIGH (ref 4.8–10.8)

## 2025-05-16 PROCEDURE — 99233 SBSQ HOSP IP/OBS HIGH 50: CPT

## 2025-05-16 PROCEDURE — 93970 EXTREMITY STUDY: CPT | Mod: 26

## 2025-05-16 PROCEDURE — 99232 SBSQ HOSP IP/OBS MODERATE 35: CPT | Mod: GC

## 2025-05-16 RX ORDER — IRON SUCROSE 20 MG/ML
200 INJECTION, SOLUTION INTRAVENOUS ONCE
Refills: 0 | Status: COMPLETED | OUTPATIENT
Start: 2025-05-17 | End: 2025-05-17

## 2025-05-16 RX ORDER — FUROSEMIDE 10 MG/ML
40 INJECTION INTRAMUSCULAR; INTRAVENOUS EVERY 12 HOURS
Refills: 0 | Status: DISCONTINUED | OUTPATIENT
Start: 2025-05-16 | End: 2025-05-27

## 2025-05-16 RX ORDER — IRON SUCROSE 20 MG/ML
100 INJECTION, SOLUTION INTRAVENOUS EVERY 24 HOURS
Refills: 0 | Status: COMPLETED | OUTPATIENT
Start: 2025-05-18 | End: 2025-05-20

## 2025-05-16 RX ADMIN — CYANOCOBALAMIN 1000 MICROGRAM(S): 1000 INJECTION INTRAMUSCULAR; SUBCUTANEOUS at 12:19

## 2025-05-16 RX ADMIN — FUROSEMIDE 40 MILLIGRAM(S): 10 INJECTION INTRAMUSCULAR; INTRAVENOUS at 17:11

## 2025-05-16 RX ADMIN — Medication 50 MILLIGRAM(S): at 17:10

## 2025-05-16 RX ADMIN — FOLIC ACID 1 MILLIGRAM(S): 1 TABLET ORAL at 12:19

## 2025-05-16 RX ADMIN — Medication 120 MILLIGRAM(S): at 05:46

## 2025-05-16 RX ADMIN — FUROSEMIDE 40 MILLIGRAM(S): 10 INJECTION INTRAMUSCULAR; INTRAVENOUS at 05:46

## 2025-05-16 RX ADMIN — LABETALOL HYDROCHLORIDE 300 MILLIGRAM(S): 200 TABLET, FILM COATED ORAL at 22:07

## 2025-05-16 RX ADMIN — Medication 40 MILLIGRAM(S): at 17:10

## 2025-05-16 RX ADMIN — CALCITRIOL 0.25 MICROGRAM(S): 0.5 CAPSULE, GELATIN COATED ORAL at 12:19

## 2025-05-16 RX ADMIN — LABETALOL HYDROCHLORIDE 300 MILLIGRAM(S): 200 TABLET, FILM COATED ORAL at 17:10

## 2025-05-16 RX ADMIN — Medication 5 MILLIGRAM(S): at 22:07

## 2025-05-16 RX ADMIN — Medication 50 MILLIGRAM(S): at 05:47

## 2025-05-16 RX ADMIN — ATORVASTATIN CALCIUM 80 MILLIGRAM(S): 80 TABLET, FILM COATED ORAL at 22:07

## 2025-05-16 RX ADMIN — Medication 1 APPLICATION(S): at 05:46

## 2025-05-16 RX ADMIN — Medication 81 MILLIGRAM(S): at 12:19

## 2025-05-16 RX ADMIN — Medication 40 MILLIGRAM(S): at 05:47

## 2025-05-16 RX ADMIN — Medication 50 MILLIGRAM(S): at 12:19

## 2025-05-16 RX ADMIN — Medication 50 MILLIGRAM(S): at 22:07

## 2025-05-16 RX ADMIN — LABETALOL HYDROCHLORIDE 300 MILLIGRAM(S): 200 TABLET, FILM COATED ORAL at 05:47

## 2025-05-16 NOTE — PROGRESS NOTE ADULT - SUBJECTIVE AND OBJECTIVE BOX
Gastroenterology Follow Up Note      Location: [address] ([address])  Patient Name: FRANCISCO DUNLAP  Age: 39y  Gender: Female      Chief Complaint  Patient is a 39y old Female who presents with a chief complaint of Hypertensi (12 May 2025 14:24)  Primary diagnosis of Hypertensive emergency        Reason for Consult  Concern for enteritis/ischemia/TTP like picture from malignant HTN      Progress Note  This morning patient was seen and examined at bedside.    Today is hospital day 9d.  No acute events overnight.   She denies abdominal pain or nausea or vomiting.  She reports 2 black loose stools on 05/11 and 1 on 05/12.  She reports brown stools x2 on 05/13-05/15.  She adamantly refused CHRISTINE on 05/12-05/14.        Vital Signs in the last 24 hours   Vital Signs Last 24 Hrs  T(C): 36.8 (16 May 2025 11:15), Max: 37 (16 May 2025 04:00)  T(F): 98.2 (16 May 2025 11:15), Max: 98.6 (16 May 2025 04:00)  HR: 83 (16 May 2025 11:15) (77 - 89)  BP: 163/90 (16 May 2025 11:15) (145/84 - 163/90)  BP(mean): 109 (16 May 2025 10:00) (109 - 109)  RR: 20 (16 May 2025 11:15) (16 - 51)  SpO2: 98% (16 May 2025 10:00) (94% - 98%)    Parameters below as of 16 May 2025 08:00  Patient On (Oxygen Delivery Method): room air        Physical Exam  * General Appearance: Alert, not cooperative, interactive, oriented to time, place, and person, in no acute distress  * Neck: Supple, symmetrical, trachea midline, no adenopathy   * Lungs: Good bilateral air entry, normal breath sounds  * Heart: Regular Rate and Rhythm, normal S1 and S2, no audible murmur, rub, or gallop  * Abdomen: Symmetric, mildly distended, soft, non-tender, no guarding or rebound tenderness, bowel sounds active all four quadrants  * Refused CHRISTINE on 05/12 and 05/13 as below      Investigations                            6.9    12.44 )-----------( 231      ( 16 May 2025 04:47 )             20.4     05-16    138  |  103  |  56[H]  ----------------------------<  104[H]  3.5   |  22  |  3.6[H]    Ca    7.7[L]      16 May 2025 04:47  Mg     1.8     05-16    TPro  4.2[L]  /  Alb  2.8[L]  /  TBili  0.3  /  DBili  x   /  AST  10  /  ALT  11  /  AlkPhos  76  05-16        LIVER FUNCTIONS - ( 16 May 2025 04:47 )  Alb: 2.8 g/dL / Pro: 4.2 g/dL / ALK PHOS: 76 U/L / ALT: 11 U/L / AST: 10 U/L / GGT: x               Urinalysis Basic - ( 16 May 2025 04:47 )    Color: x / Appearance: x / SG: x / pH: x  Gluc: 104 mg/dL / Ketone: x  / Bili: x / Urobili: x   Blood: x / Protein: x / Nitrite: x   Leuk Esterase: x / RBC: x / WBC x   Sq Epi: x / Non Sq Epi: x / Bacteria: x          Current Medications  Standing Medications  chlorhexidine 2% Cloths 1 Application(s) Topical <User Schedule>  cyanocobalamin 1000 MICROGram(s) Oral daily  dextrose 5%. 1000 milliLiter(s) (100 mL/Hr) IV Continuous <Continuous>  dextrose 5%. 1000 milliLiter(s) (50 mL/Hr) IV Continuous <Continuous>  dextrose 50% Injectable 25 Gram(s) IV Push once  dextrose 50% Injectable 12.5 Gram(s) IV Push once  dextrose 50% Injectable 25 Gram(s) IV Push once  folic acid 1 milliGRAM(s) Oral daily  hydrALAZINE 25 milliGRAM(s) Oral every 8 hours  insulin glargine Injectable (LANTUS) 6 Unit(s) SubCutaneous at bedtime  insulin lispro (ADMELOG) corrective regimen sliding scale   SubCutaneous three times a day before meals  insulin lispro (ADMELOG) corrective regimen sliding scale   SubCutaneous at bedtime  labetalol 300 milliGRAM(s) Oral three times a day  niCARdipine Infusion 5 mG/Hr (25 mL/Hr) IV Continuous <Continuous>  NIFEdipine  milliGRAM(s) Oral daily  pantoprazole    Tablet 40 milliGRAM(s) Oral every 12 hours    PRN Medications  calcium carbonate   1250 mG (OsCal) 1 Tablet(s) Oral every 6 hours PRN Heartburn  dextrose Oral Gel 15 Gram(s) Oral once PRN Blood Glucose LESS THAN 70 milliGRAM(s)/deciliter  melatonin 3 milliGRAM(s) Oral at bedtime PRN Insomnia    Singles Doses Administered  (ADM OVERRIDE) 1 each &lt;see task&gt; GiveOnce  (ADM OVERRIDE) 1 each &lt;see task&gt; GiveOnce  (ADM OVERRIDE) 2 each &lt;see task&gt; GiveOnce  (ADM OVERRIDE) 1 each &lt;see task&gt; GiveOnce  (ADM OVERRIDE) 1 each &lt;see task&gt; GiveOnce  (ADM OVERRIDE) 1 each &lt;see task&gt; GiveOnce  (ADM OVERRIDE) 1 each &lt;see task&gt; GiveOnce  acetaminophen   IVPB .. 1000 milliGRAM(s) IV Intermittent once  acetaminophen   IVPB .. 1000 milliGRAM(s) IV Intermittent once  calcium gluconate IVPB 2 Gram(s) IV Intermittent once  calcium gluconate IVPB 2 Gram(s) IV Intermittent once  calcium gluconate IVPB 2 Gram(s) IV Intermittent once  diphenhydrAMINE 50 milliGRAM(s) Oral once  diphenhydrAMINE Injectable 25 milliGRAM(s) IV Push once  diphenhydrAMINE Injectable 25 milliGRAM(s) IV Push once  diphenhydrAMINE Injectable 25 milliGRAM(s) IV Push once  hydrALAZINE Injectable 10 milliGRAM(s) IV Push once  hydrALAZINE Injectable 10 milliGRAM(s) IV Push once  labetalol Injectable 10 milliGRAM(s) IV Push once  labetalol Injectable 10 milliGRAM(s) IV Push once  lactated ringers Bolus 1000 milliLiter(s) IV Bolus once  methylPREDNISolone sodium succinate IVPB 1000 milliGRAM(s) IV Intermittent daily  metoprolol tartrate Injectable 10 milliGRAM(s) IV Push Once  morphine  - Injectable 4 milliGRAM(s) IV Push Once  ondansetron Injectable 4 milliGRAM(s) IV Push once  pantoprazole  Injectable 40 milliGRAM(s) IV Push Once  piperacillin/tazobactam IVPB... 3.375 Gram(s) IV Intermittent once  potassium chloride   Powder 40 milliEquivalent(s) Oral every 4 hours  potassium chloride  20 mEq/100 mL IVPB 20 milliEquivalent(s) IV Intermittent every 2 hours  potassium chloride  20 mEq/100 mL IVPB 20 milliEquivalent(s) IV Intermittent every 2 hours  QUEtiapine 25 milliGRAM(s) Oral once  sodium chloride 0.9% Bolus 1000 milliLiter(s) IV Bolus once

## 2025-05-16 NOTE — PROGRESS NOTE ADULT - ASSESSMENT
IMPRESSION    Malignant hypertension   Hypertensive Emergency  Likely UGIB on presentation  Anemia - unknown etiology  Thrombocytopenia - resolved  Early small bowel ischemia likely 2/2 severe enteritis   SIRS/Sepsis   Uncomplicated Cystitis   JOYCE likely pre-renal   RSV infection     PLAN:     CNS : Avoid CNS depressant.  Delirium Precautions awake follow follow command. CT head noted. MRI pending. Precedex if needed.  Patient is refusing workup.     ENT : Oral Care     Pulmonary : Keep Spo2 > 94% .HOB elevation. Supplemental O2 prn.     Cardiology: ECHO LVH.  Porcardia 120 XL   labetalol 300 Q8 hrs    Hydralazine 50 mg PO q6  Lasix 40 bid  nicardipine ggt PRN.      GI : follow GI dropping h/h hx possible melena   follow Gi and G sx   CBC bid  PPI Q24  Keep T & S active, CBC q24, 18 G IV x2, Keep Hb > 7    Renal : Trend CMP. Monitor Lytes and replete as needed. Nephro  follow up   RA duplex negative  FU work up for secondary hypertension,   Elevated renin and Alex - Renal following    Hem/Onc : Coagulation studies noted.   steroids d/c off plasma   RESTREPO 13  negative   s/p plasmapheresis stopped   Monitor CBC  Repeat hemolysis panel negative  LE duplex - pt refused    Endo:  Blood glucose Goal : 140-180. insulin drip for now     MSK: Ambulate as tolerated     Code : Full code.      Disp:  SDU  DC A line   IMPRESSION    Malignant hypertension   Hypertensive Emergency  Likely UGIB on presentation  Anemia - unknown etiology  Thrombocytopenia - resolved  Early small bowel ischemia likely 2/2 severe enteritis   SIRS/Sepsis   Uncomplicated Cystitis   JOYCE likely pre-renal   RSV infection     PLAN:     CNS : Avoid CNS depressant.  Delirium Precautions awake follow follow command. CT head noted. MRI pending. Precedex if needed.  Patient is refusing workup.     ENT : Oral Care     Pulmonary : Keep Spo2 > 94% .HOB elevation. Supplemental O2 prn.     Cardiology: ECHO LVH.  Porcardia 120 XL   labetalol 300 Q8 hrs    Hydralazine 50 mg PO q6  Lasix 40 bid  nicardipine ggt PRN.      GI : follow GI dropping h/h hx possible melena   follow Gi and G sx   CBC bid  PPI Q24  Keep T & S active, CBC q24, 18 G IV x2, Keep Hb > 7    Renal : Trend CMP. Monitor Lytes and replete as needed. Nephro  follow up   RA duplex negative  FU work up for secondary hypertension,   Elevated renin and Alex - Renal following    Hem/Onc : Coagulation studies noted.  Steroids d/c off plasma   RESTERPO 13  negative   s/p plasmapheresis stopped   Monitor CBC  Repeat hemolysis panel negative  Repeat LE duplex     Endo:  Blood glucose Goal : 140-180. insulin drip for now     MSK: Ambulate as tolerated     Code : Full code.      Disp: Med Tele  DC A line   IMPRESSION    Malignant hypertension   Hypertensive Emergency  Likely UGIB on presentation  Anemia - unknown etiology  Thrombocytopenia - resolved  Early small bowel ischemia likely 2/2 severe enteritis   SIRS/Sepsis   Uncomplicated Cystitis   JOYCE likely pre-renal   RSV infection     PLAN:     CNS : Avoid CNS depressant.  Delirium Precautions awake follow follow command. CT head noted. MRI pending. Precedex if needed.  Patient is refusing workup.     ENT : Oral Care     Pulmonary : Keep Spo2 > 94% .HOB elevation. Supplemental O2 prn.     Cardiology: ECHO LVH.  Porcardia 120 XL   labetalol 300 Q8 hrs    Hydralazine 50 mg PO q6  Lasix 40 bid  nicardipine ggt PRN.      GI : follow GI dropping h/h hx possible melena   follow Gi and G sx   CBC bid  PPI Q24  Keep T & S active, CBC q24, 18 G IV x2, Keep Hb > 7    Renal : Trend CMP. Monitor Lytes and replete as needed. Nephro  follow up   RA duplex negative  FU work up for secondary hypertension,   Elevated renin and Alex - Renal following    Hem/Onc : Coagulation studies noted.  Steroids d/c off plasma   RESTREPO 13  negative   s/p plasmapheresis stopped   Monitor CBC  Repeat hemolysis panel negative  Repeat LE duplex     Endo:  Blood glucose Goal : 140-180. insulin drip for now     MSK: Ambulate as tolerated     Code : Full code.      Disp: Med Tele  DC A line      This is a transition of care note.    For any questions or clarifications during regular hours, please do not hesitate to call or text me.    For after hours and weekends, please call the MICU fellow at [phone].

## 2025-05-16 NOTE — PROGRESS NOTE ADULT - ASSESSMENT
Assessment and Plan  Case of a 39 year old previously healthy female patient who presented to the ED on 05/06 for evaluation of generalized abdominal pain, nausea, vomiting, and black loose stools, found to have malignant HTN on arrival with evidence of leukocytosis/acute on chronic anemia/thrombocytopenia/elevated LDH and retic/low haptoglobin/schistocytes on smear/proteinuria/JOYCE on blood work and evidence of distal D/proximal J thickening with dilated jejunal loops to 3.4cm and gradual tapering distally wo SBO along with mural enhancement of SB concerning for severe enteritis VS early ischemia VS shock bowel. 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 She was also found to have age indeterminate lacunar infarct on CT 05/13. We are following for above findings.      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 (JOYCE with leukocytosis, thrombocytopenia, and acute on chronic anemia/ not TTP since RESTREPO -)   * Hemodynamically stable  * Labs: WBC 14.71, Hb 7, Plt 228, Na 136, K 4.2, BUN 79, Cr 3.3 on 05/12  * Hb trend: 9 on 05/06 -> 7.2/6.8 on 05/07 s/p 1 unit pRBC -> 6.8 on 05/08 s/p 1 unit -> 7.6 on 05/10 -> 6.9 on 05/11 s/p 1 unit -> 7 on 05/12 -> 6.7 on 05/13 s/p 1 unit pRBC -> 8.6 on 05/13 -> 7.5 on 05/14 -> 7.7 on 05/15 -> 6.9 s/p 1 unit pRBC 05/16  * Liver enzymes: 0.7/71/17/14 on 05/08  * Iron with Total Binding Capacity (05.07.25 @ 11:40): Iron - Total Binding Capacity.: 166 ug/dL; % Saturation, Iron: 11 %; Iron Total: 18 ug/dL; Unsaturated Iron Binding Capacity: 148 ug/dL  * Elevated LDH and retic, low haptoglobin, smear with schistocytes; proteinuria on ua   * INR 0.9 on 05/07; not on AC  * Refused CHRISTINE on 05/12-05/14 despite extensive counseling  * CT Abdomen and Pelvis No Cont (05.07.25 @ 01:49) Edematous distal duodenum and proximal jejunal loops with associated dilatation measuring up to 3.4 cm. Associated marked interloop ascites, mesenteric fat stranding, and prominent mesenteric lymph nodes. Mild-to-moderate ascites. Findings concerning for small bowel ischemia.No portal venous gas, pneumatosis, or pneumoperitoneum.  * CT Angio Abdomen and Pelvis w/ IV Cont (05.07.25 @ 04:18) >Patent SMA, SMV. Redemonstration of distal duodenal and proximal small bowel with marked inflammatory change. Mural enhancement noted throughout the small bowel. Considerations include severe enteritis, early ischemia, shock bowel.  * CT Abdomen and Pelvis No Cont (05.13.25 @ 12:47) No evidence of bowel obstruction.Interval improvement of the proximal small bowel inflammatory change, decreased distention.Please see separate report for concurrent evaluation of thorax.  * No previous EGD or colonoscopy  * No FHx of GI cancers    RECOMMENDATIONS  - In the setting of previously reported black stools with acute on chronic iron deficiency anemia, the patient will ideally benefit from endoscopic evaluation    - However, in the setting of patient refusal, change to brown stools per patient since 05/13, uncontrolled BP (recent malignant HTN), concern for hemolysis (schistocytes/high LDH and retic/ low haptoglobin), and concern for TTP like picture per hematology/nephrology, will hold off endoscopic evaluation for now pending optimization (if patient becomes agreeable)   - Hematology and nephrology teams on board: 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 s/p plasmapheresis from 05/08-05/09 and s/p IV solumedrol from 05/07-05/10  - Surgery team on board: no acute intervention; unlikely ischemia per clinical exam  - Trend LA; monitor MAP and keep > 65mmHg (avoid malignant HTN; on nicardipine drip; labetalol; nifedipine) -> management per primary team  - Recommend serial abdominal exams. Check daily KUBs. Monitor electrolytes and replete as indicated. Avoid CCBs, sedatives, anticholinergics; limit opioids. Encourage ambulation and incentive spirometry  - Monitor for pain: management per team  - Monitor for nausea: consider antiemetics PRN if QTc is within normal  - Monitor BM. Stool cx -, GI PCR - on 05/08. Check Clostridium difficile if diarrhea recurs  - Holding off Ab for concern for HUS. ID team is on board  - Continue Protonix 40mg BID for now; avoid inessential NSAIDs  - Trend CBC and transfuse as needed; keep active type and screen  - Decision to continue blood thinners for concern of age indeterminate stroke should be made after discussing risks and benefits  - Follow up with our [facility] for EGD/colonoscopy +- VCE (located at [address] [place] [address]. Phone Number: [phone])        Thank you for your consult.  - Please note that plan was communicated with medical team.   - Please reach GI on [phone] during weekdays till 5pm.  - Please call the GI service line after 5pm on Weekdays and anytime on Weekends: [phone].      Art Graham MD  PGY - 5 Gastroenterology Fellow   [facility]

## 2025-05-16 NOTE — PROGRESS NOTE ADULT - SUBJECTIVE AND OBJECTIVE BOX
seen and examined  24 h events noted   no distress       PAST HISTORY  --------------------------------------------------------------------------------  No significant changes to PMH, PSH, FHx, SHx, unless otherwise noted    ALLERGIES & MEDICATIONS  --------------------------------------------------------------------------------  Allergies    No Known Allergies    Intolerances      Standing Inpatient Medications  aspirin  chewable 81 milliGRAM(s) Oral daily  atorvastatin 80 milliGRAM(s) Oral at bedtime  calcitriol   Capsule 0.25 MICROGram(s) Oral daily  chlorhexidine 2% Cloths 1 Application(s) Topical <User Schedule>  cyanocobalamin 1000 MICROGram(s) Oral daily  dextrose 5%. 1000 milliLiter(s) IV Continuous <Continuous>  dextrose 5%. 1000 milliLiter(s) IV Continuous <Continuous>  dextrose 50% Injectable 25 Gram(s) IV Push once  dextrose 50% Injectable 12.5 Gram(s) IV Push once  dextrose 50% Injectable 25 Gram(s) IV Push once  epoetin sergey-epbx (RETACRIT) Injectable 85618 Unit(s) SubCutaneous every 7 days  ferrous    sulfate 325 milliGRAM(s) Oral daily  folic acid 1 milliGRAM(s) Oral daily  furosemide   Injectable 40 milliGRAM(s) IV Push every 12 hours  hydrALAZINE 50 milliGRAM(s) Oral every 6 hours  insulin glargine Injectable (LANTUS) 6 Unit(s) SubCutaneous at bedtime  insulin lispro (ADMELOG) corrective regimen sliding scale   SubCutaneous three times a day before meals  insulin lispro (ADMELOG) corrective regimen sliding scale   SubCutaneous at bedtime  labetalol 300 milliGRAM(s) Oral three times a day  melatonin 5 milliGRAM(s) Oral at bedtime  NIFEdipine  milliGRAM(s) Oral daily  pantoprazole    Tablet 40 milliGRAM(s) Oral every 12 hours  traZODone 50 milliGRAM(s) Oral at bedtime    PRN Inpatient Medications  acetaminophen     Tablet .. 650 milliGRAM(s) Oral every 6 hours PRN  calcium carbonate   1250 mG (OsCal) 1 Tablet(s) Oral every 6 hours PRN  dextrose Oral Gel 15 Gram(s) Oral once PRN        VITALS/PHYSICAL EXAM  --------------------------------------------------------------------------------  T(C): 37 (05-16-25 @ 04:00), Max: 37 (05-16-25 @ 04:00)  HR: 89 (05-16-25 @ 07:00) (77 - 93)  BP: --  RR: 20 (05-16-25 @ 07:00) (16 - 55)  SpO2: 98% (05-16-25 @ 07:00) (94% - 98%)  Wt(kg): --        05-15-25 @ 07:01  -  05-16-25 @ 07:00  --------------------------------------------------------  IN: 1535 mL / OUT: 2430 mL / NET: -895 mL      Physical Exam:  	Gen: NAD  	Pulm: decrease BS B/L  	CV:  S1S2; no rub  	Abd:  distended  	  LABS/STUDIES  --------------------------------------------------------------------------------              6.9    12.44 >-----------<  231      [05-16-25 @ 04:47]              20.4     138  |  103  |  56  ----------------------------<  104      [05-16-25 @ 04:47]  3.5   |  22  |  3.6        Ca     7.7     [05-16-25 @ 04:47]      Mg     1.8     [05-16-25 @ 04:47]    TPro  4.2  /  Alb  2.8  /  TBili  0.3  /  DBili  x   /  AST  10  /  ALT  11  /  AlkPhos  76  [05-16-25 @ 04:47]      Creatinine Trend:  SCr 3.6 [05-16 @ 04:47]  SCr 3.5 [05-15 @ 04:54]  SCr 3.2 [05-14 @ 04:20]  SCr 3.4 [05-13 @ 04:40]  SCr 3.3 [05-12 @ 03:55]    Urinalysis - [05-16-25 @ 04:47]      Color  / Appearance  / SG  / pH       Gluc 104 / Ketone   / Bili  / Urobili        Blood  / Protein  / Leuk Est  / Nitrite       RBC  / WBC  / Hyaline  / Gran  / Sq Epi  / Non Sq Epi  / Bacteria     Urine Creatinine 16      [05-13-25 @ 12:24]  Urine Protein 37      [05-13-25 @ 12:24]    Iron 18, TIBC 166, %sat 11      [05-07-25 @ 11:40]  Ferritin 362      [05-07-25 @ 11:40]  PTH -- (Ca --)      [05-13-25 @ 12:08]   194  PTH -- (Ca --)      [05-12-25 @ 16:48]   193  Vitamin D (25OH) 12      [05-12-25 @ 16:48]  TSH 2.05      [05-07-25 @ 11:40]  Lipid: chol 154, , HDL 37, LDL --      [05-14-25 @ 04:20]    HBsAg Nonreact      [05-07-25 @ 11:40]  HCV 0.13, Nonreact      [05-07-25 @ 11:40]  HIV Nonreact      [05-07-25 @ 11:40]    JOSE ANTONIO: titer Negative, pattern --      [05-07-25 @ 11:40]  dsDNA 2      [05-08-25 @ 19:20]  C3 Complement 106      [05-07-25 @ 11:40]  C4 Complement 22      [05-07-25 @ 11:40]  ANCA: cANCA Negative, pANCA Negative, atypical ANCA Negative      [05-07-25 @ 11:40]  anti-GBM <0.2      [05-07-25 @ 11:40]  Free Light Chains: kappa 1.98, lambda 2.29, ratio = 0.86      [05-13 @ 04:40]  Immunofixation Serum:   No Monoclonal Band Identified      Reference Range: None Detected      [05-12-25 @ 16:48]  SPEP Interpretation: Hypogammaglobulinemia      [05-12-25 @ 16:48]

## 2025-05-16 NOTE — PROGRESS NOTE ADULT - ASSESSMENT
39-year-old female with history of hypertension presenting to ER with 5 days of multiple episodes of nonbloody nonbilious vomiting and diarrhea with associated generalized abdominal pain and distention  # HTN   # JOYCE rule out ATN   # proteinuria / hematuria   # thrombocytopenia   - picture above can be due to malignant HTN  / ADAMTS 13 not low / however patient received steroids and on plasmapheresis   - normal C3 ( not in favor of a HUS)  nl C4 normal JOSE ANTONIO which rule out active lupus  - hem onc appreciated s/p steroids , s/p  plasmapheresis , now d/tessie  - hb noted receiving blood tx / repeat hemolysis w/up/ followed by hematology and GI / on ANASTACIO  hold if bp > 170/100/ d/c feso4 start venofer course   - creat stable   - 1.8 g protein on UPCR will need repeat   - last  ph at goal / no binders / check i calcium / pth noted and vit D / replete VIT D/ start calcitriol 0.25 daily   -  GN w/up negative   - follow bp readings / off cardene drip / added hydralazine and lasix change to po  today keep on nifedipine / renin elevated / aldosterone > 100 , please repeat ? due to malignant hypertension / no FOUZIA/ ?  renin secreting tumors  - renal artery dupplex no FOUZIA   renal team will follow

## 2025-05-16 NOTE — CHART NOTE - NSCHARTNOTEFT_GEN_A_CORE
CCU DOWN GRADE NOTE      Patient is a 39y old  Female who presents with a chief complaint of Hypertension (12 May 2025 14:24)      HPI:    39-year-old female with history of hypertension presenting to ER with 5 days of multiple episodes of nonbloody nonbilious vomiting and diarrhea with associated generalized abdominal pain and distention. Patient states that on Saturday she had acute onset of nausea, vomiting, diarrhea and crampy abdominal pain. She has had 4-5 episodes of non-bloody, non-bilious vomiting per day and 4-5 episodes of black diarrhea per day. She initially thought that she had gastroenteritis, but when her symptoms didn't resolve, she decided come to the ED. She denies any current abdominal pain, nausea or vomiting, fever, chest pain, shortness of breath, clotting disorder, past intra-abdominal surgeries, kidney issues, leg pain or swelling.     Labs significant for WBC 18.72k, Hb 9.0(unknown baseline) , Platelets 86k, Creatinine 3.5(unknown baseline). Lipase 71, UA positive      CTAP with finding of Edematous distal duodenum and proximal jejunal loops with associated dilatation measuring up to 3.4 cm. Associated marked interloop ascites, mesenteric fat stranding, and prominent mesenteric lymph nodes. Mild-to-moderate ascites. Findings concerning for small bowel ischemia.     CT Angio Abdomen and Pelvis w/ IV Cont (05.07.25 @ 04:18): Patent SMA, SMV. Redemonstration of distal duodenal and proximal small bowel with marked inflammatory change. Mural enhancement noted throughout   the small bowel. Considerations include severe enteritis, early ischemia, shock bowel.    #ED Vitals:   T(C): 37.2 (05-07-25 @ 05:46), Max: 37.3 (05-06-25 @ 23:44)  HR: 112 (05-07-25 @ 05:46) (97 - 118)  BP: 151/86 (05-07-25 @ 03:39) (151/86 - 238/135)  RR: 17 (05-07-25 @ 05:46) (17 - 19)  SpO2: 99% (05-07-25 @ 05:46) (96% - 100%)    # ED Course:   Zosyn, LR 1L, NS 1L, Zofran, Morphine, Pantoprazole, Reglan, Metoprolol 10 IV   Started on Nicardipine drip   Evaluated by surgery >>> No acute surgical intervention         INTERVAL HPI/OVERNIGHT EVENTS:    Patient admitted to MICU for early small bowel ischemia 2/2 to HTN Urgency. There was concern that symptoms could be related to TTP given elevated LDH, and schistocytes that were seen on peripheral smear. Patient underwent several treatments of plasma exchange therapy while ADAMS13 level was sent. The exchange therapy did not improve her HTN and ADAMS13 level came back normal so the treatment was discounted. Patient did remain anemic through her course in CCU and received 5u of PRBC during the admission. No source of bleeding was identified and patient did not have the abdominal pain/melena that she described during her admission. Patient's HTN was eventually controlled with aggressive regiment and secondary causes of htn are being explored but has unrevealing so far. Patient's CCU stay was also notable for non compliance and aggressive behavior. She was evaluated by psych who believes its delirium in the setting of HTN encephalopathy. She required frequent reorientation and has waxing and waning insight into her medical condition. It was determined that patient does not have capacity to leave AMA at this time.  Due to her behavior and persistent HTN, a CT head was ordered and was significant for an age indeterminant lacunar infarct. She was evaluated by neurology and and MRI was ordered however patient would keep refusing and would not tolerate the exam when brought down. The mri was attempted with light sedation (precedex) however that did not help and patient refused again. HTN has been under control and has been off a nicardipine drip for >24hrs. Patient stable for downgrade to CCU    REVIEW OF SYSTEMS:  CONSTITUTIONAL: No fever, chills  HEENT:  No blurry vision No sinus or throat pain  NECK: No pain or stiffness  RESPIRATORY: No cough, wheezing, chills or hemoptysis; No shortness of breath  CARDIOVASCULAR: No chest pain, palpitations  GASTROINTESTINAL: No abdominal pain. No nausea, vomiting, or diarrhea  GENITOURINARY: No dysuria  NEUROLOGICAL: No HA, No focal weakness  SKIN: No itching, burning, rashes, or lesions   MUSCULOSKELETAL: No joint pain or swelling; No muscle, back, or extremity pain    MEDICATIONS:  acetaminophen     Tablet .. 650 milliGRAM(s) Oral every 6 hours PRN  aspirin  chewable 81 milliGRAM(s) Oral daily  atorvastatin 80 milliGRAM(s) Oral at bedtime  calcitriol   Capsule 0.25 MICROGram(s) Oral daily  calcium carbonate   1250 mG (OsCal) 1 Tablet(s) Oral every 6 hours PRN  chlorhexidine 2% Cloths 1 Application(s) Topical <User Schedule>  cyanocobalamin 1000 MICROGram(s) Oral daily  dextrose 5%. 1000 milliLiter(s) IV Continuous <Continuous>  dextrose 5%. 1000 milliLiter(s) IV Continuous <Continuous>  dextrose 50% Injectable 25 Gram(s) IV Push once  dextrose 50% Injectable 12.5 Gram(s) IV Push once  dextrose 50% Injectable 25 Gram(s) IV Push once  dextrose Oral Gel 15 Gram(s) Oral once PRN  epoetin sergey-epbx (RETACRIT) Injectable 33413 Unit(s) SubCutaneous every 7 days  ferrous    sulfate 325 milliGRAM(s) Oral daily  folic acid 1 milliGRAM(s) Oral daily  furosemide   Injectable 40 milliGRAM(s) IV Push every 12 hours  hydrALAZINE 50 milliGRAM(s) Oral every 6 hours  insulin glargine Injectable (LANTUS) 6 Unit(s) SubCutaneous at bedtime  insulin lispro (ADMELOG) corrective regimen sliding scale   SubCutaneous three times a day before meals  insulin lispro (ADMELOG) corrective regimen sliding scale   SubCutaneous at bedtime  labetalol 300 milliGRAM(s) Oral three times a day  melatonin 5 milliGRAM(s) Oral at bedtime  NIFEdipine  milliGRAM(s) Oral daily  pantoprazole    Tablet 40 milliGRAM(s) Oral every 12 hours  traZODone 50 milliGRAM(s) Oral at bedtime      T(C): 36.4 (05-16-25 @ 08:00), Max: 37 (05-16-25 @ 04:00)  HR: 89 (05-16-25 @ 07:00) (77 - 93)  BP: --  RR: 20 (05-16-25 @ 07:00) (16 - 55)  SpO2: 98% (05-16-25 @ 07:00) (94% - 98%)  Wt(kg): --Vital Signs Last 24 Hrs  T(C): 36.4 (16 May 2025 08:00), Max: 37 (16 May 2025 04:00)  T(F): 97.5 (16 May 2025 08:00), Max: 98.6 (16 May 2025 04:00)  HR: 89 (16 May 2025 07:00) (77 - 93)  BP: --  BP(mean): --  RR: 20 (16 May 2025 07:00) (16 - 55)  SpO2: 98% (16 May 2025 07:00) (94% - 98%)    Parameters below as of 16 May 2025 08:00  Patient On (Oxygen Delivery Method): room air        PHYSICAL EXAM:  GENERAL: NAD, well-groomed, well-developed  HEAD:  Atraumatic, Normocephalic  EYES: EOMI, PERRLA, conjunctiva and sclera clear  ENMT:  Moist mucous membranes  NECK: Supple, No JVD,  CHEST/LUNG: Clear to auscultation bilaterally; No rales, rhonchi, wheezing, or rubs  HEART: Regular rate and rhythm; No murmurs, rubs, or gallops  ABDOMEN: Soft, Nontender, Nondistended; Bowel sounds present  NEURO: Alert & Oriented X3  EXTREMITIES: No LE edema, no calf tenderness  LYMPH: No lymphadenopathy noted  SKIN: No rashes or lesions    Consultant(s) Notes Reviewed:  [x ] YES  [ ] NO  Care Discussed with Consultants/Other Providers [ x] YES  [ ] NO    LABS:                        6.9    12.44 )-----------( 231      ( 16 May 2025 04:47 )             20.4     05-16    138  |  103  |  56[H]  ----------------------------<  104[H]  3.5   |  22  |  3.6[H]    Ca    7.7[L]      16 May 2025 04:47  Mg     1.8     05-16    TPro  4.2[L]  /  Alb  2.8[L]  /  TBili  0.3  /  DBili  x   /  AST  10  /  ALT  11  /  AlkPhos  76  05-16      Urinalysis Basic - ( 16 May 2025 04:47 )    Color: x / Appearance: x / SG: x / pH: x  Gluc: 104 mg/dL / Ketone: x  / Bili: x / Urobili: x   Blood: x / Protein: x / Nitrite: x   Leuk Esterase: x / RBC: x / WBC x   Sq Epi: x / Non Sq Epi: x / Bacteria: x      CAPILLARY BLOOD GLUCOSE      POCT Blood Glucose.: 185 mg/dL (15 May 2025 21:20)  POCT Blood Glucose.: 142 mg/dL (15 May 2025 11:45)  POCT Blood Glucose.: 144 mg/dL (15 May 2025 08:45)        Urinalysis Basic - ( 16 May 2025 04:47 )    Color: x / Appearance: x / SG: x / pH: x  Gluc: 104 mg/dL / Ketone: x  / Bili: x / Urobili: x   Blood: x / Protein: x / Nitrite: x   Leuk Esterase: x / RBC: x / WBC x   Sq Epi: x / Non Sq Epi: x / Bacteria: x        RADIOLOGY & ADDITIONAL TESTS:    Imaging Personally Reviewed:  [x ] YES  [ ] NO        Plan       #HTN Urgency- Resolved   #Treatment resistant HTN of unclear primary vs secondary etiology    - Patient had BP of 238/135 in the ED, had to be started on a nicardipine drip   -Target BP now is SBP <160  -BP controlled with Hydralazine 50 Q6, Labetalol 300mg TID, Procardia 120 QHS   -For evaluation of secondary HTN, several causes have been evaluated   -TSH 2.04   -Renal Artery duplex negative   - Aldosterone/Renin 105/52, less likely primary aldosteronism but since patient has been treated aggressively for HTN results are confounded, will repeat per nephro   -CT scan did not show PKD, Lupus workup negative   -urine metanephrines sent but not resulted---->f/u   -cortisol WNL       #JOYCE vs CKD  #proteinuria  #LE edema   -Cr 3.5 on admission unknown baseline   - has been relatively stable throughout admission   -protein/cr urine ratio 2.3      #Age indeterminant lacunar infarct   -evaluated by Neuro, recommend MRI   -patient not tolerating MRI with light sedation   -consider MRI with anesthesia if patient is amendable     #Enteritis vs Small bowel ischemia   -physical exam benign   -patient not complaining of abdominal pain, has good PO diet, no bloody BM     #Normocytic Anemia   -required 5 transfusions while admitted  -no obvious sources of bleeding   -evaluated by heme onc: 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 CCU DOWN GRADE NOTE      Patient is a 39y old  Female who presents with a chief complaint of Hypertension (12 May 2025 14:24)      HPI:    39-year-old female with history of hypertension presenting to ER with 5 days of multiple episodes of nonbloody nonbilious vomiting and diarrhea with associated generalized abdominal pain and distention. Patient states that on Saturday she had acute onset of nausea, vomiting, diarrhea and crampy abdominal pain. She has had 4-5 episodes of non-bloody, non-bilious vomiting per day and 4-5 episodes of black diarrhea per day. She initially thought that she had gastroenteritis, but when her symptoms didn't resolve, she decided come to the ED. She denies any current abdominal pain, nausea or vomiting, fever, chest pain, shortness of breath, clotting disorder, past intra-abdominal surgeries, kidney issues, leg pain or swelling.     Labs significant for WBC 18.72k, Hb 9.0(unknown baseline) , Platelets 86k, Creatinine 3.5(unknown baseline). Lipase 71, UA positive      CTAP with finding of Edematous distal duodenum and proximal jejunal loops with associated dilatation measuring up to 3.4 cm. Associated marked interloop ascites, mesenteric fat stranding, and prominent mesenteric lymph nodes. Mild-to-moderate ascites. Findings concerning for small bowel ischemia.     CT Angio Abdomen and Pelvis w/ IV Cont (05.07.25 @ 04:18): Patent SMA, SMV. Redemonstration of distal duodenal and proximal small bowel with marked inflammatory change. Mural enhancement noted throughout   the small bowel. Considerations include severe enteritis, early ischemia, shock bowel.    #ED Vitals:   T(C): 37.2 (05-07-25 @ 05:46), Max: 37.3 (05-06-25 @ 23:44)  HR: 112 (05-07-25 @ 05:46) (97 - 118)  BP: 151/86 (05-07-25 @ 03:39) (151/86 - 238/135)  RR: 17 (05-07-25 @ 05:46) (17 - 19)  SpO2: 99% (05-07-25 @ 05:46) (96% - 100%)    # ED Course:   Zosyn, LR 1L, NS 1L, Zofran, Morphine, Pantoprazole, Reglan, Metoprolol 10 IV   Started on Nicardipine drip   Evaluated by surgery >>> No acute surgical intervention         INTERVAL HPI/OVERNIGHT EVENTS:    Patient admitted to MICU for early small bowel ischemia 2/2 to HTN Urgency. There was concern that symptoms could be related to TTP given elevated LDH, and schistocytes that were seen on peripheral smear. Patient underwent several treatments of plasma exchange therapy while ADAMS13 level was sent. The exchange therapy did not improve her HTN and ADAMS13 level came back normal so the treatment was discounted. Patient did remain anemic through her course in CCU and received 5u of PRBC during the admission. No source of bleeding was identified and patient did not have the abdominal pain/melena that she described during her admission. Patient's HTN was eventually controlled with aggressive regiment and secondary causes of htn are being explored but has unrevealing so far. Patient's CCU stay was also notable for non compliance and aggressive behavior. She was evaluated by psych who believes its delirium in the setting of HTN encephalopathy. She required frequent reorientation and has waxing and waning insight into her medical condition. It was determined that patient does not have capacity to leave AMA at this time.  Due to her behavior and persistent HTN, a CT head was ordered and was significant for an age indeterminant lacunar infarct. She was evaluated by neurology and and MRI was ordered however patient would keep refusing and would not tolerate the exam when brought down. The mri was attempted with light sedation (precedex) however that did not help and patient refused again. HTN has been under control and has been off a nicardipine drip for >24hrs. Patient stable for downgrade to CCU    REVIEW OF SYSTEMS:  CONSTITUTIONAL: No fever, chills  HEENT:  No blurry vision No sinus or throat pain  NECK: No pain or stiffness  RESPIRATORY: No cough, wheezing, chills or hemoptysis; No shortness of breath  CARDIOVASCULAR: No chest pain, palpitations  GASTROINTESTINAL: No abdominal pain. No nausea, vomiting, or diarrhea  GENITOURINARY: No dysuria  NEUROLOGICAL: No HA, No focal weakness  SKIN: No itching, burning, rashes, or lesions   MUSCULOSKELETAL: No joint pain or swelling; No muscle, back, or extremity pain    MEDICATIONS:  acetaminophen     Tablet .. 650 milliGRAM(s) Oral every 6 hours PRN  aspirin  chewable 81 milliGRAM(s) Oral daily  atorvastatin 80 milliGRAM(s) Oral at bedtime  calcitriol   Capsule 0.25 MICROGram(s) Oral daily  calcium carbonate   1250 mG (OsCal) 1 Tablet(s) Oral every 6 hours PRN  chlorhexidine 2% Cloths 1 Application(s) Topical <User Schedule>  cyanocobalamin 1000 MICROGram(s) Oral daily  dextrose 5%. 1000 milliLiter(s) IV Continuous <Continuous>  dextrose 5%. 1000 milliLiter(s) IV Continuous <Continuous>  dextrose 50% Injectable 25 Gram(s) IV Push once  dextrose 50% Injectable 12.5 Gram(s) IV Push once  dextrose 50% Injectable 25 Gram(s) IV Push once  dextrose Oral Gel 15 Gram(s) Oral once PRN  epoetin sergey-epbx (RETACRIT) Injectable 76934 Unit(s) SubCutaneous every 7 days  ferrous    sulfate 325 milliGRAM(s) Oral daily  folic acid 1 milliGRAM(s) Oral daily  furosemide   Injectable 40 milliGRAM(s) IV Push every 12 hours  hydrALAZINE 50 milliGRAM(s) Oral every 6 hours  insulin glargine Injectable (LANTUS) 6 Unit(s) SubCutaneous at bedtime  insulin lispro (ADMELOG) corrective regimen sliding scale   SubCutaneous three times a day before meals  insulin lispro (ADMELOG) corrective regimen sliding scale   SubCutaneous at bedtime  labetalol 300 milliGRAM(s) Oral three times a day  melatonin 5 milliGRAM(s) Oral at bedtime  NIFEdipine  milliGRAM(s) Oral daily  pantoprazole    Tablet 40 milliGRAM(s) Oral every 12 hours  traZODone 50 milliGRAM(s) Oral at bedtime      T(C): 36.4 (05-16-25 @ 08:00), Max: 37 (05-16-25 @ 04:00)  HR: 89 (05-16-25 @ 07:00) (77 - 93)  BP: --  RR: 20 (05-16-25 @ 07:00) (16 - 55)  SpO2: 98% (05-16-25 @ 07:00) (94% - 98%)  Wt(kg): --Vital Signs Last 24 Hrs  T(C): 36.4 (16 May 2025 08:00), Max: 37 (16 May 2025 04:00)  T(F): 97.5 (16 May 2025 08:00), Max: 98.6 (16 May 2025 04:00)  HR: 89 (16 May 2025 07:00) (77 - 93)  BP: --  BP(mean): --  RR: 20 (16 May 2025 07:00) (16 - 55)  SpO2: 98% (16 May 2025 07:00) (94% - 98%)    Parameters below as of 16 May 2025 08:00  Patient On (Oxygen Delivery Method): room air        PHYSICAL EXAM:  GENERAL: NAD, well-groomed, well-developed  HEAD:  Atraumatic, Normocephalic  EYES: EOMI, PERRLA, conjunctiva and sclera clear  ENMT:  Moist mucous membranes  NECK: Supple, No JVD,  CHEST/LUNG: Clear to auscultation bilaterally; No rales, rhonchi, wheezing, or rubs  HEART: Regular rate and rhythm; No murmurs, rubs, or gallops  ABDOMEN: Soft, Nontender, Nondistended; Bowel sounds present  NEURO: Alert & Oriented X3  EXTREMITIES: No LE edema, no calf tenderness  LYMPH: No lymphadenopathy noted  SKIN: No rashes or lesions    Consultant(s) Notes Reviewed:  [x ] YES  [ ] NO  Care Discussed with Consultants/Other Providers [ x] YES  [ ] NO    LABS:                        6.9    12.44 )-----------( 231      ( 16 May 2025 04:47 )             20.4     05-16    138  |  103  |  56[H]  ----------------------------<  104[H]  3.5   |  22  |  3.6[H]    Ca    7.7[L]      16 May 2025 04:47  Mg     1.8     05-16    TPro  4.2[L]  /  Alb  2.8[L]  /  TBili  0.3  /  DBili  x   /  AST  10  /  ALT  11  /  AlkPhos  76  05-16      Urinalysis Basic - ( 16 May 2025 04:47 )    Color: x / Appearance: x / SG: x / pH: x  Gluc: 104 mg/dL / Ketone: x  / Bili: x / Urobili: x   Blood: x / Protein: x / Nitrite: x   Leuk Esterase: x / RBC: x / WBC x   Sq Epi: x / Non Sq Epi: x / Bacteria: x      CAPILLARY BLOOD GLUCOSE      POCT Blood Glucose.: 185 mg/dL (15 May 2025 21:20)  POCT Blood Glucose.: 142 mg/dL (15 May 2025 11:45)  POCT Blood Glucose.: 144 mg/dL (15 May 2025 08:45)        Urinalysis Basic - ( 16 May 2025 04:47 )    Color: x / Appearance: x / SG: x / pH: x  Gluc: 104 mg/dL / Ketone: x  / Bili: x / Urobili: x   Blood: x / Protein: x / Nitrite: x   Leuk Esterase: x / RBC: x / WBC x   Sq Epi: x / Non Sq Epi: x / Bacteria: x        RADIOLOGY & ADDITIONAL TESTS:    Imaging Personally Reviewed:  [x ] YES  [ ] NO        Plan       #HTN Urgency- Resolved   #Treatment resistant HTN of unclear etiology    - Patient had BP of 238/135 in the ED, had to be started on a nicardipine drip   -Target BP now is SBP <160  -BP controlled with Hydralazine 50 Q6, Labetalol 300mg TID, Procardia 120 QHS   -For evaluation of secondary HTN, several causes have been evaluated   -TSH 2.04   -Renal Artery duplex negative   - Aldosterone/Renin 105/52, less likely primary aldosteronism but since patient has been treated aggressively for HTN results are confounded, will repeat per nephro   -CT scan did not show PKD, Lupus workup negative   -urine metanephrines sent but not resulted---->f/u   -cortisol WNL       #JOYCE vs CKD  #proteinuria  #LE edema   -Cr 3.5 on admission unknown baseline   - has been relatively stable throughout admission   -protein/cr urine ratio 2.3  -Glomerular nephritis workup negative   -on lasix 40 PO     #Age indeterminant lacunar infarct   -evaluated by Neuro, recommend MRI   -patient not tolerating MRI with light sedation   -consider MRI with anesthesia if patient is amendable     #Enteritis vs Small bowel ischemia   -physical exam benign   -patient not complaining of abdominal pain, has good PO diet, no bloody BM     #Normocytic Anemia   -required 5 transfusions while admitted  -no obvious sources of bleeding   -evaluated by heme onc: 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 CCU DOWN GRADE NOTE      Patient is a 39y old  Female who presents with a chief complaint of Hypertension (12 May 2025 14:24)      HPI:    39-year-old female with history of hypertension presenting to ER with 5 days of multiple episodes of nonbloody nonbilious vomiting and diarrhea with associated generalized abdominal pain and distention. Patient states that on Saturday she had acute onset of nausea, vomiting, diarrhea and crampy abdominal pain. She has had 4-5 episodes of non-bloody, non-bilious vomiting per day and 4-5 episodes of black diarrhea per day. She initially thought that she had gastroenteritis, but when her symptoms didn't resolve, she decided come to the ED. She denies any current abdominal pain, nausea or vomiting, fever, chest pain, shortness of breath, clotting disorder, past intra-abdominal surgeries, kidney issues, leg pain or swelling.     Labs significant for WBC 18.72k, Hb 9.0(unknown baseline) , Platelets 86k, Creatinine 3.5(unknown baseline). Lipase 71, UA positive      CTAP with finding of Edematous distal duodenum and proximal jejunal loops with associated dilatation measuring up to 3.4 cm. Associated marked interloop ascites, mesenteric fat stranding, and prominent mesenteric lymph nodes. Mild-to-moderate ascites. Findings concerning for small bowel ischemia.     CT Angio Abdomen and Pelvis w/ IV Cont (05.07.25 @ 04:18): Patent SMA, SMV. Redemonstration of distal duodenal and proximal small bowel with marked inflammatory change. Mural enhancement noted throughout   the small bowel. Considerations include severe enteritis, early ischemia, shock bowel.    #ED Vitals:   T(C): 37.2 (05-07-25 @ 05:46), Max: 37.3 (05-06-25 @ 23:44)  HR: 112 (05-07-25 @ 05:46) (97 - 118)  BP: 151/86 (05-07-25 @ 03:39) (151/86 - 238/135)  RR: 17 (05-07-25 @ 05:46) (17 - 19)  SpO2: 99% (05-07-25 @ 05:46) (96% - 100%)    # ED Course:   Zosyn, LR 1L, NS 1L, Zofran, Morphine, Pantoprazole, Reglan, Metoprolol 10 IV   Started on Nicardipine drip   Evaluated by surgery >>> No acute surgical intervention         INTERVAL HPI/OVERNIGHT EVENTS:    Patient admitted to MICU for early small bowel ischemia 2/2 to HTN Urgency. There was concern that symptoms could be related to TTP given elevated LDH, and schistocytes that were seen on peripheral smear. Patient underwent several treatments of plasma exchange therapy while ADAMS13 level was sent. The exchange therapy did not improve her HTN and ADAMS13 level came back normal so the treatment was discounted. Patient did remain anemic through her course in CCU and received 5u of PRBC during the admission. No source of bleeding was identified and patient did not have the abdominal pain/melena that she described during her admission. Patient's HTN was eventually controlled with aggressive regiment and secondary causes of htn are being explored but has unrevealing so far. Patient's CCU stay was also notable for non compliance and aggressive behavior. She was evaluated by psych who believes its delirium in the setting of HTN encephalopathy. She required frequent reorientation and has waxing and waning insight into her medical condition. It was determined that patient does not have capacity to leave AMA at this time.  Due to her behavior and persistent HTN, a CT head was ordered and was significant for an age indeterminant lacunar infarct. She was evaluated by neurology and and MRI was ordered however patient would keep refusing and would not tolerate the exam when brought down. The mri was attempted with light sedation (precedex) however that did not help and patient refused again. HTN has been under control and has been off a nicardipine drip for >24hrs. Patient stable for downgrade to CCU    REVIEW OF SYSTEMS:  CONSTITUTIONAL: No fever, chills  HEENT:  No blurry vision No sinus or throat pain  NECK: No pain or stiffness  RESPIRATORY: No cough, wheezing, chills or hemoptysis; No shortness of breath  CARDIOVASCULAR: No chest pain, palpitations  GASTROINTESTINAL: No abdominal pain. No nausea, vomiting, or diarrhea  GENITOURINARY: No dysuria  NEUROLOGICAL: No HA, No focal weakness  SKIN: No itching, burning, rashes, or lesions   MUSCULOSKELETAL: No joint pain or swelling; No muscle, back, or extremity pain    MEDICATIONS:  acetaminophen     Tablet .. 650 milliGRAM(s) Oral every 6 hours PRN  aspirin  chewable 81 milliGRAM(s) Oral daily  atorvastatin 80 milliGRAM(s) Oral at bedtime  calcitriol   Capsule 0.25 MICROGram(s) Oral daily  calcium carbonate   1250 mG (OsCal) 1 Tablet(s) Oral every 6 hours PRN  chlorhexidine 2% Cloths 1 Application(s) Topical <User Schedule>  cyanocobalamin 1000 MICROGram(s) Oral daily  dextrose 5%. 1000 milliLiter(s) IV Continuous <Continuous>  dextrose 5%. 1000 milliLiter(s) IV Continuous <Continuous>  dextrose 50% Injectable 25 Gram(s) IV Push once  dextrose 50% Injectable 12.5 Gram(s) IV Push once  dextrose 50% Injectable 25 Gram(s) IV Push once  dextrose Oral Gel 15 Gram(s) Oral once PRN  epoetin sergey-epbx (RETACRIT) Injectable 28818 Unit(s) SubCutaneous every 7 days  ferrous    sulfate 325 milliGRAM(s) Oral daily  folic acid 1 milliGRAM(s) Oral daily  furosemide   Injectable 40 milliGRAM(s) IV Push every 12 hours  hydrALAZINE 50 milliGRAM(s) Oral every 6 hours  insulin glargine Injectable (LANTUS) 6 Unit(s) SubCutaneous at bedtime  insulin lispro (ADMELOG) corrective regimen sliding scale   SubCutaneous three times a day before meals  insulin lispro (ADMELOG) corrective regimen sliding scale   SubCutaneous at bedtime  labetalol 300 milliGRAM(s) Oral three times a day  melatonin 5 milliGRAM(s) Oral at bedtime  NIFEdipine  milliGRAM(s) Oral daily  pantoprazole    Tablet 40 milliGRAM(s) Oral every 12 hours  traZODone 50 milliGRAM(s) Oral at bedtime      T(C): 36.4 (05-16-25 @ 08:00), Max: 37 (05-16-25 @ 04:00)  HR: 89 (05-16-25 @ 07:00) (77 - 93)  BP: --  RR: 20 (05-16-25 @ 07:00) (16 - 55)  SpO2: 98% (05-16-25 @ 07:00) (94% - 98%)  Wt(kg): --Vital Signs Last 24 Hrs  T(C): 36.4 (16 May 2025 08:00), Max: 37 (16 May 2025 04:00)  T(F): 97.5 (16 May 2025 08:00), Max: 98.6 (16 May 2025 04:00)  HR: 89 (16 May 2025 07:00) (77 - 93)  BP: --  BP(mean): --  RR: 20 (16 May 2025 07:00) (16 - 55)  SpO2: 98% (16 May 2025 07:00) (94% - 98%)    Parameters below as of 16 May 2025 08:00  Patient On (Oxygen Delivery Method): room air        PHYSICAL EXAM:  GENERAL: NAD, well-groomed, well-developed  HEAD:  Atraumatic, Normocephalic  EYES: EOMI, PERRLA, conjunctiva and sclera clear  ENMT:  Moist mucous membranes  NECK: Supple, No JVD,  CHEST/LUNG: Clear to auscultation bilaterally; No rales, rhonchi, wheezing, or rubs  HEART: Regular rate and rhythm; No murmurs, rubs, or gallops  ABDOMEN: Soft, Nontender, Nondistended; Bowel sounds present  NEURO: Alert & Oriented X3  EXTREMITIES: No LE edema, no calf tenderness  LYMPH: No lymphadenopathy noted  SKIN: No rashes or lesions    Consultant(s) Notes Reviewed:  [x ] YES  [ ] NO  Care Discussed with Consultants/Other Providers [ x] YES  [ ] NO    LABS:                        6.9    12.44 )-----------( 231      ( 16 May 2025 04:47 )             20.4     05-16    138  |  103  |  56[H]  ----------------------------<  104[H]  3.5   |  22  |  3.6[H]    Ca    7.7[L]      16 May 2025 04:47  Mg     1.8     05-16    TPro  4.2[L]  /  Alb  2.8[L]  /  TBili  0.3  /  DBili  x   /  AST  10  /  ALT  11  /  AlkPhos  76  05-16      Urinalysis Basic - ( 16 May 2025 04:47 )    Color: x / Appearance: x / SG: x / pH: x  Gluc: 104 mg/dL / Ketone: x  / Bili: x / Urobili: x   Blood: x / Protein: x / Nitrite: x   Leuk Esterase: x / RBC: x / WBC x   Sq Epi: x / Non Sq Epi: x / Bacteria: x      CAPILLARY BLOOD GLUCOSE      POCT Blood Glucose.: 185 mg/dL (15 May 2025 21:20)  POCT Blood Glucose.: 142 mg/dL (15 May 2025 11:45)  POCT Blood Glucose.: 144 mg/dL (15 May 2025 08:45)        Urinalysis Basic - ( 16 May 2025 04:47 )    Color: x / Appearance: x / SG: x / pH: x  Gluc: 104 mg/dL / Ketone: x  / Bili: x / Urobili: x   Blood: x / Protein: x / Nitrite: x   Leuk Esterase: x / RBC: x / WBC x   Sq Epi: x / Non Sq Epi: x / Bacteria: x        RADIOLOGY & ADDITIONAL TESTS:    Imaging Personally Reviewed:  [x ] YES  [ ] NO        Plan       #HTN Urgency- Resolved   #Treatment resistant HTN of unclear etiology    - Patient had BP of 238/135 in the ED, had to be started on a nicardipine drip   -Target BP now is SBP <160  -BP controlled with Hydralazine 50 Q6, Labetalol 300mg TID, Procardia 120 QHS   -For evaluation of secondary HTN, several causes have been evaluated   -TSH 2.04   -Renal Artery duplex negative   - Aldosterone/Renin 105/52, less likely primary aldosteronism but since patient has been treated aggressively for HTN results are confounded, will repeat per nephro   -CT scan did not show PKD, Lupus workup negative   -urine metanephrines sent but not resulted---->f/u   -cortisol WNL   - f/u repeat LE duplex    #JOYCE vs CKD  #proteinuria  #LE edema   -Cr 3.5 on admission unknown baseline   - has been relatively stable throughout admission   -protein/cr urine ratio 2.3  -Glomerular nephritis workup negative   -on lasix 40 PO     #Age indeterminant lacunar infarct   -evaluated by Neuro, recommend MRI   -patient not tolerating MRI with light sedation   -f/u MRI with anesthesia if patient is amendable     #Enteritis vs Small bowel ischemia   -physical exam benign   -patient not complaining of abdominal pain, has good PO diet, no bloody BM     #Normocytic Anemia   -required 5 transfusions while admitted  -no obvious sources of bleeding   -evaluated by heme onc: 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

## 2025-05-16 NOTE — PROGRESS NOTE ADULT - SUBJECTIVE AND OBJECTIVE BOX
Patient is a 39y old  Female who presents with a chief complaint of Hypertensi (12 May 2025 14:24)        Over Night Events: Off nicardipine      ROS:     All ROS are negative except HPI         PHYSICAL EXAM    ICU Vital Signs Last 24 Hrs  T(C): 37 (16 May 2025 04:00), Max: 37 (16 May 2025 04:00)  T(F): 98.6 (16 May 2025 04:00), Max: 98.6 (16 May 2025 04:00)  HR: 89 (16 May 2025 07:00) (77 - 93)  BP: --  BP(mean): --  ABP: 173/72 (16 May 2025 07:00) (129/65 - 173/72)  ABP(mean): 106 (16 May 2025 07:00) (79 - 107)  RR: 20 (16 May 2025 07:00) (16 - 55)  SpO2: 98% (16 May 2025 07:00) (94% - 98%)    O2 Parameters below as of 16 May 2025 07:00  Patient On (Oxygen Delivery Method): room air          ENT: Airway patent,  EYES: Pupils equal, Round and reactive to light.  CARDIAC: Normal rate, Regular rhythm.    RESPIRATORY: No wheezing, Bilateral BS, Not tachypneic, No use of accessory muscles  GASTROINTESTINAL: Abdomen soft, Non-tender, No guarding,   NEUROLOGICAL: Alert and oriented   SKIN: Skin normal color for race, Warm and dry and intact.         05-15-25 @ 07:01  -  05-16-25 @ 07:00  --------------------------------------------------------  IN:    Dexmedetomidine: 72 mL    Oral Fluid: 1140 mL    PRBCs (Packed Red Blood Cells): 323 mL  Total IN: 1535 mL    OUT:    Voided (mL): 2430 mL  Total OUT: 2430 mL    Total NET: -895 mL          LABS:                            6.9    12.44 )-----------( 231      ( 16 May 2025 04:47 )             20.4                                               05-16    138  |  103  |  56[H]  ----------------------------<  104[H]  3.5   |  22  |  3.6[H]    Ca    7.7[L]      16 May 2025 04:47  Mg     1.8     05-16    TPro  4.2[L]  /  Alb  2.8[L]  /  TBili  0.3  /  DBili  x   /  AST  10  /  ALT  11  /  AlkPhos  76  05-16                                             Urinalysis Basic - ( 16 May 2025 04:47 )    Color: x / Appearance: x / SG: x / pH: x  Gluc: 104 mg/dL / Ketone: x  / Bili: x / Urobili: x   Blood: x / Protein: x / Nitrite: x   Leuk Esterase: x / RBC: x / WBC x   Sq Epi: x / Non Sq Epi: x / Bacteria: x                                                  LIVER FUNCTIONS - ( 16 May 2025 04:47 )  Alb: 2.8 g/dL / Pro: 4.2 g/dL / ALK PHOS: 76 U/L / ALT: 11 U/L / AST: 10 U/L / GGT: x                                                                                                                                       MEDICATIONS  (STANDING):  aspirin  chewable 81 milliGRAM(s) Oral daily  atorvastatin 80 milliGRAM(s) Oral at bedtime  calcitriol   Capsule 0.25 MICROGram(s) Oral daily  chlorhexidine 2% Cloths 1 Application(s) Topical <User Schedule>  cyanocobalamin 1000 MICROGram(s) Oral daily  dextrose 5%. 1000 milliLiter(s) (100 mL/Hr) IV Continuous <Continuous>  dextrose 5%. 1000 milliLiter(s) (50 mL/Hr) IV Continuous <Continuous>  dextrose 50% Injectable 25 Gram(s) IV Push once  dextrose 50% Injectable 12.5 Gram(s) IV Push once  dextrose 50% Injectable 25 Gram(s) IV Push once  epoetin sergey-epbx (RETACRIT) Injectable 87144 Unit(s) SubCutaneous every 7 days  ferrous    sulfate 325 milliGRAM(s) Oral daily  folic acid 1 milliGRAM(s) Oral daily  furosemide   Injectable 40 milliGRAM(s) IV Push every 12 hours  hydrALAZINE 50 milliGRAM(s) Oral every 6 hours  insulin glargine Injectable (LANTUS) 6 Unit(s) SubCutaneous at bedtime  insulin lispro (ADMELOG) corrective regimen sliding scale   SubCutaneous three times a day before meals  insulin lispro (ADMELOG) corrective regimen sliding scale   SubCutaneous at bedtime  labetalol 300 milliGRAM(s) Oral three times a day  melatonin 5 milliGRAM(s) Oral at bedtime  NIFEdipine  milliGRAM(s) Oral daily  pantoprazole    Tablet 40 milliGRAM(s) Oral every 12 hours  traZODone 50 milliGRAM(s) Oral at bedtime    MEDICATIONS  (PRN):  acetaminophen     Tablet .. 650 milliGRAM(s) Oral every 6 hours PRN Mild Pain (1 - 3)  calcium carbonate   1250 mG (OsCal) 1 Tablet(s) Oral every 6 hours PRN Heartburn  dextrose Oral Gel 15 Gram(s) Oral once PRN Blood Glucose LESS THAN 70 milliGRAM(s)/deciliter         Patient is a 39y old  Female who presents with a chief complaint of Hypertensi (12 May 2025 14:24)        Over Night Events: Off nicardipine. SP 1 pRBC     ROS:     All ROS are negative except HPI       PHYSICAL EXAM    ICU Vital Signs Last 24 Hrs  T(C): 37 (16 May 2025 04:00), Max: 37 (16 May 2025 04:00)  T(F): 98.6 (16 May 2025 04:00), Max: 98.6 (16 May 2025 04:00)  HR: 89 (16 May 2025 07:00) (77 - 93)  ABP: 173/72 (16 May 2025 07:00) (129/65 - 173/72)  ABP(mean): 106 (16 May 2025 07:00) (79 - 107)  RR: 20 (16 May 2025 07:00) (16 - 55)  SpO2: 98% (16 May 2025 07:00) (94% - 98%)    O2 Parameters below as of 16 May 2025 07:00  Patient On (Oxygen Delivery Method): room air        ENT: Airway patent,  EYES: Pupils equal, Round and reactive to light.  CARDIAC: Normal rate, Regular rhythm.    RESPIRATORY: No wheezing, Bilateral BS, Not tachypneic, No use of accessory muscles  GASTROINTESTINAL: Abdomen soft, Non-tender, No guarding,   NEUROLOGICAL: Alert and oriented   SKIN: Skin normal color for race, Warm and dry and intact.         05-15-25 @ 07:01  -  05-16-25 @ 07:00  --------------------------------------------------------  IN:    Dexmedetomidine: 72 mL    Oral Fluid: 1140 mL    PRBCs (Packed Red Blood Cells): 323 mL  Total IN: 1535 mL    OUT:    Voided (mL): 2430 mL  Total OUT: 2430 mL    Total NET: -895 mL      LABS:                          6.9    12.44 )-----------( 231      ( 16 May 2025 04:47 )             20.4                                               05-16    138  |  103  |  56[H]  ----------------------------<  104[H]  3.5   |  22  |  3.6[H]    Ca    7.7[L]      16 May 2025 04:47  Mg     1.8     05-16    TPro  4.2[L]  /  Alb  2.8[L]  /  TBili  0.3  /  DBili  x   /  AST  10  /  ALT  11  /  AlkPhos  76  05-16                                             Urinalysis Basic - ( 16 May 2025 04:47 )    Color: x / Appearance: x / SG: x / pH: x  Gluc: 104 mg/dL / Ketone: x  / Bili: x / Urobili: x   Blood: x / Protein: x / Nitrite: x   Leuk Esterase: x / RBC: x / WBC x   Sq Epi: x / Non Sq Epi: x / Bacteria: x                                                  LIVER FUNCTIONS - ( 16 May 2025 04:47 )  Alb: 2.8 g/dL / Pro: 4.2 g/dL / ALK PHOS: 76 U/L / ALT: 11 U/L / AST: 10 U/L / GGT: x                                                                                                                          MEDICATIONS  (STANDING):  aspirin  chewable 81 milliGRAM(s) Oral daily  atorvastatin 80 milliGRAM(s) Oral at bedtime  calcitriol   Capsule 0.25 MICROGram(s) Oral daily  chlorhexidine 2% Cloths 1 Application(s) Topical <User Schedule>  cyanocobalamin 1000 MICROGram(s) Oral daily  dextrose 5%. 1000 milliLiter(s) (100 mL/Hr) IV Continuous <Continuous>  dextrose 5%. 1000 milliLiter(s) (50 mL/Hr) IV Continuous <Continuous>  dextrose 50% Injectable 25 Gram(s) IV Push once  dextrose 50% Injectable 12.5 Gram(s) IV Push once  dextrose 50% Injectable 25 Gram(s) IV Push once  epoetin sergey-epbx (RETACRIT) Injectable 39161 Unit(s) SubCutaneous every 7 days  ferrous    sulfate 325 milliGRAM(s) Oral daily  folic acid 1 milliGRAM(s) Oral daily  furosemide   Injectable 40 milliGRAM(s) IV Push every 12 hours  hydrALAZINE 50 milliGRAM(s) Oral every 6 hours  insulin glargine Injectable (LANTUS) 6 Unit(s) SubCutaneous at bedtime  insulin lispro (ADMELOG) corrective regimen sliding scale   SubCutaneous three times a day before meals  insulin lispro (ADMELOG) corrective regimen sliding scale   SubCutaneous at bedtime  labetalol 300 milliGRAM(s) Oral three times a day  melatonin 5 milliGRAM(s) Oral at bedtime  NIFEdipine  milliGRAM(s) Oral daily  pantoprazole    Tablet 40 milliGRAM(s) Oral every 12 hours  traZODone 50 milliGRAM(s) Oral at bedtime    MEDICATIONS  (PRN):  acetaminophen     Tablet .. 650 milliGRAM(s) Oral every 6 hours PRN Mild Pain (1 - 3)  calcium carbonate   1250 mG (OsCal) 1 Tablet(s) Oral every 6 hours PRN Heartburn  dextrose Oral Gel 15 Gram(s) Oral once PRN Blood Glucose LESS THAN 70 milliGRAM(s)/deciliter

## 2025-05-17 LAB
GLUCOSE BLDC GLUCOMTR-MCNC: 119 MG/DL — HIGH (ref 70–99)
GLUCOSE BLDC GLUCOMTR-MCNC: 135 MG/DL — HIGH (ref 70–99)
GLUCOSE BLDC GLUCOMTR-MCNC: 163 MG/DL — HIGH (ref 70–99)
HCT VFR BLD CALC: 25.1 % — LOW (ref 37–47)
HGB BLD-MCNC: 8.4 G/DL — LOW (ref 12–16)
MCHC RBC-ENTMCNC: 30.1 PG — SIGNIFICANT CHANGE UP (ref 27–31)
MCHC RBC-ENTMCNC: 33.5 G/DL — SIGNIFICANT CHANGE UP (ref 32–37)
MCV RBC AUTO: 90 FL — SIGNIFICANT CHANGE UP (ref 81–99)
NRBC BLD AUTO-RTO: 0 /100 WBCS — SIGNIFICANT CHANGE UP (ref 0–0)
PLATELET # BLD AUTO: 220 K/UL — SIGNIFICANT CHANGE UP (ref 130–400)
PMV BLD: 10 FL — SIGNIFICANT CHANGE UP (ref 7.4–10.4)
RBC # BLD: 2.79 M/UL — LOW (ref 4.2–5.4)
RBC # BLD: 2.79 M/UL — LOW (ref 4.2–5.4)
RBC # FLD: 14.7 % — HIGH (ref 11.5–14.5)
RETICS #: 127.5 K/UL — HIGH (ref 25–125)
RETICS/RBC NFR: 4.6 % — HIGH (ref 0.5–1.5)
WBC # BLD: 13.58 K/UL — HIGH (ref 4.8–10.8)
WBC # FLD AUTO: 13.58 K/UL — HIGH (ref 4.8–10.8)

## 2025-05-17 PROCEDURE — 99233 SBSQ HOSP IP/OBS HIGH 50: CPT

## 2025-05-17 RX ORDER — NIFEDIPINE 30 MG
120 TABLET, EXTENDED RELEASE 24 HR ORAL DAILY
Refills: 0 | Status: DISCONTINUED | OUTPATIENT
Start: 2025-05-17 | End: 2025-05-27

## 2025-05-17 RX ORDER — NICARDIPINE HCL 30 MG
5 CAPSULE ORAL
Qty: 40 | Refills: 0 | Status: DISCONTINUED | OUTPATIENT
Start: 2025-05-17 | End: 2025-05-18

## 2025-05-17 RX ORDER — LABETALOL HYDROCHLORIDE 200 MG/1
10 TABLET, FILM COATED ORAL ONCE
Refills: 0 | Status: COMPLETED | OUTPATIENT
Start: 2025-05-17 | End: 2025-05-17

## 2025-05-17 RX ADMIN — Medication 0.1 MILLIGRAM(S): at 00:14

## 2025-05-17 RX ADMIN — FUROSEMIDE 40 MILLIGRAM(S): 10 INJECTION INTRAMUSCULAR; INTRAVENOUS at 17:33

## 2025-05-17 RX ADMIN — Medication 50 MILLIGRAM(S): at 12:52

## 2025-05-17 RX ADMIN — CYANOCOBALAMIN 1000 MICROGRAM(S): 1000 INJECTION INTRAMUSCULAR; SUBCUTANEOUS at 12:52

## 2025-05-17 RX ADMIN — Medication 25 MG/HR: at 20:24

## 2025-05-17 RX ADMIN — FUROSEMIDE 40 MILLIGRAM(S): 10 INJECTION INTRAMUSCULAR; INTRAVENOUS at 06:19

## 2025-05-17 RX ADMIN — Medication 5 MILLIGRAM(S): at 21:07

## 2025-05-17 RX ADMIN — FOLIC ACID 1 MILLIGRAM(S): 1 TABLET ORAL at 12:53

## 2025-05-17 RX ADMIN — LABETALOL HYDROCHLORIDE 10 MILLIGRAM(S): 200 TABLET, FILM COATED ORAL at 03:44

## 2025-05-17 RX ADMIN — Medication 0.1 MILLIGRAM(S): at 02:32

## 2025-05-17 RX ADMIN — Medication 40 MILLIGRAM(S): at 06:19

## 2025-05-17 RX ADMIN — Medication 25 MG/HR: at 17:12

## 2025-05-17 RX ADMIN — Medication 25 MG/HR: at 04:59

## 2025-05-17 RX ADMIN — Medication 120 MILLIGRAM(S): at 02:32

## 2025-05-17 RX ADMIN — Medication 1 APPLICATION(S): at 06:19

## 2025-05-17 RX ADMIN — LABETALOL HYDROCHLORIDE 300 MILLIGRAM(S): 200 TABLET, FILM COATED ORAL at 21:07

## 2025-05-17 RX ADMIN — Medication 40 MILLIGRAM(S): at 17:33

## 2025-05-17 RX ADMIN — Medication 50 MILLIGRAM(S): at 06:19

## 2025-05-17 RX ADMIN — CALCITRIOL 0.25 MICROGRAM(S): 0.5 CAPSULE, GELATIN COATED ORAL at 12:53

## 2025-05-17 RX ADMIN — LABETALOL HYDROCHLORIDE 300 MILLIGRAM(S): 200 TABLET, FILM COATED ORAL at 14:04

## 2025-05-17 RX ADMIN — Medication 50 MILLIGRAM(S): at 17:32

## 2025-05-17 RX ADMIN — Medication 81 MILLIGRAM(S): at 12:53

## 2025-05-17 RX ADMIN — Medication 10 MILLIGRAM(S): at 04:15

## 2025-05-17 RX ADMIN — Medication 50 MILLIGRAM(S): at 21:06

## 2025-05-17 RX ADMIN — LABETALOL HYDROCHLORIDE 300 MILLIGRAM(S): 200 TABLET, FILM COATED ORAL at 06:19

## 2025-05-17 RX ADMIN — ATORVASTATIN CALCIUM 80 MILLIGRAM(S): 80 TABLET, FILM COATED ORAL at 21:06

## 2025-05-17 RX ADMIN — Medication 50 MILLIGRAM(S): at 01:08

## 2025-05-17 RX ADMIN — IRON SUCROSE 100 MILLIGRAM(S): 20 INJECTION, SOLUTION INTRAVENOUS at 06:19

## 2025-05-17 NOTE — PROGRESS NOTE ADULT - SUBJECTIVE AND OBJECTIVE BOX
Patient is a 39y old  Female who presents with a chief complaint of Hypertensi (12 May 2025 14:24)        HPI:  39-year-old female with history of hypertension presenting to ER with 5 days of multiple episodes of nonbloody nonbilious vomiting and diarrhea with associated generalized abdominal pain and distention. Patient states that on Saturday she had acute onset of nausea, vomiting, diarrhea and crampy abdominal pain. She has had 4-5 episodes of non-bloody, non-bilious vomiting per day and 4-5 episodes of black diarrhea per day. She initially thought that she had gastroenteritis, but when her symptoms didn't resolve, she decided come to the ED. She denies any current abdominal pain, nausea or vomiting, fever, chest pain, shortness of breath, clotting disorder, past intra-abdominal surgeries, kidney issues, leg pain or swelling.     Labs significant for WBC 18.72k, Hb 9.0(unknown baseline) , Platelets 86k, Creatinine 3.5(unknown baseline). Lipase 71, UA positive      CTAP with finding of Edematous distal duodenum and proximal jejunal loops with associated dilatation measuring up to 3.4 cm. Associated marked interloop ascites, mesenteric fat stranding, and prominent mesenteric lymph nodes. Mild-to-moderate ascites. Findings concerning for small bowel ischemia.     CT Angio Abdomen and Pelvis w/ IV Cont (05.07.25 @ 04:18): Patent SMA, SMV. Redemonstration of distal duodenal and proximal small bowel with marked inflammatory change. Mural enhancement noted throughout   the small bowel. Considerations include severe enteritis, early ischemia, shock bowel.    #ED Vitals:   T(C): 37.2 (05-07-25 @ 05:46), Max: 37.3 (05-06-25 @ 23:44)  HR: 112 (05-07-25 @ 05:46) (97 - 118)  BP: 151/86 (05-07-25 @ 03:39) (151/86 - 238/135)  RR: 17 (05-07-25 @ 05:46) (17 - 19)  SpO2: 99% (05-07-25 @ 05:46) (96% - 100%)    # ED Course:   Zosyn, LR 1L, NS 1L, Zofran, Morphine, Pantoprazole, Reglan, Metoprolol 10 IV   Started on Nicardipine drip   Evaluated by surgery >>> No acute surgical intervention     The patient is being admitted to MICU for the further management.  (07 May 2025 06:03)      PAST MEDICAL & SURGICAL HISTORY:  HTN (hypertension)          FAMILY HISTORY:        Pt evaluated on rounds.  I reviewed the radiology tests and hospital record.    I reviewed previous notes on this patient.    Interval Events: No overnight events.      REVIEW OF SYSTEMS:   see HPI      OBJECTIVE:  ICU Vital Signs Last 24 Hrs  T(C): 35.6 (17 May 2025 08:00), Max: 36.8 (16 May 2025 11:15)  T(F): 96 (17 May 2025 08:00), Max: 98.2 (16 May 2025 11:15)  HR: 81 (17 May 2025 10:00) (78 - 89)  BP: 184/98 (17 May 2025 10:00) (140/75 - 218/110)  BP(mean): 133 (17 May 2025 10:00) (100 - 135)  ABP: --  ABP(mean): --  RR: 27 (17 May 2025 10:00) (17 - 57)  SpO2: 98% (17 May 2025 10:00) (93% - 98%)    O2 Parameters below as of 17 May 2025 10:00  Patient On (Oxygen Delivery Method): room air              05-16 @ 07:01  -  05-17 @ 07:00  --------------------------------------------------------  IN: 155 mL / OUT: 0 mL / NET: 155 mL      CAPILLARY BLOOD GLUCOSE      POCT Blood Glucose.: 119 mg/dL (17 May 2025 08:34)        PHYSICAL EXAM:     · ENMT:   Airway patent,   Nasal mucosa clear.  Mouth with normal mucosa.   No thrush    · EYES:   Clear bilaterally,   pupils equal,   round and reactive to light.    · CARDIAC:   Normal rate,   regular rhythm.    Heart sounds S1, S2.   No murmurs, no rubs or gallops on auscultation  no edema        CAROTID:   normal systolic impulse  no bruits    · RESPIRATORY:   rales  normal chest expansion  no retractions or use of accessory muscles  percussion of chest demonstrates no hyperresonance or dullness    · GASTROINTESTINAL:  Abdomen soft,   non-tender,   + BS  liver/spleen not palpable    · SKIN:   Skin normal color for race,   warm, dry   No evidence of rash.    · HEME LYMPH:   no splenomegaly.  No cervical  lymphadenopathy.  no inguinal lymphadenopathy    HOSPITAL MEDICATIONS:  MEDICATIONS  (STANDING):  aspirin  chewable 81 milliGRAM(s) Oral daily  atorvastatin 80 milliGRAM(s) Oral at bedtime  calcitriol   Capsule 0.25 MICROGram(s) Oral daily  chlorhexidine 2% Cloths 1 Application(s) Topical <User Schedule>  cyanocobalamin 1000 MICROGram(s) Oral daily  dextrose 5%. 1000 milliLiter(s) (100 mL/Hr) IV Continuous <Continuous>  dextrose 5%. 1000 milliLiter(s) (50 mL/Hr) IV Continuous <Continuous>  dextrose 50% Injectable 25 Gram(s) IV Push once  dextrose 50% Injectable 12.5 Gram(s) IV Push once  dextrose 50% Injectable 25 Gram(s) IV Push once  epoetin sergey-epbx (RETACRIT) Injectable 89899 Unit(s) SubCutaneous every 7 days  folic acid 1 milliGRAM(s) Oral daily  furosemide    Tablet 40 milliGRAM(s) Oral every 12 hours  hydrALAZINE 50 milliGRAM(s) Oral every 6 hours  insulin glargine Injectable (LANTUS) 6 Unit(s) SubCutaneous at bedtime  insulin lispro (ADMELOG) corrective regimen sliding scale   SubCutaneous three times a day before meals  insulin lispro (ADMELOG) corrective regimen sliding scale   SubCutaneous at bedtime  labetalol 300 milliGRAM(s) Oral three times a day  melatonin 5 milliGRAM(s) Oral at bedtime  niCARdipine Infusion 5 mG/Hr (25 mL/Hr) IV Continuous <Continuous>  NIFEdipine  milliGRAM(s) Oral daily  pantoprazole    Tablet 40 milliGRAM(s) Oral every 12 hours  traZODone 50 milliGRAM(s) Oral at bedtime    MEDICATIONS  (PRN):  acetaminophen     Tablet .. 650 milliGRAM(s) Oral every 6 hours PRN Mild Pain (1 - 3)  calcium carbonate   1250 mG (OsCal) 1 Tablet(s) Oral every 6 hours PRN Heartburn  dextrose Oral Gel 15 Gram(s) Oral once PRN Blood Glucose LESS THAN 70 milliGRAM(s)/deciliter    lactated ringers.: Solution, 1000 milliLiter(s) infuse at 50 mL/Hr  lactated ringers Bolus:   1000 milliLiter(s), IV Bolus, once, infuse over 60 Minute(s), Stop After 1 Doses  Provider's Contact #: [phone]  sodium chloride 0.9% Bolus:   1000 milliLiter(s), IV Bolus, once, infuse over 60 Minute(s), Stop After 1 Doses  Provider's Contact #: [phone]      LABS:                        8.4    13.58 )-----------( 220      ( 17 May 2025 04:10 )             25.1     05-16    138  |  103  |  56[H]  ----------------------------<  104[H]  3.5   |  22  |  3.6[H]    Ca    7.7[L]      16 May 2025 04:47  Mg     1.8     05-16    TPro  4.2[L]  /  Alb  2.8[L]  /  TBili  0.3  /  DBili  x   /  AST  10  /  ALT  11  /  AlkPhos  76  05-16      Urinalysis Basic - ( 16 May 2025 04:47 )    Color: x / Appearance: x / SG: x / pH: x  Gluc: 104 mg/dL / Ketone: x  / Bili: x / Urobili: x   Blood: x / Protein: x / Nitrite: x   Leuk Esterase: x / RBC: x / WBC x   Sq Epi: x / Non Sq Epi: x / Bacteria: x                            RADIOLOGY: Today I personally interpreted the latest CXR and other pertinent films.

## 2025-05-17 NOTE — PROGRESS NOTE ADULT - ASSESSMENT
IMPRESSION    Malignant hypertension   Hypertensive Emergency  Likely UGIB on presentation  Anemia - unknown etiology  Thrombocytopenia - resolved  Early small bowel ischemia likely 2/2 severe enteritis   SIRS/Sepsis   Uncomplicated Cystitis   JOYCE likely pre-renal   RSV infection     PLAN:     CNS : Avoid CNS depressant.  Delirium Precautions awake follow follow command. CT head noted. MRI pending. Precedex if needed.  Patient is refusing workup.     ENT : Oral Care     Pulmonary : Keep Spo2 > 94% .HOB elevation. Supplemental O2 prn.     Cardiology: ECHO LVH.  Porcardia 120 XL   labetalol 300 Q8 hrs    Hydralazine 50 mg PO q6  Lasix 40 bid  nicardipine ggt PRN.      GI : follow GI dropping h/h hx possible melena   follow Gi and G sx   CBC bid  PPI Q24  Keep T & S active, CBC q24, 18 G IV x2, Keep Hb > 7    Renal : Trend CMP. Monitor Lytes and replete as needed. Nephro  follow up   RA duplex negative  FU work up for secondary hypertension,   Elevated renin and Alex - Renal following    Hem/Onc : Coagulation studies noted.  Steroids d/c off plasma   RESTREPO 13  negative   s/p plasmapheresis stopped   Monitor CBC  Repeat hemolysis panel negative  Repeat LE duplex     Endo:  Blood glucose Goal : 140-180.     MSK: Ambulate as tolerated     Code : Full code.      Disp: Micu as pt had elevate BP and came back to ICU last PM  DC A line

## 2025-05-17 NOTE — PROGRESS NOTE ADULT - SUBJECTIVE AND OBJECTIVE BOX
seen and examined  24 h events noted   on samson andrade        PAST HISTORY  --------------------------------------------------------------------------------  No significant changes to PMH, PSH, FHx, SHx, unless otherwise noted    ALLERGIES & MEDICATIONS  --------------------------------------------------------------------------------  Allergies    No Known Allergies    Intolerances      Standing Inpatient Medications  aspirin  chewable 81 milliGRAM(s) Oral daily  atorvastatin 80 milliGRAM(s) Oral at bedtime  calcitriol   Capsule 0.25 MICROGram(s) Oral daily  chlorhexidine 2% Cloths 1 Application(s) Topical <User Schedule>  cyanocobalamin 1000 MICROGram(s) Oral daily  dextrose 5%. 1000 milliLiter(s) IV Continuous <Continuous>  dextrose 5%. 1000 milliLiter(s) IV Continuous <Continuous>  dextrose 50% Injectable 25 Gram(s) IV Push once  dextrose 50% Injectable 12.5 Gram(s) IV Push once  dextrose 50% Injectable 25 Gram(s) IV Push once  epoetin sergey-epbx (RETACRIT) Injectable 03201 Unit(s) SubCutaneous every 7 days  folic acid 1 milliGRAM(s) Oral daily  furosemide    Tablet 40 milliGRAM(s) Oral every 12 hours  hydrALAZINE 50 milliGRAM(s) Oral every 6 hours  insulin glargine Injectable (LANTUS) 6 Unit(s) SubCutaneous at bedtime  insulin lispro (ADMELOG) corrective regimen sliding scale   SubCutaneous three times a day before meals  insulin lispro (ADMELOG) corrective regimen sliding scale   SubCutaneous at bedtime  labetalol 300 milliGRAM(s) Oral three times a day  melatonin 5 milliGRAM(s) Oral at bedtime  niCARdipine Infusion 5 mG/Hr IV Continuous <Continuous>  NIFEdipine  milliGRAM(s) Oral daily  pantoprazole    Tablet 40 milliGRAM(s) Oral every 12 hours  traZODone 50 milliGRAM(s) Oral at bedtime    PRN Inpatient Medications  acetaminophen     Tablet .. 650 milliGRAM(s) Oral every 6 hours PRN  calcium carbonate   1250 mG (OsCal) 1 Tablet(s) Oral every 6 hours PRN  dextrose Oral Gel 15 Gram(s) Oral once PRN      VITALS/PHYSICAL EXAM  --------------------------------------------------------------------------------  T(C): 36.4 (05-16-25 @ 20:00), Max: 36.8 (05-16-25 @ 11:15)  HR: 81 (05-17-25 @ 03:09) (78 - 89)  BP: 214/115 (05-17-25 @ 04:15) (140/75 - 218/110)  RR: 17 (05-16-25 @ 22:10) (17 - 51)  SpO2: 96% (05-16-25 @ 22:10) (95% - 98%)  Wt(kg): --        05-16-25 @ 07:01  -  05-17-25 @ 07:00  --------------------------------------------------------  IN: 100 mL / OUT: 0 mL / NET: 100 mL      Physical Exam:  	Gen: NAD  	Pulm: CTA B/L  	CV: S1S2; no rub  	Abd: distended  	    LABS/STUDIES  --------------------------------------------------------------------------------              8.4    13.58 >-----------<  220      [05-17-25 @ 04:10]              25.1     138  |  103  |  56  ----------------------------<  104      [05-16-25 @ 04:47]  3.5   |  22  |  3.6        Ca     7.7     [05-16-25 @ 04:47]      Mg     1.8     [05-16-25 @ 04:47]    TPro  4.2  /  Alb  2.8  /  TBili  0.3  /  DBili  x   /  AST  10  /  ALT  11  /  AlkPhos  76  [05-16-25 @ 04:47]          Creatinine Trend:  SCr 3.6 [05-16 @ 04:47]  SCr 3.5 [05-15 @ 04:54]  SCr 3.2 [05-14 @ 04:20]  SCr 3.4 [05-13 @ 04:40]  SCr 3.3 [05-12 @ 03:55]    Urinalysis - [05-16-25 @ 04:47]      Color  / Appearance  / SG  / pH       Gluc 104 / Ketone   / Bili  / Urobili        Blood  / Protein  / Leuk Est  / Nitrite       RBC  / WBC  / Hyaline  / Gran  / Sq Epi  / Non Sq Epi  / Bacteria     Urine Creatinine 16      [05-13-25 @ 12:24]  Urine Protein 37      [05-13-25 @ 12:24]    Iron 18, TIBC 166, %sat 11      [05-07-25 @ 11:40]  Ferritin 362      [05-07-25 @ 11:40]  PTH -- (Ca --)      [05-13-25 @ 12:08]   194  PTH -- (Ca --)      [05-12-25 @ 16:48]   193  Vitamin D (25OH) 12      [05-12-25 @ 16:48]  TSH 2.05      [05-07-25 @ 11:40]  Lipid: chol 154, , HDL 37, LDL --      [05-14-25 @ 04:20]    HBsAg Nonreact      [05-07-25 @ 11:40]  HCV 0.13, Nonreact      [05-07-25 @ 11:40]  HIV Nonreact      [05-07-25 @ 11:40]    JOSE ANTONIO: titer Negative, pattern --      [05-07-25 @ 11:40]  dsDNA 2      [05-08-25 @ 19:20]  C3 Complement 106      [05-07-25 @ 11:40]  C4 Complement 22      [05-07-25 @ 11:40]  ANCA: cANCA Negative, pANCA Negative, atypical ANCA Negative      [05-07-25 @ 11:40]  anti-GBM <0.2      [05-07-25 @ 11:40]  Free Light Chains: kappa 1.98, lambda 2.29, ratio = 0.86      [05-13 @ 04:40]  Immunofixation Serum:   No Monoclonal Band Identified      Reference Range: None Detected      [05-12-25 @ 16:48]  SPEP Interpretation: Hypogammaglobulinemia      [05-12-25 @ 16:48]

## 2025-05-17 NOTE — CHART NOTE - NSCHARTNOTEFT_GEN_A_CORE
Transfer From:  Transfer To:      Patient is a 39y old  Female who presents with a chief complaint of Hypertensi (12 May 2025 14:24)      HPI:   39-year-old female with history of hypertension presenting to ER with 5 days of multiple episodes of nonbloody nonbilious vomiting and diarrhea with associated generalized abdominal pain and distention. Patient states that on Saturday she had acute onset of nausea, vomiting, diarrhea and crampy abdominal pain. She has had 4-5 episodes of non-bloody, non-bilious vomiting per day and 4-5 episodes of black diarrhea per day. She initially thought that she had gastroenteritis, but when her symptoms didn't resolve, she decided come to the ED. She denies any current abdominal pain, nausea or vomiting, fever, chest pain, shortness of breath, clotting disorder, past intra-abdominal surgeries, kidney issues, leg pain or swelling.     Labs significant for WBC 18.72k, Hb 9.0(unknown baseline) , Platelets 86k, Creatinine 3.5(unknown baseline). Lipase 71, UA positive      CTAP with finding of Edematous distal duodenum and proximal jejunal loops with associated dilatation measuring up to 3.4 cm. Associated marked interloop ascites, mesenteric fat stranding, and prominent mesenteric lymph nodes. Mild-to-moderate ascites. Findings concerning for small bowel ischemia.     CT Angio Abdomen and Pelvis w/ IV Cont (05.07.25 @ 04:18): Patent SMA, SMV. Redemonstration of distal duodenal and proximal small bowel with marked inflammatory change. Mural enhancement noted throughout   the small bowel. Considerations include severe enteritis, early ischemia, shock bowel.    #ED Vitals:   T(C): 37.2 (05-07-25 @ 05:46), Max: 37.3 (05-06-25 @ 23:44)  HR: 112 (05-07-25 @ 05:46) (97 - 118)  BP: 151/86 (05-07-25 @ 03:39) (151/86 - 238/135)  RR: 17 (05-07-25 @ 05:46) (17 - 19)  SpO2: 99% (05-07-25 @ 05:46) (96% - 100%)    # ED Course:   Zosyn, LR 1L, NS 1L, Zofran, Morphine, Pantoprazole, Reglan, Metoprolol 10 IV   Started on Nicardipine drip   Evaluated by surgery >>> No acute surgical intervention     The patient is being admitted to MICU for the further management.  (07 May 2025 06:03)      ------------------------------------------------------------------------  MICU Course:  Patient admitted to MICU for early small bowel ischemia 2/2 to HTN Urgency. There was concern that symptoms could be related to TTP given elevated LDH, and schistocytes that were seen on peripheral smear. Patient underwent several treatments of plasma exchange therapy while ADAMS13 level was sent. The exchange therapy did not improve her HTN and ADAMS13 level came back normal so the treatment was discounted. Patient did remain anemic through her course in CCU and received 5u of PRBC during the admission. No source of bleeding was identified and patient did not have the abdominal pain/melena that she described during her admission. Patient's HTN was eventually controlled with aggressive regiment and secondary causes of htn are being explored but has unrevealing so far. Patient's CCU stay was also notable for non compliance and aggressive behavior. She was evaluated by psych who believes its delirium in the setting of HTN encephalopathy. She required frequent reorientation and has waxing and waning insight into her medical condition. It was determined that patient does not have capacity to leave AMA at this time.  Due to her behavior and persistent HTN, a CT head was ordered and was significant for an age indeterminant lacunar infarct. She was evaluated by neurology and and MRI was ordered however patient would keep refusing and would not tolerate the exam when brought down. The mri was attempted with light sedation (precedex) however that did not help and patient refused again. HTN has been under control and has been off a nicardipine drip for >24hrs. Patient stable for downgrade to CCU.  ------------------------------------------------------------------------  3C course:  Patient was downgraded to 3C on 5/16. On 5/17 In the PM the patients BP was persistently >200 SBP. She received clonidine, nifedipine, Labetalol IV and hydralazine IV with the SBP persistently >190. She was started on Cardizem gtt and upgraded to CCU.        MEDICATIONS:  acetaminophen     Tablet .. 650 milliGRAM(s) Oral every 6 hours PRN  aspirin  chewable 81 milliGRAM(s) Oral daily  atorvastatin 80 milliGRAM(s) Oral at bedtime  calcitriol   Capsule 0.25 MICROGram(s) Oral daily  calcium carbonate   1250 mG (OsCal) 1 Tablet(s) Oral every 6 hours PRN  chlorhexidine 2% Cloths 1 Application(s) Topical <User Schedule>  cyanocobalamin 1000 MICROGram(s) Oral daily  dextrose 5%. 1000 milliLiter(s) IV Continuous <Continuous>  dextrose 5%. 1000 milliLiter(s) IV Continuous <Continuous>  dextrose 50% Injectable 25 Gram(s) IV Push once  dextrose 50% Injectable 12.5 Gram(s) IV Push once  dextrose 50% Injectable 25 Gram(s) IV Push once  dextrose Oral Gel 15 Gram(s) Oral once PRN  epoetin sergey-epbx (RETACRIT) Injectable 23841 Unit(s) SubCutaneous every 7 days  folic acid 1 milliGRAM(s) Oral daily  furosemide    Tablet 40 milliGRAM(s) Oral every 12 hours  hydrALAZINE 50 milliGRAM(s) Oral every 6 hours  insulin glargine Injectable (LANTUS) 6 Unit(s) SubCutaneous at bedtime  insulin lispro (ADMELOG) corrective regimen sliding scale   SubCutaneous three times a day before meals  insulin lispro (ADMELOG) corrective regimen sliding scale   SubCutaneous at bedtime  iron sucrose IVPB 200 milliGRAM(s) IV Intermittent once  labetalol 300 milliGRAM(s) Oral three times a day  melatonin 5 milliGRAM(s) Oral at bedtime  niCARdipine Infusion 5 mG/Hr IV Continuous <Continuous>  NIFEdipine  milliGRAM(s) Oral daily  pantoprazole    Tablet 40 milliGRAM(s) Oral every 12 hours  traZODone 50 milliGRAM(s) Oral at bedtime      T(C): 36.4 (05-16-25 @ 20:00), Max: 36.8 (05-16-25 @ 11:15)  HR: 81 (05-17-25 @ 03:09) (78 - 89)  BP: 218/110 (05-17-25 @ 03:48) (140/75 - 218/110)  RR: 17 (05-16-25 @ 22:10) (17 - 51)  SpO2: 96% (05-16-25 @ 22:10) (94% - 98%)  Wt(kg): --Vital Signs Last 24 Hrs  T(C): 36.4 (16 May 2025 20:00), Max: 36.8 (16 May 2025 11:15)  T(F): 97.5 (16 May 2025 20:00), Max: 98.2 (16 May 2025 11:15)  HR: 81 (17 May 2025 03:09) (78 - 89)  BP: 218/110 (17 May 2025 03:48) (140/75 - 218/110)  BP(mean): 135 (16 May 2025 22:10) (109 - 135)  RR: 17 (16 May 2025 22:10) (17 - 51)  SpO2: 96% (16 May 2025 22:10) (94% - 98%)    Parameters below as of 16 May 2025 22:10  Patient On (Oxygen Delivery Method): room air      ------------------------------------------------------------------------  3-day Trends:          PHYSICAL EXAM:  GENERAL: NAD,   HEAD:  Atraumatic, Normocephalic  EYES: EOMI, PERRLA, conjunctiva and sclera clear  ENMT:  Moist mucous membranes  NECK: Supple, No JVD,  CHEST/LUNG: Clear to auscultation bilaterally; No rales, rhonchi, wheezing, or rubs  HEART: Regular rate and rhythm; No murmurs, rubs, or gallops  ABDOMEN: Soft, Nontender, Nondistended; Bowel sounds present  NEURO: Alert & Oriented X3  EXTREMITIES: No LE edema, no calf tenderness  LYMPH: No lymphadenopathy noted  SKIN: No rashes or lesions            LABS:                        8.4    13.58 )-----------( 220      ( 17 May 2025 04:10 )             25.1     05-16    138  |  103  |  56[H]  ----------------------------<  104[H]  3.5   |  22  |  3.6[H]    Ca    7.7[L]      16 May 2025 04:47  Mg     1.8     05-16    TPro  4.2[L]  /  Alb  2.8[L]  /  TBili  0.3  /  DBili  x   /  AST  10  /  ALT  11  /  AlkPhos  76  05-16      Urinalysis Basic - ( 16 May 2025 04:47 )    Color: x / Appearance: x / SG: x / pH: x  Gluc: 104 mg/dL / Ketone: x  / Bili: x / Urobili: x   Blood: x / Protein: x / Nitrite: x   Leuk Esterase: x / RBC: x / WBC x   Sq Epi: x / Non Sq Epi: x / Bacteria: x      CAPILLARY BLOOD GLUCOSE      POCT Blood Glucose.: 190 mg/dL (16 May 2025 17:09)  POCT Blood Glucose.: 176 mg/dL (16 May 2025 11:17)        Urinalysis Basic - ( 16 May 2025 04:47 )    Color: x / Appearance: x / SG: x / pH: x  Gluc: 104 mg/dL / Ketone: x  / Bili: x / Urobili: x   Blood: x / Protein: x / Nitrite: x   Leuk Esterase: x / RBC: x / WBC x   Sq Epi: x / Non Sq Epi: x / Bacteria: x        #HTN Urgency- Resolved   #Treatment resistant HTN of unclear etiology    - Patient had BP of 238/135 in the ED, had to be started on a nicardipine drip   -Target BP now is SBP <160  -BP controlled with Hydralazine 50 Q6, Labetalol 300mg TID, Procardia 120 QHS   -For evaluation of secondary HTN, several causes have been evaluated   -TSH 2.04   -Renal Artery duplex negative   - Aldosterone/Renin 105/52, less likely primary aldosteronism but since patient has been treated aggressively for HTN results are confounded, will repeat per nephro   -CT scan did not show PKD, Lupus workup negative   -urine metanephrines sent but not resulted---->f/u   -cortisol WNL   - f/u repeat LE duplex    #JOYCE vs CKD  #proteinuria  #LE edema   -Cr 3.5 on admission unknown baseline   - has been relatively stable throughout admission   -protein/cr urine ratio 2.3  -Glomerular nephritis workup negative   -on lasix 40 PO     #Age indeterminant lacunar infarct   -evaluated by Neuro, recommend MRI   -patient not tolerating MRI with light sedation   -f/u MRI with anesthesia if patient is amendable     #Enteritis vs Small bowel ischemia   -physical exam benign   -patient not complaining of abdominal pain, has good PO diet, no bloody BM     #Normocytic Anemia   -required 5 transfusions while admitted  -no obvious sources of bleeding   -evaluated by heme onc: 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 Transfer From:  Transfer To:      Patient is a 39y old  Female who presents with a chief complaint of Hypertensi (12 May 2025 14:24)      HPI:   39-year-old female with history of hypertension presenting to ER with 5 days of multiple episodes of nonbloody nonbilious vomiting and diarrhea with associated generalized abdominal pain and distention. Patient states that on Saturday she had acute onset of nausea, vomiting, diarrhea and crampy abdominal pain. She has had 4-5 episodes of non-bloody, non-bilious vomiting per day and 4-5 episodes of black diarrhea per day. She initially thought that she had gastroenteritis, but when her symptoms didn't resolve, she decided come to the ED. She denies any current abdominal pain, nausea or vomiting, fever, chest pain, shortness of breath, clotting disorder, past intra-abdominal surgeries, kidney issues, leg pain or swelling.     Labs significant for WBC 18.72k, Hb 9.0(unknown baseline) , Platelets 86k, Creatinine 3.5(unknown baseline). Lipase 71, UA positive      CTAP with finding of Edematous distal duodenum and proximal jejunal loops with associated dilatation measuring up to 3.4 cm. Associated marked interloop ascites, mesenteric fat stranding, and prominent mesenteric lymph nodes. Mild-to-moderate ascites. Findings concerning for small bowel ischemia.     CT Angio Abdomen and Pelvis w/ IV Cont (05.07.25 @ 04:18): Patent SMA, SMV. Redemonstration of distal duodenal and proximal small bowel with marked inflammatory change. Mural enhancement noted throughout   the small bowel. Considerations include severe enteritis, early ischemia, shock bowel.    #ED Vitals:   T(C): 37.2 (05-07-25 @ 05:46), Max: 37.3 (05-06-25 @ 23:44)  HR: 112 (05-07-25 @ 05:46) (97 - 118)  BP: 151/86 (05-07-25 @ 03:39) (151/86 - 238/135)  RR: 17 (05-07-25 @ 05:46) (17 - 19)  SpO2: 99% (05-07-25 @ 05:46) (96% - 100%)    # ED Course:   Zosyn, LR 1L, NS 1L, Zofran, Morphine, Pantoprazole, Reglan, Metoprolol 10 IV   Started on Nicardipine drip   Evaluated by surgery >>> No acute surgical intervention     The patient is being admitted to MICU for the further management.  (07 May 2025 06:03)      ------------------------------------------------------------------------  MICU Course:  Patient admitted to MICU for early small bowel ischemia 2/2 to HTN Urgency. There was concern that symptoms could be related to TTP given elevated LDH, and schistocytes that were seen on peripheral smear. Patient underwent several treatments of plasma exchange therapy while ADAMS13 level was sent. The exchange therapy did not improve her HTN and ADAMS13 level came back normal so the treatment was discounted. Patient did remain anemic through her course in CCU and received 5u of PRBC during the admission. No source of bleeding was identified and patient did not have the abdominal pain/melena that she described during her admission. Patient's HTN was eventually controlled with aggressive regiment and secondary causes of htn are being explored but has unrevealing so far. Patient's CCU stay was also notable for non compliance and aggressive behavior. She was evaluated by psych who believes its delirium in the setting of HTN encephalopathy. She required frequent reorientation and has waxing and waning insight into her medical condition. It was determined that patient does not have capacity to leave AMA at this time.  Due to her behavior and persistent HTN, a CT head was ordered and was significant for an age indeterminant lacunar infarct. She was evaluated by neurology and and MRI was ordered however patient would keep refusing and would not tolerate the exam when brought down. The mri was attempted with light sedation (precedex) however that did not help and patient refused again. HTN has been under control and has been off a nicardipine drip for >24hrs. Patient stable for downgrade to CCU.  ------------------------------------------------------------------------  3C course:  Patient was downgraded to 3C on 5/16. On 5/17 In the PM the patients BP was persistently >200 SBP. She received clonidine, nifedipine, Labetalol IV and hydralazine IV with the SBP persistently >190. She denied having any HA, CP, SOB. Exam was benign. She was started on Cardizem gtt and upgraded to CCU.  ------------------------------------------------------------------------------------      MEDICATIONS:  acetaminophen     Tablet .. 650 milliGRAM(s) Oral every 6 hours PRN  aspirin  chewable 81 milliGRAM(s) Oral daily  atorvastatin 80 milliGRAM(s) Oral at bedtime  calcitriol   Capsule 0.25 MICROGram(s) Oral daily  calcium carbonate   1250 mG (OsCal) 1 Tablet(s) Oral every 6 hours PRN  chlorhexidine 2% Cloths 1 Application(s) Topical <User Schedule>  cyanocobalamin 1000 MICROGram(s) Oral daily  dextrose 5%. 1000 milliLiter(s) IV Continuous <Continuous>  dextrose 5%. 1000 milliLiter(s) IV Continuous <Continuous>  dextrose 50% Injectable 25 Gram(s) IV Push once  dextrose 50% Injectable 12.5 Gram(s) IV Push once  dextrose 50% Injectable 25 Gram(s) IV Push once  dextrose Oral Gel 15 Gram(s) Oral once PRN  epoetin sergey-epbx (RETACRIT) Injectable 86648 Unit(s) SubCutaneous every 7 days  folic acid 1 milliGRAM(s) Oral daily  furosemide    Tablet 40 milliGRAM(s) Oral every 12 hours  hydrALAZINE 50 milliGRAM(s) Oral every 6 hours  insulin glargine Injectable (LANTUS) 6 Unit(s) SubCutaneous at bedtime  insulin lispro (ADMELOG) corrective regimen sliding scale   SubCutaneous three times a day before meals  insulin lispro (ADMELOG) corrective regimen sliding scale   SubCutaneous at bedtime  iron sucrose IVPB 200 milliGRAM(s) IV Intermittent once  labetalol 300 milliGRAM(s) Oral three times a day  melatonin 5 milliGRAM(s) Oral at bedtime  niCARdipine Infusion 5 mG/Hr IV Continuous <Continuous>  NIFEdipine  milliGRAM(s) Oral daily  pantoprazole    Tablet 40 milliGRAM(s) Oral every 12 hours  traZODone 50 milliGRAM(s) Oral at bedtime      T(C): 36.4 (05-16-25 @ 20:00), Max: 36.8 (05-16-25 @ 11:15)  HR: 81 (05-17-25 @ 03:09) (78 - 89)  BP: 218/110 (05-17-25 @ 03:48) (140/75 - 218/110)  RR: 17 (05-16-25 @ 22:10) (17 - 51)  SpO2: 96% (05-16-25 @ 22:10) (94% - 98%)  Wt(kg): --Vital Signs Last 24 Hrs  T(C): 36.4 (16 May 2025 20:00), Max: 36.8 (16 May 2025 11:15)  T(F): 97.5 (16 May 2025 20:00), Max: 98.2 (16 May 2025 11:15)  HR: 81 (17 May 2025 03:09) (78 - 89)  BP: 218/110 (17 May 2025 03:48) (140/75 - 218/110)  BP(mean): 135 (16 May 2025 22:10) (109 - 135)  RR: 17 (16 May 2025 22:10) (17 - 51)  SpO2: 96% (16 May 2025 22:10) (94% - 98%)    Parameters below as of 16 May 2025 22:10  Patient On (Oxygen Delivery Method): room air      ------------------------------------------------------------------------  3-day Trends:          PHYSICAL EXAM:  GENERAL: NAD,   HEAD:  Atraumatic, Normocephalic  EYES: EOMI, PERRLA, conjunctiva and sclera clear  ENMT:  Moist mucous membranes  NECK: Supple, No JVD,  CHEST/LUNG: Clear to auscultation bilaterally; No rales, rhonchi, wheezing, or rubs  HEART: Regular rate and rhythm; No murmurs, rubs, or gallops  ABDOMEN: Soft, Nontender, Nondistended; Bowel sounds present  NEURO: Alert & Oriented X3  EXTREMITIES: No LE edema, no calf tenderness  LYMPH: No lymphadenopathy noted  SKIN: No rashes or lesions            LABS:                        8.4    13.58 )-----------( 220      ( 17 May 2025 04:10 )             25.1     05-16    138  |  103  |  56[H]  ----------------------------<  104[H]  3.5   |  22  |  3.6[H]    Ca    7.7[L]      16 May 2025 04:47  Mg     1.8     05-16    TPro  4.2[L]  /  Alb  2.8[L]  /  TBili  0.3  /  DBili  x   /  AST  10  /  ALT  11  /  AlkPhos  76  05-16      Urinalysis Basic - ( 16 May 2025 04:47 )    Color: x / Appearance: x / SG: x / pH: x  Gluc: 104 mg/dL / Ketone: x  / Bili: x / Urobili: x   Blood: x / Protein: x / Nitrite: x   Leuk Esterase: x / RBC: x / WBC x   Sq Epi: x / Non Sq Epi: x / Bacteria: x      CAPILLARY BLOOD GLUCOSE      POCT Blood Glucose.: 190 mg/dL (16 May 2025 17:09)  POCT Blood Glucose.: 176 mg/dL (16 May 2025 11:17)        Urinalysis Basic - ( 16 May 2025 04:47 )    Color: x / Appearance: x / SG: x / pH: x  Gluc: 104 mg/dL / Ketone: x  / Bili: x / Urobili: x   Blood: x / Protein: x / Nitrite: x   Leuk Esterase: x / RBC: x / WBC x   Sq Epi: x / Non Sq Epi: x / Bacteria: x        #HTN Urgency- Resolved   #Treatment resistant HTN of unclear etiology    - Patient had BP of 238/135 in the ED, had to be started on a nicardipine drip   -Target BP now is SBP <160  -BP controlled with Hydralazine 50 Q6, Labetalol 300mg TID, Procardia 120 QHS   -For evaluation of secondary HTN, several causes have been evaluated   -TSH 2.04   -Renal Artery duplex negative   - Aldosterone/Renin 105/52, less likely primary aldosteronism but since patient has been treated aggressively for HTN results are confounded, will repeat per nephro   -CT scan did not show PKD, Lupus workup negative   -urine metanephrines sent but not resulted---->f/u   -cortisol WNL   - f/u repeat LE duplex    #JOYCE vs CKD  #proteinuria  #LE edema   -Cr 3.5 on admission unknown baseline   - has been relatively stable throughout admission   -protein/cr urine ratio 2.3  -Glomerular nephritis workup negative   -on lasix 40 PO     #Age indeterminant lacunar infarct   -evaluated by Neuro, recommend MRI   -patient not tolerating MRI with light sedation   -f/u MRI with anesthesia if patient is amendable     #Enteritis vs Small bowel ischemia   -physical exam benign   -patient not complaining of abdominal pain, has good PO diet, no bloody BM     #Normocytic Anemia   -required 5 transfusions while admitted  -no obvious sources of bleeding   -evaluated by heme onc: 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 Transfer From:  Transfer To:      Patient is a 39y old  Female who presents with a chief complaint of Hypertension (12 May 2025 14:24)      HPI:   39-year-old female with history of hypertension presenting to ER with 5 days of multiple episodes of nonbloody nonbilious vomiting and diarrhea with associated generalized abdominal pain and distention. Patient states that on Saturday she had acute onset of nausea, vomiting, diarrhea and crampy abdominal pain. She has had 4-5 episodes of non-bloody, non-bilious vomiting per day and 4-5 episodes of black diarrhea per day. She initially thought that she had gastroenteritis, but when her symptoms didn't resolve, she decided come to the ED. She denies any current abdominal pain, nausea or vomiting, fever, chest pain, shortness of breath, clotting disorder, past intra-abdominal surgeries, kidney issues, leg pain or swelling.     Labs significant for WBC 18.72k, Hb 9.0(unknown baseline) , Platelets 86k, Creatinine 3.5(unknown baseline). Lipase 71, UA positive      CTAP with finding of Edematous distal duodenum and proximal jejunal loops with associated dilatation measuring up to 3.4 cm. Associated marked interloop ascites, mesenteric fat stranding, and prominent mesenteric lymph nodes. Mild-to-moderate ascites. Findings concerning for small bowel ischemia.     CT Angio Abdomen and Pelvis w/ IV Cont (05.07.25 @ 04:18): Patent SMA, SMV. Redemonstration of distal duodenal and proximal small bowel with marked inflammatory change. Mural enhancement noted throughout   the small bowel. Considerations include severe enteritis, early ischemia, shock bowel.    #ED Vitals:   T(C): 37.2 (05-07-25 @ 05:46), Max: 37.3 (05-06-25 @ 23:44)  HR: 112 (05-07-25 @ 05:46) (97 - 118)  BP: 151/86 (05-07-25 @ 03:39) (151/86 - 238/135)  RR: 17 (05-07-25 @ 05:46) (17 - 19)  SpO2: 99% (05-07-25 @ 05:46) (96% - 100%)    # ED Course:   Zosyn, LR 1L, NS 1L, Zofran, Morphine, Pantoprazole, Reglan, Metoprolol 10 IV   Started on Nicardipine drip   Evaluated by surgery >>> No acute surgical intervention     The patient is being admitted to MICU for the further management.  (07 May 2025 06:03)      ------------------------------------------------------------------------  MICU Course:  Patient admitted to MICU for early small bowel ischemia 2/2 to HTN Urgency. There was concern that symptoms could be related to TTP given elevated LDH, and schistocytes that were seen on peripheral smear. Patient underwent several treatments of plasma exchange therapy while ADAMS13 level was sent. The exchange therapy did not improve her HTN and ADAMS13 level came back normal so the treatment was discounted. Patient did remain anemic through her course in CCU and received 5u of PRBC during the admission. No source of bleeding was identified and patient did not have the abdominal pain/melena that she described during her admission. Patient's HTN was eventually controlled with aggressive regiment and secondary causes of htn are being explored but has unrevealing so far. Patient's CCU stay was also notable for non compliance and aggressive behavior. She was evaluated by psych who believes its delirium in the setting of HTN encephalopathy. She required frequent reorientation and has waxing and waning insight into her medical condition. It was determined that patient does not have capacity to leave AMA at this time.  Due to her behavior and persistent HTN, a CT head was ordered and was significant for an age indeterminant lacunar infarct. She was evaluated by neurology and and MRI was ordered however patient would keep refusing and would not tolerate the exam when brought down. The mri was attempted with light sedation (precedex) however that did not help and patient refused again. HTN has been under control and has been off a nicardipine drip for >24hrs. Patient stable for downgrade to CCU.  ------------------------------------------------------------------------  3C course:  Patient was downgraded to 3C on 5/16. On 5/17 In the PM the patients BP was persistently >200 SBP. She received clonidine, nifedipine, Labetalol IV and hydralazine IV with the SBP persistently >190. She denied having any HA, CP, SOB. Exam was benign. She was started on Cardizem gtt and upgraded to CCU.  ------------------------------------------------------------------------------------  CCU course:  Patient was weaned off cardizem gtt and continued on previous PO medications. Clonidine added to regimen. Patient remained asymptomatic. Refusing further workup and MRI. CTH with lacunar infarcts and EEG normal. Refusing all medications. Stable for downgrade to the medical floor.       MEDICATIONS:  acetaminophen     Tablet .. 650 milliGRAM(s) Oral every 6 hours PRN  aspirin  chewable 81 milliGRAM(s) Oral daily  atorvastatin 80 milliGRAM(s) Oral at bedtime  calcitriol   Capsule 0.25 MICROGram(s) Oral daily  calcium carbonate   1250 mG (OsCal) 1 Tablet(s) Oral every 6 hours PRN  chlorhexidine 2% Cloths 1 Application(s) Topical <User Schedule>  cyanocobalamin 1000 MICROGram(s) Oral daily  dextrose 5%. 1000 milliLiter(s) IV Continuous <Continuous>  dextrose 5%. 1000 milliLiter(s) IV Continuous <Continuous>  dextrose 50% Injectable 25 Gram(s) IV Push once  dextrose 50% Injectable 12.5 Gram(s) IV Push once  dextrose 50% Injectable 25 Gram(s) IV Push once  dextrose Oral Gel 15 Gram(s) Oral once PRN  epoetin sergey-epbx (RETACRIT) Injectable 69797 Unit(s) SubCutaneous every 7 days  folic acid 1 milliGRAM(s) Oral daily  furosemide    Tablet 40 milliGRAM(s) Oral every 12 hours  hydrALAZINE 50 milliGRAM(s) Oral every 6 hours  insulin glargine Injectable (LANTUS) 6 Unit(s) SubCutaneous at bedtime  insulin lispro (ADMELOG) corrective regimen sliding scale   SubCutaneous three times a day before meals  insulin lispro (ADMELOG) corrective regimen sliding scale   SubCutaneous at bedtime  iron sucrose IVPB 200 milliGRAM(s) IV Intermittent once  labetalol 300 milliGRAM(s) Oral three times a day  melatonin 5 milliGRAM(s) Oral at bedtime  niCARdipine Infusion 5 mG/Hr IV Continuous <Continuous>  NIFEdipine  milliGRAM(s) Oral daily  pantoprazole    Tablet 40 milliGRAM(s) Oral every 12 hours  traZODone 50 milliGRAM(s) Oral at bedtime      T(C): 36.4 (05-16-25 @ 20:00), Max: 36.8 (05-16-25 @ 11:15)  HR: 81 (05-17-25 @ 03:09) (78 - 89)  BP: 218/110 (05-17-25 @ 03:48) (140/75 - 218/110)  RR: 17 (05-16-25 @ 22:10) (17 - 51)  SpO2: 96% (05-16-25 @ 22:10) (94% - 98%)  Wt(kg): --Vital Signs Last 24 Hrs  T(C): 36.4 (16 May 2025 20:00), Max: 36.8 (16 May 2025 11:15)  T(F): 97.5 (16 May 2025 20:00), Max: 98.2 (16 May 2025 11:15)  HR: 81 (17 May 2025 03:09) (78 - 89)  BP: 218/110 (17 May 2025 03:48) (140/75 - 218/110)  BP(mean): 135 (16 May 2025 22:10) (109 - 135)  RR: 17 (16 May 2025 22:10) (17 - 51)  SpO2: 96% (16 May 2025 22:10) (94% - 98%)    Parameters below as of 16 May 2025 22:10  Patient On (Oxygen Delivery Method): room air      ------------------------------------------------------------------------  3-day Trends:          PHYSICAL EXAM:  GENERAL: NAD,   HEAD:  Atraumatic, Normocephalic  EYES: EOMI, PERRLA, conjunctiva and sclera clear  ENMT:  Moist mucous membranes  NECK: Supple, No JVD,  CHEST/LUNG: Clear to auscultation bilaterally; No rales, rhonchi, wheezing, or rubs  HEART: Regular rate and rhythm; No murmurs, rubs, or gallops  ABDOMEN: Soft, Nontender, Nondistended; Bowel sounds present  NEURO: Alert & Oriented X3  EXTREMITIES: No LE edema, no calf tenderness  LYMPH: No lymphadenopathy noted  SKIN: No rashes or lesions            LABS:                        8.4    13.58 )-----------( 220      ( 17 May 2025 04:10 )             25.1     05-16    138  |  103  |  56[H]  ----------------------------<  104[H]  3.5   |  22  |  3.6[H]    Ca    7.7[L]      16 May 2025 04:47  Mg     1.8     05-16    TPro  4.2[L]  /  Alb  2.8[L]  /  TBili  0.3  /  DBili  x   /  AST  10  /  ALT  11  /  AlkPhos  76  05-16      Urinalysis Basic - ( 16 May 2025 04:47 )    Color: x / Appearance: x / SG: x / pH: x  Gluc: 104 mg/dL / Ketone: x  / Bili: x / Urobili: x   Blood: x / Protein: x / Nitrite: x   Leuk Esterase: x / RBC: x / WBC x   Sq Epi: x / Non Sq Epi: x / Bacteria: x      CAPILLARY BLOOD GLUCOSE      POCT Blood Glucose.: 190 mg/dL (16 May 2025 17:09)  POCT Blood Glucose.: 176 mg/dL (16 May 2025 11:17)        Urinalysis Basic - ( 16 May 2025 04:47 )    Color: x / Appearance: x / SG: x / pH: x  Gluc: 104 mg/dL / Ketone: x  / Bili: x / Urobili: x   Blood: x / Protein: x / Nitrite: x   Leuk Esterase: x / RBC: x / WBC x   Sq Epi: x / Non Sq Epi: x / Bacteria: x        #HTN Urgency- Resolved   #Treatment resistant HTN of unclear etiology    - Patient had BP of 238/135 in the ED, had to be started on a nicardipine drip   -Target BP now is SBP <160  -BP controlled with Hydralazine 50 Q6, Labetalol 300mg TID, Procardia 120 QHS   -For evaluation of secondary HTN, several causes have been evaluated   -TSH 2.04   -Renal Artery duplex negative   - Aldosterone/Renin 105/52, less likely primary aldosteronism but since patient has been treated aggressively for HTN results are confounded, will repeat per nephro   -CT scan did not show PKD, Lupus workup negative   -urine metanephrines sent but not resulted---->f/u   -cortisol WNL   - f/u repeat LE duplex    #JOYCE vs CKD  #proteinuria  #LE edema   -Cr 3.5 on admission unknown baseline   - has been relatively stable throughout admission   -protein/cr urine ratio 2.3  -Glomerular nephritis workup negative   -on lasix 40 PO     #Age indeterminant lacunar infarct   -evaluated by Neuro, recommend MRI   -patient not tolerating MRI with light sedation   -f/u MRI with anesthesia if patient is amendable     #Enteritis vs Small bowel ischemia   -physical exam benign   -patient not complaining of abdominal pain, has good PO diet, no bloody BM     #Normocytic Anemia   -required 5 transfusions while admitted  -no obvious sources of bleeding   -evaluated by heme onc: 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

## 2025-05-17 NOTE — PROGRESS NOTE ADULT - ASSESSMENT
39-year-old female with history of hypertension presenting to ER with 5 days of multiple episodes of nonbloody nonbilious vomiting and diarrhea with associated generalized abdominal pain and distention  # HTN   # JOYCE rule out ATN   # proteinuria / hematuria   # thrombocytopenia   - picture above can be due to malignant HTN  / ADAMTS 13 not low / however patient received steroids and on plasmapheresis   - normal C3 ( not in favor of a HUS)  nl C4 normal JOSE ANTONIO which rule out active lupus  -  s/p steroids , s/p  plasmapheresis   - hb noted receiving blood tx / repeat hemolysis w/up/ followed by hematology and GI / on ANASTACIO  hold if bp > 170/100/ d/c feso4 start venofer course / followed by GI  - creat stable   - 1.8 g protein on UPCR will need repeat   - last  ph at goal / no binders / check i calcium / pth noted and vit D / replete VIT D/ on calcitriol 0.25 daily   -  GN w/up negative   - follow bp readings / on  cardene drip / added hydralazine and lasix   today keep on nifedipine / renin elevated / aldosterone > 100 , please repeat ? due to malignant hypertension / no FOUZIA/ ?  renin secreting tumors  - renal artery dupplex no FOUZIA   renal team will follow

## 2025-05-18 LAB
CREATININE, RNDM UR: 16.4 MG/DL — SIGNIFICANT CHANGE UP
GLUCOSE BLDC GLUCOMTR-MCNC: 173 MG/DL — HIGH (ref 70–99)
RENIN PLAS-CCNC: 1.62 NG/ML/HR — SIGNIFICANT CHANGE UP (ref 0.17–5.38)
VMA /GCREATININE: 5.5 MG/G CREAT — SIGNIFICANT CHANGE UP (ref 0–6)

## 2025-05-18 RX ADMIN — ATORVASTATIN CALCIUM 80 MILLIGRAM(S): 80 TABLET, FILM COATED ORAL at 21:34

## 2025-05-18 RX ADMIN — IRON SUCROSE 210 MILLIGRAM(S): 20 INJECTION, SOLUTION INTRAVENOUS at 05:10

## 2025-05-18 RX ADMIN — Medication 120 MILLIGRAM(S): at 06:55

## 2025-05-18 RX ADMIN — FUROSEMIDE 40 MILLIGRAM(S): 10 INJECTION INTRAMUSCULAR; INTRAVENOUS at 05:09

## 2025-05-18 RX ADMIN — LABETALOL HYDROCHLORIDE 300 MILLIGRAM(S): 200 TABLET, FILM COATED ORAL at 21:16

## 2025-05-18 RX ADMIN — FOLIC ACID 1 MILLIGRAM(S): 1 TABLET ORAL at 13:04

## 2025-05-18 RX ADMIN — LABETALOL HYDROCHLORIDE 300 MILLIGRAM(S): 200 TABLET, FILM COATED ORAL at 06:05

## 2025-05-18 RX ADMIN — Medication 50 MILLIGRAM(S): at 17:38

## 2025-05-18 RX ADMIN — FUROSEMIDE 40 MILLIGRAM(S): 10 INJECTION INTRAMUSCULAR; INTRAVENOUS at 17:38

## 2025-05-18 RX ADMIN — Medication 40 MILLIGRAM(S): at 05:09

## 2025-05-18 RX ADMIN — Medication 1 APPLICATION(S): at 05:09

## 2025-05-18 RX ADMIN — Medication 50 MILLIGRAM(S): at 21:16

## 2025-05-18 RX ADMIN — Medication 81 MILLIGRAM(S): at 13:04

## 2025-05-18 RX ADMIN — Medication 50 MILLIGRAM(S): at 03:54

## 2025-05-18 RX ADMIN — Medication 40 MILLIGRAM(S): at 17:38

## 2025-05-18 RX ADMIN — Medication 5 MILLIGRAM(S): at 21:16

## 2025-05-18 RX ADMIN — CYANOCOBALAMIN 1000 MICROGRAM(S): 1000 INJECTION INTRAMUSCULAR; SUBCUTANEOUS at 13:05

## 2025-05-18 RX ADMIN — CALCITRIOL 0.25 MICROGRAM(S): 0.5 CAPSULE, GELATIN COATED ORAL at 13:04

## 2025-05-18 RX ADMIN — Medication 50 MILLIGRAM(S): at 13:04

## 2025-05-18 RX ADMIN — Medication 50 MILLIGRAM(S): at 23:12

## 2025-05-18 RX ADMIN — LABETALOL HYDROCHLORIDE 300 MILLIGRAM(S): 200 TABLET, FILM COATED ORAL at 13:04

## 2025-05-18 NOTE — PROGRESS NOTE ADULT - ASSESSMENT
Patient is a 40 yo Female w/ a h/o HTN presenting to ER with 5 days of multiple episodes of NBNB vomiting and diarrhea with associated generalized abdominal pain and distention.    # HTN   # JOYCE rule out ATN   # proteinuria / hematuria   # thrombocytopenia     Plan:  -Monitor vitals, continue home blood medications.   -Blood pressure stable.   -1+ pitting edema noted; continue with lasix 40 mg BID.   -Picture above can be due to malignant HTN / ADAMTS 13 not low / however patient received steroids and on plasmapheresis.   -Normal C3 ( not in favor of a HUS)  nl C4 normal JOSE ANTONIO which rule out active lupus  -s/p steroids , s/p  plasmapheresis   -creat stable   -1.8 g protein on UPCR will need repeat   -last  ph at goal / no binders / check i calcium / pth noted and vit D / replete VIT D/ on calcitriol 0.25 daily   -GN w/up negative  -renal artery duplex no FOUZIA   Renal team will follow    Patient is a 40 yo Female w/ a h/o HTN presenting to ER with 5 days of multiple episodes of NBNB vomiting and diarrhea with associated generalized abdominal pain and distention.    # HTN   # JOYCE rule out ATN   # proteinuria / hematuria   # thrombocytopenia     Plan:  -1+ pitting edema noted; continue with lasix 40 mg BID.   -Picture above can be due to malignant HTN / ADAMTS 13 not low / however patient received steroids and on plasmapheresis.   -Normal C3 (not in favor of a HUS)  nl C4 normal DNA which rule out active lupus  -s/p steroids , s/p plasmapheresis   -creat stable   -1.8 g protein on UPCR will need repeat   -last  ph at goal / no binders / check i calcium / pth noted and vit D / replete VIT D/ on calcitriol 0.25 daily   -GN w/up negative  -renal artery duplex no FOUZIA   Renal team will follow    Patient is a 38 yo Female w/ a h/o HTN presenting to ER with 5 days of multiple episodes of NBNB vomiting and diarrhea with associated generalized abdominal pain and distention.    # HTN   # JOYCE rule out ATN   # Non nephrotic range proteinuria / hematuria   # thrombocytopenia     Plan:  -1+ pitting edema noted; continue with lasix 40 mg BID.   -Picture above can be due to malignant HTN / ADAMTS 13 not low / however patient received steroids and on plasmapheresis.   -Normal C3 (not in favor of a HUS)  nl C4 normal DNA which rule out active lupus  -s/p steroids, s/p plasmapheresis   -last creat stable   -last ph at goal / no binders / calcium corrected wnl / pth noted and vit D / replete VIT D/ on calcitriol 0.25 daily   -GN w/up negative  - on ANASTACIO/ monitor h/h   -renal artery duplex no FOUZIA   Renal team will follow

## 2025-05-18 NOTE — PROGRESS NOTE ADULT - SUBJECTIVE AND OBJECTIVE BOX
Nephrology progress note    Patient is seen and examined, events over the last 24 h noted.  Patient resting comfortably in chair, not in distress.  Patient reported discomfort with her bilateral LE edema.     Allergies:  No Known Allergies    Hospital Medications:   MEDICATIONS  (STANDING):  aspirin  chewable 81 milliGRAM(s) Oral daily  atorvastatin 80 milliGRAM(s) Oral at bedtime  calcitriol   Capsule 0.25 MICROGram(s) Oral daily  chlorhexidine 2% Cloths 1 Application(s) Topical <User Schedule>  cyanocobalamin 1000 MICROGram(s) Oral daily  dextrose 5%. 1000 milliLiter(s) (100 mL/Hr) IV Continuous <Continuous>  dextrose 5%. 1000 milliLiter(s) (50 mL/Hr) IV Continuous <Continuous>  dextrose 50% Injectable 25 Gram(s) IV Push once  dextrose 50% Injectable 12.5 Gram(s) IV Push once  dextrose 50% Injectable 25 Gram(s) IV Push once  epoetin sergey-epbx (RETACRIT) Injectable 09924 Unit(s) SubCutaneous every 7 days  folic acid 1 milliGRAM(s) Oral daily  furosemide    Tablet 40 milliGRAM(s) Oral every 12 hours  hydrALAZINE 50 milliGRAM(s) Oral every 6 hours  insulin glargine Injectable (LANTUS) 6 Unit(s) SubCutaneous at bedtime  insulin lispro (ADMELOG) corrective regimen sliding scale   SubCutaneous three times a day before meals  insulin lispro (ADMELOG) corrective regimen sliding scale   SubCutaneous at bedtime  iron sucrose IVPB 100 milliGRAM(s) IV Intermittent every 24 hours  labetalol 300 milliGRAM(s) Oral three times a day  melatonin 5 milliGRAM(s) Oral at bedtime  NIFEdipine  milliGRAM(s) Oral daily  pantoprazole    Tablet 40 milliGRAM(s) Oral every 12 hours  traZODone 50 milliGRAM(s) Oral at bedtime    VITALS:  T(F): 96 (05-18-25 @ 13:13), Max: 97.7 (05-17-25 @ 20:00)  HR: 88 (05-18-25 @ 15:15)  BP: 127/65 (05-18-25 @ 15:15)  RR: 17 (05-18-25 @ 15:15)  SpO2: 100% (05-18-25 @ 08:00)  Wt(kg): --    05-16 @ 07:01  -  05-17 @ 07:00  --------------------------------------------------------  IN: 155 mL / OUT: 0 mL / NET: 155 mL    05-17 @ 07:01  -  05-18 @ 07:00  --------------------------------------------------------  IN: 687.5 mL / OUT: 0 mL / NET: 687.5 mL    PHYSICAL EXAM:  Constitutional: NAD  Respiratory: CTAB, no wheezes, rales or rhonchi  Cardiovascular: S1, S2, RRR  Gastrointestinal: BS+, soft, NT/ND  Extremities: No cyanosis or clubbing. 1+ bilateral LE pitting edema.  :  No wick.   Skin: No rashes    LABS:                          8.4    13.58 )-----------( 220      ( 17 May 2025 04:10 )             25.1       Urine Studies:  Urinalysis Basic - ( 16 May 2025 04:47 )    Color:  / Appearance:  / SG:  / pH:   Gluc: 104 mg/dL / Ketone:   / Bili:  / Urobili:    Blood:  / Protein:  / Nitrite:    Leuk Esterase:  / RBC:  / WBC    Sq Epi:  / Non Sq Epi:  / Bacteria:       Creatinine, Random Urine: 16 mg/dL (05-13 @ 12:24)  Protein/Creatinine Ratio Calculation: 2.3 Ratio (05-13 @ 12:24)      Iron 18, TIBC 166, %sat 11      [05-07-25 @ 11:40]  Ferritin 362      [05-07-25 @ 11:40]  PTH -- (Ca --)      [05-13-25 @ 12:08]   194  PTH -- (Ca --)      [05-12-25 @ 16:48]   193  Vitamin D (25OH) 12      [05-12-25 @ 16:48]  TSH 2.05      [05-07-25 @ 11:40]  Lipid: chol 154, , HDL 37, LDL --      [05-14-25 @ 04:20]    HBsAg Nonreact      [05-07-25 @ 11:40]  HCV 0.13, Nonreact      [05-07-25 @ 11:40]  HIV Nonreact      [05-07-25 @ 11:40]    JOSE ANTONIO: titer Negative, pattern --      [05-07-25 @ 11:40]  dsDNA 2      [05-08-25 @ 19:20]  C3 Complement 106      [05-07-25 @ 11:40]  C4 Complement 22      [05-07-25 @ 11:40]  ANCA: cANCA Negative, pANCA Negative, atypical ANCA Negative      [05-07-25 @ 11:40]  anti-GBM <0.2      [05-07-25 @ 11:40]  Free Light Chains: kappa 1.98, lambda 2.29, ratio = 0.86      [05-13 @ 04:40]  Immunofixation Serum:   No Monoclonal Band Identified      Reference Range: None Detected      [05-12-25 @ 16:48]  SPEP Interpretation: Hypogammaglobulinemia      [05-12-25 @ 16:48]      RADIOLOGY & ADDITIONAL STUDIES:     Nephrology progress note    Patient is seen and examined, events over the last 24 h noted.  Patient resting comfortably in chair, not in distress.  Patient reported discomfort with her bilateral LE edema.     Allergies:  No Known Allergies    Hospital Medications:   MEDICATIONS  (STANDING):  aspirin  chewable 81 milliGRAM(s) Oral daily  atorvastatin 80 milliGRAM(s) Oral at bedtime  calcitriol   Capsule 0.25 MICROGram(s) Oral daily  chlorhexidine 2% Cloths 1 Application(s) Topical <User Schedule>  cyanocobalamin 1000 MICROGram(s) Oral daily  epoetin sergey-epbx (RETACRIT) Injectable 36424 Unit(s) SubCutaneous every 7 days  folic acid 1 milliGRAM(s) Oral daily  furosemide    Tablet 40 milliGRAM(s) Oral every 12 hours  hydrALAZINE 50 milliGRAM(s) Oral every 6 hours  insulin glargine Injectable (LANTUS) 6 Unit(s) SubCutaneous at bedtime  insulin lispro (ADMELOG) corrective regimen sliding scale   SubCutaneous three times a day before meals  insulin lispro (ADMELOG) corrective regimen sliding scale   SubCutaneous at bedtime  iron sucrose IVPB 100 milliGRAM(s) IV Intermittent every 24 hours  labetalol 300 milliGRAM(s) Oral three times a day  melatonin 5 milliGRAM(s) Oral at bedtime  NIFEdipine  milliGRAM(s) Oral daily  pantoprazole    Tablet 40 milliGRAM(s) Oral every 12 hours  traZODone 50 milliGRAM(s) Oral at bedtime    VITALS:  T(F): 96 (05-18-25 @ 13:13), Max: 97.7 (05-17-25 @ 20:00)  HR: 88 (05-18-25 @ 15:15)  BP: 127/65 (05-18-25 @ 15:15)  RR: 17 (05-18-25 @ 15:15)  SpO2: 100% (05-18-25 @ 08:00)  Wt(kg): --    05-16 @ 07:01  -  05-17 @ 07:00  --------------------------------------------------------  IN: 155 mL / OUT: 0 mL / NET: 155 mL    05-17 @ 07:01  -  05-18 @ 07:00  --------------------------------------------------------  IN: 687.5 mL / OUT: 0 mL / NET: 687.5 mL    PHYSICAL EXAM:  Constitutional: NAD  Respiratory: CTAB, no wheezes, rales or rhonchi  Cardiovascular: S1, S2, RRR  Gastrointestinal: BS+, soft, NT/ND  Extremities: No cyanosis or clubbing. 1+ bilateral LE pitting edema.  :  No wick.   Skin: No rashes    LABS:                          8.4    13.58 )-----------( 220      ( 17 May 2025 04:10 )             25.1       Comprehensive Metabolic Panel (05.16.25 @ 04:47)    Sodium: 138 mmol/L   Potassium: 3.5 mmol/L   Chloride: 103 mmol/L   Carbon Dioxide: 22 mmol/L   Anion Gap: 13 mmol/L   Blood Urea Nitrogen: 56 mg/dL   Creatinine: 3.6 mg/dL   Glucose: 104 mg/dL   Calcium: 7.7 mg/dL   Protein Total: 4.2 g/dL   Albumin: 2.8 g/dL   Bilirubin Total: 0.3 mg/dL   Alkaline Phosphatase: 76 U/L   Aspartate Aminotransferase (AST/SGOT): 10 U/L   Alanine Aminotransferase (ALT/SGPT): 11 U/L    Creatinine: 3.6 mg/dL (05-16-25 @ 04:47)  Creatinine: 3.5 mg/dL (05-15-25 @ 04:54)  Creatinine: 3.2 mg/dL (05-14-25 @ 04:20)  Creatinine: 3.4 mg/dL (05-13-25 @ 04:40)  Creatinine: 3.3 mg/dL (05-12-25 @ 03:55)  Creatinine: 3.8 mg/dL (05-11-25 @ 04:42)        Creatinine, Random Urine: 16 mg/dL (05-13 @ 12:24)  Protein/Creatinine Ratio Calculation: 2.3 Ratio (05-13 @ 12:24)      Iron 18, TIBC 166, %sat 11      [05-07-25 @ 11:40]  Ferritin 362      [05-07-25 @ 11:40]  PTH -- (Ca --)      [05-13-25 @ 12:08]   194  PTH -- (Ca --)      [05-12-25 @ 16:48]   193  Vitamin D (25OH) 12      [05-12-25 @ 16:48]  TSH 2.05      [05-07-25 @ 11:40]  Lipid: chol 154, , HDL 37, LDL --      [05-14-25 @ 04:20]    HBsAg Nonreact      [05-07-25 @ 11:40]  HCV 0.13, Nonreact      [05-07-25 @ 11:40]  HIV Nonreact      [05-07-25 @ 11:40]    JOSE ANTONIO: titer Negative, pattern --      [05-07-25 @ 11:40]  dsDNA 2      [05-08-25 @ 19:20]  C3 Complement 106      [05-07-25 @ 11:40]  C4 Complement 22      [05-07-25 @ 11:40]  ANCA: cANCA Negative, pANCA Negative, atypical ANCA Negative      [05-07-25 @ 11:40]  anti-GBM <0.2      [05-07-25 @ 11:40]  Free Light Chains: kappa 1.98, lambda 2.29, ratio = 0.86      [05-13 @ 04:40]  Immunofixation Serum:   No Monoclonal Band Identified      Reference Range: None Detected      [05-12-25 @ 16:48]  SPEP Interpretation: Hypogammaglobulinemia      [05-12-25 @ 16:48]      RADIOLOGY & ADDITIONAL STUDIES:

## 2025-05-18 NOTE — CHART NOTE - NSCHARTNOTEFT_GEN_A_CORE
Transfer from: CCU    Transfer to: TELEMETRY    HPI:   39-year-old female with history of hypertension presenting to ER with 5 days of multiple episodes of nonbloody nonbilious vomiting and diarrhea with associated generalized abdominal pain and distention. Patient states that on Saturday she had acute onset of nausea, vomiting, diarrhea and crampy abdominal pain. She has had 4-5 episodes of non-bloody, non-bilious vomiting per day and 4-5 episodes of black diarrhea per day. She initially thought that she had gastroenteritis, but when her symptoms didn't resolve, she decided come to the ED. She denies any current abdominal pain, nausea or vomiting, fever, chest pain, shortness of breath, clotting disorder, past intra-abdominal surgeries, kidney issues, leg pain or swelling.     Labs significant for WBC 18.72k, Hb 9.0(unknown baseline) , Platelets 86k, Creatinine 3.5(unknown baseline). Lipase 71, UA positive      CTAP with finding of Edematous distal duodenum and proximal jejunal loops with associated dilatation measuring up to 3.4 cm. Associated marked interloop ascites, mesenteric fat stranding, and prominent mesenteric lymph nodes. Mild-to-moderate ascites. Findings concerning for small bowel ischemia.     CT Angio Abdomen and Pelvis w/ IV Cont (05.07.25 @ 04:18): Patent SMA, SMV. Redemonstration of distal duodenal and proximal small bowel with marked inflammatory change. Mural enhancement noted throughout   the small bowel. Considerations include severe enteritis, early ischemia, shock bowel.    #ED Vitals:   T(C): 37.2 (05-07-25 @ 05:46), Max: 37.3 (05-06-25 @ 23:44)  HR: 112 (05-07-25 @ 05:46) (97 - 118)  BP: 151/86 (05-07-25 @ 03:39) (151/86 - 238/135)  RR: 17 (05-07-25 @ 05:46) (17 - 19)  SpO2: 99% (05-07-25 @ 05:46) (96% - 100%)    # ED Course:   Zosyn, LR 1L, NS 1L, Zofran, Morphine, Pantoprazole, Reglan, Metoprolol 10 IV   Started on Nicardipine drip   Evaluated by surgery >>> No acute surgical intervention     The patient is being admitted to MICU for the further management.  (05-07-25 @ 06:03)      HOSPITAL COURSE:    MICU Course:  Patient admitted to MICU for early small bowel ischemia 2/2 to HTN Urgency. There was concern that symptoms could be related to TTP given elevated LDH, and schistocytes that were seen on peripheral smear. Patient underwent several treatments of plasma exchange therapy while ADAMS13 level was sent. The exchange therapy did not improve her HTN and ADAMS13 level came back normal so the treatment was discounted. Patient did remain anemic through her course in CCU and received 5u of PRBC during the admission. No source of bleeding was identified and patient did not have the abdominal pain/melena that she described during her admission. Patient's HTN was eventually controlled with aggressive regiment and secondary causes of htn are being explored but has unrevealing so far. Patient's CCU stay was also notable for non compliance and aggressive behavior. She was evaluated by psych who believes its delirium in the setting of HTN encephalopathy. She required frequent reorientation and has waxing and waning insight into her medical condition. It was determined that patient does not have capacity to leave AMA at this time.  Due to her behavior and persistent HTN, a CT head was ordered and was significant for an age indeterminant lacunar infarct. She was evaluated by neurology and and MRI was ordered however patient would keep refusing and would not tolerate the exam when brought down. The mri was attempted with light sedation (precedex) however that did not help and patient refused again. HTN has been under control and has been off a nicardipine drip for >24hrs. Patient stable for downgrade to CCU.  ------------------------------------------------------------------------  3C course:  Patient was downgraded to 3C on 5/16. On 5/17 In the PM the patients BP was persistently >200 SBP. She received clonidine, nifedipine, Labetalol IV and hydralazine IV with the SBP persistently >190. She denied having any HA, CP, SOB. Exam was benign. She was started on Cardizem gtt and upgraded to CCU.  ------------------------------------------------------------------------------------  CCU:   Patient was weaned off cardizem gtt and continued on previous PO medications without change. Patient remained asymptomatic. Patient is still pending MRI, will likely require anesthesia if neurology wants done inpatient. Patient is stable for downgrade to telemetry.   ------------------------------------------------------------------------------------    FOR FOLLOW UP:  [ ] f/u neurology as to whether MRI needs to be done inpatient vs outpatient - if inpatient, consult anesthesia for MRI sedation  [ ] f/u nephro +/- endo for resistant HTN workup (repeat R/A ratio and metanephrines)  [ ] trend CBC   [ ] monitor BP, goal SBP < 160, patient sometimes requires encouragement from MD to take medication    ASSESSMENT & PLAN:    #HTN Urgency- Resolved   #Treatment resistant HTN of unclear etiology    - Patient had BP of 238/135 in the ED  - s/p cardene gtt   - originally thought TTP/HUS picture, s/p plasmapheresis, [id number] was negative and therapy did not improve HTN  - secondary HTN workup: TSH 2.04, cortisol wnl, RA doppler (-), CT scan did not show PKD, Lupus workup negative  - Aldosterone/Renin 105/52, less likely primary aldosteronism but since patient has been treated aggressively for HTN results are confounded  - f/u repeat R/A ratio, f/u metanephrines, f/u ESR/CRP   - Target SBP < 160  - c/w hydralazine 50mg q6h, labetalol 300mg TID, procardia 120mg qd    #JOYCE vs CKD  #proteinuria  #LE edema   -Cr 3.5 on admission unknown baseline   - has been relatively stable throughout admission   -protein/cr urine ratio 2.3  -Glomerular nephritis workup negative   - appreciate nephro recs   -on lasix 40 PO BID   - c/w calcitriol     #Age indeterminant lacunar infarct   -evaluated by Neuro, recommend MRI  - f/u whether needed inpatient vs outpatient - if needed inpatient will require sedation (consult anesthesia)  -f/u MRI with anesthesia if patient is amendable and required inpatient   - c/w ASA ,statin , BP control     #Enteritis vs Small bowel ischemia   #Melena - resolved   -physical exam benign   -patient not complaining of abdominal pain, has good PO diet, no bloody BM since 5/13    #Normocytic Anemia   #Acute on chronic MARITZA   - s/p 5U pRBC   - melena on admission, now resolved  - evaluated by heme/onc: likely MAHA 2/2 HTN  - evaluated by GI: would benefit from endoscopy, now brown stools, f/u OP  - evaluated by nephro: likely not just from CKD  - c/w epogen, hold if SBP > 170  - c/w venofer, 3 day course   - c/w folic acid, B12  - c/w PPI BID    #Mood disorder   #delirium 2/2 to htn encephalopathy   -patient not compliant with treatment, requires frequent reorientation from staff and mother   -does not have capacity to leave AMA   -trazodone 50qhs.  - psychiatry recommendations appreciated    ---------------------  DVT ppx: ambulation   Diet: CCC/DASH  GI ppx/Bowel regimen: PPI BID   Activity: AAT  GOC: full   Dispo: TELE Transfer from: CCU    Transfer to: TELEMETRY    HPI:   39-year-old female with history of hypertension presenting to ER with 5 days of multiple episodes of nonbloody nonbilious vomiting and diarrhea with associated generalized abdominal pain and distention. Patient states that on Saturday she had acute onset of nausea, vomiting, diarrhea and crampy abdominal pain. She has had 4-5 episodes of non-bloody, non-bilious vomiting per day and 4-5 episodes of black diarrhea per day. She initially thought that she had gastroenteritis, but when her symptoms didn't resolve, she decided come to the ED. She denies any current abdominal pain, nausea or vomiting, fever, chest pain, shortness of breath, clotting disorder, past intra-abdominal surgeries, kidney issues, leg pain or swelling.     Labs significant for WBC 18.72k, Hb 9.0(unknown baseline) , Platelets 86k, Creatinine 3.5(unknown baseline). Lipase 71, UA positive      CTAP with finding of Edematous distal duodenum and proximal jejunal loops with associated dilatation measuring up to 3.4 cm. Associated marked interloop ascites, mesenteric fat stranding, and prominent mesenteric lymph nodes. Mild-to-moderate ascites. Findings concerning for small bowel ischemia.     CT Angio Abdomen and Pelvis w/ IV Cont (05.07.25 @ 04:18): Patent SMA, SMV. Redemonstration of distal duodenal and proximal small bowel with marked inflammatory change. Mural enhancement noted throughout   the small bowel. Considerations include severe enteritis, early ischemia, shock bowel.    #ED Vitals:   T(C): 37.2 (05-07-25 @ 05:46), Max: 37.3 (05-06-25 @ 23:44)  HR: 112 (05-07-25 @ 05:46) (97 - 118)  BP: 151/86 (05-07-25 @ 03:39) (151/86 - 238/135)  RR: 17 (05-07-25 @ 05:46) (17 - 19)  SpO2: 99% (05-07-25 @ 05:46) (96% - 100%)    # ED Course:   Zosyn, LR 1L, NS 1L, Zofran, Morphine, Pantoprazole, Reglan, Metoprolol 10 IV   Started on Nicardipine drip   Evaluated by surgery >>> No acute surgical intervention     The patient is being admitted to MICU for the further management.  (05-07-25 @ 06:03)      HOSPITAL COURSE:    MICU Course:  Patient admitted to MICU for early small bowel ischemia 2/2 to HTN Urgency. There was concern that symptoms could be related to TTP given elevated LDH, and schistocytes that were seen on peripheral smear. Patient underwent several treatments of plasma exchange therapy while ADAMS13 level was sent. The exchange therapy did not improve her HTN and ADAMS13 level came back normal so the treatment was discounted. Patient did remain anemic through her course in CCU and received 5u of PRBC during the admission. No source of bleeding was identified and patient did not have the abdominal pain/melena that she described during her admission. Patient's HTN was eventually controlled with aggressive regiment and secondary causes of htn are being explored but has unrevealing so far. Patient's CCU stay was also notable for non compliance and aggressive behavior. She was evaluated by psych who believes its delirium in the setting of HTN encephalopathy. She required frequent reorientation and has waxing and waning insight into her medical condition. It was determined that patient does not have capacity to leave AMA at this time.  Due to her behavior and persistent HTN, a CT head was ordered and was significant for an age indeterminant lacunar infarct. She was evaluated by neurology and and MRI was ordered however patient would keep refusing and would not tolerate the exam when brought down. The mri was attempted with light sedation (precedex) however that did not help and patient refused again. HTN has been under control and has been off a nicardipine drip for >24hrs. Patient stable for downgrade to CCU.  ------------------------------------------------------------------------  3C course:  Patient was downgraded to 3C on 5/16. On 5/17 In the PM the patients BP was persistently >200 SBP. She received clonidine, nifedipine, Labetalol IV and hydralazine IV with the SBP persistently >190. She denied having any HA, CP, SOB. Exam was benign. She was started on Cardizem gtt and upgraded to CCU.  ------------------------------------------------------------------------------------  CCU:   Patient was weaned off cardizem gtt and continued on previous PO medications without change. Patient remained asymptomatic. Patient was weaned off cardizem gtt and continued on previous PO medications. Clonidine added to regimen. Patient remained asymptomatic. Refusing further workup and MRI. CTH with lacunar infarcts and EEG normal. Refusing all medications. BP stable. Stable for downgrade to Tele.       FOR FOLLOW UP:  [ ] f/u neurology as to whether MRI needs to be done inpatient vs outpatient - if inpatient, consult anesthesia for MRI sedation  [ ] f/u nephro +/- endo for resistant HTN workup (repeat R/A ratio and metanephrines)  [ ] trend CBC   [ ] monitor BP, goal SBP < 160, patient sometimes requires encouragement from MD to take medication    ASSESSMENT & PLAN:    #HTN Urgency- Resolved   #Treatment resistant HTN of unclear etiology    - Patient had BP of 238/135 in the ED  - s/p cardene gtt   - originally thought TTP/HUS picture, s/p plasmapheresis, [id number] was negative and therapy did not improve HTN  - secondary HTN workup: TSH 2.04, cortisol wnl, RA doppler (-), CT scan did not show PKD, Lupus workup negative  - Aldosterone/Renin 105/52, less likely primary aldosteronism but since patient has been treated aggressively for HTN results are confounded  - f/u repeat R/A ratio, f/u metanephrines, f/u ESR/CRP   - Target SBP < 160  - c/w hydralazine 50mg q6h, labetalol 300mg TID, procardia 120mg qd    #JOYCE vs CKD  #proteinuria  #LE edema   -Cr 3.5 on admission unknown baseline   - has been relatively stable throughout admission   -protein/cr urine ratio 2.3  -Glomerular nephritis workup negative   - appreciate nephro recs   -on lasix 40 PO BID   - c/w calcitriol     #Age indeterminant lacunar infarct   -evaluated by Neuro, recommend MRI  - f/u whether needed inpatient vs outpatient - if needed inpatient will require sedation (consult anesthesia)  -f/u MRI with anesthesia if patient is amendable and required inpatient   - c/w ASA ,statin , BP control     #Enteritis vs Small bowel ischemia   #Melena - resolved   -physical exam benign   -patient not complaining of abdominal pain, has good PO diet, no bloody BM since 5/13    #Normocytic Anemia   #Acute on chronic MARITZA   - s/p 5U pRBC   - melena on admission, now resolved  - evaluated by heme/onc: likely MAHA 2/2 HTN  - evaluated by GI: would benefit from endoscopy, now brown stools, f/u OP  - evaluated by nephro: likely not just from CKD  - c/w epogen, hold if SBP > 170  - c/w venofer, 3 day course   - c/w folic acid, B12  - c/w PPI BID    #Mood disorder   #delirium 2/2 to htn encephalopathy   -patient not compliant with treatment, requires frequent reorientation from staff and mother   -does not have capacity to leave AMA   -trazodone 50qhs.  - psychiatry recommendations appreciated    ---------------------  DVT ppx: ambulation   Diet: CCC/DASH  GI ppx/Bowel regimen: PPI BID   Activity: ROXANA  GOC: full   Dispo: TELE Transfer from: CCU    Transfer to: TELEMETRY    HPI:   39-year-old female with history of hypertension presenting to ER with 5 days of multiple episodes of nonbloody nonbilious vomiting and diarrhea with associated generalized abdominal pain and distention. Patient states that on Saturday she had acute onset of nausea, vomiting, diarrhea and crampy abdominal pain. She has had 4-5 episodes of non-bloody, non-bilious vomiting per day and 4-5 episodes of black diarrhea per day. She initially thought that she had gastroenteritis, but when her symptoms didn't resolve, she decided come to the ED. She denies any current abdominal pain, nausea or vomiting, fever, chest pain, shortness of breath, clotting disorder, past intra-abdominal surgeries, kidney issues, leg pain or swelling.     Labs significant for WBC 18.72k, Hb 9.0(unknown baseline) , Platelets 86k, Creatinine 3.5(unknown baseline). Lipase 71, UA positive      CTAP with finding of Edematous distal duodenum and proximal jejunal loops with associated dilatation measuring up to 3.4 cm. Associated marked interloop ascites, mesenteric fat stranding, and prominent mesenteric lymph nodes. Mild-to-moderate ascites. Findings concerning for small bowel ischemia.     CT Angio Abdomen and Pelvis w/ IV Cont (05.07.25 @ 04:18): Patent SMA, SMV. Redemonstration of distal duodenal and proximal small bowel with marked inflammatory change. Mural enhancement noted throughout   the small bowel. Considerations include severe enteritis, early ischemia, shock bowel.    #ED Vitals:   T(C): 37.2 (05-07-25 @ 05:46), Max: 37.3 (05-06-25 @ 23:44)  HR: 112 (05-07-25 @ 05:46) (97 - 118)  BP: 151/86 (05-07-25 @ 03:39) (151/86 - 238/135)  RR: 17 (05-07-25 @ 05:46) (17 - 19)  SpO2: 99% (05-07-25 @ 05:46) (96% - 100%)    # ED Course:   Zosyn, LR 1L, NS 1L, Zofran, Morphine, Pantoprazole, Reglan, Metoprolol 10 IV   Started on Nicardipine drip   Evaluated by surgery >>> No acute surgical intervention     The patient is being admitted to MICU for the further management.  (05-07-25 @ 06:03)      HOSPITAL COURSE:    MICU Course:  Patient admitted to MICU for early small bowel ischemia 2/2 to HTN Urgency. There was concern that symptoms could be related to TTP given elevated LDH, and schistocytes that were seen on peripheral smear. Patient underwent several treatments of plasma exchange therapy while ADAMS13 level was sent. The exchange therapy did not improve her HTN and ADAMS13 level came back normal so the treatment was discounted. Patient did remain anemic through her course in CCU and received 5u of PRBC during the admission. No source of bleeding was identified and patient did not have the abdominal pain/melena that she described during her admission. Patient's HTN was eventually controlled with aggressive regiment and secondary causes of htn are being explored but has unrevealing so far. Patient's CCU stay was also notable for non compliance and aggressive behavior. She was evaluated by psych who believes its delirium in the setting of HTN encephalopathy. She required frequent reorientation and has waxing and waning insight into her medical condition. It was determined that patient does not have capacity to leave AMA at this time.  Due to her behavior and persistent HTN, a CT head was ordered and was significant for an age indeterminant lacunar infarct. She was evaluated by neurology and and MRI was ordered however patient would keep refusing and would not tolerate the exam when brought down. The mri was attempted with light sedation (precedex) however that did not help and patient refused again. HTN has been under control and has been off a nicardipine drip for >24hrs. Patient stable for downgrade to CCU.  ------------------------------------------------------------------------  3C course:  Patient was downgraded to 3C on 5/16. On 5/17 In the PM the patients BP was persistently >200 SBP. She received clonidine, nifedipine, Labetalol IV and hydralazine IV with the SBP persistently >190. She denied having any HA, CP, SOB. Exam was benign. She was started on Cardizem gtt and upgraded to CCU.  ------------------------------------------------------------------------------------  CCU:   Patient was weaned off cardizem gtt and continued on previous PO medications without change. Patient remained asymptomatic. Clonidine added to regimen. Patient remained asymptomatic. Refusing further workup and MRI. CTH with lacunar infarcts and EEG normal. Refusing all medications. BP stable. Stable for downgrade to Tele.       FOR FOLLOW UP:  [ ] f/u neurology as to whether MRI needs to be done inpatient vs outpatient - if inpatient, consult anesthesia for MRI sedation  [ ] f/u nephro +/- endo for resistant HTN workup (repeat R/A ratio and metanephrines)  [ ] trend CBC   [ ] monitor BP, goal SBP < 160, patient sometimes requires encouragement from MD to take medication    ASSESSMENT & PLAN:    #HTN Urgency- Resolved   #Treatment resistant HTN of unclear etiology    - Patient had BP of 238/135 in the ED  - s/p cardene gtt   - originally thought TTP/HUS picture, s/p plasmapheresis, [id number] was negative and therapy did not improve HTN  - secondary HTN workup: TSH 2.04, cortisol wnl, RA doppler (-), CT scan did not show PKD, Lupus workup negative  - Aldosterone/Renin 105/52, less likely primary aldosteronism but since patient has been treated aggressively for HTN results are confounded  - f/u repeat R/A ratio, f/u metanephrines, f/u ESR/CRP   - Target SBP < 160  - c/w hydralazine 50mg q6h, labetalol 300mg TID, procardia 120mg qd    #JOYCE vs CKD  #proteinuria  #LE edema   -Cr 3.5 on admission unknown baseline   - has been relatively stable throughout admission   -protein/cr urine ratio 2.3  -Glomerular nephritis workup negative   - appreciate nephro recs   -on lasix 40 PO BID   - c/w calcitriol     #Age indeterminant lacunar infarct   -evaluated by Neuro, recommend MRI  - f/u whether needed inpatient vs outpatient - if needed inpatient will require sedation (consult anesthesia)  -f/u MRI with anesthesia if patient is amendable and required inpatient   - c/w ASA ,statin , BP control     #Enteritis vs Small bowel ischemia   #Melena - resolved   -physical exam benign   -patient not complaining of abdominal pain, has good PO diet, no bloody BM since 5/13    #Normocytic Anemia   #Acute on chronic MARITZA   - s/p 5U pRBC   - melena on admission, now resolved  - evaluated by heme/onc: likely MAHA 2/2 HTN  - evaluated by GI: would benefit from endoscopy, now brown stools, f/u OP  - evaluated by nephro: likely not just from CKD  - c/w epogen, hold if SBP > 170  - c/w venofer, 3 day course   - c/w folic acid, B12  - c/w PPI BID    #Mood disorder   #delirium 2/2 to htn encephalopathy   -patient not compliant with treatment, requires frequent reorientation from staff and mother   -does not have capacity to leave AMA   -trazodone 50qhs.  - psychiatry recommendations appreciated    ---------------------  DVT ppx: ambulation   Diet: CCC/DASH  GI ppx/Bowel regimen: PPI BID   Activity: AAT  GOC: full   Dispo: TELE

## 2025-05-19 LAB
GLUCOSE BLDC GLUCOMTR-MCNC: 118 MG/DL — HIGH (ref 70–99)
GLUCOSE BLDC GLUCOMTR-MCNC: 151 MG/DL — HIGH (ref 70–99)
GLUCOSE BLDC GLUCOMTR-MCNC: 157 MG/DL — HIGH (ref 70–99)
RENIN PLAS-CCNC: 5.98 NG/ML/HR — HIGH (ref 0.17–5.38)

## 2025-05-19 PROCEDURE — 99232 SBSQ HOSP IP/OBS MODERATE 35: CPT

## 2025-05-19 RX ADMIN — IRON SUCROSE 210 MILLIGRAM(S): 20 INJECTION, SOLUTION INTRAVENOUS at 05:20

## 2025-05-19 RX ADMIN — LABETALOL HYDROCHLORIDE 300 MILLIGRAM(S): 200 TABLET, FILM COATED ORAL at 21:12

## 2025-05-19 RX ADMIN — Medication 81 MILLIGRAM(S): at 12:37

## 2025-05-19 RX ADMIN — ATORVASTATIN CALCIUM 80 MILLIGRAM(S): 80 TABLET, FILM COATED ORAL at 21:12

## 2025-05-19 RX ADMIN — LABETALOL HYDROCHLORIDE 300 MILLIGRAM(S): 200 TABLET, FILM COATED ORAL at 13:38

## 2025-05-19 RX ADMIN — FOLIC ACID 1 MILLIGRAM(S): 1 TABLET ORAL at 12:37

## 2025-05-19 RX ADMIN — Medication 50 MILLIGRAM(S): at 12:37

## 2025-05-19 RX ADMIN — Medication 40 MILLIGRAM(S): at 17:24

## 2025-05-19 RX ADMIN — FUROSEMIDE 40 MILLIGRAM(S): 10 INJECTION INTRAMUSCULAR; INTRAVENOUS at 09:20

## 2025-05-19 RX ADMIN — Medication 40 MILLIGRAM(S): at 09:20

## 2025-05-19 RX ADMIN — Medication 50 MILLIGRAM(S): at 21:12

## 2025-05-19 RX ADMIN — Medication 0.1 MILLIGRAM(S): at 14:49

## 2025-05-19 RX ADMIN — Medication 50 MILLIGRAM(S): at 23:56

## 2025-05-19 RX ADMIN — Medication 5 MILLIGRAM(S): at 21:12

## 2025-05-19 RX ADMIN — CYANOCOBALAMIN 1000 MICROGRAM(S): 1000 INJECTION INTRAMUSCULAR; SUBCUTANEOUS at 12:37

## 2025-05-19 RX ADMIN — CALCITRIOL 0.25 MICROGRAM(S): 0.5 CAPSULE, GELATIN COATED ORAL at 12:37

## 2025-05-19 RX ADMIN — LABETALOL HYDROCHLORIDE 300 MILLIGRAM(S): 200 TABLET, FILM COATED ORAL at 09:20

## 2025-05-19 RX ADMIN — Medication 50 MILLIGRAM(S): at 09:20

## 2025-05-19 RX ADMIN — Medication 50 MILLIGRAM(S): at 17:24

## 2025-05-19 RX ADMIN — Medication 120 MILLIGRAM(S): at 09:20

## 2025-05-19 RX ADMIN — FUROSEMIDE 40 MILLIGRAM(S): 10 INJECTION INTRAMUSCULAR; INTRAVENOUS at 17:24

## 2025-05-19 NOTE — PROGRESS NOTE ADULT - ASSESSMENT
Patient is a 38 yo Female w/ a h/o HTN presenting to ER with 5 days of multiple episodes of NBNB vomiting and diarrhea with associated generalized abdominal pain and distention.  # HTN   # JOYCE rule out ATN   # Non nephrotic range proteinuria / hematuria   # thrombocytopenia   - picture above can be due to malignant HTN  / ADAMTS 13 not low / however patient received steroids and on plasmapheresis   - repeat labs today   - normal C3 ( not in favor of a HUS)  nl C4 normal JOSE ANTONIO which rule out active lupus  -  s/p steroids , s/p  plasmapheresis   - hb noted receiving blood tx / repeat hemolysis w/up/ followed by hematology and GI / on ANASTACIO  hold if bp > 170/100/ on  venofer course / followed by GI  - creat stable   - 1.8 g protein on UPCR will need repeat   - last  ph at goal / no binders / check i calcium / pth noted and vit D / replete VIT D/ on calcitriol 0.25 daily   -  GN w/up negative   - follow bp readings /add clonidine 0.1 q 12 if remains elevated /renin elevated / aldosterone > 100 , please repeat ? due to malignant hypertension / no FOUZIA/ ?  renin secreting tumors  - renal artery dupplex no FOUZIA   renal team will follow

## 2025-05-19 NOTE — PROGRESS NOTE ADULT - SUBJECTIVE AND OBJECTIVE BOX
seen and examined  24 h events noted   no distress       PAST HISTORY  --------------------------------------------------------------------------------  No significant changes to PMH, PSH, FHx, SHx, unless otherwise noted    ALLERGIES & MEDICATIONS  --------------------------------------------------------------------------------  Allergies    No Known Allergies    Intolerances      Standing Inpatient Medications  aspirin  chewable 81 milliGRAM(s) Oral daily  atorvastatin 80 milliGRAM(s) Oral at bedtime  calcitriol   Capsule 0.25 MICROGram(s) Oral daily  chlorhexidine 2% Cloths 1 Application(s) Topical <User Schedule>  cyanocobalamin 1000 MICROGram(s) Oral daily  dextrose 5%. 1000 milliLiter(s) IV Continuous <Continuous>  dextrose 5%. 1000 milliLiter(s) IV Continuous <Continuous>  dextrose 50% Injectable 25 Gram(s) IV Push once  dextrose 50% Injectable 12.5 Gram(s) IV Push once  dextrose 50% Injectable 25 Gram(s) IV Push once  epoetin sergey-epbx (RETACRIT) Injectable 84137 Unit(s) SubCutaneous every 7 days  folic acid 1 milliGRAM(s) Oral daily  furosemide    Tablet 40 milliGRAM(s) Oral every 12 hours  hydrALAZINE 50 milliGRAM(s) Oral every 6 hours  insulin glargine Injectable (LANTUS) 6 Unit(s) SubCutaneous at bedtime  insulin lispro (ADMELOG) corrective regimen sliding scale   SubCutaneous three times a day before meals  insulin lispro (ADMELOG) corrective regimen sliding scale   SubCutaneous at bedtime  iron sucrose IVPB 100 milliGRAM(s) IV Intermittent every 24 hours  labetalol 300 milliGRAM(s) Oral three times a day  melatonin 5 milliGRAM(s) Oral at bedtime  NIFEdipine  milliGRAM(s) Oral daily  pantoprazole    Tablet 40 milliGRAM(s) Oral every 12 hours  traZODone 50 milliGRAM(s) Oral at bedtime    PRN Inpatient Medications  acetaminophen     Tablet .. 650 milliGRAM(s) Oral every 6 hours PRN  calcium carbonate   1250 mG (OsCal) 1 Tablet(s) Oral every 6 hours PRN  dextrose Oral Gel 15 Gram(s) Oral once PRN        VITALS/PHYSICAL EXAM  --------------------------------------------------------------------------------  T(C): 37.2 (05-19-25 @ 09:00), Max: 37.2 (05-19-25 @ 09:00)  HR: 91 (05-19-25 @ 09:00) (81 - 93)  BP: 188/95 (05-19-25 @ 09:00) (127/65 - 188/95)  RR: 20 (05-19-25 @ 09:00) (16 - 60)  SpO2: 97% (05-19-25 @ 09:00) (96% - 97%)  Wt(kg): --        05-18-25 @ 07:01  -  05-19-25 @ 07:00  --------------------------------------------------------  IN: 340 mL / OUT: 0 mL / NET: 340 mL      Physical Exam:  	Gen: NAD,  	Pulm: CTA B/L  	CV:  S1S2; no rub  	Abd: +distended  	LE: edema        LABS/STUDIES  --------------------------------------------------------------------------------        Creatinine Trend:  SCr 3.6 [05-16 @ 04:47]  SCr 3.5 [05-15 @ 04:54]  SCr 3.2 [05-14 @ 04:20]  SCr 3.4 [05-13 @ 04:40]  SCr 3.3 [05-12 @ 03:55]    Urinalysis - [05-16-25 @ 04:47]      Color  / Appearance  / SG  / pH       Gluc 104 / Ketone   / Bili  / Urobili        Blood  / Protein  / Leuk Est  / Nitrite       RBC  / WBC  / Hyaline  / Gran  / Sq Epi  / Non Sq Epi  / Bacteria     Urine Creatinine 16      [05-13-25 @ 12:24]  Urine Protein 37      [05-13-25 @ 12:24]    Iron 18, TIBC 166, %sat 11      [05-07-25 @ 11:40]  Ferritin 362      [05-07-25 @ 11:40]  PTH -- (Ca --)      [05-13-25 @ 12:08]   194  PTH -- (Ca --)      [05-12-25 @ 16:48]   193  Vitamin D (25OH) 12      [05-12-25 @ 16:48]  TSH 2.05      [05-07-25 @ 11:40]  Lipid: chol 154, , HDL 37, LDL --      [05-14-25 @ 04:20]    HBsAg Nonreact      [05-07-25 @ 11:40]  HCV 0.13, Nonreact      [05-07-25 @ 11:40]  HIV Nonreact      [05-07-25 @ 11:40]    JOSE ANTONIO: titer Negative, pattern --      [05-07-25 @ 11:40]  dsDNA 2      [05-08-25 @ 19:20]  C3 Complement 106      [05-07-25 @ 11:40]  C4 Complement 22      [05-07-25 @ 11:40]  ANCA: cANCA Negative, pANCA Negative, atypical ANCA Negative      [05-07-25 @ 11:40]  anti-GBM <0.2      [05-07-25 @ 11:40]  Free Light Chains: kappa 1.98, lambda 2.29, ratio = 0.86      [05-13 @ 04:40]  Immunofixation Serum:   No Monoclonal Band Identified      Reference Range: None Detected      [05-12-25 @ 16:48]  SPEP Interpretation: Hypogammaglobulinemia      [05-12-25 @ 16:48]

## 2025-05-19 NOTE — PROGRESS NOTE ADULT - ASSESSMENT
Assessment and Plan  Case of a 39 year old previously healthy female patient who presented to the ED on 05/06 for evaluation of generalized abdominal pain, nausea, vomiting, and black loose stools, found to have malignant HTN on arrival with evidence of leukocytosis/acute on chronic anemia/thrombocytopenia/elevated LDH and retic/low haptoglobin/schistocytes on smear/proteinuria/JOYCE on blood work and evidence of distal D/proximal J thickening with dilated jejunal loops to 3.4cm and gradual tapering distally wo SBO along with mural enhancement of SB concerning for severe enteritis VS early ischemia VS shock bowel. 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 She was also found to have age indeterminate lacunar infarct on CT 05/13. We are following for above findings.      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 (JOYCE with leukocytosis, thrombocytopenia, and acute on chronic anemia/ not TTP since [facility]PO -)   * Hemodynamically stable  * Labs: WBC 14.71, Hb 7, Plt 228, Na 136, K 4.2, BUN 79, Cr 3.3 on 05/12  * Hb trend: 9 on 05/06 -> 7.2/6.8 on 05/07 s/p 1 unit pRBC -> 6.8 on 05/08 s/p 1 unit -> 7.6 on 05/10 -> 6.9 on 05/11 s/p 1 unit -> 7 on 05/12 -> 6.7 on 05/13 s/p 1 unit pRBC -> 8.6 on 05/13 -> 7.5 on 05/14 -> 7.7 on 05/15 -> 6.9 s/p 1 unit pRBC 05/16  * Liver enzymes: 0.7/71/17/14 on 05/08  * Iron with Total Binding Capacity (05.07.25 @ 11:40): Iron - Total Binding Capacity.: 166 ug/dL; % Saturation, Iron: 11 %; Iron Total: 18 ug/dL; Unsaturated Iron Binding Capacity: 148 ug/dL  * Elevated LDH and retic, low haptoglobin, smear with schistocytes; proteinuria on ua   * INR 0.9 on 05/07; not on AC  * Refused CHRISTINE on 05/12-05/14 despite extensive counseling  * CT Abdomen and Pelvis No Cont (05.07.25 @ 01:49) Edematous distal duodenum and proximal jejunal loops with associated dilatation measuring up to 3.4 cm. Associated marked interloop ascites, mesenteric fat stranding, and prominent mesenteric lymph nodes. Mild-to-moderate ascites. Findings concerning for small bowel ischemia.No portal venous gas, pneumatosis, or pneumoperitoneum.  * CT Angio Abdomen and Pelvis w/ IV Cont (05.07.25 @ 04:18) >Patent SMA, SMV. Redemonstration of distal duodenal and proximal small bowel with marked inflammatory change. Mural enhancement noted throughout the small bowel. Considerations include severe enteritis, early ischemia, shock bowel.  * CT Abdomen and Pelvis No Cont (05.13.25 @ 12:47) No evidence of bowel obstruction.Interval improvement of the proximal small bowel inflammatory change, decreased distention.Please see separate report for concurrent evaluation of thorax.  * No previous EGD or colonoscopy  * No FHx of GI cancers    RECOMMENDATIONS  - In the setting of previously reported black stools with acute on chronic iron deficiency anemia, the patient will ideally benefit from endoscopic evaluation    - However, in the setting of patient refusal, change to brown stools per patient since 05/13, uncontrolled BP (recent malignant HTN), concern for hemolysis (schistocytes/high LDH and retic/ low haptoglobin), and concern for TTP like picture per hematology/nephrology, will hold off endoscopic evaluation for now pending optimization (if patient becomes agreeable)   - Hematology and nephrology teams on board: 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 s/p plasmapheresis from 05/08-05/09 and s/p IV solumedrol from 05/07-05/10  - Surgery team on board: no acute intervention; unlikely ischemia per clinical exam  - Trend LA; monitor MAP and keep > 65mmHg (avoid malignant HTN; on nicardipine drip; labetalol; nifedipine) -> management per primary team  - Recommend serial abdominal exams. Check daily KUBs. Monitor electrolytes and replete as indicated. Avoid CCBs, sedatives, anticholinergics; limit opioids. Encourage ambulation and incentive spirometry  - Monitor for pain: management per team  - Monitor for nausea: consider antiemetics PRN if QTc is within normal  - Monitor BM. Stool cx -, GI PCR - on 05/08. Check Clostridium difficile if diarrhea recurs  - Holding off Ab for concern for HUS. ID team is on board  - Continue Protonix 40mg BID for now; avoid inessential NSAIDs  - Trend CBC and transfuse as needed; keep active type and screen  - Decision to continue blood thinners for concern of age indeterminate stroke should be made after discussing risks and benefits  - Follow up with our [facility] for EGD/colonoscopy +- VCE (located at [address] [place] [address]. Phone Number: [phone])        Thank you for your consult.  - Please note that plan was communicated with medical team.   - Please reach GI on [phone] during weekdays till 5pm.  - Please call the GI service line after 5pm on Weekdays and anytime on Weekends: [phone].      Art Graham MD  PGY - 5 Gastroenterology Fellow   [facility]

## 2025-05-19 NOTE — PROGRESS NOTE ADULT - SUBJECTIVE AND OBJECTIVE BOX
Gastroenterology Follow Up Note      Location: [address] ([address])  Patient Name: FRANCISCO DUNLAP  Age: 39y  Gender: Female      Chief Complaint  Patient is a 39y old Female who presents with a chief complaint of Hypertensi (12 May 2025 14:24)  Primary diagnosis of Hypertensive emergency        Reason for Consult  Concern for enteritis/ischemia/TTP like picture from malignant HTN      Progress Note  This morning patient was seen and examined at bedside.    Today is hospital day 12d.  No acute events overnight.   She denies abdominal pain or nausea or vomiting.  She reports 2 black loose stools on 05/11 and 1 on 05/12.  She reports brown stools x2 on 05/13-05/18.  She adamantly refused CHRISTINE on 05/12-05/14.        Vital Signs in the last 24 hours   T(C): 36.7 (19 May 2025 20:01), Max: 37.2 (19 May 2025 09:00)  T(F): 98 (19 May 2025 20:01), Max: 98.9 (19 May 2025 09:00)  HR: 89 (19 May 2025 20:01) (81 - 93)  BP: 187/97 (19 May 2025 20:01) (144/82 - 189/97)  BP(mean): 127 (19 May 2025 20:01) (99 - 140)  RR: 20 (19 May 2025 14:00) (16 - 22)  SpO2: 98% (19 May 2025 20:01) (96% - 99%)    Parameters below as of 19 May 2025 20:01  Patient On (Oxygen Delivery Method): room air      Physical Exam  * General Appearance: Alert, not cooperative, interactive, oriented to time, place, and person, in no acute distress  * Neck: Supple, symmetrical, trachea midline, no adenopathy   * Lungs: Good bilateral air entry, normal breath sounds  * Heart: Regular Rate and Rhythm, normal S1 and S2, no audible murmur, rub, or gallop  * Abdomen: Symmetric, mildly distended, soft, non-tender, no guarding or rebound tenderness, bowel sounds active all four quadrants  * Refused CHRISTINE on 05/12 and 05/13 as below      Investigations                 6.9    12.44 )-----------( 231      ( 16 May 2025 04:47 )             20.4     05-16    138  |  103  |  56[H]  ----------------------------<  104[H]  3.5   |  22  |  3.6[H]    Ca    7.7[L]      16 May 2025 04:47  Mg     1.8     05-16    TPro  4.2[L]  /  Alb  2.8[L]  /  TBili  0.3  /  DBili  x   /  AST  10  /  ALT  11  /  AlkPhos  76  05-16        LIVER FUNCTIONS - ( 16 May 2025 04:47 )  Alb: 2.8 g/dL / Pro: 4.2 g/dL / ALK PHOS: 76 U/L / ALT: 11 U/L / AST: 10 U/L / GGT: x               Urinalysis Basic - ( 16 May 2025 04:47 )    Color: x / Appearance: x / SG: x / pH: x  Gluc: 104 mg/dL / Ketone: x  / Bili: x / Urobili: x   Blood: x / Protein: x / Nitrite: x   Leuk Esterase: x / RBC: x / WBC x   Sq Epi: x / Non Sq Epi: x / Bacteria: x          Current Medications  Standing Medications  chlorhexidine 2% Cloths 1 Application(s) Topical <User Schedule>  cyanocobalamin 1000 MICROGram(s) Oral daily  dextrose 5%. 1000 milliLiter(s) (100 mL/Hr) IV Continuous <Continuous>  dextrose 5%. 1000 milliLiter(s) (50 mL/Hr) IV Continuous <Continuous>  dextrose 50% Injectable 25 Gram(s) IV Push once  dextrose 50% Injectable 12.5 Gram(s) IV Push once  dextrose 50% Injectable 25 Gram(s) IV Push once  folic acid 1 milliGRAM(s) Oral daily  hydrALAZINE 25 milliGRAM(s) Oral every 8 hours  insulin glargine Injectable (LANTUS) 6 Unit(s) SubCutaneous at bedtime  insulin lispro (ADMELOG) corrective regimen sliding scale   SubCutaneous three times a day before meals  insulin lispro (ADMELOG) corrective regimen sliding scale   SubCutaneous at bedtime  labetalol 300 milliGRAM(s) Oral three times a day  niCARdipine Infusion 5 mG/Hr (25 mL/Hr) IV Continuous <Continuous>  NIFEdipine  milliGRAM(s) Oral daily  pantoprazole    Tablet 40 milliGRAM(s) Oral every 12 hours    PRN Medications  calcium carbonate   1250 mG (OsCal) 1 Tablet(s) Oral every 6 hours PRN Heartburn  dextrose Oral Gel 15 Gram(s) Oral once PRN Blood Glucose LESS THAN 70 milliGRAM(s)/deciliter  melatonin 3 milliGRAM(s) Oral at bedtime PRN Insomnia    Singles Doses Administered  (ADM OVERRIDE) 1 each &lt;see task&gt; GiveOnce  (ADM OVERRIDE) 1 each &lt;see task&gt; GiveOnce  (ADM OVERRIDE) 2 each &lt;see task&gt; GiveOnce  (ADM OVERRIDE) 1 each &lt;see task&gt; GiveOnce  (ADM OVERRIDE) 1 each &lt;see task&gt; GiveOnce  (ADM OVERRIDE) 1 each &lt;see task&gt; GiveOnce  (ADM OVERRIDE) 1 each &lt;see task&gt; GiveOnce  acetaminophen   IVPB .. 1000 milliGRAM(s) IV Intermittent once  acetaminophen   IVPB .. 1000 milliGRAM(s) IV Intermittent once  calcium gluconate IVPB 2 Gram(s) IV Intermittent once  calcium gluconate IVPB 2 Gram(s) IV Intermittent once  calcium gluconate IVPB 2 Gram(s) IV Intermittent once  diphenhydrAMINE 50 milliGRAM(s) Oral once  diphenhydrAMINE Injectable 25 milliGRAM(s) IV Push once  diphenhydrAMINE Injectable 25 milliGRAM(s) IV Push once  diphenhydrAMINE Injectable 25 milliGRAM(s) IV Push once  hydrALAZINE Injectable 10 milliGRAM(s) IV Push once  hydrALAZINE Injectable 10 milliGRAM(s) IV Push once  labetalol Injectable 10 milliGRAM(s) IV Push once  labetalol Injectable 10 milliGRAM(s) IV Push once  lactated ringers Bolus 1000 milliLiter(s) IV Bolus once  methylPREDNISolone sodium succinate IVPB 1000 milliGRAM(s) IV Intermittent daily  metoprolol tartrate Injectable 10 milliGRAM(s) IV Push Once  morphine  - Injectable 4 milliGRAM(s) IV Push Once  ondansetron Injectable 4 milliGRAM(s) IV Push once  pantoprazole  Injectable 40 milliGRAM(s) IV Push Once  piperacillin/tazobactam IVPB... 3.375 Gram(s) IV Intermittent once  potassium chloride   Powder 40 milliEquivalent(s) Oral every 4 hours  potassium chloride  20 mEq/100 mL IVPB 20 milliEquivalent(s) IV Intermittent every 2 hours  potassium chloride  20 mEq/100 mL IVPB 20 milliEquivalent(s) IV Intermittent every 2 hours  QUEtiapine 25 milliGRAM(s) Oral once  sodium chloride 0.9% Bolus 1000 milliLiter(s) IV Bolus once

## 2025-05-19 NOTE — PROGRESS NOTE ADULT - ASSESSMENT
IMPRESSION    Malignant hypertension   Hypertensive Emergency  Likely UGIB on presentation  Anemia - unknown etiology  Thrombocytopenia - resolved  Early small bowel ischemia likely 2/2 severe enteritis   SIRS/Sepsis   Uncomplicated Cystitis   JOYCE likely pre-renal   RSV infection     PLAN:     CNS : Refusing workup. Refused MRI. CTH with lacunar infarcts and EEG normal. Neurology follow up.     ENT : Oral Care     Pulmonary: On room air.     Cardiology: ECHO LVH  Porcardia 120 XL   labetalol 300 Q8 hrs    Hydralazine 50 mg PO q6  Lasix 40 PO BID   Off the nicardipine drip     GI : No further melena. Tolerating diet. Refused labs.     Renal: Making urine. Nephrology following. No wick. Patient declined labs.     Hem/Onc : No evidence of TTP. Off plasmapheresis. Last CBC from the 17th. Platelets back to normal.      Endo:  Blood glucose Goal : 140-180.     MSK: Ambulate as tolerated     Code : Full code.      Dispo: Medical floor.     Patient refusing workup; labs; monitoring.     Evaluated by psych; has capacity.     This is a transition of care note.    For any questions or clarifications during regular hours, please do not hesitate to call or text me.    For after hours and weekends, please call the MICU fellow at [phone].

## 2025-05-19 NOTE — PROGRESS NOTE ADULT - SUBJECTIVE AND OBJECTIVE BOX
Patient is a 39y old  Female who presents with a chief complaint of Hypertensi (12 May 2025 14:24)        Over Night Events:    No fevers   On oral BP meds         ROS:  See HPI    PHYSICAL EXAM    ICU Vital Signs Last 24 Hrs  T(C): 36.1 (19 May 2025 00:00), Max: 36.4 (18 May 2025 16:00)  T(F): 97 (19 May 2025 00:00), Max: 97.6 (18 May 2025 16:00)  HR: 88 (19 May 2025 07:00) (79 - 93)  BP: 156/78 (19 May 2025 05:00) (127/65 - 179/92)  BP(mean): 107 (19 May 2025 05:00) (89 - 129)  ABP: --  ABP(mean): --  RR: 22 (19 May 2025 07:00) (16 - 60)  SpO2: 97% (19 May 2025 03:00) (96% - 97%)    O2 Parameters below as of 19 May 2025 07:00  Patient On (Oxygen Delivery Method): room air            General: NAD   HEENT: CHAPITO             Lymphatic system: No cervical LN   Lungs: Bilateral BS; clear  Cardiovascular: Regular   Gastrointestinal: Soft, Positive BS  Extremities: No clubbing.  Moves extremities.  Full Range of motion   Skin: Warm, intact  Neurological: No motor or sensory deficit; refusing medications       05-18-25 @ 07:01  -  05-19-25 @ 07:00  --------------------------------------------------------  IN:    IV PiggyBack: 100 mL    Oral Fluid: 240 mL  Total IN: 340 mL    OUT:  Total OUT: 0 mL    Total NET: 340 mL          LABS:                                                                                                                                                                                                                                                                             MEDICATIONS  (STANDING):  aspirin  chewable 81 milliGRAM(s) Oral daily  atorvastatin 80 milliGRAM(s) Oral at bedtime  calcitriol   Capsule 0.25 MICROGram(s) Oral daily  chlorhexidine 2% Cloths 1 Application(s) Topical <User Schedule>  cyanocobalamin 1000 MICROGram(s) Oral daily  dextrose 5%. 1000 milliLiter(s) (100 mL/Hr) IV Continuous <Continuous>  dextrose 5%. 1000 milliLiter(s) (50 mL/Hr) IV Continuous <Continuous>  dextrose 50% Injectable 25 Gram(s) IV Push once  dextrose 50% Injectable 12.5 Gram(s) IV Push once  dextrose 50% Injectable 25 Gram(s) IV Push once  epoetin sergey-epbx (RETACRIT) Injectable 66341 Unit(s) SubCutaneous every 7 days  folic acid 1 milliGRAM(s) Oral daily  furosemide    Tablet 40 milliGRAM(s) Oral every 12 hours  hydrALAZINE 50 milliGRAM(s) Oral every 6 hours  insulin glargine Injectable (LANTUS) 6 Unit(s) SubCutaneous at bedtime  insulin lispro (ADMELOG) corrective regimen sliding scale   SubCutaneous three times a day before meals  insulin lispro (ADMELOG) corrective regimen sliding scale   SubCutaneous at bedtime  iron sucrose IVPB 100 milliGRAM(s) IV Intermittent every 24 hours  labetalol 300 milliGRAM(s) Oral three times a day  melatonin 5 milliGRAM(s) Oral at bedtime  NIFEdipine  milliGRAM(s) Oral daily  pantoprazole    Tablet 40 milliGRAM(s) Oral every 12 hours  traZODone 50 milliGRAM(s) Oral at bedtime    MEDICATIONS  (PRN):  acetaminophen     Tablet .. 650 milliGRAM(s) Oral every 6 hours PRN Mild Pain (1 - 3)  calcium carbonate   1250 mG (OsCal) 1 Tablet(s) Oral every 6 hours PRN Heartburn  dextrose Oral Gel 15 Gram(s) Oral once PRN Blood Glucose LESS THAN 70 milliGRAM(s)/deciliter

## 2025-05-20 LAB
ALBUMIN SERPL ELPH-MCNC: 3.1 G/DL — LOW (ref 3.5–5.2)
ALP SERPL-CCNC: 115 U/L — SIGNIFICANT CHANGE UP (ref 30–115)
ALT FLD-CCNC: 12 U/L — SIGNIFICANT CHANGE UP (ref 0–41)
ANION GAP SERPL CALC-SCNC: 15 MMOL/L — HIGH (ref 7–14)
AST SERPL-CCNC: 12 U/L — SIGNIFICANT CHANGE UP (ref 0–41)
BILIRUB SERPL-MCNC: 0.2 MG/DL — SIGNIFICANT CHANGE UP (ref 0.2–1.2)
BUN SERPL-MCNC: 59 MG/DL — HIGH (ref 10–20)
CALCIUM SERPL-MCNC: 8.2 MG/DL — LOW (ref 8.4–10.5)
CHLORIDE SERPL-SCNC: 104 MMOL/L — SIGNIFICANT CHANGE UP (ref 98–110)
CO2 SERPL-SCNC: 22 MMOL/L — SIGNIFICANT CHANGE UP (ref 17–32)
CREAT SERPL-MCNC: 4 MG/DL — HIGH (ref 0.7–1.5)
CREATININE, RNDM UR: 16.5 MG/DL — SIGNIFICANT CHANGE UP
EGFR: 14 ML/MIN/1.73M2 — LOW
EGFR: 14 ML/MIN/1.73M2 — LOW
GLUCOSE BLDC GLUCOMTR-MCNC: 129 MG/DL — HIGH (ref 70–99)
GLUCOSE SERPL-MCNC: 120 MG/DL — HIGH (ref 70–99)
HCT VFR BLD CALC: 21.9 % — LOW (ref 37–47)
HGB BLD-MCNC: 7.4 G/DL — LOW (ref 12–16)
MCHC RBC-ENTMCNC: 31 PG — SIGNIFICANT CHANGE UP (ref 27–31)
MCHC RBC-ENTMCNC: 33.8 G/DL — SIGNIFICANT CHANGE UP (ref 32–37)
MCV RBC AUTO: 91.6 FL — SIGNIFICANT CHANGE UP (ref 81–99)
METANEPHRINE, PL: 39.3 PG/ML — SIGNIFICANT CHANGE UP (ref 0–88)
METANEPHS 24H UR-MCNC: 1 — SIGNIFICANT CHANGE UP (ref 0–1)
METANEPHS 24H UR-MCNC: 30 UG/L — SIGNIFICANT CHANGE UP
NORMETANEPHRINE, PL: SIGNIFICANT CHANGE UP PG/ML
NORMETANEPHRINE.: 127 UG/L — SIGNIFICANT CHANGE UP
NRBC BLD AUTO-RTO: 0 /100 WBCS — SIGNIFICANT CHANGE UP (ref 0–0)
PLATELET # BLD AUTO: 142 K/UL — SIGNIFICANT CHANGE UP (ref 130–400)
PMV BLD: 9.8 FL — SIGNIFICANT CHANGE UP (ref 7.4–10.4)
POTASSIUM SERPL-MCNC: 3.7 MMOL/L — SIGNIFICANT CHANGE UP (ref 3.5–5)
POTASSIUM SERPL-SCNC: 3.7 MMOL/L — SIGNIFICANT CHANGE UP (ref 3.5–5)
PROT SERPL-MCNC: 4.9 G/DL — LOW (ref 6–8)
RBC # BLD: 2.39 M/UL — LOW (ref 4.2–5.4)
RBC # FLD: 14.6 % — HIGH (ref 11.5–14.5)
SODIUM SERPL-SCNC: 141 MMOL/L — SIGNIFICANT CHANGE UP (ref 135–146)
WBC # BLD: 11.66 K/UL — HIGH (ref 4.8–10.8)
WBC # FLD AUTO: 11.66 K/UL — HIGH (ref 4.8–10.8)

## 2025-05-20 PROCEDURE — 93010 ELECTROCARDIOGRAM REPORT: CPT

## 2025-05-20 PROCEDURE — 99232 SBSQ HOSP IP/OBS MODERATE 35: CPT | Mod: 95

## 2025-05-20 PROCEDURE — 99233 SBSQ HOSP IP/OBS HIGH 50: CPT

## 2025-05-20 PROCEDURE — 71045 X-RAY EXAM CHEST 1 VIEW: CPT | Mod: 26

## 2025-05-20 RX ADMIN — Medication 81 MILLIGRAM(S): at 12:22

## 2025-05-20 RX ADMIN — LABETALOL HYDROCHLORIDE 300 MILLIGRAM(S): 200 TABLET, FILM COATED ORAL at 05:29

## 2025-05-20 RX ADMIN — Medication 50 MILLIGRAM(S): at 05:29

## 2025-05-20 RX ADMIN — LABETALOL HYDROCHLORIDE 300 MILLIGRAM(S): 200 TABLET, FILM COATED ORAL at 21:47

## 2025-05-20 RX ADMIN — ATORVASTATIN CALCIUM 80 MILLIGRAM(S): 80 TABLET, FILM COATED ORAL at 21:46

## 2025-05-20 RX ADMIN — CYANOCOBALAMIN 1000 MICROGRAM(S): 1000 INJECTION INTRAMUSCULAR; SUBCUTANEOUS at 12:22

## 2025-05-20 RX ADMIN — Medication 1 APPLICATION(S): at 05:39

## 2025-05-20 RX ADMIN — Medication 50 MILLIGRAM(S): at 17:03

## 2025-05-20 RX ADMIN — LABETALOL HYDROCHLORIDE 300 MILLIGRAM(S): 200 TABLET, FILM COATED ORAL at 13:39

## 2025-05-20 RX ADMIN — FUROSEMIDE 40 MILLIGRAM(S): 10 INJECTION INTRAMUSCULAR; INTRAVENOUS at 17:03

## 2025-05-20 RX ADMIN — IRON SUCROSE 210 MILLIGRAM(S): 20 INJECTION, SOLUTION INTRAVENOUS at 05:30

## 2025-05-20 RX ADMIN — Medication 5 MILLIGRAM(S): at 21:47

## 2025-05-20 RX ADMIN — FOLIC ACID 1 MILLIGRAM(S): 1 TABLET ORAL at 12:22

## 2025-05-20 RX ADMIN — Medication 50 MILLIGRAM(S): at 12:22

## 2025-05-20 RX ADMIN — FUROSEMIDE 40 MILLIGRAM(S): 10 INJECTION INTRAMUSCULAR; INTRAVENOUS at 05:30

## 2025-05-20 RX ADMIN — Medication 50 MILLIGRAM(S): at 21:47

## 2025-05-20 RX ADMIN — Medication 40 MILLIGRAM(S): at 05:30

## 2025-05-20 RX ADMIN — Medication 0.1 MILLIGRAM(S): at 17:03

## 2025-05-20 RX ADMIN — Medication 120 MILLIGRAM(S): at 05:29

## 2025-05-20 RX ADMIN — Medication 0.1 MILLIGRAM(S): at 05:29

## 2025-05-20 RX ADMIN — Medication 40 MILLIGRAM(S): at 17:03

## 2025-05-20 RX ADMIN — CALCITRIOL 0.25 MICROGRAM(S): 0.5 CAPSULE, GELATIN COATED ORAL at 12:22

## 2025-05-20 NOTE — BH CONSULTATION LIAISON PROGRESS NOTE - NSBHCHARTREVIEWLAB_PSY_A_CORE FT
05-13    139  |  106  |  74[HH]  ----------------------------<  130[H]  4.1   |  22  |  3.4[H]    Ca    7.6[L]      13 May 2025 04:40  Phos  5.1     05-13  Mg     2.0     05-13    TPro  4.2[L]  /  Alb  2.6[L]  /  TBili  <0.2  /  DBili  x   /  AST  11  /  ALT  16  /  AlkPhos  70  05-13                          6.7    14.74 )-----------( 250      ( 13 May 2025 04:40 )             20.1   
05-13    139  |  106  |  74[HH]  ----------------------------<  130[H]  4.1   |  22  |  3.4[H]    Ca    7.6[L]      13 May 2025 04:40  Phos  5.1     05-13  Mg     2.0     05-13    TPro  4.2[L]  /  Alb  2.6[L]  /  TBili  <0.2  /  DBili  x   /  AST  11  /  ALT  16  /  AlkPhos  70  05-13                          6.7    14.74 )-----------( 250      ( 13 May 2025 04:40 )             20.1

## 2025-05-20 NOTE — BH CONSULTATION LIAISON PROGRESS NOTE - NSBHFUPINTERVALHXFT_PSY_A_CORE
39-year-old female with history of hypertension presenting to ER with 5 days of multiple episodes of nonbloody nonbilious vomiting and diarrhea with associated generalized abdominal pain and distention. Patient states that on Saturday she had acute onset of nausea, vomiting, diarrhea and crampy abdominal pain. She has had 4-5 episodes of non-bloody, non-bilious vomiting per day and 4-5 episodes of black diarrhea per day. She initially thought that she had gastroenteritis, but when her symptoms didn't resolve, she decided come to the ED on 5/6. She denies any current abdominal pain, nausea or vomiting, fever, chest pain, shortness of breath, clotting disorder, past intra-abdominal surgeries, kidney issues, leg pain or swelling.   Psychiatry was consulted about depression. Psychiatry was reconsulted on 5/13 due to concern for psychosis and capacity to leave ama;  found not to have capacity by CL Dr. Zendejas at that time.   Reassessment of capacity to leave ama requested by medical team today.     on reassessment today, pt was offered  but chose to speak english and spoke in english.   she repeated she wants to leave . she says she is fine and wants to go home. she is able to state she is in hospital for   high blood pressure but is unable to tell me the treatments being offered ("I don't' know what treatment"), or the risks/benefits/alternatives to treatment and   nontreatment. she is unable to describe the risks of leaving ama. she repeats "I'm okay, so I can leave" several times.   from 5/13 assessment:  On interview, patient would frequently switch between Mandarin, Cantonese, and English. She claimed to be speaking mandarin or English although was actually speaking in Cantonese. Patient's mother was at bedside. Briefly inquired on whether patient understands the reason she is in the hospital--she superficially understands that she is still here for hypertension, but does not seem to understand the extent of her current medical treatment (per medical team requiring several agents to maintain normal blood pressure including IV). Patient's mother at bedside also did not seem to understand and was requesting that psychiatry see her to help her with anxiety. Patient seemed annoyed to talk to psychiatry--mentioning she did not need any psychiatric help.    Spoke to patient's mother's Luz with  [id number] to clarify course of illness:  -She was diagnosed with depression around 2015 at ~ age 30. She went back to [place] and started treatment for depression with some improvement. She got  in 2017 and had a child, but was  ~2023. Patient's mother was worried Louise became more depressed and withdrawn.  -Patient has completed undergrad. 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   -Patient does not have history of suicide attempts, there is not family history of schizophrenia or bipolar disorder or suicide. No history of substance abuse.  -A few months ago--patient's mother noticed patient behaving oddly such as staring at a TV and laughing inappropriately. Patient's behavior was questioned and responded that she was thinking about something funny.  -Mom has not noticed any hallucinations or delusions. She has not noticed behaviors that suggest patient is planning to intentional hurt self or others.

## 2025-05-20 NOTE — BH CONSULTATION LIAISON PROGRESS NOTE - NSICDXBHSECONDARYDX_PSY_ALL_CORE
Spoke to mom regarding holter result and let them know that the Holter looked good. We will still plan for follow up in 2 years like we discussed at the visit. Mom verbalized understanding with no further questions.  
History of depression   Z86.59  
History of depression   Z86.59

## 2025-05-20 NOTE — PROGRESS NOTE ADULT - SUBJECTIVE AND OBJECTIVE BOX
FRANCISCO DUNLAP 39y Female  MRN#: [id number]   Hospital Day: 13d    HPI:  39-year-old female with history of hypertension presenting to ER with 5 days of multiple episodes of nonbloody nonbilious vomiting and diarrhea with associated generalized abdominal pain and distention. Patient states that on Saturday she had acute onset of nausea, vomiting, diarrhea and crampy abdominal pain. She has had 4-5 episodes of non-bloody, non-bilious vomiting per day and 4-5 episodes of black diarrhea per day. She initially thought that she had gastroenteritis, but when her symptoms didn't resolve, she decided come to the ED. She denies any current abdominal pain, nausea or vomiting, fever, chest pain, shortness of breath, clotting disorder, past intra-abdominal surgeries, kidney issues, leg pain or swelling.     Labs significant for WBC 18.72k, Hb 9.0(unknown baseline) , Platelets 86k, Creatinine 3.5(unknown baseline). Lipase 71, UA positive      CTAP with finding of Edematous distal duodenum and proximal jejunal loops with associated dilatation measuring up to 3.4 cm. Associated marked interloop ascites, mesenteric fat stranding, and prominent mesenteric lymph nodes. Mild-to-moderate ascites. Findings concerning for small bowel ischemia.     CT Angio Abdomen and Pelvis w/ IV Cont (05.07.25 @ 04:18): Patent SMA, SMV. Redemonstration of distal duodenal and proximal small bowel with marked inflammatory change. Mural enhancement noted throughout   the small bowel. Considerations include severe enteritis, early ischemia, shock bowel.    #ED Vitals:   T(C): 37.2 (05-07-25 @ 05:46), Max: 37.3 (05-06-25 @ 23:44)  HR: 112 (05-07-25 @ 05:46) (97 - 118)  BP: 151/86 (05-07-25 @ 03:39) (151/86 - 238/135)  RR: 17 (05-07-25 @ 05:46) (17 - 19)  SpO2: 99% (05-07-25 @ 05:46) (96% - 100%)    # ED Course:   Zosyn, LR 1L, NS 1L, Zofran, Morphine, Pantoprazole, Reglan, Metoprolol 10 IV   Started on Nicardipine drip   Evaluated by surgery >>> No acute surgical intervention     The patient is being admitted to MICU for the further management.  (07 May 2025 06:03)      SUBJECTIVE  Patient is a 39y old Female who presents with a chief complaint of Hypertensi (12 May 2025 14:24)  Currently admitted to medicine with the primary diagnosis of Hypertensive emergency      INTERVAL HPI AND OVERNIGHT EVENTS:  Patient was examined and seen at bedside. This morning she is resting comfortably in bed and reports no issues or overnight events.    REVIEW OF SYMPTOMS:  CONSTITUTIONAL: No weakness, fevers or chills; No headaches  EYES: No visual changes, eye pain, or discharge  ENT: No vertigo; No ear pain or change in hearing; No sore throat or difficulty swallowing  NECK: No pain or stiffness  RESPIRATORY: No cough, wheezing, or hemoptysis; No shortness of breath  CARDIOVASCULAR: No chest pain or palpitations  GASTROINTESTINAL: No abdominal or epigastric pain; No nausea, vomiting, or hematemesis; No diarrhea or constipation; No melena or hematochezia  GENITOURINARY: No dysuria, frequency or hematuria  MUSCULOSKELETAL: No joint pain, no muscle pain, no weakness  NEUROLOGICAL: No numbness or weakness  SKIN: No itching or rashes    OBJECTIVE  PAST MEDICAL & SURGICAL HISTORY  HTN (hypertension)      ALLERGIES:  No Known Allergies    MEDICATIONS:  STANDING MEDICATIONS  aspirin  chewable 81 milliGRAM(s) Oral daily  atorvastatin 80 milliGRAM(s) Oral at bedtime  calcitriol   Capsule 0.25 MICROGram(s) Oral daily  chlorhexidine 2% Cloths 1 Application(s) Topical <User Schedule>  cloNIDine 0.1 milliGRAM(s) Oral every 12 hours  cyanocobalamin 1000 MICROGram(s) Oral daily  dextrose 5%. 1000 milliLiter(s) IV Continuous <Continuous>  dextrose 5%. 1000 milliLiter(s) IV Continuous <Continuous>  dextrose 50% Injectable 25 Gram(s) IV Push once  dextrose 50% Injectable 12.5 Gram(s) IV Push once  dextrose 50% Injectable 25 Gram(s) IV Push once  epoetin sergey-epbx (RETACRIT) Injectable 76029 Unit(s) SubCutaneous every 7 days  folic acid 1 milliGRAM(s) Oral daily  furosemide    Tablet 40 milliGRAM(s) Oral every 12 hours  hydrALAZINE 50 milliGRAM(s) Oral every 6 hours  insulin glargine Injectable (LANTUS) 6 Unit(s) SubCutaneous at bedtime  insulin lispro (ADMELOG) corrective regimen sliding scale   SubCutaneous three times a day before meals  insulin lispro (ADMELOG) corrective regimen sliding scale   SubCutaneous at bedtime  labetalol 300 milliGRAM(s) Oral three times a day  melatonin 5 milliGRAM(s) Oral at bedtime  NIFEdipine  milliGRAM(s) Oral daily  pantoprazole    Tablet 40 milliGRAM(s) Oral every 12 hours  traZODone 50 milliGRAM(s) Oral at bedtime    PRN MEDICATIONS  acetaminophen     Tablet .. 650 milliGRAM(s) Oral every 6 hours PRN  calcium carbonate   1250 mG (OsCal) 1 Tablet(s) Oral every 6 hours PRN  dextrose Oral Gel 15 Gram(s) Oral once PRN      VITAL SIGNS: Last 24 Hours  T(C): 36.9 (20 May 2025 06:21), Max: 37.2 (20 May 2025 04:49)  T(F): 98.5 (20 May 2025 06:21), Max: 99 (20 May 2025 04:49)  HR: 82 (20 May 2025 06:21) (61 - 92)  BP: 159/84 (20 May 2025 06:21) (146/78 - 189/97)  BP(mean): 127 (19 May 2025 20:01) (114 - 137)  RR: 18 (20 May 2025 04:49) (18 - 20)  SpO2: 95% (20 May 2025 04:49) (95% - 99%)    LABS:        PHYSICAL EXAM:  CONSTITUTIONAL: No acute distress  HEAD: Atraumatic, normocephalic  EYES: EOM intact, conjunctiva and sclera clear  ENT: moist mucous membranes  PULMONARY: Clear to auscultation bilaterally; no wheezes, rales, or rhonchi  CARDIOVASCULAR: Regular rate and rhythm; no murmurs, rubs, or gallops  GASTROINTESTINAL: Soft, non-tender, non-distended; bowel sounds present  MUSCULOSKELETAL: 2+ peripheral pulses; no clubbing, no cyanosis, +edema b/l LE  NEUROLOGY: non-focal    ASSESSMENT & PLAN    Malignant hypertension   Hypertensive Emergency  Likely UGIB on presentation  Anemia - unknown etiology  Thrombocytopenia - resolved  Early small bowel ischemia likely 2/2 severe enteritis   SIRS/Sepsis   Uncomplicated Cystitis   JOYCE likely pre-renal   RSV infection     5/20 pt continues to be noncompliant, noncooperative. f/u psych re reassessment of pt capacity.    #HTN Urgency- Resolved   #Treatment resistant HTN of unclear etiology    - Patient had BP of 238/135 in the ED  - s/p cardene gtt   - originally thought TTP/HUS picture, s/p plasmapheresis, [id number] was negative and therapy did not improve HTN  - secondary HTN workup: TSH 2.04, cortisol wnl, RA doppler (-), CT scan did not show PKD, Lupus workup negative  - Aldosterone/Renin 105/52, less likely primary aldosteronism but since patient has been treated aggressively for HTN results are confounded  - f/u repeat R/A ratio, f/u metanephrines, f/u ESR/CRP   - Target SBP < 160  - c/w hydralazine 50mg q6h, labetalol 300mg TID, procardia 120mg qd    #JOYCE vs CKD  #proteinuria  #LE edema   -Cr 3.5 on admission unknown baseline   - has been relatively stable throughout admission   -protein/cr urine ratio 2.3  -Glomerular nephritis workup negative   - appreciate nephro recs   -on lasix 40 PO BID   - c/w calcitriol     #Age indeterminant lacunar infarct   - evaluated by Neuro, recommend MRI  - f/u whether needed inpatient vs outpatient - if needed inpatient will require sedation (consult anesthesia)  - f/u MRI with anesthesia if patient is amendable and required inpatient   - c/w ASA ,statin , BP control     #Enteritis vs Small bowel ischemia   #Melena - resolved   -physical exam benign   -patient not complaining of abdominal pain, has good PO diet, no bloody BM since 5/13    #Normocytic Anemia   #Acute on chronic MARITZA   - s/p 5U pRBC   - melena on admission, now resolved  - evaluated by heme/onc: likely MAHA 2/2 HTN  - evaluated by GI: would benefit from endoscopy, now brown stools, f/u OP  - evaluated by nephro: likely not just from CKD  - c/w epogen, hold if SBP > 170  - c/w venofer, 3 day course   - c/w folic acid, B12  - c/w PPI BID    #Mood disorder   #delirium 2/2 to htn encephalopathy   -patient not compliant with treatment, requires frequent reorientation from staff and mother   -does not have capacity to leave AMA   -trazodone 50qhs.  - psychiatry recommendations appreciated - 5/20 f/u psych reeval    MISC  DVT ppx: ambulation   Diet: CCC/DASH  GI ppx/Bowel regimen: PPI BID   Activity: AAT  GOC: full

## 2025-05-20 NOTE — PROGRESS NOTE ADULT - ASSESSMENT
Patient is a 40 yo Female w/ a h/o HTN presenting to ER with 5 days of multiple episodes of NBNB vomiting and diarrhea with associated generalized abdominal pain and distention.  # HTN   # JOYCE rule out ATN   # Non nephrotic range proteinuria / hematuria   # thrombocytopenia   - picture above can be due to malignant HTN  / ADAMTS 13 not low / however patient received steroids and on plasmapheresis   - repeat labs today   - normal C3 ( not in favor of a HUS)  nl C4 normal JOSE ANTONIO which rule out active lupus  -  s/p steroids , s/p  plasmapheresis   -  last hb noted receiving blood tx / repeat hemolysis w/up/ followed by hematology and GI / on ANASTACIO  hold if bp > 170/100/ on  venofer course / followed by GI  - creat stable   - last  ph at goal / no binders / check i calcium / pth noted and vit D / replete VIT D/ on calcitriol 0.25 daily   -  GN w/up negative   - follow bp readings /add clonidine 0.1 q 12 if remains elevated /renin elevated / aldosterone elevated , please repeat ? due to malignant hypertension / no FOUZIA/ ?  renin secreting tumors  - renal artery dupplex no FOUZIA   renal team will follow

## 2025-05-20 NOTE — PROGRESS NOTE ADULT - SUBJECTIVE AND OBJECTIVE BOX
seen and examined  24 h vents noted   no distress         PAST HISTORY  --------------------------------------------------------------------------------  No significant changes to PMH, PSH, FHx, SHx, unless otherwise noted    ALLERGIES & MEDICATIONS  --------------------------------------------------------------------------------  Allergies    No Known Allergies    Intolerances      Standing Inpatient Medications  aspirin  chewable 81 milliGRAM(s) Oral daily  atorvastatin 80 milliGRAM(s) Oral at bedtime  calcitriol   Capsule 0.25 MICROGram(s) Oral daily  chlorhexidine 2% Cloths 1 Application(s) Topical <User Schedule>  cloNIDine 0.1 milliGRAM(s) Oral every 12 hours  cyanocobalamin 1000 MICROGram(s) Oral daily  dextrose 5%. 1000 milliLiter(s) IV Continuous <Continuous>  dextrose 5%. 1000 milliLiter(s) IV Continuous <Continuous>  dextrose 50% Injectable 25 Gram(s) IV Push once  dextrose 50% Injectable 12.5 Gram(s) IV Push once  dextrose 50% Injectable 25 Gram(s) IV Push once  epoetin sergey-epbx (RETACRIT) Injectable 40052 Unit(s) SubCutaneous every 7 days  folic acid 1 milliGRAM(s) Oral daily  furosemide    Tablet 40 milliGRAM(s) Oral every 12 hours  hydrALAZINE 50 milliGRAM(s) Oral every 6 hours  insulin glargine Injectable (LANTUS) 6 Unit(s) SubCutaneous at bedtime  insulin lispro (ADMELOG) corrective regimen sliding scale   SubCutaneous three times a day before meals  insulin lispro (ADMELOG) corrective regimen sliding scale   SubCutaneous at bedtime  labetalol 300 milliGRAM(s) Oral three times a day  melatonin 5 milliGRAM(s) Oral at bedtime  NIFEdipine  milliGRAM(s) Oral daily  pantoprazole    Tablet 40 milliGRAM(s) Oral every 12 hours  traZODone 50 milliGRAM(s) Oral at bedtime    PRN Inpatient Medications  acetaminophen     Tablet .. 650 milliGRAM(s) Oral every 6 hours PRN  calcium carbonate   1250 mG (OsCal) 1 Tablet(s) Oral every 6 hours PRN  dextrose Oral Gel 15 Gram(s) Oral once PRN    VITALS/PHYSICAL EXAM  --------------------------------------------------------------------------------  T(C): 36.9 (05-20-25 @ 06:21), Max: 37.2 (05-20-25 @ 04:49)  HR: 82 (05-20-25 @ 06:21) (61 - 92)  BP: 159/84 (05-20-25 @ 06:21) (146/78 - 189/97)  RR: 18 (05-20-25 @ 04:49) (18 - 20)  SpO2: 95% (05-20-25 @ 04:49) (95% - 99%)  Wt(kg): --        05-19-25 @ 07:01  -  05-20-25 @ 07:00  --------------------------------------------------------  IN: 1192 mL / OUT: 300 mL / NET: 892 mL      Physical Exam:  	Gen: NAD  	Pulm: decrease BS  B/L  	CV:  S1S2; no rub  	Abd: +distended  	LE:  edema  	    LABS/STUDIES  --------------------------------------------------------------------------------      Creatinine Trend:  SCr 3.6 [05-16 @ 04:47]  SCr 3.5 [05-15 @ 04:54]  SCr 3.2 [05-14 @ 04:20]  SCr 3.4 [05-13 @ 04:40]  SCr 3.3 [05-12 @ 03:55]    Urinalysis - [05-16-25 @ 04:47]      Color  / Appearance  / SG  / pH       Gluc 104 / Ketone   / Bili  / Urobili        Blood  / Protein  / Leuk Est  / Nitrite       RBC  / WBC  / Hyaline  / Gran  / Sq Epi  / Non Sq Epi  / Bacteria     Urine Creatinine 16      [05-13-25 @ 12:24]  Urine Protein 37      [05-13-25 @ 12:24]    Iron 18, TIBC 166, %sat 11      [05-07-25 @ 11:40]  Ferritin 362      [05-07-25 @ 11:40]  PTH -- (Ca --)      [05-13-25 @ 12:08]   194  PTH -- (Ca --)      [05-12-25 @ 16:48]   193  Vitamin D (25OH) 12      [05-12-25 @ 16:48]  TSH 2.05      [05-07-25 @ 11:40]  Lipid: chol 154, , HDL 37, LDL --      [05-14-25 @ 04:20]    HBsAg Nonreact      [05-07-25 @ 11:40]  HCV 0.13, Nonreact      [05-07-25 @ 11:40]  HIV Nonreact      [05-07-25 @ 11:40]    JOSE ANTONIO: titer Negative, pattern --      [05-07-25 @ 11:40]  dsDNA 2      [05-08-25 @ 19:20]  C3 Complement 106      [05-07-25 @ 11:40]  C4 Complement 22      [05-07-25 @ 11:40]  ANCA: cANCA Negative, pANCA Negative, atypical ANCA Negative      [05-07-25 @ 11:40]  anti-GBM <0.2      [05-07-25 @ 11:40]  Free Light Chains: kappa 1.98, lambda 2.29, ratio = 0.86      [05-13 @ 04:40]  Immunofixation Serum:   No Monoclonal Band Identified      Reference Range: None Detected      [05-12-25 @ 16:48]  SPEP Interpretation: Hypogammaglobulinemia      [05-12-25 @ 16:48]

## 2025-05-20 NOTE — BH CONSULTATION LIAISON PROGRESS NOTE - NSBHCHARTREVIEWINVESTIGATE_PSY_A_CORE FT
< from: 12 Lead ECG (05.07.25 @ 01:57) >    Ventricular Rate 93 BPM    Atrial Rate 93 BPM    P-R Interval 136 ms    QRS Duration 84 ms    Q-T Interval 412 ms    QTC Calculation(Bazett) 512 ms    < end of copied text >    < from: 12 Lead ECG (05.07.25 @ 01:57) >    Ventricular Rate 93 BPM    Atrial Rate 93 BPM    P-R Interval 136 ms    QRS Duration 84 ms    Q-T Interval 412 ms    QTC Calculation(Bazett) 512 ms    < end of copied text >    
< from: 12 Lead ECG (05.07.25 @ 01:57) >    Ventricular Rate 93 BPM    Atrial Rate 93 BPM    P-R Interval 136 ms    QRS Duration 84 ms    Q-T Interval 412 ms    QTC Calculation(Bazett) 512 ms    < end of copied text >    < from: 12 Lead ECG (05.07.25 @ 01:57) >    Ventricular Rate 93 BPM    Atrial Rate 93 BPM    P-R Interval 136 ms    QRS Duration 84 ms    Q-T Interval 412 ms    QTC Calculation(Bazett) 512 ms    < end of copied text >

## 2025-05-20 NOTE — BH CONSULTATION LIAISON PROGRESS NOTE - NSBHCONSULTRECOMMENDOTHER_PSY_A_CORE FT
-Can consider Trazodone 25 mg qhs for sleep--may help with metabolic encephalopathy.  -Psychiatry SW will follow up to give patient's mother resources for outpatient therapy for the patient.  -Agree with medical teams' assessment regarding capacity.    Recommendations to nursing for non-pharmacological interventions for delirium management:  1.	Reorient Frequently   2.	Encourage Early Mobility   3.	Encourage talking to patient prior to hands on care    4.	Prevent overstimulation     5.	Prevent Day night Reversal : Out of bed at least 3 times during the day. Can be sitting in chair or walking in unit with assistance or even sitting on bed   6.	Curtains up from 8 am to 8 pm    7.	At least 6 hours of uninterrupted sleep at night    8.	Avoid Benzodiazepines, Anticholinergics and antihistaminergics   9.	Avoid Restraints : if needed utilize the least restrictive form : 1:1  <hand mitts< 2 point soft < 4 point soft < 2 point hard <  4 point hard    10.	Treat underlying cause    11.	Maintain K>4 and Mg >2 at all times 
-Can consider Trazodone 25 mg qhs for sleep--may help with metabolic encephalopathy.  -Psychiatry SW will follow up to give patient's mother resources for outpatient therapy for the patient.  -Agree with medical teams' assessment regarding capacity    5/20  Psychiatry was consulted about depression. Psychiatry was reconsulted on 5/13 due to concern for psychosis and capacity to   leave ama;  found not to have capacity by CL Dr. Zendejas at that time.   Medical team requests reassessment of capacity to leave ama today.     pt continues to not exhibit the elements of capacity to leave ama. while she is expressing a choice to leave, she is not showing  an understanding of her medical condition, the risks of leaving ama, or an ability to rationally manipulate information.   At this time, patient does not demonstrate the capacity to leave the hospital against medical advice . In this situation , the decision should be deferred to his next of kin or documented health care proxy. However if neither party are available , a court appointed decision maker can be appointed to make this decision. In the event of imminent need for treatment that is potentially life threatening , the medical team can consider a 2 physician consent    Recommendations to nursing for non-pharmacological interventions for delirium management:  1.	Reorient Frequently   2.	Encourage Early Mobility   3.	Encourage talking to patient prior to hands on care    4.	Prevent overstimulation     5.	Prevent Day night Reversal : Out of bed at least 3 times during the day. Can be sitting in chair or walking in unit with assistance or even sitting on bed   6.	Curtains up from 8 am to 8 pm    7.	At least 6 hours of uninterrupted sleep at night    8.	Avoid Benzodiazepines, Anticholinergics and antihistaminergics   9.	Avoid Restraints : if needed utilize the least restrictive form : 1:1  <hand mitts< 2 point soft < 4 point soft < 2 point hard <  4 point hard    10.	Treat underlying cause    11.	Maintain K>4 and Mg >2 at all times

## 2025-05-20 NOTE — BH CONSULTATION LIAISON PROGRESS NOTE - NSBHCHARTREVIEWVS_PSY_A_CORE FT
Vital Signs Last 24 Hrs  T(C): 36.9 (20 May 2025 11:55), Max: 37.2 (20 May 2025 04:49)  T(F): 98.4 (20 May 2025 11:55), Max: 99 (20 May 2025 04:49)  HR: 88 (20 May 2025 11:55) (61 - 92)  BP: 164/88 (20 May 2025 11:55) (146/78 - 187/97)  BP(mean): 127 (19 May 2025 20:01) (114 - 127)  RR: 17 (20 May 2025 11:55) (17 - 20)  SpO2: 95% (20 May 2025 04:49) (95% - 99%)    Parameters below as of 20 May 2025 07:30  Patient On (Oxygen Delivery Method): room air    
Vital Signs Last 24 Hrs  T(C): 37.1 (13 May 2025 04:00), Max: 37.1 (13 May 2025 04:00)  T(F): 98.7 (13 May 2025 04:00), Max: 98.7 (13 May 2025 04:00)  HR: 120 (13 May 2025 14:00) (85 - 120)  BP: --  BP(mean): --  RR: 25 (13 May 2025 14:00) (17 - 62)  SpO2: 99% (13 May 2025 08:00) (96% - 99%)    Parameters below as of 13 May 2025 07:00  Patient On (Oxygen Delivery Method): room air

## 2025-05-20 NOTE — BH CONSULTATION LIAISON PROGRESS NOTE - NSBHTIMEACTIVITIESPERFORMED_PSY_A_CORE
Preparing to see the patient, counseling or educating patient and family, documentation, care coordination, reviewing separately obtained history, communicating with other health care professionals
Preparing to see the patient, chart review, patient interview, coodination with medicine,   documentation

## 2025-05-20 NOTE — BH CONSULTATION LIAISON PROGRESS NOTE - NSBHCONSULTFOLLOWAFTERCARE_PSY_A_CORE FT
Patient given resources for outpatient psychiatric and psychotherapy services--[facility] ([facility]), [address] [place] [address] , [place] [address], phone number : [phone].    Psychiatry SW will follow up with patient's mother to give her resources as well.
Patient given resources for outpatient psychiatric and psychotherapy services--[facility]ry Outpatient Department ([facility]), [address] [place] [address] , [place] [address], phone number : [phone].    Psychiatry SW will follow up with patient's mother to give her resources as well.

## 2025-05-20 NOTE — BH CONSULTATION LIAISON PROGRESS NOTE - NSBHATTESTTYPEVISIT_PSY_A_CORE
----- Message from Ramonita Crawford sent at 6/27/2023  9:24 AM CDT -----  Regarding: PA Request  Patient called stating Bairon Recinos need a PA for the [employer]. She is there waiting. I did inform patient that this may take a while.     
Advised pt that the PA may take a few days. Pt verbally understands.   
Attempted to contact patient. Left vm   
Attending Only
Attending Only

## 2025-05-20 NOTE — BH CONSULTATION LIAISON PROGRESS NOTE - NSBHATTESTBILLING_PSY_A_CORE
24109-Cqwjaqwfhl OBS or IP - moderate complexity OR 35-49 mins
38805-Sbnnfrhaml OBS or IP - high complexity OR 50-79 mins

## 2025-05-20 NOTE — BH CONSULTATION LIAISON PROGRESS NOTE - CURRENT MEDICATION
MEDICATIONS  (STANDING):  aspirin  chewable 81 milliGRAM(s) Oral daily  atorvastatin 80 milliGRAM(s) Oral at bedtime  calcitriol   Capsule 0.25 MICROGram(s) Oral daily  chlorhexidine 2% Cloths 1 Application(s) Topical <User Schedule>  cloNIDine 0.1 milliGRAM(s) Oral every 12 hours  cyanocobalamin 1000 MICROGram(s) Oral daily  dextrose 5%. 1000 milliLiter(s) (100 mL/Hr) IV Continuous <Continuous>  dextrose 5%. 1000 milliLiter(s) (50 mL/Hr) IV Continuous <Continuous>  dextrose 50% Injectable 25 Gram(s) IV Push once  dextrose 50% Injectable 12.5 Gram(s) IV Push once  dextrose 50% Injectable 25 Gram(s) IV Push once  epoetin sergey-epbx (RETACRIT) Injectable 60773 Unit(s) SubCutaneous every 7 days  folic acid 1 milliGRAM(s) Oral daily  furosemide    Tablet 40 milliGRAM(s) Oral every 12 hours  hydrALAZINE 50 milliGRAM(s) Oral every 6 hours  insulin glargine Injectable (LANTUS) 6 Unit(s) SubCutaneous at bedtime  insulin lispro (ADMELOG) corrective regimen sliding scale   SubCutaneous three times a day before meals  insulin lispro (ADMELOG) corrective regimen sliding scale   SubCutaneous at bedtime  labetalol 300 milliGRAM(s) Oral three times a day  melatonin 5 milliGRAM(s) Oral at bedtime  NIFEdipine  milliGRAM(s) Oral daily  pantoprazole    Tablet 40 milliGRAM(s) Oral every 12 hours  traZODone 50 milliGRAM(s) Oral at bedtime    MEDICATIONS  (PRN):  acetaminophen     Tablet .. 650 milliGRAM(s) Oral every 6 hours PRN Mild Pain (1 - 3)  calcium carbonate   1250 mG (OsCal) 1 Tablet(s) Oral every 6 hours PRN Heartburn  dextrose Oral Gel 15 Gram(s) Oral once PRN Blood Glucose LESS THAN 70 milliGRAM(s)/deciliter  
MEDICATIONS  (STANDING):  aspirin  chewable 81 milliGRAM(s) Oral daily  atorvastatin 80 milliGRAM(s) Oral at bedtime  chlorhexidine 2% Cloths 1 Application(s) Topical <User Schedule>  cyanocobalamin 1000 MICROGram(s) Oral daily  dextrose 5%. 1000 milliLiter(s) (100 mL/Hr) IV Continuous <Continuous>  dextrose 5%. 1000 milliLiter(s) (50 mL/Hr) IV Continuous <Continuous>  dextrose 50% Injectable 25 Gram(s) IV Push once  dextrose 50% Injectable 12.5 Gram(s) IV Push once  dextrose 50% Injectable 25 Gram(s) IV Push once  epoetin sergey-epbx (RETACRIT) Injectable 64569 Unit(s) SubCutaneous every 7 days  ferrous    sulfate 325 milliGRAM(s) Oral daily  folic acid 1 milliGRAM(s) Oral daily  furosemide   Injectable 40 milliGRAM(s) IV Push every 12 hours  hydrALAZINE 50 milliGRAM(s) Oral every 8 hours  insulin glargine Injectable (LANTUS) 6 Unit(s) SubCutaneous at bedtime  insulin lispro (ADMELOG) corrective regimen sliding scale   SubCutaneous three times a day before meals  insulin lispro (ADMELOG) corrective regimen sliding scale   SubCutaneous at bedtime  labetalol 300 milliGRAM(s) Oral three times a day  melatonin 5 milliGRAM(s) Oral at bedtime  niCARdipine Infusion 5 mG/Hr (25 mL/Hr) IV Continuous <Continuous>  NIFEdipine  milliGRAM(s) Oral daily  pantoprazole    Tablet 40 milliGRAM(s) Oral every 12 hours    MEDICATIONS  (PRN):  calcium carbonate   1250 mG (OsCal) 1 Tablet(s) Oral every 6 hours PRN Heartburn  dextrose Oral Gel 15 Gram(s) Oral once PRN Blood Glucose LESS THAN 70 milliGRAM(s)/deciliter

## 2025-05-20 NOTE — PROGRESS NOTE ADULT - ASSESSMENT
Case of a 39 year old previously healthy female patient who presented to the ED on 05/06 for evaluation of generalized abdominal pain, nausea, vomiting, and black loose stools, found to have malignant HTN on arrival with evidence of leukocytosis/acute on chronic anemia/thrombocytopenia/elevated LDH and retic/low haptoglobin/schistocytes on smear/proteinuria/JOYCE on blood work and evidence of distal D/proximal J thickening with dilated jejunal loops to 3.4cm and gradual tapering distally wo SBO along with mural enhancement of SB concerning for severe enteritis VS early ischemia VS shock bowel. 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 She was also found to have age indeterminate lacunar infarct on CT 05/13. We are following for above findings.    #malignant htn- improved   #Treatment resistant HTN of unclear etiology    - Patient had BP of 238/135 in the ED on admission   - s/p cardene gtt   - originally thought TTP/HUS picture, s/p plasmapheresis, [id number] was negative and therapy did not improve HTN  - secondary HTN workup: TSH 2.04, cortisol wnl, RA doppler (-), CT scan did not show PKD, Lupus workup negative  - Aldosterone/Renin 105/52, less likely primary aldosteronism but since patient has been treated aggressively for HTN results are confounded  - f/u repeat R/A ratio, f/u metanephrines, f/u ESR/CRP   - Target SBP < 160  - c/w hydralazine 50mg q6h, labetalol 300mg TID, procardia 120mg qd    #JOYCE vs CKD  #proteinuria  #LE edema   -Cr 3.5 on admission unknown baseline   - has been relatively stable throughout admission   -protein/cr urine ratio 2.3  -Glomerular nephritis workup negative   - appreciate nephro recs   -on lasix 40 PO BID   - c/w calcitriol     #Age indeterminant lacunar infarct   - evaluated by Neuro, recommend MRI  - f/u whether needed inpatient vs outpatient - if needed inpatient will require sedation (consult anesthesia)  - f/u MRI with anesthesia if patient is amendable and required inpatient   - c/w ASA ,statin , BP control     #Enteritis vs Small bowel ischemia   #Melena - resolved   -physical exam benign   -patient not complaining of abdominal pain, has good PO diet, no bloody BM since 5/13    #Normocytic Anemia   #Acute on chronic MARITZA   - s/p 5U pRBC   - melena on admission, now resolved  - evaluated by heme/onc: likely MAHA 2/2 HTN  - evaluated by GI: would benefit from endoscopy, now brown stools, f/u OP  - evaluated by nephro: likely not just from CKD  - c/w epogen, hold if SBP > 170  - c/w venofer, 3 day course   - c/w folic acid, B12  - c/w PPI BID    #Mood disorder   #delirium 2/2 to htn encephalopathy   -patient not compliant with treatment, requires frequent reorientation from staff and mother   -does not have capacity to leave AMA   -trazodone 50qhs.  - psychiatry recommendations appreciated - 5/20 f/u psych reeval found pt did not have mental insight to leave AMA    MISC  DVT ppx: ambulation   Diet: CCC/DASH  GI ppx/Bowel regimen: PPI BID   Activity: AAT  GOC: full   family: updated mother via phone with Mandarin  [id number] 5/20 Case of a 39 year old previously healthy female patient who presented to the ED on 05/06 for evaluation of generalized abdominal pain, nausea, vomiting, and black loose stools, found to have malignant HTN on arrival with evidence of leukocytosis/acute on chronic anemia/thrombocytopenia/elevated LDH and retic/low haptoglobin/schistocytes on smear/proteinuria/JOYCE on blood work and evidence of distal D/proximal J thickening with dilated jejunal loops to 3.4cm and gradual tapering distally wo SBO along with mural enhancement of SB concerning for severe enteritis VS early ischemia VS shock bowel. 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 She was also found to have age indeterminate lacunar infarct on CT 05/13. We are following for above findings.    #malignant htn- improved   #Treatment resistant HTN of unclear etiology    - Patient had BP of 238/135 in the ED on admission   - s/p cardene gtt   - originally thought TTP/HUS picture, s/p plasmapheresis, [id number] was negative and therapy did not improve HTN  - secondary HTN workup: TSH 2.04, cortisol wnl, RA doppler (-), CT scan did not show PKD, Lupus workup negative  - Aldosterone/Renin 105/52, less likely primary aldosteronism but since patient has been treated aggressively for HTN results are confounded  - f/u repeat R/A ratio, f/u metanephrines, f/u ESR/CRP   - Target SBP < 160  - c/w hydralazine 50mg q6h, labetalol 300mg TID, procardia 120mg qd    #JOYCE vs CKD  #proteinuria  #LE edema   -Cr 3.5 on admission unknown baseline   - has been relatively stable throughout admission   -protein/cr urine ratio 2.3  -Glomerular nephritis workup negative   - appreciate nephro recs   -on lasix 40 PO BID   - c/w calcitriol   - pt has been refusing labs - mother to come in this evening and try to get her to let us get labs     #Age indeterminant lacunar infarct   - evaluated by Neuro, recommend MRI  - f/u whether needed inpatient vs outpatient - if needed inpatient will require sedation (consult anesthesia)  - f/u MRI with anesthesia if patient is amendable and required inpatient   - c/w ASA ,statin , BP control     #Enteritis vs Small bowel ischemia   #Melena - resolved   -physical exam benign   -patient not complaining of abdominal pain, has good PO diet, no bloody BM since 5/13    #Normocytic Anemia   #Acute on chronic MARITZA   - s/p 5U pRBC   - melena on admission, now resolved  - evaluated by heme/onc: likely MAHA 2/2 HTN  - evaluated by GI: would benefit from endoscopy, now brown stools, f/u OP  - evaluated by nephro: likely not just from CKD  - c/w epogen, hold if SBP > 170  - c/w venofer, 3 day course   - c/w folic acid, B12  - c/w PPI BID    #Mood disorder   #delirium 2/2 to htn encephalopathy   -patient not compliant with treatment, requires frequent reorientation from staff and mother   -does not have capacity to leave AMA   -trazodone 50qhs.  - psychiatry recommendations appreciated - 5/20 f/u psych reeval found pt did not have mental insight to leave AMA    MISC  DVT ppx: ambulation   Diet: CCC/DASH  GI ppx/Bowel regimen: PPI BID   Activity: AAT  GOC: full   family: updated mother via phone with Mandarin  [phone] 5/20- mother understands we are relying on her to make medical decisions.  She agrees to come to the hospital this evening and try to get her daughter to allow us blood sample

## 2025-05-20 NOTE — PROGRESS NOTE ADULT - SUBJECTIVE AND OBJECTIVE BOX
Patient is a 39y old  Female who presents with a chief complaint of Hypertensi (12 May 2025 14:24)      Patient seen and examined at bedside.    ALLERGIES:  No Known Allergies    MEDICATIONS:  acetaminophen     Tablet .. 650 milliGRAM(s) Oral every 6 hours PRN  aspirin  chewable 81 milliGRAM(s) Oral daily  atorvastatin 80 milliGRAM(s) Oral at bedtime  calcitriol   Capsule 0.25 MICROGram(s) Oral daily  calcium carbonate   1250 mG (OsCal) 1 Tablet(s) Oral every 6 hours PRN  chlorhexidine 2% Cloths 1 Application(s) Topical <User Schedule>  cloNIDine 0.1 milliGRAM(s) Oral every 12 hours  cyanocobalamin 1000 MICROGram(s) Oral daily  dextrose 5%. 1000 milliLiter(s) IV Continuous <Continuous>  dextrose 5%. 1000 milliLiter(s) IV Continuous <Continuous>  dextrose 50% Injectable 25 Gram(s) IV Push once  dextrose 50% Injectable 12.5 Gram(s) IV Push once  dextrose 50% Injectable 25 Gram(s) IV Push once  dextrose Oral Gel 15 Gram(s) Oral once PRN  epoetin sergey-epbx (RETACRIT) Injectable 50314 Unit(s) SubCutaneous every 7 days  folic acid 1 milliGRAM(s) Oral daily  furosemide    Tablet 40 milliGRAM(s) Oral every 12 hours  hydrALAZINE 50 milliGRAM(s) Oral every 6 hours  insulin glargine Injectable (LANTUS) 6 Unit(s) SubCutaneous at bedtime  insulin lispro (ADMELOG) corrective regimen sliding scale   SubCutaneous three times a day before meals  insulin lispro (ADMELOG) corrective regimen sliding scale   SubCutaneous at bedtime  labetalol 300 milliGRAM(s) Oral three times a day  melatonin 5 milliGRAM(s) Oral at bedtime  NIFEdipine  milliGRAM(s) Oral daily  pantoprazole    Tablet 40 milliGRAM(s) Oral every 12 hours  traZODone 50 milliGRAM(s) Oral at bedtime    Vital Signs Last 24 Hrs  T(F): 98.4 (20 May 2025 11:55), Max: 99 (20 May 2025 04:49)  HR: 89 (20 May 2025 13:43) (61 - 92)  BP: 144/81 (20 May 2025 13:43) (144/81 - 187/97)  RR: 17 (20 May 2025 11:55) (17 - 19)  SpO2: 95% (20 May 2025 04:49) (95% - 99%)  I&O's Summary    19 May 2025 07:01  -  20 May 2025 07:00  --------------------------------------------------------  IN: 1192 mL / OUT: 300 mL / NET: 892 mL    20 May 2025 07:01  -  20 May 2025 15:07  --------------------------------------------------------  IN: 502 mL / OUT: 300 mL / NET: 202 mL        PHYSICAL EXAM:  General: NAD, A/O x 3  ENT: MMM  Neck: Supple, No JVD  Lungs: Clear to auscultation bilaterally  Cardio: RRR, S1/S2, No murmurs  Abdomen: Soft, Nontender, Nondistended; Bowel sounds present  Extremities: No cyanosis, No edema    LABS:                    05-14 Chol 154 mg/dL LDL -- HDL 37 mg/dL Trig 376 mg/dL              POCT Blood Glucose.: 157 mg/dL (19 May 2025 20:56)  POCT Blood Glucose.: 151 mg/dL (19 May 2025 16:45)              RADIOLOGY & ADDITIONAL TESTS:    Care Discussed with Consultants/Other Providers:  Patient is a 39y old  Female who presents with a chief complaint of Hypertensi (12 May 2025 14:24)      Patient seen and examined at bedside.  Pt reports no discomfort, only wants to go home   ALLERGIES:  No Known Allergies    MEDICATIONS:  acetaminophen     Tablet .. 650 milliGRAM(s) Oral every 6 hours PRN  aspirin  chewable 81 milliGRAM(s) Oral daily  atorvastatin 80 milliGRAM(s) Oral at bedtime  calcitriol   Capsule 0.25 MICROGram(s) Oral daily  calcium carbonate   1250 mG (OsCal) 1 Tablet(s) Oral every 6 hours PRN  chlorhexidine 2% Cloths 1 Application(s) Topical <User Schedule>  cloNIDine 0.1 milliGRAM(s) Oral every 12 hours  cyanocobalamin 1000 MICROGram(s) Oral daily  dextrose 5%. 1000 milliLiter(s) IV Continuous <Continuous>  dextrose 5%. 1000 milliLiter(s) IV Continuous <Continuous>  dextrose 50% Injectable 25 Gram(s) IV Push once  dextrose 50% Injectable 12.5 Gram(s) IV Push once  dextrose 50% Injectable 25 Gram(s) IV Push once  dextrose Oral Gel 15 Gram(s) Oral once PRN  epoetin sergey-epbx (RETACRIT) Injectable 73920 Unit(s) SubCutaneous every 7 days  folic acid 1 milliGRAM(s) Oral daily  furosemide    Tablet 40 milliGRAM(s) Oral every 12 hours  hydrALAZINE 50 milliGRAM(s) Oral every 6 hours  insulin glargine Injectable (LANTUS) 6 Unit(s) SubCutaneous at bedtime  insulin lispro (ADMELOG) corrective regimen sliding scale   SubCutaneous three times a day before meals  insulin lispro (ADMELOG) corrective regimen sliding scale   SubCutaneous at bedtime  labetalol 300 milliGRAM(s) Oral three times a day  melatonin 5 milliGRAM(s) Oral at bedtime  NIFEdipine  milliGRAM(s) Oral daily  pantoprazole    Tablet 40 milliGRAM(s) Oral every 12 hours  traZODone 50 milliGRAM(s) Oral at bedtime    Vital Signs Last 24 Hrs  T(F): 98.4 (20 May 2025 11:55), Max: 99 (20 May 2025 04:49)  HR: 89 (20 May 2025 13:43) (61 - 92)  BP: 144/81 (20 May 2025 13:43) (144/81 - 187/97)  RR: 17 (20 May 2025 11:55) (17 - 19)  SpO2: 95% (20 May 2025 04:49) (95% - 99%)  I&O's Summary    19 May 2025 07:01  -  20 May 2025 07:00  --------------------------------------------------------  IN: 1192 mL / OUT: 300 mL / NET: 892 mL    20 May 2025 07:01  -  20 May 2025 15:07  --------------------------------------------------------  IN: 502 mL / OUT: 300 mL / NET: 202 mL        PHYSICAL EXAM:  General: NAD, Alert, re  ENT: MMM  Neck: Supple, No JVD  Lungs: Clear to auscultation bilaterally  Cardio: RRR, S1/S2, No murmurs  Abdomen: Soft, Nontender, Nondistended; Bowel sounds present  Extremities: No cyanosis, No edema    LABS:                    05-14 Chol 154 mg/dL LDL -- HDL 37 mg/dL Trig 376 mg/dL              POCT Blood Glucose.: 157 mg/dL (19 May 2025 20:56)  POCT Blood Glucose.: 151 mg/dL (19 May 2025 16:45)              RADIOLOGY & ADDITIONAL TESTS:    Care Discussed with Consultants/Other Providers:  Patient is a 39y old  Female who presents with a chief complaint of Hypertensi (12 May 2025 14:24)      Patient seen and examined at bedside.  Pt reports no discomfort, only wants to go home   ALLERGIES:  No Known Allergies    MEDICATIONS:  acetaminophen     Tablet .. 650 milliGRAM(s) Oral every 6 hours PRN  aspirin  chewable 81 milliGRAM(s) Oral daily  atorvastatin 80 milliGRAM(s) Oral at bedtime  calcitriol   Capsule 0.25 MICROGram(s) Oral daily  calcium carbonate   1250 mG (OsCal) 1 Tablet(s) Oral every 6 hours PRN  chlorhexidine 2% Cloths 1 Application(s) Topical <User Schedule>  cloNIDine 0.1 milliGRAM(s) Oral every 12 hours  cyanocobalamin 1000 MICROGram(s) Oral daily  dextrose 5%. 1000 milliLiter(s) IV Continuous <Continuous>  dextrose 5%. 1000 milliLiter(s) IV Continuous <Continuous>  dextrose 50% Injectable 25 Gram(s) IV Push once  dextrose 50% Injectable 12.5 Gram(s) IV Push once  dextrose 50% Injectable 25 Gram(s) IV Push once  dextrose Oral Gel 15 Gram(s) Oral once PRN  epoetin sergey-epbx (RETACRIT) Injectable 58193 Unit(s) SubCutaneous every 7 days  folic acid 1 milliGRAM(s) Oral daily  furosemide    Tablet 40 milliGRAM(s) Oral every 12 hours  hydrALAZINE 50 milliGRAM(s) Oral every 6 hours  insulin glargine Injectable (LANTUS) 6 Unit(s) SubCutaneous at bedtime  insulin lispro (ADMELOG) corrective regimen sliding scale   SubCutaneous three times a day before meals  insulin lispro (ADMELOG) corrective regimen sliding scale   SubCutaneous at bedtime  labetalol 300 milliGRAM(s) Oral three times a day  melatonin 5 milliGRAM(s) Oral at bedtime  NIFEdipine  milliGRAM(s) Oral daily  pantoprazole    Tablet 40 milliGRAM(s) Oral every 12 hours  traZODone 50 milliGRAM(s) Oral at bedtime    Vital Signs Last 24 Hrs  T(F): 98.4 (20 May 2025 11:55), Max: 99 (20 May 2025 04:49)  HR: 89 (20 May 2025 13:43) (61 - 92)  BP: 144/81 (20 May 2025 13:43) (144/81 - 187/97)  RR: 17 (20 May 2025 11:55) (17 - 19)  SpO2: 95% (20 May 2025 04:49) (95% - 99%)  I&O's Summary    19 May 2025 07:01  -  20 May 2025 07:00  --------------------------------------------------------  IN: 1192 mL / OUT: 300 mL / NET: 892 mL    20 May 2025 07:01  -  20 May 2025 15:07  --------------------------------------------------------  IN: 502 mL / OUT: 300 mL / NET: 202 mL        PHYSICAL EXAM:  General: NAD, Alert, refusing to answer questions   ENT: MMM  Neck: Supple, No JVD  Lungs: Clear to auscultation bilaterally  Cardio: RRR, S1/S2, No murmurs  Abdomen: Soft, Nontender, Nondistended; Bowel sounds present  Extremities: No cyanosis, No edema    LABS:                    05-14 Chol 154 mg/dL LDL -- HDL 37 mg/dL Trig 376 mg/dL              POCT Blood Glucose.: 157 mg/dL (19 May 2025 20:56)  POCT Blood Glucose.: 151 mg/dL (19 May 2025 16:45)              RADIOLOGY & ADDITIONAL TESTS:    Care Discussed with Consultants/Other Providers:

## 2025-05-20 NOTE — BH CONSULTATION LIAISON PROGRESS NOTE - NSBHROSSYSTEMS_PSY_ALL_CORE
Render Post-Care Instructions In Note?: no
Consent: The patient's consent was obtained including but not limited to risks of crusting, scabbing, blistering, scarring, darker or lighter pigmentary change, recurrence, incomplete removal and infection.
Detail Level: Detailed
Number Of Freeze-Thaw Cycles: 1 freeze-thaw cycle
Psychiatric
Medical Necessity Clause: This procedure was medically necessary because the lesions that were treated were:
Post-Care Instructions: I reviewed with the patient in detail post-care instructions. Patient is to wear sunprotection, and avoid picking at any of the treated lesions. Pt may apply Vaseline to crusted or scabbing areas.
Medical Necessity Information: It is in your best interest to select a reason for this procedure from the list below. All of these items fulfill various CMS LCD requirements except the new and changing color options.
Psychiatric

## 2025-05-20 NOTE — BH CONSULTATION LIAISON PROGRESS NOTE - NSBHASSESSMENTFT_PSY_ALL_CORE
39-year-old female with history of hypertension presenting to ER with 5 days of multiple episodes of nonbloody nonbilious vomiting and diarrhea with associated generalized abdominal pain and distention. Patient states that on Saturday she had acute onset of nausea, vomiting, diarrhea and crampy abdominal pain. She has had 4-5 episodes of non-bloody, non-bilious vomiting per day and 4-5 episodes of black diarrhea per day. She initially thought that she had gastroenteritis, but when her symptoms didn't resolve, she decided come to the ED on 5/6. She denies any current abdominal pain, nausea or vomiting, fever, chest pain, shortness of breath, clotting disorder, past intra-abdominal surgeries, kidney issues, leg pain or swelling. Psychiatry was consulted about depression. Psychiatry reconsulted about capacity and psychosis.    I agree with the medical team's capacity assessment. Patient does not seem to fully understand the severity of her medical conditions and high risks of leaving AMA--thus I agree she does not have capacity to leave AMA. Patient currently does not present with acute psychosis. Per collateral with mom--her course of illness is not consistent with schizophrenia. Schizophrenia would typically start in her late teens or early 20s. There should be much more functional impairment (patient less likely to get , have children, complete college, work higher level jobs). One would expect delusions and hallucinations which are not observed. Primary psychiatric etiology of her behavioral issues is unlikely at this time.    Patient's behavioral issues, insight/judgement, and difficulty processing information is related to delirium. Agree with neurology that her waxing and waning mental status could be due to hypertensive encephalopathy--suspect patient's new onset strange behavior (mom observed patient laughing at a TV that was turned off) was from encephalopathy from her undiagnosed medical issues. Patient is not actively having a depressive episode. She is NOT psychotic. No suicidal ideation/violence/disorganization. Patient likely does have history of depression and we can provide her mother with outpatient resources on discharge (patient does not want to see a psychiatrist outpatient), but this isn't relevant to her current presentation and is not impacting her decision making at this time.    Psychiatry SW will follow up with resources to give to patient's mother. Resources were already given to the patient who declined (which she is allowed to). Her primary behavioral presentation is primarily related to her medical issues.
39-year-old female with history of hypertension presenting to ER with 5 days of multiple episodes of nonbloody nonbilious vomiting and diarrhea with associated generalized abdominal pain and distention. Patient states that on Saturday she had acute onset of nausea, vomiting, diarrhea and crampy abdominal pain. She has had 4-5 episodes of non-bloody, non-bilious vomiting per day and 4-5 episodes of black diarrhea per day. She initially thought that she had gastroenteritis, but when her symptoms didn't resolve, she decided come to the ED on 5/6. She denies any current abdominal pain, nausea or vomiting, fever, chest pain, shortness of breath, clotting disorder, past intra-abdominal surgeries, kidney issues, leg pain or swelling. Psychiatry was consulted about depression. Psychiatry reconsulted about capacity and psychosis.    I agree with the medical team's capacity assessment. Patient does not seem to fully understand the severity of her medical conditions and high risks of leaving AMA--thus I agree she does not have capacity to leave AMA. Patient currently does not present with acute psychosis. Per collateral with mom--her course of illness is not consistent with schizophrenia. Schizophrenia would typically start in her late teens or early 20s. There should be much more functional impairment (patient less likely to get , have children, complete college, work higher level jobs). One would expect delusions and hallucinations which are not observed. Primary psychiatric etiology of her behavioral issues is unlikely at this time.    Patient's behavioral issues, insight/judgement, and difficulty processing information is related to delirium. Agree with neurology that her waxing and waning mental status could be due to hypertensive encephalopathy--suspect patient's new onset strange behavior (mom observed patient laughing at a TV that was turned off) was from encephalopathy from her undiagnosed medical issues. Patient is not actively having a depressive episode. She is NOT psychotic. No suicidal ideation/violence/disorganization. Patient likely does have history of depression and we can provide her mother with outpatient resources on discharge (patient does not want to see a psychiatrist outpatient), but this isn't relevant to her current presentation and is not impacting her decision making at this time.    Psychiatry SW will follow up with resources to give to patient's mother. Resources were already given to the patient who declined (which she is allowed to). Her primary behavioral presentation is primarily related to her medical issues.    5/20 medical team requested reassessment of pt's capacity to leave ama.   I agree with the prior assessment on 5/13    pt continues to not exhibit the elements of capacity to leave ama. while she is expressing a choice to leave, she is not showing  an understanding of her medical condition, the risks of leaving ama, or an ability to rationally manipulate information.   At this time, patient does not demonstrate the capacity to leave the hospital against medical advice . In this situation , the decision should be deferred to his next of kin or documented health care proxy. However if neither party are available , a court appointed decision maker can be appointed to make this decision. In the event of imminent need for treatment that is potentially life threatening , the medical team can consider a 2 physician consent.

## 2025-05-21 LAB
ALBUMIN SERPL ELPH-MCNC: 3.2 G/DL — LOW (ref 3.5–5.2)
ALP SERPL-CCNC: 96 U/L — SIGNIFICANT CHANGE UP (ref 30–115)
ALT FLD-CCNC: 11 U/L — SIGNIFICANT CHANGE UP (ref 0–41)
ANION GAP SERPL CALC-SCNC: 12 MMOL/L — SIGNIFICANT CHANGE UP (ref 7–14)
AST SERPL-CCNC: 13 U/L — SIGNIFICANT CHANGE UP (ref 0–41)
BILIRUB SERPL-MCNC: 0.4 MG/DL — SIGNIFICANT CHANGE UP (ref 0.2–1.2)
BUN SERPL-MCNC: 61 MG/DL — CRITICAL HIGH (ref 10–20)
CALCIUM SERPL-MCNC: 8.8 MG/DL — SIGNIFICANT CHANGE UP (ref 8.4–10.5)
CHLORIDE SERPL-SCNC: 105 MMOL/L — SIGNIFICANT CHANGE UP (ref 98–110)
CO2 SERPL-SCNC: 23 MMOL/L — SIGNIFICANT CHANGE UP (ref 17–32)
CREAT SERPL-MCNC: 3.8 MG/DL — HIGH (ref 0.7–1.5)
EGFR: 15 ML/MIN/1.73M2 — LOW
EGFR: 15 ML/MIN/1.73M2 — LOW
GLUCOSE SERPL-MCNC: 155 MG/DL — HIGH (ref 70–99)
HCT VFR BLD CALC: 21.4 % — LOW (ref 37–47)
HGB BLD-MCNC: 7.2 G/DL — LOW (ref 12–16)
MCHC RBC-ENTMCNC: 31.2 PG — HIGH (ref 27–31)
MCHC RBC-ENTMCNC: 33.6 G/DL — SIGNIFICANT CHANGE UP (ref 32–37)
MCV RBC AUTO: 92.6 FL — SIGNIFICANT CHANGE UP (ref 81–99)
NRBC BLD AUTO-RTO: 0 /100 WBCS — SIGNIFICANT CHANGE UP (ref 0–0)
PLATELET # BLD AUTO: 120 K/UL — LOW (ref 130–400)
PMV BLD: 10 FL — SIGNIFICANT CHANGE UP (ref 7.4–10.4)
POTASSIUM SERPL-MCNC: 3.5 MMOL/L — SIGNIFICANT CHANGE UP (ref 3.5–5)
POTASSIUM SERPL-SCNC: 3.5 MMOL/L — SIGNIFICANT CHANGE UP (ref 3.5–5)
PROT SERPL-MCNC: 4.9 G/DL — LOW (ref 6–8)
RBC # BLD: 2.31 M/UL — LOW (ref 4.2–5.4)
RBC # FLD: 15 % — HIGH (ref 11.5–14.5)
SODIUM SERPL-SCNC: 140 MMOL/L — SIGNIFICANT CHANGE UP (ref 135–146)
WBC # BLD: 12.03 K/UL — HIGH (ref 4.8–10.8)
WBC # FLD AUTO: 12.03 K/UL — HIGH (ref 4.8–10.8)

## 2025-05-21 PROCEDURE — 99232 SBSQ HOSP IP/OBS MODERATE 35: CPT

## 2025-05-21 PROCEDURE — 93880 EXTRACRANIAL BILAT STUDY: CPT | Mod: 26

## 2025-05-21 RX ORDER — ERGOCALCIFEROL 1.25 MG/1
50000 CAPSULE ORAL ONCE
Refills: 0 | Status: COMPLETED | OUTPATIENT
Start: 2025-05-21 | End: 2025-05-21

## 2025-05-21 RX ORDER — CLOPIDOGREL BISULFATE 75 MG/1
75 TABLET, FILM COATED ORAL DAILY
Refills: 0 | Status: DISCONTINUED | OUTPATIENT
Start: 2025-05-21 | End: 2025-05-23

## 2025-05-21 RX ADMIN — FUROSEMIDE 40 MILLIGRAM(S): 10 INJECTION INTRAMUSCULAR; INTRAVENOUS at 05:38

## 2025-05-21 RX ADMIN — Medication 50 MILLIGRAM(S): at 05:39

## 2025-05-21 RX ADMIN — Medication 0.1 MILLIGRAM(S): at 05:38

## 2025-05-21 RX ADMIN — Medication 50 MILLIGRAM(S): at 18:11

## 2025-05-21 RX ADMIN — Medication 5 MILLIGRAM(S): at 22:12

## 2025-05-21 RX ADMIN — LABETALOL HYDROCHLORIDE 300 MILLIGRAM(S): 200 TABLET, FILM COATED ORAL at 05:39

## 2025-05-21 RX ADMIN — Medication 1 APPLICATION(S): at 05:40

## 2025-05-21 RX ADMIN — Medication 40 MILLIGRAM(S): at 18:11

## 2025-05-21 RX ADMIN — FOLIC ACID 1 MILLIGRAM(S): 1 TABLET ORAL at 11:33

## 2025-05-21 RX ADMIN — CALCITRIOL 0.25 MICROGRAM(S): 0.5 CAPSULE, GELATIN COATED ORAL at 11:34

## 2025-05-21 RX ADMIN — Medication 5 MILLIGRAM(S): at 21:15

## 2025-05-21 RX ADMIN — Medication 40 MILLIGRAM(S): at 05:39

## 2025-05-21 RX ADMIN — ERGOCALCIFEROL 50000 UNIT(S): 1.25 CAPSULE ORAL at 11:34

## 2025-05-21 RX ADMIN — FUROSEMIDE 40 MILLIGRAM(S): 10 INJECTION INTRAMUSCULAR; INTRAVENOUS at 18:11

## 2025-05-21 RX ADMIN — Medication 0.2 MILLIGRAM(S): at 18:11

## 2025-05-21 RX ADMIN — Medication 50 MILLIGRAM(S): at 23:47

## 2025-05-21 RX ADMIN — Medication 50 MILLIGRAM(S): at 21:15

## 2025-05-21 RX ADMIN — Medication 81 MILLIGRAM(S): at 11:33

## 2025-05-21 RX ADMIN — LABETALOL HYDROCHLORIDE 300 MILLIGRAM(S): 200 TABLET, FILM COATED ORAL at 21:16

## 2025-05-21 RX ADMIN — ATORVASTATIN CALCIUM 80 MILLIGRAM(S): 80 TABLET, FILM COATED ORAL at 21:16

## 2025-05-21 RX ADMIN — CYANOCOBALAMIN 1000 MICROGRAM(S): 1000 INJECTION INTRAMUSCULAR; SUBCUTANEOUS at 11:33

## 2025-05-21 RX ADMIN — Medication 120 MILLIGRAM(S): at 05:38

## 2025-05-21 RX ADMIN — Medication 50 MILLIGRAM(S): at 11:33

## 2025-05-21 RX ADMIN — LABETALOL HYDROCHLORIDE 300 MILLIGRAM(S): 200 TABLET, FILM COATED ORAL at 13:14

## 2025-05-21 NOTE — PROGRESS NOTE ADULT - ASSESSMENT
Case of a 39 year old previously healthy female patient who presented to the ED on 05/06 for evaluation of generalized abdominal pain, nausea, vomiting, and black loose stools, found to have malignant HTN on arrival with evidence of leukocytosis/acute on chronic anemia/thrombocytopenia/elevated LDH and retic/low haptoglobin/schistocytes on smear/proteinuria/JOYCE on blood work and evidence of distal D/proximal J thickening with dilated jejunal loops to 3.4cm and gradual tapering distally wo SBO along with mural enhancement of SB concerning for severe enteritis VS early ischemia VS shock bowel. 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 She was also found to have age indeterminate lacunar infarct on CT 05/13. We are following for above findings.    #malignant HTN - still elevated BP this am   #Treatment resistant HTN of unclear etiology    - Patient had BP of 238/135 in the ED on admission   - s/p cardene gtt   - originally thought TTP/HUS picture, s/p plasmapheresis, [id number] was negative and therapy did not improve HTN  - secondary HTN workup: TSH 2.04, cortisol wnl, RA doppler (-), CT scan did not show PKD, Lupus workup negative  - Aldosterone/Renin 105/52, less likely primary aldosteronism but since patient has been treated aggressively for HTN results are confounded  - f/u repeat R/A ratio, f/u metanephrines, f/u ESR/CRP   - Target SBP < 160  - c/w hydralazine 50mg q6h, labetalol 300mg TID, procardia 120mg qd    #JOYCE vs CKD  #proteinuria  #LE edema   -Cr 3.5 on admission unknown baseline   - has been relatively stable throughout admission   -protein/cr urine ratio 2.3  -Glomerular nephritis workup negative   - appreciate nephro recs   -on lasix 40 PO BID   - c/w calcitriol   - pt has been refusing labs - follow today's labs     #Age indeterminant lacunar infarct   - evaluated by Neuro, recommend MRI  - f/u whether needed inpatient vs outpatient - if needed inpatient will require sedation (consult anesthesia)  - f/u MRI with anesthesia if patient is amendable and required inpatient   - c/w ASA ,statin , BP control     #Enteritis vs Small bowel ischemia   #Melena - resolved   -physical exam benign   -patient not complaining of abdominal pain, has good PO diet, no bloody BM since 5/13    #Normocytic Anemia   #Acute on chronic MARITZA   - s/p 5U pRBC   - melena on admission, now resolved  - evaluated by heme/onc: likely MAHA 2/2 HTN  - evaluated by GI: would benefit from endoscopy, now brown stools, f/u OP  - evaluated by nephro: likely not just from CKD  - c/w epogen, hold if SBP > 170  - c/w venofer, 3 day course   - c/w folic acid, B12  - c/w PPI BID    #Mood disorder   #delirium 2/2 to htn encephalopathy   -patient not compliant with treatment, requires frequent reorientation from staff and mother   -does not have capacity to leave AMA   -trazodone 50qhs.  - psychiatry recommendations appreciated - 5/20 f/u psych reeval found pt did not have mental insight to leave AMA    MISC  DVT ppx: ambulation   Diet: CCC/DASH  GI ppx/Bowel regimen: PPI BID   Activity: AAT  GOC: full     DISPO: not discharge ready     Attending Physician Dr. Herminia Daley # [id number]  Case of a 39 year old previously healthy female patient who presented to the ED on 05/06 for evaluation of generalized abdominal pain, nausea, vomiting, and black loose stools, found to have malignant HTN on arrival with evidence of leukocytosis/acute on chronic anemia/thrombocytopenia/elevated LDH and retic/low haptoglobin/schistocytes on smear/proteinuria/JOYCE on blood work and evidence of distal D/proximal J thickening with dilated jejunal loops to 3.4cm and gradual tapering distally wo SBO along with mural enhancement of SB concerning for severe enteritis VS early ischemia VS shock bowel. 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 She was also found to have age indeterminate lacunar infarct on CT 05/13. We are following for above findings.    #malignant HTN - still elevated BP this am   #Treatment resistant HTN of unclear etiology    - Patient had BP of 238/135 in the ED on admission   - s/p cardene gtt   - originally thought TTP/HUS picture, s/p plasmapheresis, [id number] was negative and therapy did not improve HTN  - secondary HTN workup: TSH 2.04, cortisol wnl, RA doppler (-), CT scan did not show PKD, Lupus workup negative  - Aldosterone/Renin 105/52, less likely primary aldosteronism but since patient has been treated aggressively for HTN results are confounded  - f/u repeat R/A ratio, f/u metanephrines, f/u ESR/CRP   - Target SBP < 160  - c/w hydralazine 50mg q6h, labetalol 300mg TID, procardia 120mg qd - required 10mg IV hydralazine push    #JOYCE vs CKD  #proteinuria  #LE edema   -Cr 3.5 on admission unknown baseline   - has been relatively stable throughout admission   -protein/cr urine ratio 2.3  -Glomerular nephritis workup negative   - appreciate nephro recs   -on lasix 40 PO BID   - c/w calcitriol   - pt has been refusing labs - follow today's labs     #Age indeterminant lacunar infarct   - evaluated by Neuro  - patient is declining MRI brain   - c/w ASA, added Plavix as per Neuro, on statin and BP control     #Enteritis vs Small bowel ischemia   #Melena - resolved   -physical exam benign   -patient not complaining of abdominal pain, has good PO diet, no bloody BM since 5/13    #Normocytic Anemia   #Acute on chronic MARITZA   - s/p 5U pRBC   - melena on admission, now resolved  - evaluated by heme/onc: likely MAHA 2/2 HTN  - evaluated by GI: would benefit from endoscopy, now brown stools, f/u OP  - evaluated by nephro: likely not just from CKD  - c/w epogen, hold if SBP > 170  - c/w venofer, 3 day course   - c/w folic acid, B12  - c/w PPI BID    #Mood disorder   #delirium 2/2 to htn encephalopathy   -patient not compliant with treatment, requires frequent reorientation from staff and mother   -does not have capacity to leave AMA   -trazodone 50qhs.  - psychiatry recommendations appreciated - 5/20 f/u psych re eval found pt did not have mental insight to leave AMA    MISC  DVT ppx: ambulation   Diet: CCC/DASH  GI ppx/Bowel regimen: PPI BID   Activity: AAT  GOC: full     DISPO: not discharge ready - cbc monitoring - c/w tele     Attending Physician Dr. Herminia Daley # [id number]

## 2025-05-21 NOTE — CHART NOTE - NSCHARTNOTEFT_GEN_A_CORE
Pt was seen today by the Neurology team.   She expressed that she does not want to get MRI brain/MRA h/n as she is afraid. She was unclear on why she is afraid and insist on no further image. She does not want to try sedation nor outpatient and up MRI. As per her the stroke is visible on CTH. Team explained that he MRI studies would support etiology of her stroke and if any vascular disease. Team further explained MRI of her vessels would be a better study considering her poor kidney function. Patient declined any further Neuroimage at this time.     Recommendation   Start Aspirin 81mg po QD   start Clopidogrel 75mg po QD for max 21 days   Outpatient Neurovascular F/u 2-4 weeks Pt was seen today by the Neurology team.   She expressed that she does not want to get MRI brain/MRA h/n as she is afraid. She was unclear on why she is afraid and insist on no further image. She does not want to try sedation nor outpatient and up MRI. As per her the stroke is visible on CTH. Team explained that he MRI studies would support etiology of her stroke and if any vascular disease. Team further explained MRI of her vessels would be a better study considering her poor kidney function. Patient declined any further Neuroimage at this time.     Recommendation   Carotid Duplex prior to discharge  Start Aspirin 81mg po QD   start Clopidogrel 75mg po QD for max 21 days   Outpatient Neurovascular F/u 2-4 weeks Pt was seen today by the Neurology team.   She expressed that she does not want to get MRI brain/MRA h/n as she is afraid. She was unclear on why she is afraid and insist on no further image. She does not want to try sedation nor outpatient and up MRI. As per her the stroke is visible on CTH. Team explained that he MRI studies would support etiology of her stroke and if any vascular disease. Team further explained MRI of her vessels would be a better study considering her poor kidney function. Patient declined any further Neuroimage at this time.     Recommendation   Bilateral carotid duplex prior to discharge  Start Aspirin 81mg po QD   start Clopidogrel 75mg po QD for max 21 days   Outpatient Neurovascular F/u 2-4 weeks

## 2025-05-21 NOTE — PROGRESS NOTE ADULT - SUBJECTIVE AND OBJECTIVE BOX
FRANCISCO DUNLAP 39y Female  MRN#: [id number]   Hospital Day: 14d    HPI:  39-year-old female with history of hypertension presenting to ER with 5 days of multiple episodes of nonbloody nonbilious vomiting and diarrhea with associated generalized abdominal pain and distention. Patient states that on Saturday she had acute onset of nausea, vomiting, diarrhea and crampy abdominal pain. She has had 4-5 episodes of non-bloody, non-bilious vomiting per day and 4-5 episodes of black diarrhea per day. She initially thought that she had gastroenteritis, but when her symptoms didn't resolve, she decided come to the ED. She denies any current abdominal pain, nausea or vomiting, fever, chest pain, shortness of breath, clotting disorder, past intra-abdominal surgeries, kidney issues, leg pain or swelling.     Labs significant for WBC 18.72k, Hb 9.0(unknown baseline) , Platelets 86k, Creatinine 3.5(unknown baseline). Lipase 71, UA positive      CTAP with finding of Edematous distal duodenum and proximal jejunal loops with associated dilatation measuring up to 3.4 cm. Associated marked interloop ascites, mesenteric fat stranding, and prominent mesenteric lymph nodes. Mild-to-moderate ascites. Findings concerning for small bowel ischemia.     CT Angio Abdomen and Pelvis w/ IV Cont (05.07.25 @ 04:18): Patent SMA, SMV. Redemonstration of distal duodenal and proximal small bowel with marked inflammatory change. Mural enhancement noted throughout   the small bowel. Considerations include severe enteritis, early ischemia, shock bowel.    #ED Vitals:   T(C): 37.2 (05-07-25 @ 05:46), Max: 37.3 (05-06-25 @ 23:44)  HR: 112 (05-07-25 @ 05:46) (97 - 118)  BP: 151/86 (05-07-25 @ 03:39) (151/86 - 238/135)  RR: 17 (05-07-25 @ 05:46) (17 - 19)  SpO2: 99% (05-07-25 @ 05:46) (96% - 100%)    # ED Course:   Zosyn, LR 1L, NS 1L, Zofran, Morphine, Pantoprazole, Reglan, Metoprolol 10 IV   Started on Nicardipine drip   Evaluated by surgery >>> No acute surgical intervention     The patient is being admitted to MICU for the further management.  (07 May 2025 06:03)      SUBJECTIVE  Patient is a 39y old Female who presents with a chief complaint of hypertension (21 May 2025 07:30)  Currently admitted to medicine with the primary diagnosis of Hypertensive emergency      INTERVAL HPI AND OVERNIGHT EVENTS:  Patient was examined and seen at bedside. This morning she is resting comfortably in bed and reports no issues or overnight events.    REVIEW OF SYMPTOMS:  CONSTITUTIONAL: No weakness, fevers or chills; No headaches  EYES: No visual changes, eye pain, or discharge  ENT: No vertigo; No ear pain or change in hearing; No sore throat or difficulty swallowing  NECK: No pain or stiffness  RESPIRATORY: No cough, wheezing, or hemoptysis; No shortness of breath  CARDIOVASCULAR: No chest pain or palpitations  GASTROINTESTINAL: No abdominal or epigastric pain; No nausea, vomiting, or hematemesis; No diarrhea or constipation; No melena or hematochezia  GENITOURINARY: No dysuria, frequency or hematuria  MUSCULOSKELETAL: No joint pain, no muscle pain, no weakness  NEUROLOGICAL: No numbness or weakness  SKIN: No itching or rashes    OBJECTIVE  PAST MEDICAL & SURGICAL HISTORY  HTN (hypertension)      ALLERGIES:  No Known Allergies    MEDICATIONS:  STANDING MEDICATIONS  aspirin  chewable 81 milliGRAM(s) Oral daily  atorvastatin 80 milliGRAM(s) Oral at bedtime  calcitriol   Capsule 0.25 MICROGram(s) Oral daily  chlorhexidine 2% Cloths 1 Application(s) Topical <User Schedule>  cloNIDine 0.2 milliGRAM(s) Oral every 12 hours  clopidogrel Tablet 75 milliGRAM(s) Oral daily  cyanocobalamin 1000 MICROGram(s) Oral daily  dextrose 5%. 1000 milliLiter(s) IV Continuous <Continuous>  dextrose 5%. 1000 milliLiter(s) IV Continuous <Continuous>  dextrose 50% Injectable 25 Gram(s) IV Push once  dextrose 50% Injectable 12.5 Gram(s) IV Push once  dextrose 50% Injectable 25 Gram(s) IV Push once  epoetin sergey-epbx (RETACRIT) Injectable 41010 Unit(s) SubCutaneous every 7 days  folic acid 1 milliGRAM(s) Oral daily  furosemide    Tablet 40 milliGRAM(s) Oral every 12 hours  hydrALAZINE 50 milliGRAM(s) Oral every 6 hours  insulin glargine Injectable (LANTUS) 6 Unit(s) SubCutaneous at bedtime  insulin lispro (ADMELOG) corrective regimen sliding scale   SubCutaneous three times a day before meals  insulin lispro (ADMELOG) corrective regimen sliding scale   SubCutaneous at bedtime  labetalol 300 milliGRAM(s) Oral three times a day  melatonin 5 milliGRAM(s) Oral at bedtime  NIFEdipine  milliGRAM(s) Oral daily  pantoprazole    Tablet 40 milliGRAM(s) Oral every 12 hours  traZODone 50 milliGRAM(s) Oral at bedtime    PRN MEDICATIONS  acetaminophen     Tablet .. 650 milliGRAM(s) Oral every 6 hours PRN  calcium carbonate   1250 mG (OsCal) 1 Tablet(s) Oral every 6 hours PRN  dextrose Oral Gel 15 Gram(s) Oral once PRN      VITAL SIGNS: Last 24 Hours  T(C): 36.4 (21 May 2025 12:22), Max: 36.8 (20 May 2025 20:00)  T(F): 97.6 (21 May 2025 12:22), Max: 98.2 (20 May 2025 20:00)  HR: 80 (21 May 2025 12:22) (80 - 89)  BP: 168/99 (21 May 2025 12:22) (144/81 - 200/111)  BP(mean): 116 (21 May 2025 09:29) (103 - 141)  RR: 18 (21 May 2025 12:22) (18 - 18)  SpO2: 95% (21 May 2025 07:49) (94% - 97%)    LABS:                        7.4    11.66 )-----------( 142      ( 20 May 2025 19:27 )             21.9     05-20    141  |  104  |  59[H]  ----------------------------<  120[H]  3.7   |  22  |  4.0[H]    Ca    8.2[L]      20 May 2025 19:27    TPro  4.9[L]  /  Alb  3.1[L]  /  TBili  0.2  /  DBili  x   /  AST  12  /  ALT  12  /  AlkPhos  115  05-20      Urinalysis Basic - ( 20 May 2025 19:27 )    Color: x / Appearance: x / SG: x / pH: x  Gluc: 120 mg/dL / Ketone: x  / Bili: x / Urobili: x   Blood: x / Protein: x / Nitrite: x   Leuk Esterase: x / RBC: x / WBC x   Sq Epi: x / Non Sq Epi: x / Bacteria: x        PHYSICAL EXAM:  CONSTITUTIONAL: No acute distress  HEAD: Atraumatic, normocephalic  EYES: EOM intact, conjunctiva and sclera clear  ENT: moist mucous membranes  PULMONARY: Clear to auscultation bilaterally; no wheezes, rales, or rhonchi  CARDIOVASCULAR: Regular rate and rhythm; no murmurs, rubs, or gallops  GASTROINTESTINAL: Soft, non-tender, non-distended; bowel sounds present  MUSCULOSKELETAL: 2+ peripheral pulses; no clubbing, no cyanosis  NEUROLOGY: non-focal    ASSESSMENT & PLAN      39 year old previously healthy female patient who presented to the ED on 05/06 for evaluation of generalized abdominal pain, nausea, vomiting, and black loose stools, found to have malignant HTN on arrival with evidence of leukocytosis/acute on chronic anemia/thrombocytopenia/elevated LDH and retic/low haptoglobin/schistocytes on smear/proteinuria/JOYCE on blood work and evidence of distal D/proximal J thickening with dilated jejunal loops to 3.4cm and gradual tapering distally wo SBO along with mural enhancement of SB concerning for severe enteritis VS early ischemia VS shock bowel. 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 She was also found to have age indeterminate lacunar infarct on CT 05/13. We are following for above findings.    #malignant HTN  #Treatment resistant HTN of unclear etiology   - Patient had BP of 238/135 in the ED on admission   - s/p cardene gtt   - originally thought TTP/HUS picture, s/p plasmapheresis, [id number] was negative and therapy did not improve HTN  - secondary HTN workup: TSH 2.04, cortisol wnl, RA doppler (-), CT scan did not show PKD, Lupus workup negative  - Aldosterone/Renin 105/52, less likely primary aldosteronism but since patient has been treated aggressively for HTN results are confounded  - nephro on board; metanephrines wnl , ESR 45/  - Target SBP < 160  - c/w hydralazine 50mg q6h, labetalol 300mg TID, procardia 120mg qd  - 5/21 increased clonidine to 0.2 q12 per nephro  - pt kindly agreeable to MD draw of labs through midline    #JOYCE vs CKD  #proteinuria  #LE edema   -Cr 3.5 on admission unknown baseline   - has been relatively stable throughout admission   - protein/cr urine ratio 2.3  - Glomerular nephritis workup negative   - appreciate nephro recs   - on lasix 40 PO BID   - c/w calcitriol   - cw trend    #Age indeterminant lacunar infarct   - evaluated by Neuro, recommend MRI  - f/u whether needed inpatient vs outpatient - if needed inpatient will require sedation (consult anesthesia)  - f/u MRI with anesthesia if patient is amendable and required inpatient   - c/w ASA ,statin , BP control  - appreciate neuro, add clopidegrol 75 daily x21 days, f/u neuroendovascular OP, pt refused MR/further neuroimaging at this point    #Enteritis vs Small bowel ischemia   #Melena - resolved   - physical exam benign   - patient not complaining of abdominal pain, has good PO diet, no bloody BM since 5/13    #Normocytic Anemia   #Acute on chronic MARITZA   - s/p 5U pRBC   - melena on admission, now resolved  - evaluated by heme/onc: likely MAHA 2/2 HTN  - evaluated by GI: would benefit from endoscopy, now brown stools, f/u OP  - evaluated by nephro: likely not just from CKD  - c/w epogen, hold if SBP > 170  - c/w venofer, 3 day course   - c/w folic acid, B12  - c/w PPI BID    #Mood disorder   #delirium 2/2 to htn encephalopathy   -patient not compliant with treatment, requires frequent reorientation from staff and mother   -does not have capacity to leave AMA   -trazodone 50qhs.  - psychiatry recommendations appreciated - 5/20 f/u psych reeval found pt did not have mental insight to leave AMA    MISC  DVT ppx: ambulation   Diet: CCC/DASH  GI ppx/Bowel regimen: PPI BID   Activity: AAT  GOC: full

## 2025-05-21 NOTE — PROGRESS NOTE ADULT - ASSESSMENT
Patient is a 40 yo Female w/ a h/o HTN presenting to ER with 5 days of multiple episodes of NBNB vomiting and diarrhea with associated generalized abdominal pain and distention.  # HTN   # JOYCE rule out ATN   # Non nephrotic range proteinuria / hematuria   # thrombocytopenia   - picture above can be due to malignant HTN  / ADAMTS 13 not low / however patient received steroids and on plasmapheresis   - normal C3 ( not in favor of a HUS)  nl C4 normal JOSE ANTONIO which rule out active lupus  -  s/p steroids , s/p  plasmapheresis   - cr trending up/ check repeat   - document accurate UO   -  last hb noted receiving blood tx / repeat hemolysis w/up/ followed by hematology and GI / on ANASTACIO  hold if bp > 170/100/ on  venofer course / followed by GI  - last  ph at goal / no binders / check i calcium / pth noted and vit D / replete VIT D/ on calcitriol 0.25 daily   -  GN w/up negative   - follow bp readings /increase clonidine to 0.2 q 12 if remains elevated /renin elevated / aldosterone elevated , please repeat ? due to malignant hypertension / no FOUZIA/ ?  renin secreting tumors  - renal artery dupplex no FOUZIA   renal team will follow

## 2025-05-21 NOTE — PROGRESS NOTE ADULT - SUBJECTIVE AND OBJECTIVE BOX
seen and examined   24 h events noted   no distress       PAST HISTORY  --------------------------------------------------------------------------------  No significant changes to PMH, PSH, FHx, SHx, unless otherwise noted    ALLERGIES & MEDICATIONS  --------------------------------------------------------------------------------  Allergies    No Known Allergies    Intolerances      Standing Inpatient Medications  aspirin  chewable 81 milliGRAM(s) Oral daily  atorvastatin 80 milliGRAM(s) Oral at bedtime  calcitriol   Capsule 0.25 MICROGram(s) Oral daily  chlorhexidine 2% Cloths 1 Application(s) Topical <User Schedule>  cloNIDine 0.1 milliGRAM(s) Oral every 12 hours  cyanocobalamin 1000 MICROGram(s) Oral daily  dextrose 5%. 1000 milliLiter(s) IV Continuous <Continuous>  dextrose 5%. 1000 milliLiter(s) IV Continuous <Continuous>  dextrose 50% Injectable 25 Gram(s) IV Push once  dextrose 50% Injectable 12.5 Gram(s) IV Push once  dextrose 50% Injectable 25 Gram(s) IV Push once  epoetin sergey-epbx (RETACRIT) Injectable 02728 Unit(s) SubCutaneous every 7 days  folic acid 1 milliGRAM(s) Oral daily  furosemide    Tablet 40 milliGRAM(s) Oral every 12 hours  hydrALAZINE 50 milliGRAM(s) Oral every 6 hours  hydrALAZINE Injectable 10 milliGRAM(s) IV Push once  insulin glargine Injectable (LANTUS) 6 Unit(s) SubCutaneous at bedtime  insulin lispro (ADMELOG) corrective regimen sliding scale   SubCutaneous three times a day before meals  insulin lispro (ADMELOG) corrective regimen sliding scale   SubCutaneous at bedtime  labetalol 300 milliGRAM(s) Oral three times a day  melatonin 5 milliGRAM(s) Oral at bedtime  NIFEdipine  milliGRAM(s) Oral daily  pantoprazole    Tablet 40 milliGRAM(s) Oral every 12 hours  traZODone 50 milliGRAM(s) Oral at bedtime    PRN Inpatient Medications  acetaminophen     Tablet .. 650 milliGRAM(s) Oral every 6 hours PRN  calcium carbonate   1250 mG (OsCal) 1 Tablet(s) Oral every 6 hours PRN  dextrose Oral Gel 15 Gram(s) Oral once PRN        VITALS/PHYSICAL EXAM  --------------------------------------------------------------------------------  T(C): 36.7 (05-21-25 @ 05:00), Max: 36.9 (05-20-25 @ 11:55)  HR: 88 (05-21-25 @ 09:29) (85 - 89)  BP: 184/82 (05-21-25 @ 09:29) (144/81 - 200/111)  RR: 18 (05-21-25 @ 05:00) (17 - 18)  SpO2: 95% (05-21-25 @ 07:49) (94% - 97%)  Wt(kg): --        05-20-25 @ 07:01  -  05-21-25 @ 07:00  --------------------------------------------------------  IN: 872 mL / OUT: 300 mL / NET: 572 mL      Physical Exam:  	Gen: NAD  	Pulm: CTA B/L  	CV: S1S2; no rub  	Abd: +distended      LABS/STUDIES  --------------------------------------------------------------------------------              7.4    11.66 >-----------<  142      [05-20-25 @ 19:27]              21.9     141  |  104  |  59  ----------------------------<  120      [05-20-25 @ 19:27]  3.7   |  22  |  4.0        Ca     8.2     [05-20-25 @ 19:27]    TPro  4.9  /  Alb  3.1  /  TBili  0.2  /  DBili  x   /  AST  12  /  ALT  12  /  AlkPhos  115  [05-20-25 @ 19:27]    Creatinine Trend:  SCr 4.0 [05-20 @ 19:27]  SCr 3.6 [05-16 @ 04:47]  SCr 3.5 [05-15 @ 04:54]  SCr 3.2 [05-14 @ 04:20]  SCr 3.4 [05-13 @ 04:40]    Urinalysis - [05-20-25 @ 19:27]      Color  / Appearance  / SG  / pH       Gluc 120 / Ketone   / Bili  / Urobili        Blood  / Protein  / Leuk Est  / Nitrite       RBC  / WBC  / Hyaline  / Gran  / Sq Epi  / Non Sq Epi  / Bacteria       Iron 18, TIBC 166, %sat 11      [05-07-25 @ 11:40]  Ferritin 362      [05-07-25 @ 11:40]  PTH -- (Ca --)      [05-13-25 @ 12:08]   194  PTH -- (Ca --)      [05-12-25 @ 16:48]   193  Vitamin D (25OH) 12      [05-12-25 @ 16:48]  TSH 2.05      [05-07-25 @ 11:40]  Lipid: chol 154, , HDL 37, LDL --      [05-14-25 @ 04:20]    HBsAg Nonreact      [05-07-25 @ 11:40]  HCV 0.13, Nonreact      [05-07-25 @ 11:40]  HIV Nonreact      [05-07-25 @ 11:40]    JOSE ANTONIO: titer Negative, pattern --      [05-07-25 @ 11:40]  dsDNA 2      [05-08-25 @ 19:20]  C3 Complement 106      [05-07-25 @ 11:40]  C4 Complement 22      [05-07-25 @ 11:40]  ANCA: cANCA Negative, pANCA Negative, atypical ANCA Negative      [05-07-25 @ 11:40]  anti-GBM <0.2      [05-07-25 @ 11:40]  Free Light Chains: kappa 1.98, lambda 2.29, ratio = 0.86      [05-13 @ 04:40]  Immunofixation Serum:   No Monoclonal Band Identified      Reference Range: None Detected      [05-12-25 @ 16:48]  SPEP Interpretation: Hypogammaglobulinemia      [05-12-25 @ 16:48]

## 2025-05-21 NOTE — PROGRESS NOTE ADULT - SUBJECTIVE AND OBJECTIVE BOX
Patient is a 39y old  Female who presents with a chief complaint of Hypertensi (12 May 2025 14:24)      Patient seen this am   -no overnight events notified      ALLERGIES:  No Known Allergies    Vital Signs Last 24 Hrs  T(C): 36.7 (21 May 2025 05:00), Max: 36.9 (20 May 2025 11:55)  T(F): 98 (21 May 2025 05:00), Max: 98.4 (20 May 2025 11:55)  HR: 85 (21 May 2025 05:00) (85 - 89)  BP: 168/106 (21 May 2025 05:00) (144/81 - 171/92)  BP(mean): 126 (21 May 2025 05:00) (103 - 126)  RR: 18 (21 May 2025 05:00) (17 - 18)  SpO2: 94% (21 May 2025 05:00) (94% - 97%)    Parameters below as of 20 May 2025 21:35  Patient On (Oxygen Delivery Method): room air      PHYSICAL EXAM:  General: Not in distress   ENT: MMM  Neck: Supple, No JVD  Lungs: Clear air entry  Cardio: RRR, S1/S2, No murmurs  Abdomen: Soft abdomen     LABS:                          7.4    11.66 )-----------( 142      ( 20 May 2025 19:27 )             21.9       05-20    141  |  104  |  59[H]  ----------------------------<  120[H]  3.7   |  22  |  4.0[H]    Ca    8.2[L]      20 May 2025 19:27    TPro  4.9[L]  /  Alb  3.1[L]  /  TBili  0.2  /  DBili  x   /  AST  12  /  ALT  12  /  AlkPhos  115  05-20        MEDICATIONS  (STANDING):  aspirin  chewable 81 milliGRAM(s) Oral daily  atorvastatin 80 milliGRAM(s) Oral at bedtime  calcitriol   Capsule 0.25 MICROGram(s) Oral daily  chlorhexidine 2% Cloths 1 Application(s) Topical <User Schedule>  cloNIDine 0.1 milliGRAM(s) Oral every 12 hours  cyanocobalamin 1000 MICROGram(s) Oral daily  dextrose 5%. 1000 milliLiter(s) (100 mL/Hr) IV Continuous <Continuous>  dextrose 5%. 1000 milliLiter(s) (50 mL/Hr) IV Continuous <Continuous>  dextrose 50% Injectable 25 Gram(s) IV Push once  dextrose 50% Injectable 12.5 Gram(s) IV Push once  dextrose 50% Injectable 25 Gram(s) IV Push once  epoetin sergey-epbx (RETACRIT) Injectable 92041 Unit(s) SubCutaneous every 7 days  folic acid 1 milliGRAM(s) Oral daily  furosemide    Tablet 40 milliGRAM(s) Oral every 12 hours  hydrALAZINE 50 milliGRAM(s) Oral every 6 hours  insulin glargine Injectable (LANTUS) 6 Unit(s) SubCutaneous at bedtime  insulin lispro (ADMELOG) corrective regimen sliding scale   SubCutaneous three times a day before meals  insulin lispro (ADMELOG) corrective regimen sliding scale   SubCutaneous at bedtime  labetalol 300 milliGRAM(s) Oral three times a day  melatonin 5 milliGRAM(s) Oral at bedtime  NIFEdipine  milliGRAM(s) Oral daily  pantoprazole    Tablet 40 milliGRAM(s) Oral every 12 hours  traZODone 50 milliGRAM(s) Oral at bedtime    MEDICATIONS  (PRN):  acetaminophen     Tablet .. 650 milliGRAM(s) Oral every 6 hours PRN Mild Pain (1 - 3)  calcium carbonate   1250 mG (OsCal) 1 Tablet(s) Oral every 6 hours PRN Heartburn  dextrose Oral Gel 15 Gram(s) Oral once PRN Blood Glucose LESS THAN 70 milliGRAM(s)/deciliter   Patient is a 39y old  Female who presents with a chief complaint of Hypertensi (12 May 2025 14:24)      Patient seen this am   -no overnight events notified   -on ASA and Plavix initiated - monitor cbc       ALLERGIES:  No Known Allergies    Vital Signs Last 24 Hrs  T(C): 36.7 (21 May 2025 05:00), Max: 36.9 (20 May 2025 11:55)  T(F): 98 (21 May 2025 05:00), Max: 98.4 (20 May 2025 11:55)  HR: 85 (21 May 2025 05:00) (85 - 89)  BP: 168/106 (21 May 2025 05:00) (144/81 - 171/92)  BP(mean): 126 (21 May 2025 05:00) (103 - 126)  RR: 18 (21 May 2025 05:00) (17 - 18)  SpO2: 94% (21 May 2025 05:00) (94% - 97%)    Parameters below as of 20 May 2025 21:35  Patient On (Oxygen Delivery Method): room air      PHYSICAL EXAM:  General: Not in distress   ENT: MMM  Neck: Supple, No JVD  Lungs: Clear air entry  Cardio: RRR, S1/S2, No murmurs  Abdomen: Soft abdomen     LABS:                          7.4    11.66 )-----------( 142      ( 20 May 2025 19:27 )             21.9       05-20    141  |  104  |  59[H]  ----------------------------<  120[H]  3.7   |  22  |  4.0[H]    Ca    8.2[L]      20 May 2025 19:27    TPro  4.9[L]  /  Alb  3.1[L]  /  TBili  0.2  /  DBili  x   /  AST  12  /  ALT  12  /  AlkPhos  115  05-20        MEDICATIONS  (STANDING):  aspirin  chewable 81 milliGRAM(s) Oral daily  atorvastatin 80 milliGRAM(s) Oral at bedtime  calcitriol   Capsule 0.25 MICROGram(s) Oral daily  chlorhexidine 2% Cloths 1 Application(s) Topical <User Schedule>  cloNIDine 0.1 milliGRAM(s) Oral every 12 hours  cyanocobalamin 1000 MICROGram(s) Oral daily  dextrose 5%. 1000 milliLiter(s) (100 mL/Hr) IV Continuous <Continuous>  dextrose 5%. 1000 milliLiter(s) (50 mL/Hr) IV Continuous <Continuous>  dextrose 50% Injectable 25 Gram(s) IV Push once  dextrose 50% Injectable 12.5 Gram(s) IV Push once  dextrose 50% Injectable 25 Gram(s) IV Push once  epoetin sergey-epbx (RETACRIT) Injectable 88002 Unit(s) SubCutaneous every 7 days  folic acid 1 milliGRAM(s) Oral daily  furosemide    Tablet 40 milliGRAM(s) Oral every 12 hours  hydrALAZINE 50 milliGRAM(s) Oral every 6 hours  insulin glargine Injectable (LANTUS) 6 Unit(s) SubCutaneous at bedtime  insulin lispro (ADMELOG) corrective regimen sliding scale   SubCutaneous three times a day before meals  insulin lispro (ADMELOG) corrective regimen sliding scale   SubCutaneous at bedtime  labetalol 300 milliGRAM(s) Oral three times a day  melatonin 5 milliGRAM(s) Oral at bedtime  NIFEdipine  milliGRAM(s) Oral daily  pantoprazole    Tablet 40 milliGRAM(s) Oral every 12 hours  traZODone 50 milliGRAM(s) Oral at bedtime    MEDICATIONS  (PRN):  acetaminophen     Tablet .. 650 milliGRAM(s) Oral every 6 hours PRN Mild Pain (1 - 3)  calcium carbonate   1250 mG (OsCal) 1 Tablet(s) Oral every 6 hours PRN Heartburn  dextrose Oral Gel 15 Gram(s) Oral once PRN Blood Glucose LESS THAN 70 milliGRAM(s)/deciliter

## 2025-05-22 LAB
GLUCOSE BLDC GLUCOMTR-MCNC: 133 MG/DL — HIGH (ref 70–99)
GLUCOSE BLDC GLUCOMTR-MCNC: 146 MG/DL — HIGH (ref 70–99)

## 2025-05-22 PROCEDURE — 70450 CT HEAD/BRAIN W/O DYE: CPT | Mod: 26

## 2025-05-22 PROCEDURE — 99232 SBSQ HOSP IP/OBS MODERATE 35: CPT

## 2025-05-22 RX ADMIN — Medication 120 MILLIGRAM(S): at 05:49

## 2025-05-22 RX ADMIN — LABETALOL HYDROCHLORIDE 300 MILLIGRAM(S): 200 TABLET, FILM COATED ORAL at 17:34

## 2025-05-22 RX ADMIN — LABETALOL HYDROCHLORIDE 300 MILLIGRAM(S): 200 TABLET, FILM COATED ORAL at 22:10

## 2025-05-22 RX ADMIN — Medication 0.2 MILLIGRAM(S): at 05:49

## 2025-05-22 RX ADMIN — Medication 0.2 MILLIGRAM(S): at 17:33

## 2025-05-22 RX ADMIN — Medication 50 MILLIGRAM(S): at 22:11

## 2025-05-22 RX ADMIN — FOLIC ACID 1 MILLIGRAM(S): 1 TABLET ORAL at 12:04

## 2025-05-22 RX ADMIN — Medication 81 MILLIGRAM(S): at 12:04

## 2025-05-22 RX ADMIN — LABETALOL HYDROCHLORIDE 300 MILLIGRAM(S): 200 TABLET, FILM COATED ORAL at 05:49

## 2025-05-22 RX ADMIN — CYANOCOBALAMIN 1000 MICROGRAM(S): 1000 INJECTION INTRAMUSCULAR; SUBCUTANEOUS at 12:04

## 2025-05-22 RX ADMIN — Medication 50 MILLIGRAM(S): at 17:33

## 2025-05-22 RX ADMIN — Medication 50 MILLIGRAM(S): at 12:04

## 2025-05-22 RX ADMIN — Medication 5 MILLIGRAM(S): at 22:11

## 2025-05-22 RX ADMIN — FUROSEMIDE 40 MILLIGRAM(S): 10 INJECTION INTRAMUSCULAR; INTRAVENOUS at 05:50

## 2025-05-22 RX ADMIN — Medication 50 MILLIGRAM(S): at 05:50

## 2025-05-22 RX ADMIN — Medication 40 MILLIGRAM(S): at 05:50

## 2025-05-22 RX ADMIN — Medication 1 APPLICATION(S): at 05:50

## 2025-05-22 RX ADMIN — CLOPIDOGREL BISULFATE 75 MILLIGRAM(S): 75 TABLET, FILM COATED ORAL at 12:03

## 2025-05-22 RX ADMIN — Medication 0.2 MILLIGRAM(S): at 22:11

## 2025-05-22 RX ADMIN — FUROSEMIDE 40 MILLIGRAM(S): 10 INJECTION INTRAMUSCULAR; INTRAVENOUS at 17:34

## 2025-05-22 RX ADMIN — ATORVASTATIN CALCIUM 80 MILLIGRAM(S): 80 TABLET, FILM COATED ORAL at 22:10

## 2025-05-22 RX ADMIN — CALCITRIOL 0.25 MICROGRAM(S): 0.5 CAPSULE, GELATIN COATED ORAL at 12:04

## 2025-05-22 NOTE — PROGRESS NOTE ADULT - SUBJECTIVE AND OBJECTIVE BOX
SUBJECTIVE/OVERNIGHT EVENTS  Today is hospital day 15d. This morning patient was seen and examined at bedside, resting comfortably in bed. No acute or major events overnight.    HOSPITAL COURSE  Day 1:   Day 2:   Day 3:     Hypertensive emergency    Handoff    MEWS Score    HTN (hypertension)    Delirium    Metabolic encephalopathy    Hypertensive emergency    History of depression    ABD PAIN    90+    JOYCE (acute kidney injury)    Small bowel ischemia    SysAdmin_VisitLink        MEDICATIONS  STANDING MEDICATIONS  aspirin  chewable 81 milliGRAM(s) Oral daily  atorvastatin 80 milliGRAM(s) Oral at bedtime  calcitriol   Capsule 0.25 MICROGram(s) Oral daily  chlorhexidine 2% Cloths 1 Application(s) Topical <User Schedule>  cloNIDine 0.2 milliGRAM(s) Oral every 8 hours  clopidogrel Tablet 75 milliGRAM(s) Oral daily  cyanocobalamin 1000 MICROGram(s) Oral daily  dextrose 5%. 1000 milliLiter(s) IV Continuous <Continuous>  dextrose 5%. 1000 milliLiter(s) IV Continuous <Continuous>  dextrose 50% Injectable 25 Gram(s) IV Push once  dextrose 50% Injectable 12.5 Gram(s) IV Push once  dextrose 50% Injectable 25 Gram(s) IV Push once  epoetin sergey-epbx (RETACRIT) Injectable 35095 Unit(s) SubCutaneous every 7 days  folic acid 1 milliGRAM(s) Oral daily  furosemide    Tablet 40 milliGRAM(s) Oral every 12 hours  hydrALAZINE 50 milliGRAM(s) Oral every 6 hours  insulin glargine Injectable (LANTUS) 6 Unit(s) SubCutaneous at bedtime  insulin lispro (ADMELOG) corrective regimen sliding scale   SubCutaneous three times a day before meals  insulin lispro (ADMELOG) corrective regimen sliding scale   SubCutaneous at bedtime  labetalol 300 milliGRAM(s) Oral three times a day  melatonin 5 milliGRAM(s) Oral at bedtime  NIFEdipine  milliGRAM(s) Oral daily  pantoprazole    Tablet 40 milliGRAM(s) Oral every 12 hours  traZODone 50 milliGRAM(s) Oral at bedtime    PRN MEDICATIONS  acetaminophen     Tablet .. 650 milliGRAM(s) Oral every 6 hours PRN  calcium carbonate   1250 mG (OsCal) 1 Tablet(s) Oral every 6 hours PRN  dextrose Oral Gel 15 Gram(s) Oral once PRN    VITALS  T(F): 98.5 (05-22-25 @ 04:54), Max: 98.5 (05-22-25 @ 04:54)  HR: 84 (05-22-25 @ 04:54) (79 - 91)  BP: 150/80 (05-22-25 @ 04:54) (150/80 - 195/104)  RR: 18 (05-22-25 @ 04:54) (18 - 18)  SpO2: 95% (05-22-25 @ 04:54) (95% - 95%)  POCT Blood Glucose.: 133 mg/dL (05-22-25 @ 07:45)    PHYSICAL EXAM  GENERAL  ( x ) Uncooperative     (  ) obtunded     (  ) lethargic     (  ) somnolent    HEART  Rate -->  ( x ) normal rate    (  ) bradycardic    (  ) tachycardic  Rhythm -->  (  ) regular    (  ) regularly irregular    (  ) irregularly irregular  Murmurs -->  (  ) normal s1/s2    (  ) systolic murmur    (  ) diastolic murmur    (  ) continuous murmur     (  ) S3 present    (  ) S4 present    LUNGS  ( x )Unlabored respirations     (  ) tachypnea  (  ) B/L air entry     (  ) decreased breath sounds in:  (location     )    (  ) no adventitious sound     (  ) crackles     (  ) wheezing      (  ) rhonchi      (specify location:       )  (  ) chest wall tenderness (specify location:       )    ABDOMEN  ( x ) Soft     (  ) tense   |   (x  ) nondistended     (  ) distended   |   (  ) +BS     (  ) hypoactive bowel sounds     (  ) hyperactive bowel sounds  ( x ) nontender     (  ) RUQ tenderness     (  ) RLQ tenderness     (  ) LLQ tenderness     (  ) epigastric tenderness     (  ) diffuse tenderness  (  ) Splenomegaly      (  ) Hepatomegaly      (  ) Jaundice     (  ) ecchymosis     EXTREMITIES  (  ) Normal     (  ) Rash     (  ) ecchymosis     (  ) varicose veins      ( x ) pitting edema     (  ) non-pitting edema   (  ) ulceration     (  ) gangrene:     (location:     )    NERVOUS SYSTEM  ( x ) A&Ox3     (  ) confused     (  ) lethargic  CN II-XII:     (  ) Intact     (  ) focal deficits  (Specify:     )   Upper extremities:     (  ) strength X/5     (  ) focal deficit (specify:    )  Lower extremities:     (  ) strength  X/5    (  ) focal deficit (specify:    )          LABS             7.2    12.03 )-----------( 120      ( 05-21-25 @ 13:19 )             21.4     140  |  105  |  61  -------------------------<  155   05-21-25 @ 13:19  3.5  |  23  |  3.8    Ca      8.8     05-21-25 @ 13:19    TPro  4.9  /  Alb  3.2  /  TBili  0.4  /  DBili  x   /  AST  13  /  ALT  11  /  AlkPhos  96  /  GGT  x     05-21-25 @ 13:19        Urinalysis Basic - ( 21 May 2025 13:19 )    Color: x / Appearance: x / SG: x / pH: x  Gluc: 155 mg/dL / Ketone: x  / Bili: x / Urobili: x   Blood: x / Protein: x / Nitrite: x   Leuk Esterase: x / RBC: x / WBC x   Sq Epi: x / Non Sq Epi: x / Bacteria: x          IMAGING

## 2025-05-22 NOTE — PROGRESS NOTE ADULT - ASSESSMENT
Patient is a 38 yo Female w/ a h/o HTN presenting to ER with 5 days of multiple episodes of NBNB vomiting and diarrhea with associated generalized abdominal pain and distention.  # HTN   # JOYCE rule out ATN   # Non nephrotic range proteinuria / hematuria   # thrombocytopenia   - picture above can be due to malignant HTN  / ADAMTS 13 not low / however patient received steroids and on plasmapheresis   - normal C3 ( not in favor of a HUS)  nl C4 normal JOSE ANTONIO which rule out active lupus  -  s/p steroids , s/p  plasmapheresis   - cr stable   - document accurate UO   -  last hb noted receiving blood tx hematology and GI f/up  / on ANASTACIO  hold if bp > 170/100/   - last  ph at goal / no binders / check i calcium / pth noted and vit D / replete VIT D/ on calcitriol 0.25 daily   -  GN w/up negative   - follow bp readings / clonidine just increased /renin elevated / aldosterone elevated , please repeat ? due to malignant hypertension / no FOUZIA/ ?  renin secreting tumors  - renal artery dupplex no FOUZIA  - neurology notes appreciated s/p CT    Stable age-indeterminate lacunar infarct within the left external   capsule. Otherwise unremarkable head CT.  renal team will follow

## 2025-05-22 NOTE — PROGRESS NOTE ADULT - ASSESSMENT
39 year old previously healthy female patient who presented to the ED on 05/06 for evaluation of generalized abdominal pain, nausea, vomiting, and black loose stools, found to have malignant HTN on arrival with evidence of leukocytosis/acute on chronic anemia/thrombocytopenia/elevated LDH and retic/low haptoglobin/schistocytes on smear/proteinuria/JOYCE on blood work and evidence of distal D/proximal J thickening with dilated jejunal loops to 3.4cm and gradual tapering distally wo SBO along with mural enhancement of SB concerning for severe enteritis VS early ischemia VS shock bowel. 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 She was also found to have age indeterminate lacunar infarct on CT 05/13. We are following for above findings.    #Age indeterminant lacunar infarct   - evaluated by Neuro, recommend MRI  - f/u whether needed inpatient vs outpatient - if needed inpatient will require sedation (consult anesthesia)  - f/u MRI with anesthesia if patient is amendable and required inpatient   - c/w ASA ,statin , BP control  - appreciate neuro, add clopidegrol 75 daily x21 days, f/u neuroendovascular OP, pt refused MR/further neuroimaging at this point  -Given patient is refusing MRI will repeat CTH for any acute changes or hemorrhagic transformation as per neuro patient is start on ASA and plavix      #malignant HTN  #Treatment resistant HTN of unclear etiology   - Patient had BP of 238/135 in the ED on admission   - s/p cardene gtt   - originally thought TTP/HUS picture, s/p plasmapheresis, [id number] was negative and therapy did not improve HTN  - secondary HTN workup: TSH 2.04, cortisol wnl, RA doppler (-), CT scan did not show PKD, Lupus workup negative  - Aldosterone/Renin 105/52, less likely primary aldosteronism but since patient has been treated aggressively for HTN results are confounded  - nephro on board; metanephrines wnl , ESR 45/  - Target SBP < 160  - c/w hydralazine 50mg q6h, labetalol 300mg TID, procardia 120mg qd  - Patient still hypertensive overnight s/p 1 IVP of hydralazine questionable if she is taking her PO meds will increase clonidine patch 0.2 from q 12 to q 8 for better BP control  - Will f/u carotid duplex as per neuro has been performed  - pt kindly agreeable to MD draw of labs through midline    #JOYCE vs CKD  #proteinuria  #LE edema   -Cr 3.5 on admission unknown baseline   - has been relatively stable throughout admission   - protein/cr urine ratio 2.3  - Glomerular nephritis workup negative   - appreciate nephro recs   - on lasix 40 PO BID   - c/w calcitriol   - cw trend    #Enteritis vs Small bowel ischemia   #Melena - resolved   - physical exam benign   - patient not complaining of abdominal pain, has good PO diet, no bloody BM since 5/13    #Normocytic Anemia   #Acute on chronic MARITZA   - s/p 5U pRBC   - melena on admission, now resolved  - evaluated by heme/onc: likely MAHA 2/2 HTN  - evaluated by GI: would benefit from endoscopy, now brown stools, f/u OP  - evaluated by nephro: likely not just from CKD  - c/w epogen, hold if SBP > 170  - c/w venofer, 3 day course   - c/w folic acid, B12  - c/w PPI BID  -Hgb yesterday 7.2 slow steady decline will need STAT labs patient refusing at this time despite mother at bedside will reattempt    #Mood disorder   #delirium 2/2 to htn encephalopathy   -patient not compliant with treatment, requires frequent reorientation from staff and mother   -does not have capacity to leave AMA   -trazodone 50qhs.  - psychiatry recommendations appreciated - 5/20 f/u psych reeval found pt did not have mental insight to leave AMA    MISC  DVT ppx: ambulation   Diet: CCC/DASH  GI ppx/Bowel regimen: PPI BID   Activity: AAT  GOC: full

## 2025-05-22 NOTE — PROGRESS NOTE ADULT - SUBJECTIVE AND OBJECTIVE BOX
seen and examined  24 h events noted   no distress         PAST HISTORY  --------------------------------------------------------------------------------  No significant changes to PMH, PSH, FHx, SHx, unless otherwise noted    ALLERGIES & MEDICATIONS  --------------------------------------------------------------------------------  Allergies    No Known Allergies    Intolerances      Standing Inpatient Medications  aspirin  chewable 81 milliGRAM(s) Oral daily  atorvastatin 80 milliGRAM(s) Oral at bedtime  calcitriol   Capsule 0.25 MICROGram(s) Oral daily  chlorhexidine 2% Cloths 1 Application(s) Topical <User Schedule>  cloNIDine 0.2 milliGRAM(s) Oral every 8 hours  clopidogrel Tablet 75 milliGRAM(s) Oral daily  cyanocobalamin 1000 MICROGram(s) Oral daily  dextrose 5%. 1000 milliLiter(s) IV Continuous <Continuous>  dextrose 5%. 1000 milliLiter(s) IV Continuous <Continuous>  dextrose 50% Injectable 25 Gram(s) IV Push once  dextrose 50% Injectable 12.5 Gram(s) IV Push once  dextrose 50% Injectable 25 Gram(s) IV Push once  epoetin sergey-epbx (RETACRIT) Injectable 90627 Unit(s) SubCutaneous every 7 days  folic acid 1 milliGRAM(s) Oral daily  furosemide    Tablet 40 milliGRAM(s) Oral every 12 hours  hydrALAZINE 50 milliGRAM(s) Oral every 6 hours  insulin glargine Injectable (LANTUS) 6 Unit(s) SubCutaneous at bedtime  insulin lispro (ADMELOG) corrective regimen sliding scale   SubCutaneous three times a day before meals  insulin lispro (ADMELOG) corrective regimen sliding scale   SubCutaneous at bedtime  labetalol 300 milliGRAM(s) Oral three times a day  melatonin 5 milliGRAM(s) Oral at bedtime  NIFEdipine  milliGRAM(s) Oral daily  pantoprazole    Tablet 40 milliGRAM(s) Oral every 12 hours  traZODone 50 milliGRAM(s) Oral at bedtime    PRN Inpatient Medications  acetaminophen     Tablet .. 650 milliGRAM(s) Oral every 6 hours PRN  calcium carbonate   1250 mG (OsCal) 1 Tablet(s) Oral every 6 hours PRN  dextrose Oral Gel 15 Gram(s) Oral once PRN          VITALS/PHYSICAL EXAM  --------------------------------------------------------------------------------  T(C): 36.9 (05-22-25 @ 04:54), Max: 36.9 (05-22-25 @ 04:54)  HR: 84 (05-22-25 @ 04:54) (79 - 91)  BP: 150/80 (05-22-25 @ 04:54) (150/80 - 195/104)  RR: 18 (05-22-25 @ 04:54) (18 - 18)  SpO2: 95% (05-22-25 @ 04:54) (95% - 95%)  Wt(kg): --        05-21-25 @ 07:01  -  05-22-25 @ 07:00  --------------------------------------------------------  IN: 343 mL / OUT: 0 mL / NET: 343 mL    05-22-25 @ 07:01  -  05-22-25 @ 12:05  --------------------------------------------------------  IN: 325 mL / OUT: 200 mL / NET: 125 mL      Physical Exam:  	Gen: NAD  	Pulm: decrease BS  B/L  	CV: S1S2; no rub  	Abd: +distended  	LE: no edema    LABS/STUDIES  --------------------------------------------------------------------------------              7.2    12.03 >-----------<  120      [05-21-25 @ 13:19]              21.4     140  |  105  |  61  ----------------------------<  155      [05-21-25 @ 13:19]  3.5   |  23  |  3.8        Ca     8.8     [05-21-25 @ 13:19]    TPro  4.9  /  Alb  3.2  /  TBili  0.4  /  DBili  x   /  AST  13  /  ALT  11  /  AlkPhos  96  [05-21-25 @ 13:19]    Creatinine Trend:  SCr 3.8 [05-21 @ 13:19]  SCr 4.0 [05-20 @ 19:27]  SCr 3.6 [05-16 @ 04:47]  SCr 3.5 [05-15 @ 04:54]  SCr 3.2 [05-14 @ 04:20]    Urinalysis - [05-21-25 @ 13:19]      Color  / Appearance  / SG  / pH       Gluc 155 / Ketone   / Bili  / Urobili        Blood  / Protein  / Leuk Est  / Nitrite       RBC  / WBC  / Hyaline  / Gran  / Sq Epi  / Non Sq Epi  / Bacteria       Iron 18, TIBC 166, %sat 11      [05-07-25 @ 11:40]  Ferritin 362      [05-07-25 @ 11:40]  PTH -- (Ca --)      [05-13-25 @ 12:08]   194  PTH -- (Ca --)      [05-12-25 @ 16:48]   193  Vitamin D (25OH) 12      [05-12-25 @ 16:48]  TSH 2.05      [05-07-25 @ 11:40]  Lipid: chol 154, , HDL 37, LDL --      [05-14-25 @ 04:20]    HBsAg Nonreact      [05-07-25 @ 11:40]  HCV 0.13, Nonreact      [05-07-25 @ 11:40]  HIV Nonreact      [05-07-25 @ 11:40]    JOSE ANTONIO: titer Negative, pattern --      [05-07-25 @ 11:40]  dsDNA 2      [05-08-25 @ 19:20]  C3 Complement 106      [05-07-25 @ 11:40]  C4 Complement 22      [05-07-25 @ 11:40]  ANCA: cANCA Negative, pANCA Negative, atypical ANCA Negative      [05-07-25 @ 11:40]  anti-GBM <0.2      [05-07-25 @ 11:40]  Free Light Chains: kappa 1.98, lambda 2.29, ratio = 0.86      [05-13 @ 04:40]  Immunofixation Serum:   No Monoclonal Band Identified      Reference Range: None Detected      [05-12-25 @ 16:48]  SPEP Interpretation: Hypogammaglobulinemia      [05-12-25 @ 16:48]

## 2025-05-22 NOTE — PROGRESS NOTE ADULT - ASSESSMENT
Case of a 39 year old previously healthy female patient who presented to the ED on 05/06 for evaluation of generalized abdominal pain, nausea, vomiting, and black loose stools, found to have malignant HTN on arrival with evidence of leukocytosis/acute on chronic anemia/thrombocytopenia/elevated LDH and retic/low haptoglobin/schistocytes on smear/proteinuria/JOYCE on blood work and evidence of distal D/proximal J thickening with dilated jejunal loops to 3.4cm and gradual tapering distally wo SBO along with mural enhancement of SB concerning for severe enteritis VS early ischemia VS shock bowel. 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 She was also found to have age indeterminate lacunar infarct on CT 05/13. We are following for above findings.    #malignant HTN - still elevated BP this am   #Treatment resistant HTN of unclear etiology    - Patient had BP of 238/135 in the ED on admission   - s/p cardene gtt   - originally thought TTP/HUS picture, s/p plasmapheresis, [id number] was negative and therapy did not improve HTN  - secondary HTN workup: TSH 2.04, cortisol wnl, RA doppler (-), CT scan did not show PKD, Lupus workup negative  - Aldosterone/Renin 105/52, less likely primary aldosteronism but since patient has been treated aggressively for HTN results are confounded  - f/u repeat R/A ratio, f/u metanephrines, f/u ESR/CRP   - Target SBP < 160  - c/w hydralazine 50mg q6h, labetalol 300mg TID, procardia 120mg qd, lasix 40mg twice daily - increase clonidine, iv hydralazine prn     #JOYCE vs CKD  #proteinuria  #LE edema   -Cr 3.5 on admission unknown baseline   - has been relatively stable throughout admission   -protein/cr urine ratio 2.3  -Glomerular nephritis workup negative   - appreciate nephro recs   -on Lasix 40mg PO BID   - c/w calcitriol   - pt has been refusing labs - follow today's labs     #Age indeterminant lacunar infarct   - evaluated by Neuro  - patient is declining MRI brain   - c/w ASA, added Plavix 5/21 as per Neuro - low hb, check cbc, head CT (as pt is declining MRI)  - c/w statin    - BP control - risk of hemorrhagic conversion with elevated sbp     #Melena - resolved   -patient not complaining of abdominal pain, has good oral intake, no bloody BM since 5/13 - eating home cooked meal     #Normocytic Anemia   #Acute on chronic MARITZA   - s/p 5U pRBC   - melena on admission, now resolved  - evaluated by heme/onc: likely MAHA 2/2 HTN  - evaluated by GI: would benefit from endoscopy, now brown stools, f/u OP  - evaluated by nephro: likely not just from CKD  - c/w epogen, hold if SBP > 170  - c/w venofer, 3 day course   - c/w folic acid, B12  - c/w PPI BID    #Mood disorder   #delirium 2/2 to htn encephalopathy   -patient not compliant with treatment, requires frequent reorientation from staff and mother   -does not have capacity to leave AMA   -trazodone 50qhs.  - psychiatry recommendations appreciated - 5/20 f/u psych re eval found pt did not have mental insight to leave AMA    MISC  DVT ppx: ambulation   Diet: CCC/DASH  GI ppx/Bowel regimen: PPI BID   Activity: AAT  GOC: full     DISPO: not discharge ready - cbc monitoring - c/w tele - malignant resistant HTN  -spoke to patient - updated mother at bedside     Attending Physician Dr. Herminia Daley # [id number]

## 2025-05-22 NOTE — PROGRESS NOTE ADULT - SUBJECTIVE AND OBJECTIVE BOX
Patient is a 39y old  Female who presents with a chief complaint of Hypertensi (12 May 2025 14:24)      Patient seen this am   -no overnight events notified   -on ASA and Plavix  -  DECLINED LABS THIS AM - MOTHER TRYING TO ASSIST AND CONVINCE HER - CHECK HEAD CT, HB MONITORING, MAY NEED BLOOD TRANSFUSION, BP MONITORING; ADJUST BP MEDS   -not discharge ready        ALLERGIES:  No Known Allergies    Vital Signs Last 24 Hrs  T(C): 36.6 (22 May 2025 12:13), Max: 36.9 (22 May 2025 04:54)  T(F): 97.8 (22 May 2025 12:13), Max: 98.5 (22 May 2025 04:54)  HR: 81 (22 May 2025 12:13) (79 - 91)  BP: 132/80 (22 May 2025 12:13) (132/80 - 195/104)  BP(mean): 97 (22 May 2025 12:13) (97 - 136)  RR: 18 (22 May 2025 04:54) (18 - 18)  SpO2: 97% (22 May 2025 12:13) (95% - 97%)    Parameters below as of 22 May 2025 12:13  Patient On (Oxygen Delivery Method): room air      PHYSICAL EXAM:  General: Not in distress - sitting up in chair - mother at bedside   ENT: MMM  Neck: Supple, No JVD  Lungs: Clear air entry  Cardio: RRR, S1/S2, No murmurs  Abdomen: Soft abdomen     LABS:    declining labs this am                         7.4    11.66 )-----------( 142      ( 20 May 2025 19:27 )             21.9       05-20    141  |  104  |  59[H]  ----------------------------<  120[H]  3.7   |  22  |  4.0[H]    Ca    8.2[L]      20 May 2025 19:27    TPro  4.9[L]  /  Alb  3.1[L]  /  TBili  0.2  /  DBili  x   /  AST  12  /  ALT  12  /  AlkPhos  115  05-20      MEDICATIONS  (STANDING):  aspirin  chewable 81 milliGRAM(s) Oral daily  atorvastatin 80 milliGRAM(s) Oral at bedtime  calcitriol   Capsule 0.25 MICROGram(s) Oral daily  chlorhexidine 2% Cloths 1 Application(s) Topical <User Schedule>  cloNIDine 0.2 milliGRAM(s) Oral every 8 hours  clopidogrel Tablet 75 milliGRAM(s) Oral daily  cyanocobalamin 1000 MICROGram(s) Oral daily  dextrose 5%. 1000 milliLiter(s) (100 mL/Hr) IV Continuous <Continuous>  dextrose 5%. 1000 milliLiter(s) (50 mL/Hr) IV Continuous <Continuous>  dextrose 50% Injectable 25 Gram(s) IV Push once  dextrose 50% Injectable 12.5 Gram(s) IV Push once  dextrose 50% Injectable 25 Gram(s) IV Push once  epoetin sergey-epbx (RETACRIT) Injectable 48107 Unit(s) SubCutaneous every 7 days  folic acid 1 milliGRAM(s) Oral daily  furosemide    Tablet 40 milliGRAM(s) Oral every 12 hours  hydrALAZINE 50 milliGRAM(s) Oral every 6 hours  insulin glargine Injectable (LANTUS) 6 Unit(s) SubCutaneous at bedtime  insulin lispro (ADMELOG) corrective regimen sliding scale   SubCutaneous three times a day before meals  insulin lispro (ADMELOG) corrective regimen sliding scale   SubCutaneous at bedtime  labetalol 300 milliGRAM(s) Oral three times a day  melatonin 5 milliGRAM(s) Oral at bedtime  NIFEdipine  milliGRAM(s) Oral daily  pantoprazole    Tablet 40 milliGRAM(s) Oral every 12 hours  traZODone 50 milliGRAM(s) Oral at bedtime    MEDICATIONS  (PRN):  acetaminophen     Tablet .. 650 milliGRAM(s) Oral every 6 hours PRN Mild Pain (1 - 3)  calcium carbonate   1250 mG (OsCal) 1 Tablet(s) Oral every 6 hours PRN Heartburn  dextrose Oral Gel 15 Gram(s) Oral once PRN Blood Glucose LESS THAN 70 milliGRAM(s)/deciliter

## 2025-05-23 LAB
ANION GAP SERPL CALC-SCNC: 13 MMOL/L — SIGNIFICANT CHANGE UP (ref 7–14)
BASOPHILS # BLD AUTO: 0 K/UL — SIGNIFICANT CHANGE UP (ref 0–0.2)
BASOPHILS NFR BLD AUTO: 0 % — SIGNIFICANT CHANGE UP (ref 0–1)
BUN SERPL-MCNC: 65 MG/DL — CRITICAL HIGH (ref 10–20)
CALCIUM SERPL-MCNC: 8.8 MG/DL — SIGNIFICANT CHANGE UP (ref 8.4–10.5)
CHLORIDE SERPL-SCNC: 98 MMOL/L — SIGNIFICANT CHANGE UP (ref 98–110)
CO2 SERPL-SCNC: 24 MMOL/L — SIGNIFICANT CHANGE UP (ref 17–32)
CREAT SERPL-MCNC: 4.1 MG/DL — CRITICAL HIGH (ref 0.7–1.5)
EGFR: 14 ML/MIN/1.73M2 — LOW
EGFR: 14 ML/MIN/1.73M2 — LOW
EOSINOPHIL # BLD AUTO: 0.18 K/UL — SIGNIFICANT CHANGE UP (ref 0–0.7)
EOSINOPHIL NFR BLD AUTO: 1.7 % — SIGNIFICANT CHANGE UP (ref 0–8)
GLUCOSE BLDC GLUCOMTR-MCNC: 146 MG/DL — HIGH (ref 70–99)
GLUCOSE SERPL-MCNC: 159 MG/DL — HIGH (ref 70–99)
HCT VFR BLD CALC: 18.4 % — LOW (ref 37–47)
HGB BLD-MCNC: 6.3 G/DL — CRITICAL LOW (ref 12–16)
LDH SERPL L TO P-CCNC: 302 — HIGH (ref 50–242)
LYMPHOCYTES # BLD AUTO: 0.64 K/UL — LOW (ref 1.2–3.4)
LYMPHOCYTES # BLD AUTO: 6.1 % — LOW (ref 20.5–51.1)
MAGNESIUM SERPL-MCNC: 2 MG/DL — SIGNIFICANT CHANGE UP (ref 1.8–2.4)
MCHC RBC-ENTMCNC: 32.3 PG — HIGH (ref 27–31)
MCHC RBC-ENTMCNC: 34.2 G/DL — SIGNIFICANT CHANGE UP (ref 32–37)
MCV RBC AUTO: 94.4 FL — SIGNIFICANT CHANGE UP (ref 81–99)
MONOCYTES # BLD AUTO: 0.19 K/UL — SIGNIFICANT CHANGE UP (ref 0.1–0.6)
MONOCYTES NFR BLD AUTO: 1.8 % — SIGNIFICANT CHANGE UP (ref 1.7–9.3)
NEUTROPHILS # BLD AUTO: 9.26 K/UL — HIGH (ref 1.4–6.5)
NEUTROPHILS NFR BLD AUTO: 88.6 % — HIGH (ref 42.2–75.2)
PLATELET # BLD AUTO: 101 K/UL — LOW (ref 130–400)
PMV BLD: 10.1 FL — SIGNIFICANT CHANGE UP (ref 7.4–10.4)
POTASSIUM SERPL-MCNC: 3.9 MMOL/L — SIGNIFICANT CHANGE UP (ref 3.5–5)
POTASSIUM SERPL-SCNC: 3.9 MMOL/L — SIGNIFICANT CHANGE UP (ref 3.5–5)
RBC # BLD: 1.9 M/UL — LOW (ref 4.2–5.4)
RBC # BLD: 1.95 M/UL — LOW (ref 4.2–5.4)
RBC # FLD: 15 % — HIGH (ref 11.5–14.5)
RETICS #: 83.2 K/UL — SIGNIFICANT CHANGE UP (ref 25–125)
RETICS/RBC NFR: 4.5 % — HIGH (ref 0.5–1.5)
SODIUM SERPL-SCNC: 135 MMOL/L — SIGNIFICANT CHANGE UP (ref 135–146)
WBC # BLD: 10.45 K/UL — SIGNIFICANT CHANGE UP (ref 4.8–10.8)
WBC # FLD AUTO: 10.45 K/UL — SIGNIFICANT CHANGE UP (ref 4.8–10.8)

## 2025-05-23 PROCEDURE — 99233 SBSQ HOSP IP/OBS HIGH 50: CPT

## 2025-05-23 PROCEDURE — 71045 X-RAY EXAM CHEST 1 VIEW: CPT | Mod: 26

## 2025-05-23 PROCEDURE — 99233 SBSQ HOSP IP/OBS HIGH 50: CPT | Mod: GC

## 2025-05-23 RX ORDER — FUROSEMIDE 10 MG/ML
40 INJECTION INTRAMUSCULAR; INTRAVENOUS ONCE
Refills: 0 | Status: COMPLETED | OUTPATIENT
Start: 2025-05-23 | End: 2025-05-23

## 2025-05-23 RX ADMIN — Medication 120 MILLIGRAM(S): at 05:58

## 2025-05-23 RX ADMIN — LABETALOL HYDROCHLORIDE 300 MILLIGRAM(S): 200 TABLET, FILM COATED ORAL at 05:58

## 2025-05-23 RX ADMIN — Medication 81 MILLIGRAM(S): at 12:35

## 2025-05-23 RX ADMIN — Medication 50 MILLIGRAM(S): at 12:33

## 2025-05-23 RX ADMIN — CALCITRIOL 0.25 MICROGRAM(S): 0.5 CAPSULE, GELATIN COATED ORAL at 12:32

## 2025-05-23 RX ADMIN — CLOPIDOGREL BISULFATE 75 MILLIGRAM(S): 75 TABLET, FILM COATED ORAL at 12:32

## 2025-05-23 RX ADMIN — Medication 0.2 MILLIGRAM(S): at 13:55

## 2025-05-23 RX ADMIN — Medication 0.2 MILLIGRAM(S): at 05:58

## 2025-05-23 RX ADMIN — Medication 50 MILLIGRAM(S): at 05:58

## 2025-05-23 RX ADMIN — Medication 1 APPLICATION(S): at 05:58

## 2025-05-23 RX ADMIN — FUROSEMIDE 40 MILLIGRAM(S): 10 INJECTION INTRAMUSCULAR; INTRAVENOUS at 16:18

## 2025-05-23 RX ADMIN — FUROSEMIDE 40 MILLIGRAM(S): 10 INJECTION INTRAMUSCULAR; INTRAVENOUS at 17:07

## 2025-05-23 RX ADMIN — FUROSEMIDE 40 MILLIGRAM(S): 10 INJECTION INTRAMUSCULAR; INTRAVENOUS at 05:59

## 2025-05-23 RX ADMIN — Medication 40 MILLIGRAM(S): at 05:59

## 2025-05-23 RX ADMIN — LABETALOL HYDROCHLORIDE 300 MILLIGRAM(S): 200 TABLET, FILM COATED ORAL at 13:55

## 2025-05-23 RX ADMIN — Medication 40 MILLIGRAM(S): at 17:07

## 2025-05-23 RX ADMIN — CYANOCOBALAMIN 1000 MICROGRAM(S): 1000 INJECTION INTRAMUSCULAR; SUBCUTANEOUS at 12:33

## 2025-05-23 RX ADMIN — FOLIC ACID 1 MILLIGRAM(S): 1 TABLET ORAL at 12:33

## 2025-05-23 NOTE — PROGRESS NOTE ADULT - SUBJECTIVE AND OBJECTIVE BOX
FRANCISCO DUNLAP 39y Female  MRN#: [id number]   Hospital Day: 16d    Heme following for anemia     REVIEW OF SYMPTOMS:      OBJECTIVE  PAST MEDICAL & SURGICAL HISTORY  HTN (hypertension)      ALLERGIES:  No Known Allergies    MEDICATIONS:  STANDING MEDICATIONS  atorvastatin 80 milliGRAM(s) Oral at bedtime  calcitriol   Capsule 0.25 MICROGram(s) Oral daily  chlorhexidine 2% Cloths 1 Application(s) Topical <User Schedule>  cloNIDine 0.2 milliGRAM(s) Oral every 8 hours  cyanocobalamin 1000 MICROGram(s) Oral daily  dextrose 5%. 1000 milliLiter(s) IV Continuous <Continuous>  dextrose 5%. 1000 milliLiter(s) IV Continuous <Continuous>  dextrose 50% Injectable 25 Gram(s) IV Push once  dextrose 50% Injectable 12.5 Gram(s) IV Push once  dextrose 50% Injectable 25 Gram(s) IV Push once  epoetin sergey-epbx (RETACRIT) Injectable 82335 Unit(s) SubCutaneous every 7 days  folic acid 1 milliGRAM(s) Oral daily  furosemide    Tablet 40 milliGRAM(s) Oral every 12 hours  furosemide   Injectable 40 milliGRAM(s) IV Push once  hydrALAZINE 50 milliGRAM(s) Oral every 6 hours  insulin glargine Injectable (LANTUS) 6 Unit(s) SubCutaneous at bedtime  insulin lispro (ADMELOG) corrective regimen sliding scale   SubCutaneous three times a day before meals  insulin lispro (ADMELOG) corrective regimen sliding scale   SubCutaneous at bedtime  labetalol 300 milliGRAM(s) Oral three times a day  melatonin 5 milliGRAM(s) Oral at bedtime  NIFEdipine  milliGRAM(s) Oral daily  pantoprazole    Tablet 40 milliGRAM(s) Oral every 12 hours  traZODone 50 milliGRAM(s) Oral at bedtime    PRN MEDICATIONS  acetaminophen     Tablet .. 650 milliGRAM(s) Oral every 6 hours PRN  calcium carbonate   1250 mG (OsCal) 1 Tablet(s) Oral every 6 hours PRN  dextrose Oral Gel 15 Gram(s) Oral once PRN      VITAL SIGNS: Last 24 Hours  T(C): 36.8 (23 May 2025 14:11), Max: 36.8 (23 May 2025 14:11)  T(F): 98.2 (23 May 2025 14:11), Max: 98.2 (23 May 2025 14:11)  HR: 89 (23 May 2025 14:11) (78 - 90)  BP: 135/70 (23 May 2025 14:11) (135/70 - 178/98)  BP(mean): 92 (23 May 2025 14:11) (92 - 125)  RR: 18 (23 May 2025 05:00) (18 - 18)  SpO2: 95% (23 May 2025 05:00) (95% - 98%)    LABS:                        6.3    10.45 )-----------( 101      ( 23 May 2025 11:15 )             18.4     05-23    135  |  98  |  65[HH]  ----------------------------<  159[H]  3.9   |  24  |  4.1[HH]    Ca    8.8      23 May 2025 11:15  Mg     2.0     05-23        Urinalysis Basic - ( 23 May 2025 11:15 )    Color: x / Appearance: x / SG: x / pH: x  Gluc: 159 mg/dL / Ketone: x  / Bili: x / Urobili: x   Blood: x / Protein: x / Nitrite: x   Leuk Esterase: x / RBC: x / WBC x   Sq Epi: x / Non Sq Epi: x / Bacteria: x                 FRANCISCO DUNLAP 39y Female  MRN#: [id number]   Hospital Day: 16d    Heme following for anemia     REVIEW OF SYMPTOMS:   denies noticing blood in stool or urine   denies headache           OBJECTIVE  PAST MEDICAL & SURGICAL HISTORY  HTN (hypertension)      ALLERGIES:  No Known Allergies    MEDICATIONS:  STANDING MEDICATIONS  atorvastatin 80 milliGRAM(s) Oral at bedtime  calcitriol   Capsule 0.25 MICROGram(s) Oral daily  chlorhexidine 2% Cloths 1 Application(s) Topical <User Schedule>  cloNIDine 0.2 milliGRAM(s) Oral every 8 hours  cyanocobalamin 1000 MICROGram(s) Oral daily  dextrose 5%. 1000 milliLiter(s) IV Continuous <Continuous>  dextrose 5%. 1000 milliLiter(s) IV Continuous <Continuous>  dextrose 50% Injectable 25 Gram(s) IV Push once  dextrose 50% Injectable 12.5 Gram(s) IV Push once  dextrose 50% Injectable 25 Gram(s) IV Push once  epoetin sergey-epbx (RETACRIT) Injectable 29375 Unit(s) SubCutaneous every 7 days  folic acid 1 milliGRAM(s) Oral daily  furosemide    Tablet 40 milliGRAM(s) Oral every 12 hours  furosemide   Injectable 40 milliGRAM(s) IV Push once  hydrALAZINE 50 milliGRAM(s) Oral every 6 hours  insulin glargine Injectable (LANTUS) 6 Unit(s) SubCutaneous at bedtime  insulin lispro (ADMELOG) corrective regimen sliding scale   SubCutaneous three times a day before meals  insulin lispro (ADMELOG) corrective regimen sliding scale   SubCutaneous at bedtime  labetalol 300 milliGRAM(s) Oral three times a day  melatonin 5 milliGRAM(s) Oral at bedtime  NIFEdipine  milliGRAM(s) Oral daily  pantoprazole    Tablet 40 milliGRAM(s) Oral every 12 hours  traZODone 50 milliGRAM(s) Oral at bedtime    PRN MEDICATIONS  acetaminophen     Tablet .. 650 milliGRAM(s) Oral every 6 hours PRN  calcium carbonate   1250 mG (OsCal) 1 Tablet(s) Oral every 6 hours PRN  dextrose Oral Gel 15 Gram(s) Oral once PRN      VITAL SIGNS: Last 24 Hours  T(C): 36.8 (23 May 2025 14:11), Max: 36.8 (23 May 2025 14:11)  T(F): 98.2 (23 May 2025 14:11), Max: 98.2 (23 May 2025 14:11)  HR: 89 (23 May 2025 14:11) (78 - 90)  BP: 135/70 (23 May 2025 14:11) (135/70 - 178/98)  BP(mean): 92 (23 May 2025 14:11) (92 - 125)  RR: 18 (23 May 2025 05:00) (18 - 18)  SpO2: 95% (23 May 2025 05:00) (95% - 98%)    LABS:                        6.3    10.45 )-----------( 101      ( 23 May 2025 11:15 )             18.4     05-23    135  |  98  |  65[HH]  ----------------------------<  159[H]  3.9   |  24  |  4.1[HH]    Ca    8.8      23 May 2025 11:15  Mg     2.0     05-23        Urinalysis Basic - ( 23 May 2025 11:15 )    Color: x / Appearance: x / SG: x / pH: x  Gluc: 159 mg/dL / Ketone: x  / Bili: x / Urobili: x   Blood: x / Protein: x / Nitrite: x   Leuk Esterase: x / RBC: x / WBC x   Sq Epi: x / Non Sq Epi: x / Bacteria: x      Gen alert orietned responds to questions appropriately   HEEnt conjunctiva pale   RS b/l equal air entry  CVS s1,s2  ABd soft   Ext edema

## 2025-05-23 NOTE — PROGRESS NOTE ADULT - ASSESSMENT
39 year old previously healthy female patient who presented to the ED on 05/06 for evaluation of generalized abdominal pain, nausea, vomiting, and black loose stools, found to have malignant HTN on arrival with evidence of leukocytosis/acute on chronic anemia/thrombocytopenia/elevated LDH and retic/low haptoglobin/schistocytes on smear/proteinuria/JOYCE on blood work and evidence of distal D/proximal J thickening with dilated jejunal loops to 3.4cm and gradual tapering distally wo SBO along with mural enhancement of SB concerning for severe enteritis VS early ischemia VS shock bowel. 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 She was also found to have age indeterminate lacunar infarct on CT 05/13. We are following for above findings.    #Age indeterminant lacunar infarct   - evaluated by Neuro, recommend MRI  - f/u whether needed inpatient vs outpatient - if needed inpatient will require sedation (consult anesthesia)  - f/u MRI with anesthesia if patient is amendable and required inpatient   - c/w ASA ,statin , BP control  - appreciate neuro, add clopidegrol 75 daily x21 days, f/u neuroendovascular OP, pt refused MR/further neuroimaging at this point  -CTH was unchanged from prior cw with ASA and plavix as per neuro      #malignant HTN  #Treatment resistant HTN of unclear etiology   - Patient had BP of 238/135 in the ED on admission   - s/p cardene gtt   - originally thought TTP/HUS picture, s/p plasmapheresis, [id number] was negative and therapy did not improve HTN  - secondary HTN workup: TSH 2.04, cortisol wnl, RA doppler (-), CT scan did not show PKD, Lupus workup negative  - Aldosterone/Renin 105/52, less likely primary aldosteronism but since patient has been treated aggressively for HTN results are confounded  - nephro on board; metanephrines wnl , ESR 45/  - Target SBP < 160  - c/w hydralazine 50mg q6h, labetalol 300mg TID, procardia 120mg qd  - Carotid duplex unremarkable  - pt kindly agreeable to MD draw of labs through midline    #JOYCE vs CKD  #proteinuria  #LE edema   -Cr 3.5 on admission unknown baseline   - has been relatively stable throughout admission   - protein/cr urine ratio 2.3  - Glomerular nephritis workup negative   - appreciate nephro recs   - on lasix 40 PO BID   - c/w calcitriol   - cw trend    #Enteritis vs Small bowel ischemia   #Melena - resolved   - physical exam benign   - patient not complaining of abdominal pain, has good PO diet, no bloody BM since 5/13    #Normocytic Anemia   #Acute on chronic MARITZA   - s/p 5U pRBC   - melena on admission, now resolved  - evaluated by heme/onc: likely MAHA 2/2 HTN  - evaluated by GI: would benefit from endoscopy, now brown stools, f/u OP  - evaluated by nephro: likely not just from CKD  - c/w epogen, hold if SBP > 170  - c/w venofer, 3 day course   - c/w folic acid, B12  - c/w PPI BID  -STAT labs sofi this AM pending results for Hgb    #Mood disorder   #delirium 2/2 to htn encephalopathy   -patient not compliant with treatment, requires frequent reorientation from staff and mother   -does not have capacity to leave AMA   -trazodone 50qhs.  - psychiatry recommendations appreciated - 5/20 f/u psych reeval found pt did not have mental insight to leave AMA    MISC  DVT ppx: ambulation   Diet: CCC/DASH  GI ppx/Bowel regimen: PPI BID   Activity: AAT  GOC: full

## 2025-05-23 NOTE — PROGRESS NOTE ADULT - SUBJECTIVE AND OBJECTIVE BOX
Patient is a 39y old  Female who presents with a chief complaint of Hypertensi (12 May 2025 14:24)      Patient seen this am   -no overnight events notified - no active bleeding   -difficult with labs and medical care - requires lot of convincing and family involvement   -hold ASA and Plavix, DAPT recommended by Neurology - neuro follow up - patient declined mri brain, repeated head ct yest with stable findings   -2 units prbc transfusion stat with lasix in between for volume overload - syeda  -check hemolytic work up - icu eval; may need plasma exchange - had plasmapharesis this admission       Vital Signs Last 24 Hrs  T(C): 36.6 (23 May 2025 05:00), Max: 36.7 (22 May 2025 22:10)  T(F): 97.9 (23 May 2025 05:00), Max: 98.1 (22 May 2025 22:10)  HR: 82 (23 May 2025 12:27) (78 - 90)  BP: 144/101 (23 May 2025 12:27) (144/101 - 178/98)  BP(mean): 115 (23 May 2025 12:27) (115 - 125)  RR: 18 (23 May 2025 05:00) (18 - 18)  SpO2: 95% (23 May 2025 05:00) (95% - 98%)    Parameters below as of 22 May 2025 22:10  Patient On (Oxygen Delivery Method): room air      PHYSICAL EXAM:  General: Not in distress - sitting up in bed   ENT: MMM  Neck: Supple, No JVD  Lungs: Clear air entry  Cardio: RRR, S1/S2, No murmurs  Abdomen: Soft abdomen     LABS:                     6.3    10.45 )-----------( 101      ( 23 May 2025 11:15 )             18.4     05-23    135  |  98  |  65[HH]  ----------------------------<  159[H]  3.9   |  24  |  4.1[HH]    Ca    8.8      23 May 2025 11:15  Mg     2.0     05-23    TPro  4.9[L]  /  Alb  3.2[L]  /  TBili  0.4  /  DBili  x   /  AST  13  /  ALT  11  /  AlkPhos  96  05-21    MEDICATIONS  (STANDING):  atorvastatin 80 milliGRAM(s) Oral at bedtime  calcitriol   Capsule 0.25 MICROGram(s) Oral daily  chlorhexidine 2% Cloths 1 Application(s) Topical <User Schedule>  cloNIDine 0.2 milliGRAM(s) Oral every 8 hours  cyanocobalamin 1000 MICROGram(s) Oral daily  dextrose 5%. 1000 milliLiter(s) (100 mL/Hr) IV Continuous <Continuous>  dextrose 5%. 1000 milliLiter(s) (50 mL/Hr) IV Continuous <Continuous>  dextrose 50% Injectable 25 Gram(s) IV Push once  dextrose 50% Injectable 12.5 Gram(s) IV Push once  dextrose 50% Injectable 25 Gram(s) IV Push once  epoetin sergey-epbx (RETACRIT) Injectable 54281 Unit(s) SubCutaneous every 7 days  folic acid 1 milliGRAM(s) Oral daily  furosemide    Tablet 40 milliGRAM(s) Oral every 12 hours  hydrALAZINE 50 milliGRAM(s) Oral every 6 hours  insulin glargine Injectable (LANTUS) 6 Unit(s) SubCutaneous at bedtime  insulin lispro (ADMELOG) corrective regimen sliding scale   SubCutaneous three times a day before meals  insulin lispro (ADMELOG) corrective regimen sliding scale   SubCutaneous at bedtime  labetalol 300 milliGRAM(s) Oral three times a day  melatonin 5 milliGRAM(s) Oral at bedtime  NIFEdipine  milliGRAM(s) Oral daily  pantoprazole    Tablet 40 milliGRAM(s) Oral every 12 hours  traZODone 50 milliGRAM(s) Oral at bedtime    MEDICATIONS  (PRN):  acetaminophen     Tablet .. 650 milliGRAM(s) Oral every 6 hours PRN Mild Pain (1 - 3)  calcium carbonate   1250 mG (OsCal) 1 Tablet(s) Oral every 6 hours PRN Heartburn  dextrose Oral Gel 15 Gram(s) Oral once PRN Blood Glucose LESS THAN 70 milliGRAM(s)/deciliter

## 2025-05-23 NOTE — PROGRESS NOTE ADULT - SUBJECTIVE AND OBJECTIVE BOX
seen andexamined  24 h events noted   no distress         PAST HISTORY  --------------------------------------------------------------------------------  No significant changes to PMH, PSH, FHx, SHx, unless otherwise noted    ALLERGIES & MEDICATIONS  --------------------------------------------------------------------------------  Allergies    No Known Allergies    Intolerances      Standing Inpatient Medications  atorvastatin 80 milliGRAM(s) Oral at bedtime  calcitriol   Capsule 0.25 MICROGram(s) Oral daily  chlorhexidine 2% Cloths 1 Application(s) Topical <User Schedule>  cloNIDine 0.2 milliGRAM(s) Oral every 8 hours  cyanocobalamin 1000 MICROGram(s) Oral daily  dextrose 5%. 1000 milliLiter(s) IV Continuous <Continuous>  dextrose 5%. 1000 milliLiter(s) IV Continuous <Continuous>  dextrose 50% Injectable 25 Gram(s) IV Push once  dextrose 50% Injectable 12.5 Gram(s) IV Push once  dextrose 50% Injectable 25 Gram(s) IV Push once  epoetin sergey-epbx (RETACRIT) Injectable 17090 Unit(s) SubCutaneous every 7 days  folic acid 1 milliGRAM(s) Oral daily  furosemide    Tablet 40 milliGRAM(s) Oral every 12 hours  hydrALAZINE 50 milliGRAM(s) Oral every 6 hours  insulin glargine Injectable (LANTUS) 6 Unit(s) SubCutaneous at bedtime  insulin lispro (ADMELOG) corrective regimen sliding scale   SubCutaneous three times a day before meals  insulin lispro (ADMELOG) corrective regimen sliding scale   SubCutaneous at bedtime  labetalol 300 milliGRAM(s) Oral three times a day  melatonin 5 milliGRAM(s) Oral at bedtime  NIFEdipine  milliGRAM(s) Oral daily  pantoprazole    Tablet 40 milliGRAM(s) Oral every 12 hours  traZODone 50 milliGRAM(s) Oral at bedtime    PRN Inpatient Medications  acetaminophen     Tablet .. 650 milliGRAM(s) Oral every 6 hours PRN  calcium carbonate   1250 mG (OsCal) 1 Tablet(s) Oral every 6 hours PRN  dextrose Oral Gel 15 Gram(s) Oral once PRN      VITALS/PHYSICAL EXAM  --------------------------------------------------------------------------------  T(C): 36.6 (05-23-25 @ 05:00), Max: 36.7 (05-22-25 @ 22:10)  HR: 82 (05-23-25 @ 12:27) (78 - 90)  BP: 144/101 (05-23-25 @ 12:27) (144/101 - 178/98)  RR: 18 (05-23-25 @ 05:00) (18 - 18)  SpO2: 95% (05-23-25 @ 05:00) (95% - 98%)  Wt(kg): --        05-22-25 @ 07:01  -  05-23-25 @ 07:00  --------------------------------------------------------  IN: 1191 mL / OUT: 1600 mL / NET: -409 mL    05-23-25 @ 07:01  -  05-23-25 @ 13:11  --------------------------------------------------------  IN: 236 mL / OUT: 250 mL / NET: -14 mL      Physical Exam:  	Gen: NAD  	Pulm: decrease BS  B/L  	CV:  S1S2; no rub  	Abd: +distended      LABS/STUDIES  --------------------------------------------------------------------------------              6.3    10.45 >-----------<  101      [05-23-25 @ 11:15]              18.4     135  |  98  |  65  ----------------------------<  159      [05-23-25 @ 11:15]  3.9   |  24  |  4.1        Ca     8.8     [05-23-25 @ 11:15]      Mg     2.0     [05-23-25 @ 11:15]    TPro  4.9  /  Alb  3.2  /  TBili  0.4  /  DBili  x   /  AST  13  /  ALT  11  /  AlkPhos  96  [05-21-25 @ 13:19]      Creatinine Trend:  SCr 4.1 [05-23 @ 11:15]  SCr 3.8 [05-21 @ 13:19]  SCr 4.0 [05-20 @ 19:27]  SCr 3.6 [05-16 @ 04:47]  SCr 3.5 [05-15 @ 04:54]    Urinalysis - [05-23-25 @ 11:15]      Color  / Appearance  / SG  / pH       Gluc 159 / Ketone   / Bili  / Urobili        Blood  / Protein  / Leuk Est  / Nitrite       RBC  / WBC  / Hyaline  / Gran  / Sq Epi  / Non Sq Epi  / Bacteria       Iron 18, TIBC 166, %sat 11      [05-07-25 @ 11:40]  Ferritin 362      [05-07-25 @ 11:40]  PTH -- (Ca --)      [05-13-25 @ 12:08]   194  PTH -- (Ca --)      [05-12-25 @ 16:48]   193  Vitamin D (25OH) 12      [05-12-25 @ 16:48]  TSH 2.05      [05-07-25 @ 11:40]  Lipid: chol 154, , HDL 37, LDL --      [05-14-25 @ 04:20]    HBsAg Nonreact      [05-07-25 @ 11:40]  HCV 0.13, Nonreact      [05-07-25 @ 11:40]  HIV Nonreact      [05-07-25 @ 11:40]    JOSE ANTONIO: titer Negative, pattern --      [05-07-25 @ 11:40]  dsDNA 2      [05-08-25 @ 19:20]  C3 Complement 106      [05-07-25 @ 11:40]  C4 Complement 22      [05-07-25 @ 11:40]  ANCA: cANCA Negative, pANCA Negative, atypical ANCA Negative      [05-07-25 @ 11:40]  anti-GBM <0.2      [05-07-25 @ 11:40]  Free Light Chains: kappa 1.98, lambda 2.29, ratio = 0.86      [05-13 @ 04:40]  Immunofixation Serum:   No Monoclonal Band Identified      Reference Range: None Detected      [05-12-25 @ 16:48]  SPEP Interpretation: Hypogammaglobulinemia      [05-12-25 @ 16:48]

## 2025-05-23 NOTE — PROGRESS NOTE ADULT - ASSESSMENT
Patient is a 40 yo Female w/ a h/o HTN presenting to ER with 5 days of multiple episodes of NBNB vomiting and diarrhea with associated generalized abdominal pain and distention.  # HTN   # JOYCE rule out ATN   # Non nephrotic range proteinuria / hematuria   # thrombocytopenia   - picture above can be due to malignant HTN  / ADAMTS 13 not low / however patient received steroids and on plasmapheresis   - normal C3 ( not in favor of a HUS)  nl C4 normal JOSE ANTONIO which rule out active lupus  -  s/p steroids , s/p  plasmapheresis   - cr stable   - document accurate UO   -  last hb noted receiving blood tx hematology and GI f/up  / on ANASTACIO  hold if bp > 170/100/ please recall hematology / will need blood tx   - last  ph at goal / no binders / check i calcium / pth noted and vit D / replete VIT D/ on calcitriol 0.25 daily   -  GN w/up negative   - follow bp readings / clonidine just increased /renin elevated / aldosterone elevated , please repeat ? due to malignant hypertension / no FOUZIA/ ?  renin secreting tumors  - renal artery dupplex no OFUZIA  Stable age-indeterminate lacunar infarct within the left external   capsule. Otherwise unremarkable head CT.followed by neurology   renal team will follow

## 2025-05-23 NOTE — PROGRESS NOTE ADULT - ASSESSMENT
IMPRESSION    Malignant hypertension   Hypertensive Emergency  Anemia - unknown etiology  ?GIB on presentation, but no visible evidence now  Thrombocytopenia - resolved  Early small bowel ischemia likely 2/2 severe enteritis   SIRS/Sepsis   Uncomplicated Cystitis   JOYCE likely pre-renal   RSV infection     PLAN:     CNS : Refusing workup. Refused MRI. CTH with lacunar infarcts and EEG normal. Neurology following    ENT : Oral Care     Pulmonary: On room air.     Cardiology: ECHO LVH. Would advise to hold one dose of anti hypertensives. Reassess to resume.   On the following anti hypertensive regimen:  Porcardia 120 XL   labetalol 300 Q8 hrs    Hydralazine 50 mg PO q6  Lasix 40 PO BID      GI : No further melena. Tolerating diet. Refused labs. Repeat CBC and lactate after transfusion     Renal: Making urine. Nephrology following. No wick. Patient declined labs. Workup for secondary hypertension causes     Hem/Onc : No evidence of TTP. Off plasmapheresis. Last CBC from the 17th. Platelets back to normal.    Required pRBCs every 3-4 days. No visible evidence of any bleeding. Hematology following     Endo:  Blood glucose Goal : 140-180.     MSK: Ambulate as tolerated     Code : Full code.      Dispo: Medical floor.     Patient refusing workup; labs; monitoring.     Evaluated by psych; has capacity.     In current state she is stable, will not benefit from MICU, if condition changes, please recall  For any questions or clarifications during regular hours, please do not hesitate to call or text me.    For after hours and weekends, please call the MICU fellow at [phone].

## 2025-05-23 NOTE — PROGRESS NOTE ADULT - ATTENDING SUPERVISION STATEMENT
Fellow
Resident
Fellow

## 2025-05-23 NOTE — PROGRESS NOTE ADULT - TIME BILLING
direct patient care and chart review  -coordinated current plan of care with medical staff on MDR direct patient care and chart review  -coordinated current plan of care with medical staff on MDR    * above timings exclude teaching residents

## 2025-05-23 NOTE — PROGRESS NOTE ADULT - ASSESSMENT
Doron consulted for normocytic anemia and thrombocytopenia.   Hospital course complicated by small bowel ischemia likely 2/2 severe enteritis, malignant hypertension, SIRS/Sepsis ,Uncomplicated Cystitis          #Micro-angiopathic hemolytic anemia   #Thrombocytopenia   - No prior labs for baseline  - Normal coags  -labs on admission were concerning for TTP/HUS ( hapto 36, ldh 739) Peripheral smear reviewed 5.7.25, noted schistocytosis 5-6/HPF and polychromasia and Plasmic score 5, intermediate probability of TTP. She was started on PLEX. Last session on 5.9.25. LELA-TS resulted as 57 and PLEX was dc'd.   - Borderline B12 and low folate  - Iron sat 11, TIBC 166, ferritin 362. SPEP hypogammaglobulinemia   - started on folic acid,b12, received 500mg total venofer   - unable to give EPO due to HTN     #Age indeterminate  lacunar stroke  refused MRI   neurology following     #HTN emergency    #JOYCE   nephrology following     Plan:  She received PLEX initially due to concern for TTP which was ruled out with LELA-TS levels. Microangiopathic hemolytic anemia attributed to HTN emergency. On Repeat labs, haptoglobin, Bili were WNL. LDH downtrended. Hence anemia attributed to being multifactorial from folate def, b12 def, anemia of kidney disease. She received venofer but has not been able to receive EPO due to elevated BP.   C/w folic acid and b12  EPO when able  F/up nephro for eval for secondary causes of HTN   Outpatient follow up with Doron    Doron consulted for normocytic anemia and thrombocytopenia.   Hospital course complicated by small bowel ischemia likely 2/2 severe enteritis, malignant hypertension, SIRS/Sepsis ,Uncomplicated Cystitis        #Anemia (multifactorial)  #Thrombocytopenia   - No prior labs for baseline  - Normal coags  -labs on admission were concerning for TTP/HUS ( hapto 36, ldh 739) Peripheral smear reviewed 5.7.25, noted schistocytosis 5-6/HPF and polychromasia and Plasmic score 5, intermediate probability of TTP. She was started on PLEX. Last session on 5.9.25. LELA-TS resulted as 57 and PLEX was dc'd.   - Borderline B12 and low folate  - Iron sat 11, TIBC 166, ferritin 362. SPEP hypogammaglobulinemia   - started on folic acid,b12, received 500mg total venofer   - unable to give EPO due to HTN     #Age indeterminate  lacunar stroke  refused MRI   neurology following     #HTN emergency    #JOYCE   nephrology following     Plan:  She received PLEX initially due to concern for TTP which was ruled out with LELA-TS levels. Microangiopathic hemolytic anemia attributed to HTN emergency. On Repeat labs, haptoglobin, Bili were WNL. LDH downtrended.   Peripheral smear reviewed today 1 or 2 schistocytes in a 1-2 HPF. Anisocytosis noted. No polychromasia or retic seen. hence no concern for hemolysis on smear. NO INDICATION FOR PLEX.   Anemia  multifactorial from folate def, b12 def, anemia of kidney disease. She received venofer but has not been able to receive EPO due to elevated BP.   Can repeat hemolysis labs  C/w folic acid and b12  EPO when able  transfuse for hb <7  Obtain LE duplex   F/up nephro for eval for secondary causes of HTN   Outpatient follow up with Doron

## 2025-05-23 NOTE — PROGRESS NOTE ADULT - SUBJECTIVE AND OBJECTIVE BOX
SUBJECTIVE/OVERNIGHT EVENTS  Today is hospital day 15d. This morning patient was seen and examined at bedside, resting comfortably in bed. No acute or major events overnight.    HOSPITAL COURSE  Day 1:   Day 2:   Day 3:     Hypertensive emergency    Handoff    MEWS Score    HTN (hypertension)    Delirium    Metabolic encephalopathy    Hypertensive emergency    History of depression    ABD PAIN    90+    JOYCE (acute kidney injury)    Small bowel ischemia    SysAdmin_VisitLink        MEDICATIONS  STANDING MEDICATIONS  aspirin  chewable 81 milliGRAM(s) Oral daily  atorvastatin 80 milliGRAM(s) Oral at bedtime  calcitriol   Capsule 0.25 MICROGram(s) Oral daily  chlorhexidine 2% Cloths 1 Application(s) Topical <User Schedule>  cloNIDine 0.2 milliGRAM(s) Oral every 8 hours  clopidogrel Tablet 75 milliGRAM(s) Oral daily  cyanocobalamin 1000 MICROGram(s) Oral daily  dextrose 5%. 1000 milliLiter(s) IV Continuous <Continuous>  dextrose 5%. 1000 milliLiter(s) IV Continuous <Continuous>  dextrose 50% Injectable 25 Gram(s) IV Push once  dextrose 50% Injectable 12.5 Gram(s) IV Push once  dextrose 50% Injectable 25 Gram(s) IV Push once  epoetin sergey-epbx (RETACRIT) Injectable 71809 Unit(s) SubCutaneous every 7 days  folic acid 1 milliGRAM(s) Oral daily  furosemide    Tablet 40 milliGRAM(s) Oral every 12 hours  hydrALAZINE 50 milliGRAM(s) Oral every 6 hours  insulin glargine Injectable (LANTUS) 6 Unit(s) SubCutaneous at bedtime  insulin lispro (ADMELOG) corrective regimen sliding scale   SubCutaneous three times a day before meals  insulin lispro (ADMELOG) corrective regimen sliding scale   SubCutaneous at bedtime  labetalol 300 milliGRAM(s) Oral three times a day  melatonin 5 milliGRAM(s) Oral at bedtime  NIFEdipine  milliGRAM(s) Oral daily  pantoprazole    Tablet 40 milliGRAM(s) Oral every 12 hours  traZODone 50 milliGRAM(s) Oral at bedtime    PRN MEDICATIONS  acetaminophen     Tablet .. 650 milliGRAM(s) Oral every 6 hours PRN  calcium carbonate   1250 mG (OsCal) 1 Tablet(s) Oral every 6 hours PRN  dextrose Oral Gel 15 Gram(s) Oral once PRN    VITALS  T(F): 98.5 (05-22-25 @ 04:54), Max: 98.5 (05-22-25 @ 04:54)  HR: 84 (05-22-25 @ 04:54) (79 - 91)  BP: 150/80 (05-22-25 @ 04:54) (150/80 - 195/104)  RR: 18 (05-22-25 @ 04:54) (18 - 18)  SpO2: 95% (05-22-25 @ 04:54) (95% - 95%)  POCT Blood Glucose.: 133 mg/dL (05-22-25 @ 07:45)    PHYSICAL EXAM  GENERAL  ( x ) Uncooperative     (  ) obtunded     (  ) lethargic     (  ) somnolent    HEART  Rate -->  ( x ) normal rate    (  ) bradycardic    (  ) tachycardic  Rhythm -->  (  ) regular    (  ) regularly irregular    (  ) irregularly irregular  Murmurs -->  (  ) normal s1/s2    (  ) systolic murmur    (  ) diastolic murmur    (  ) continuous murmur     (  ) S3 present    (  ) S4 present    LUNGS  ( x )Unlabored respirations     (  ) tachypnea  (  ) B/L air entry     (  ) decreased breath sounds in:  (location     )    (  ) no adventitious sound     (  ) crackles     (  ) wheezing      (  ) rhonchi      (specify location:       )  (  ) chest wall tenderness (specify location:       )    ABDOMEN  ( x ) Soft     (  ) tense   |   (x  ) nondistended     (  ) distended   |   (  ) +BS     (  ) hypoactive bowel sounds     (  ) hyperactive bowel sounds  ( x ) nontender     (  ) RUQ tenderness     (  ) RLQ tenderness     (  ) LLQ tenderness     (  ) epigastric tenderness     (  ) diffuse tenderness  (  ) Splenomegaly      (  ) Hepatomegaly      (  ) Jaundice     (  ) ecchymosis     EXTREMITIES  (  ) Normal     (  ) Rash     (  ) ecchymosis     (  ) varicose veins      ( x ) pitting edema     (  ) non-pitting edema   (  ) ulceration     (  ) gangrene:     (location:     )    NERVOUS SYSTEM  ( x ) A&Ox3     (  ) confused     (  ) lethargic  CN II-XII:     (  ) Intact     (  ) focal deficits  (Specify:     )   Upper extremities:     (  ) strength X/5     (  ) focal deficit (specify:    )  Lower extremities:     (  ) strength  X/5    (  ) focal deficit (specify:    )          LABS             7.2    12.03 )-----------( 120      ( 05-21-25 @ 13:19 )             21.4     140  |  105  |  61  -------------------------<  155   05-21-25 @ 13:19  3.5  |  23  |  3.8    Ca      8.8     05-21-25 @ 13:19    TPro  4.9  /  Alb  3.2  /  TBili  0.4  /  DBili  x   /  AST  13  /  ALT  11  /  AlkPhos  96  /  GGT  x     05-21-25 @ 13:19        Urinalysis Basic - ( 21 May 2025 13:19 )    Color: x / Appearance: x / SG: x / pH: x  Gluc: 155 mg/dL / Ketone: x  / Bili: x / Urobili: x   Blood: x / Protein: x / Nitrite: x   Leuk Esterase: x / RBC: x / WBC x   Sq Epi: x / Non Sq Epi: x / Bacteria: x          IMAGING

## 2025-05-23 NOTE — PROGRESS NOTE ADULT - ASSESSMENT
Case of a 39 year old previously healthy female patient who presented to the ED on 05/06 for evaluation of generalized abdominal pain, nausea, vomiting, and black loose stools, found to have malignant HTN on arrival with evidence of leukocytosis/acute on chronic anemia/thrombocytopenia/elevated LDH and retic/low haptoglobin/schistocytes on smear/proteinuria/JOYCE on blood work and evidence of distal D/proximal J thickening with dilated jejunal loops to 3.4cm and gradual tapering distally wo SBO along with mural enhancement of SB concerning for severe enteritis VS early ischemia VS shock bowel. 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 She was also found to have age indeterminate lacunar infarct on CT 05/13. We are following for above findings.    #malignant HTN - still elevated BPs  #Treatment resistant HTN of unclear etiology   - Patient had BP of 238/135 in the ED on admission   - s/p cardene gtt   - originally thought TTP/HUS picture, s/p plasmapheresis, [id number] was negative and therapy did not improve HTN  - secondary HTN workup: TSH 2.04, cortisol wnl, RA doppler (-), CT scan did not show PKD, Lupus workup negative  - Aldosterone/Renin 105/52, less likely primary aldosteronism but since patient has been treated aggressively for HTN results are confounded  - Target SBP < 160  - c/w hydralazine 50mg q6h, labetalol 300mg TID, procardia 120mg qd, lasix 40mg twice daily - increased clonidine to three times daily, iv hydralazine prn     #JOYCE vs CKD  #proteinuria  #LE edema   -Cr 3.5 on admission unknown baseline   - has been relatively stable throughout admission   -protein/cr urine ratio 2.3  -Glomerular nephritis workup negative   - appreciate nephro recs - following the case   - on Lasix 40mg PO BID - serum Cr ~ 4- will reach out to nephro to inform need for current lasix  (hold and or continue - will follow; clinically would need diuresis ondina in lieu of blood transfusion - check cxr STAT)  - c/w calcitriol     #Age indeterminant lacunar infarct   - evaluated by Neuro  - patient declined MRI brain   - on ASA, added Plavix 5/21 as per Neuro reccs - low hb ~ 6, transfuse 2 units with lasix doses in between, head CT negative for bleed 5/22 and stable findings - informed neuro about current acute anemia and holding DAPT  - c/w statin    - BP control - risk of hemorrhagic conversion with elevated sbp     #Melena - resolved   -patient not complaining of abdominal pain, has good oral intake, no bloody BM since 5/13 - eating home cooked meal     #Normocytic Anemia   #Acute on chronic MARITZA - hb ~ 6 this am  - s/p 5U pRBC this admission  - melena on initial presentation, now resolved  - evaluated by heme/onc: likely MAHA 2/2 HTN  - recall GI in lieu of current acute anemia   - evaluated by nephro: likely not just from CKD  - c/w epogen, hold if SBP > 170  - received venofer  - transfuse 2 units prbcs + check cbc with smear, retic count, LDH, Haptoglobin, TAMIA test - ICU eval if hemolytic anemia ? plasma exchange if indicated - heme/onc follow up  - c/w folic acid, B12  - c/w PPI BID    #Mood disorder   #delirium 2/2 to htn encephalopathy   -patient not compliant with treatment, requires frequent reorientation from staff and mother   -does not have capacity to leave AMA   -trazodone 50qhs.  - psychiatry recommendations appreciated - 5/20 psych re eval found pt did not have mental insight to leave AMA - so far agreeable for inpatient stay but difficult with lab work (requires lot of requesting/convincing for blood draws - mother assists)    MISC  DVT ppx: ambulation   Diet: CCC/DASH  GI ppx/Bowel regimen: PPI BID   Activity: AAT  GOC: full     Handoff/DISPO: not discharge ready - acute anemia hb 6 - malignant resistant HTN, meds adjustment, neurology follow up - serial cbcs, may need to be upgraded if hemolytic anemia - I will be off tomorrow and will see the patient on Sunday if pt remains on 3C floor   -spoke to patient - updated mother    Attending Physician Dr. Herminia Daley # [id number]  Case of a 39 year old previously healthy female patient who presented to the ED on 05/06 for evaluation of generalized abdominal pain, nausea, vomiting, and black loose stools, found to have malignant HTN on arrival with evidence of leukocytosis/acute on chronic anemia/thrombocytopenia/elevated LDH and retic/low haptoglobin/schistocytes on smear/proteinuria/JOYCE on blood work and evidence of distal D/proximal J thickening with dilated jejunal loops to 3.4cm and gradual tapering distally wo SBO along with mural enhancement of SB concerning for severe enteritis VS early ischemia VS shock bowel. 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 She was also found to have age indeterminate lacunar infarct on CT 05/13. We are following for above findings.    #malignant HTN - still elevated BPs  #Treatment resistant HTN of unclear etiology   - Patient had BP of 238/135 in the ED on admission   - s/p cardene gtt   - originally thought TTP/HUS picture, s/p plasmapheresis, [id number] was negative and therapy did not improve HTN  - secondary HTN workup: TSH 2.04, cortisol wnl, RA doppler (-), CT scan did not show PKD, Lupus workup negative  - Aldosterone/Renin 105/52, less likely primary aldosteronism but since patient has been treated aggressively for HTN results are confounded  - Target SBP < 160  - c/w hydralazine 50mg q6h, labetalol 300mg TID, procardia 120mg qd, lasix 40mg twice daily - increased clonidine to three times daily, iv hydralazine prn     #JOYCE vs CKD  #proteinuria  #LE edema   -Cr 3.5 on admission unknown baseline   - has been relatively stable throughout admission   -protein/cr urine ratio 2.3  -Glomerular nephritis workup negative   - appreciate nephro recs - following the case   - on Lasix 40mg PO BID - serum Cr ~ 4- discussed with Dr. Felipe/nephro - ok to continue with diuresis for now; clinically would need lasix ondina in lieu of blood transfusions - check cxr STAT)  - c/w calcitriol     #Age indeterminant lacunar infarct   - evaluated by Neuro  - patient declined MRI brain   - on ASA, added Plavix 5/21 as per Neuro reccs - low hb ~ 6, transfuse 2 units with lasix doses in between, head CT negative for bleed 5/22 and stable findings - informed neuro about current acute anemia and holding DAPT  - c/w statin    - BP control - risk of hemorrhagic conversion with elevated sbp     #Melena - resolved   -patient not complaining of abdominal pain, has good oral intake, no bloody BM since 5/13 - eating home cooked meal     #Normocytic Anemia   #Acute on chronic MARITZA - hb ~ 6 this am  - s/p 5U pRBC this admission  - melena on initial presentation, now resolved  - evaluated by heme/onc: likely MAHA 2/2 HTN  - recall GI in lieu of current acute anemia   - evaluated by nephro: likely not just from CKD  - c/w epogen, hold if SBP > 170  - received venofer  - transfuse 2 units prbcs + check cbc with smear, retic count, LDH, Haptoglobin, TAMIA test - ICU eval if hemolytic anemia ? plasma exchange if indicated - heme/onc follow up  - c/w folic acid, B12  - c/w PPI BID    #Mood disorder   #delirium 2/2 to htn encephalopathy   -patient not compliant with treatment, requires frequent reorientation from staff and mother   -does not have capacity to leave AMA   -trazodone 50qhs.  - psychiatry recommendations appreciated - 5/20 psych re eval found pt did not have mental insight to leave AMA - so far agreeable for inpatient stay but difficult with lab work (requires lot of requesting/convincing for blood draws - mother assists)    MISC  DVT ppx: ambulation   Diet: CCC/DASH  GI ppx/Bowel regimen: PPI BID   Activity: AAT  GOC: full - prognosis guarded     Handoff/DISPO: not discharge ready - acute anemia hb 6 - malignant resistant HTN, meds adjustment, neurology follow up - serial cbcs, may need to be upgraded if hemolytic anemia - I will be off tomorrow and will see the patient on Sunday if pt remains on 3C floor   -I discussed the case with Nephro Dr. Felipe   -Also, discussed with critical care fellow who had the patient in ICU - upgrade requested; will see the patient and let the medical team know - for now c/w care on 3C  -spoke to patient - updated mother    *medical intern will recall GI, Heme/onc, Neurology    Attending Physician Dr. Herminia Daley # [id number]

## 2025-05-23 NOTE — CHART NOTE - NSCHARTNOTEFT_GEN_A_CORE
Patient repeat CBC was 6.3 from 7.2 HDS stable at this time. There is concern for hemolysis 2/2 HTN. Patient was previously in ICU requiring 5 uprbcs. TTP was ruled out at that time however still questionable. ICU fellow made aware and will see patient. Heme/onc was informed recommending repeat hemolytic panel and peripheral smear. Will give 2 unit prbc and lasix in between

## 2025-05-23 NOTE — PROGRESS NOTE ADULT - SUBJECTIVE AND OBJECTIVE BOX
Patient is a 39y old  Female who presents with a chief complaint of hypertension (23 May 2025 13:02)        Over Night Events: Comfortably lying in bed with no complaints. on RA. SBP 120s. Getting pRBC        ROS:     All ROS are negative except HPI         PHYSICAL EXAM    ICU Vital Signs Last 24 Hrs  T(C): 36.8 (23 May 2025 14:11), Max: 36.8 (23 May 2025 14:11)  T(F): 98.2 (23 May 2025 14:11), Max: 98.2 (23 May 2025 14:11)  HR: 89 (23 May 2025 14:11) (78 - 90)  BP: 135/70 (23 May 2025 14:11) (135/70 - 178/98)  BP(mean): 92 (23 May 2025 14:11) (92 - 125)  ABP: --  ABP(mean): --  RR: 18 (23 May 2025 05:00) (18 - 18)  SpO2: 95% (23 May 2025 05:00) (95% - 98%)    O2 Parameters below as of 22 May 2025 22:10  Patient On (Oxygen Delivery Method): room air              ENT: Airway patent,  EYES: Pupils equal, Round and reactive to light.  CARDIAC: Normal rate, Regular rhythm.    RESPIRATORY: No wheezing, Bilateral BS, Not tachypneic, No use of accessory muscles  GASTROINTESTINAL: Abdomen soft, Non-tender, No guarding,   NEUROLOGICAL: Alert and oriented   SKIN: Skin normal color for race, Warm and dry and intact.         05-22-25 @ 07:01  -  05-23-25 @ 07:00  --------------------------------------------------------  IN:    Oral Fluid: 1191 mL  Total IN: 1191 mL    OUT:    Voided (mL): 1600 mL  Total OUT: 1600 mL    Total NET: -409 mL      05-23-25 @ 07:01  -  05-23-25 @ 15:28  --------------------------------------------------------  IN:    Oral Fluid: 236 mL  Total IN: 236 mL    OUT:    Voided (mL): 250 mL  Total OUT: 250 mL    Total NET: -14 mL          LABS:                            6.3    10.45 )-----------( 101      ( 23 May 2025 11:15 )             18.4                                               05-23    135  |  98  |  65[HH]  ----------------------------<  159[H]  3.9   |  24  |  4.1[HH]    Ca    8.8      23 May 2025 11:15  Mg     2.0     05-23                                               Urinalysis Basic - ( 23 May 2025 11:15 )    Color: x / Appearance: x / SG: x / pH: x  Gluc: 159 mg/dL / Ketone: x  / Bili: x / Urobili: x   Blood: x / Protein: x / Nitrite: x   Leuk Esterase: x / RBC: x / WBC x   Sq Epi: x / Non Sq Epi: x / Bacteria: x                                                                                                                                                                                MEDICATIONS  (STANDING):  atorvastatin 80 milliGRAM(s) Oral at bedtime  calcitriol   Capsule 0.25 MICROGram(s) Oral daily  chlorhexidine 2% Cloths 1 Application(s) Topical <User Schedule>  cloNIDine 0.2 milliGRAM(s) Oral every 8 hours  cyanocobalamin 1000 MICROGram(s) Oral daily  dextrose 5%. 1000 milliLiter(s) (100 mL/Hr) IV Continuous <Continuous>  dextrose 5%. 1000 milliLiter(s) (50 mL/Hr) IV Continuous <Continuous>  dextrose 50% Injectable 25 Gram(s) IV Push once  dextrose 50% Injectable 12.5 Gram(s) IV Push once  dextrose 50% Injectable 25 Gram(s) IV Push once  epoetin sergey-epbx (RETACRIT) Injectable 26954 Unit(s) SubCutaneous every 7 days  folic acid 1 milliGRAM(s) Oral daily  furosemide    Tablet 40 milliGRAM(s) Oral every 12 hours  furosemide   Injectable 40 milliGRAM(s) IV Push once  insulin glargine Injectable (LANTUS) 6 Unit(s) SubCutaneous at bedtime  insulin lispro (ADMELOG) corrective regimen sliding scale   SubCutaneous three times a day before meals  insulin lispro (ADMELOG) corrective regimen sliding scale   SubCutaneous at bedtime  labetalol 300 milliGRAM(s) Oral three times a day  melatonin 5 milliGRAM(s) Oral at bedtime  NIFEdipine  milliGRAM(s) Oral daily  pantoprazole    Tablet 40 milliGRAM(s) Oral every 12 hours  traZODone 50 milliGRAM(s) Oral at bedtime    MEDICATIONS  (PRN):  acetaminophen     Tablet .. 650 milliGRAM(s) Oral every 6 hours PRN Mild Pain (1 - 3)  calcium carbonate   1250 mG (OsCal) 1 Tablet(s) Oral every 6 hours PRN Heartburn  dextrose Oral Gel 15 Gram(s) Oral once PRN Blood Glucose LESS THAN 70 milliGRAM(s)/deciliter

## 2025-05-24 LAB
ALBUMIN SERPL ELPH-MCNC: 3.8 G/DL — SIGNIFICANT CHANGE UP (ref 3.5–5.2)
ALP SERPL-CCNC: 97 U/L — SIGNIFICANT CHANGE UP (ref 30–115)
ALT FLD-CCNC: 11 U/L — SIGNIFICANT CHANGE UP (ref 0–41)
ANION GAP SERPL CALC-SCNC: 18 MMOL/L — HIGH (ref 7–14)
AST SERPL-CCNC: 12 U/L — SIGNIFICANT CHANGE UP (ref 0–41)
BASOPHILS # BLD AUTO: 0.04 K/UL — SIGNIFICANT CHANGE UP (ref 0–0.2)
BASOPHILS NFR BLD AUTO: 0.3 % — SIGNIFICANT CHANGE UP (ref 0–1)
BILIRUB SERPL-MCNC: 0.5 MG/DL — SIGNIFICANT CHANGE UP (ref 0.2–1.2)
BUN SERPL-MCNC: 69 MG/DL — CRITICAL HIGH (ref 10–20)
CALCIUM SERPL-MCNC: 8.9 MG/DL — SIGNIFICANT CHANGE UP (ref 8.4–10.5)
CHLORIDE SERPL-SCNC: 99 MMOL/L — SIGNIFICANT CHANGE UP (ref 98–110)
CO2 SERPL-SCNC: 22 MMOL/L — SIGNIFICANT CHANGE UP (ref 17–32)
CREAT SERPL-MCNC: 4.2 MG/DL — CRITICAL HIGH (ref 0.7–1.5)
EGFR: 13 ML/MIN/1.73M2 — LOW
EGFR: 13 ML/MIN/1.73M2 — LOW
EOSINOPHIL # BLD AUTO: 0.12 K/UL — SIGNIFICANT CHANGE UP (ref 0–0.7)
EOSINOPHIL NFR BLD AUTO: 1 % — SIGNIFICANT CHANGE UP (ref 0–8)
GLUCOSE SERPL-MCNC: 140 MG/DL — HIGH (ref 70–99)
HAPTOGLOB SERPL-MCNC: 82 MG/DL — SIGNIFICANT CHANGE UP (ref 34–200)
HCT VFR BLD CALC: 26.5 % — LOW (ref 37–47)
HGB BLD-MCNC: 9.7 G/DL — LOW (ref 12–16)
IMM GRANULOCYTES NFR BLD AUTO: 0.8 % — HIGH (ref 0.1–0.3)
LDH SERPL L TO P-CCNC: 344 — HIGH (ref 50–242)
LYMPHOCYTES # BLD AUTO: 1.21 K/UL — SIGNIFICANT CHANGE UP (ref 1.2–3.4)
LYMPHOCYTES # BLD AUTO: 10.1 % — LOW (ref 20.5–51.1)
MAGNESIUM SERPL-MCNC: 2.2 MG/DL — SIGNIFICANT CHANGE UP (ref 1.8–2.4)
MCHC RBC-ENTMCNC: 34.2 PG — HIGH (ref 27–31)
MCHC RBC-ENTMCNC: 36.6 G/DL — SIGNIFICANT CHANGE UP (ref 32–37)
MCV RBC AUTO: 93.3 FL — SIGNIFICANT CHANGE UP (ref 81–99)
MONOCYTES # BLD AUTO: 0.61 K/UL — HIGH (ref 0.1–0.6)
MONOCYTES NFR BLD AUTO: 5.1 % — SIGNIFICANT CHANGE UP (ref 1.7–9.3)
NEUTROPHILS # BLD AUTO: 9.95 K/UL — HIGH (ref 1.4–6.5)
NEUTROPHILS NFR BLD AUTO: 82.7 % — HIGH (ref 42.2–75.2)
NRBC BLD AUTO-RTO: 0 /100 WBCS — SIGNIFICANT CHANGE UP (ref 0–0)
PLATELET # BLD AUTO: 134 K/UL — SIGNIFICANT CHANGE UP (ref 130–400)
PMV BLD: 10.5 FL — HIGH (ref 7.4–10.4)
POTASSIUM SERPL-MCNC: 3.8 MMOL/L — SIGNIFICANT CHANGE UP (ref 3.5–5)
POTASSIUM SERPL-SCNC: 3.8 MMOL/L — SIGNIFICANT CHANGE UP (ref 3.5–5)
PROT SERPL-MCNC: 5.9 G/DL — LOW (ref 6–8)
RBC # BLD: 2.84 M/UL — LOW (ref 4.2–5.4)
RBC # BLD: 2.84 M/UL — LOW (ref 4.2–5.4)
RBC # FLD: 14.9 % — HIGH (ref 11.5–14.5)
RETICS #: 99.7 K/UL — SIGNIFICANT CHANGE UP (ref 25–125)
RETICS/RBC NFR: 3.5 % — HIGH (ref 0.5–1.5)
SODIUM SERPL-SCNC: 139 MMOL/L — SIGNIFICANT CHANGE UP (ref 135–146)
WBC # BLD: 12.03 K/UL — HIGH (ref 4.8–10.8)
WBC # FLD AUTO: 12.03 K/UL — HIGH (ref 4.8–10.8)

## 2025-05-24 PROCEDURE — 99232 SBSQ HOSP IP/OBS MODERATE 35: CPT

## 2025-05-24 RX ORDER — SODIUM CHLORIDE 9 G/1000ML
1000 INJECTION, SOLUTION INTRAVENOUS
Refills: 0 | Status: DISCONTINUED | OUTPATIENT
Start: 2025-05-24 | End: 2025-05-27

## 2025-05-24 RX ADMIN — FOLIC ACID 1 MILLIGRAM(S): 1 TABLET ORAL at 12:36

## 2025-05-24 RX ADMIN — FUROSEMIDE 40 MILLIGRAM(S): 10 INJECTION INTRAMUSCULAR; INTRAVENOUS at 17:36

## 2025-05-24 RX ADMIN — Medication 0.2 MILLIGRAM(S): at 22:35

## 2025-05-24 RX ADMIN — Medication 120 MILLIGRAM(S): at 08:03

## 2025-05-24 RX ADMIN — LABETALOL HYDROCHLORIDE 300 MILLIGRAM(S): 200 TABLET, FILM COATED ORAL at 14:21

## 2025-05-24 RX ADMIN — Medication 0.2 MILLIGRAM(S): at 08:04

## 2025-05-24 RX ADMIN — Medication 40 MILLIGRAM(S): at 08:04

## 2025-05-24 RX ADMIN — Medication 50 MILLIGRAM(S): at 22:35

## 2025-05-24 RX ADMIN — CYANOCOBALAMIN 1000 MICROGRAM(S): 1000 INJECTION INTRAMUSCULAR; SUBCUTANEOUS at 12:36

## 2025-05-24 RX ADMIN — CALCITRIOL 0.25 MICROGRAM(S): 0.5 CAPSULE, GELATIN COATED ORAL at 12:36

## 2025-05-24 RX ADMIN — FUROSEMIDE 40 MILLIGRAM(S): 10 INJECTION INTRAMUSCULAR; INTRAVENOUS at 08:04

## 2025-05-24 RX ADMIN — Medication 5 MILLIGRAM(S): at 22:35

## 2025-05-24 RX ADMIN — LABETALOL HYDROCHLORIDE 300 MILLIGRAM(S): 200 TABLET, FILM COATED ORAL at 08:03

## 2025-05-24 RX ADMIN — ATORVASTATIN CALCIUM 80 MILLIGRAM(S): 80 TABLET, FILM COATED ORAL at 22:35

## 2025-05-24 RX ADMIN — Medication 0.2 MILLIGRAM(S): at 14:21

## 2025-05-24 RX ADMIN — LABETALOL HYDROCHLORIDE 300 MILLIGRAM(S): 200 TABLET, FILM COATED ORAL at 22:35

## 2025-05-24 RX ADMIN — Medication 40 MILLIGRAM(S): at 17:37

## 2025-05-24 NOTE — PROGRESS NOTE ADULT - SUBJECTIVE AND OBJECTIVE BOX
SUBJECTIVE/OVERNIGHT EVENTS  Today is hospital day 16d. This morning patient was seen and examined at bedside, resting comfortably in bed. No acute or major events overnight.    HOSPITAL COURSE    Hypertensive emergency    Handoff    MEWS Score    HTN (hypertension)    Delirium    Metabolic encephalopathy    Hypertensive emergency    History of depression    ABD PAIN    90+    JOYCE (acute kidney injury)    Small bowel ischemia    SysAdmin_VisitLink        MEDICATIONS  STANDING MEDICATIONS  aspirin  chewable 81 milliGRAM(s) Oral daily  atorvastatin 80 milliGRAM(s) Oral at bedtime  calcitriol   Capsule 0.25 MICROGram(s) Oral daily  chlorhexidine 2% Cloths 1 Application(s) Topical <User Schedule>  cloNIDine 0.2 milliGRAM(s) Oral every 8 hours  clopidogrel Tablet 75 milliGRAM(s) Oral daily  cyanocobalamin 1000 MICROGram(s) Oral daily  dextrose 5%. 1000 milliLiter(s) IV Continuous <Continuous>  dextrose 5%. 1000 milliLiter(s) IV Continuous <Continuous>  dextrose 50% Injectable 25 Gram(s) IV Push once  dextrose 50% Injectable 12.5 Gram(s) IV Push once  dextrose 50% Injectable 25 Gram(s) IV Push once  epoetin sergey-epbx (RETACRIT) Injectable 45813 Unit(s) SubCutaneous every 7 days  folic acid 1 milliGRAM(s) Oral daily  furosemide    Tablet 40 milliGRAM(s) Oral every 12 hours  hydrALAZINE 50 milliGRAM(s) Oral every 6 hours  insulin glargine Injectable (LANTUS) 6 Unit(s) SubCutaneous at bedtime  insulin lispro (ADMELOG) corrective regimen sliding scale   SubCutaneous three times a day before meals  insulin lispro (ADMELOG) corrective regimen sliding scale   SubCutaneous at bedtime  labetalol 300 milliGRAM(s) Oral three times a day  melatonin 5 milliGRAM(s) Oral at bedtime  NIFEdipine  milliGRAM(s) Oral daily  pantoprazole    Tablet 40 milliGRAM(s) Oral every 12 hours  traZODone 50 milliGRAM(s) Oral at bedtime    PRN MEDICATIONS  acetaminophen     Tablet .. 650 milliGRAM(s) Oral every 6 hours PRN  calcium carbonate   1250 mG (OsCal) 1 Tablet(s) Oral every 6 hours PRN  dextrose Oral Gel 15 Gram(s) Oral once PRN    VITALS  Vital Signs Last 24 Hrs  T(C): 36.8 (24 May 2025 05:22), Max: 36.9 (23 May 2025 20:07)  T(F): 98.3 (24 May 2025 05:22), Max: 98.4 (23 May 2025 20:07)  HR: 81 (24 May 2025 05:22) (78 - 89)  BP: 118/102 (24 May 2025 05:22) (110/69 - 144/101)  BP(mean): 128 (24 May 2025 05:22) (83 - 128)  RR: 18 (24 May 2025 05:22) (18 - 18)  SpO2: 96% (24 May 2025 05:22) (96% - 96%)    Parameters below as of 23 May 2025 21:50  Patient On (Oxygen Delivery Method): room air        PHYSICAL EXAM  GENERAL  ( x ) Uncooperative     (  ) obtunded     (  ) lethargic     (  ) somnolent    HEART  Rate -->  ( x ) normal rate    (  ) bradycardic    (  ) tachycardic  Rhythm -->  (  ) regular    (  ) regularly irregular    (  ) irregularly irregular  Murmurs -->  (  ) normal s1/s2    (  ) systolic murmur    (  ) diastolic murmur    (  ) continuous murmur     (  ) S3 present    (  ) S4 present    LUNGS  ( x )Unlabored respirations     (  ) tachypnea  (  ) B/L air entry     (  ) decreased breath sounds in:  (location     )    (  ) no adventitious sound     (  ) crackles     (  ) wheezing      (  ) rhonchi      (specify location:       )  (  ) chest wall tenderness (specify location:       )    ABDOMEN  ( x ) Soft     (  ) tense   |   (x  ) nondistended     (  ) distended   |   (  ) +BS     (  ) hypoactive bowel sounds     (  ) hyperactive bowel sounds  ( x ) nontender     (  ) RUQ tenderness     (  ) RLQ tenderness     (  ) LLQ tenderness     (  ) epigastric tenderness     (  ) diffuse tenderness  (  ) Splenomegaly      (  ) Hepatomegaly      (  ) Jaundice     (  ) ecchymosis     EXTREMITIES  (  ) Normal     (  ) Rash     (  ) ecchymosis     (  ) varicose veins      ( x ) pitting edema     (  ) non-pitting edema   (  ) ulceration     (  ) gangrene:     (location:     )    NERVOUS SYSTEM  ( x ) A&Ox3     (  ) confused     (  ) lethargic  CN II-XII:     (  ) Intact     (  ) focal deficits  (Specify:     )   Upper extremities:     (  ) strength X/5     (  ) focal deficit (specify:    )  Lower extremities:     (  ) strength  X/5    (  ) focal deficit (specify:    )          LABS    Labs                        6.3    10.45 )-----------( 101      ( 23 May 2025 11:15 )             18.4     05-23    135  |  98  |  65[HH]  ----------------------------<  159[H]  3.9   |  24  |  4.1[HH]    Ca    8.8      23 May 2025 11:15  Mg     2.0     05-23        Urinalysis Basic - ( 23 May 2025 11:15 )    Color: x / Appearance: x / SG: x / pH: x  Gluc: 159 mg/dL / Ketone: x  / Bili: x / Urobili: x   Blood: x / Protein: x / Nitrite: x   Leuk Esterase: x / RBC: x / WBC x   Sq Epi: x / Non Sq Epi: x / Bacteria: x              IMAGING

## 2025-05-24 NOTE — PROGRESS NOTE ADULT - ASSESSMENT
39 year old previously healthy female patient who presented to the ED on 05/06 for evaluation of generalized abdominal pain, nausea, vomiting, and black loose stools, found to have malignant HTN on arrival with evidence of leukocytosis/acute on chronic anemia/thrombocytopenia/elevated LDH and retic/low haptoglobin/schistocytes on smear/proteinuria/JOYCE on blood work and evidence of distal D/proximal J thickening with dilated jejunal loops to 3.4cm and gradual tapering distally wo SBO along with mural enhancement of SB concerning for severe enteritis VS early ischemia VS shock bowel. 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 She was also found to have age indeterminate lacunar infarct on CT 05/13. We are following for above findings.    #Age indeterminant lacunar infarct   - evaluated by Neuro, recommend MRI  - f/u whether needed inpatient vs outpatient - if needed inpatient will require sedation (consult anesthesia)  - f/u MRI with anesthesia if patient is amendable and required inpatient   - c/w ASA ,statin , BP control  - appreciate neuro, add clopidegrol 75 daily x21 days, f/u neuroendovascular OP, pt refused MR/further neuroimaging at this point  -CTH was unchanged from prior cw with ASA and plavix as per neuro      #malignant HTN  #Treatment resistant HTN of unclear etiology   - Patient had BP of 238/135 in the ED on admission   - s/p cardene gtt   - originally thought TTP/HUS picture, s/p plasmapheresis, [id number] was negative and therapy did not improve HTN  - secondary HTN workup: TSH 2.04, cortisol wnl, RA doppler (-), CT scan did not show PKD, Lupus workup negative  - Aldosterone/Renin 105/52, less likely primary aldosteronism but since patient has been treated aggressively for HTN results are confounded  - nephro on board; metanephrines wnl , ESR 45/  - Target SBP < 160  - c/w hydralazine 50mg q6h, labetalol 300mg TID, procardia 120mg qd  - Carotid duplex unremarkable  - pt kindly agreeable to MD draw of labs through midline    #JOYCE vs CKD  #proteinuria  #LE edema   -Cr 3.5 on admission unknown baseline   - has been relatively stable throughout admission   - protein/cr urine ratio 2.3  - Glomerular nephritis workup negative   - appreciate nephro recs   - on lasix 40 PO BID   - c/w calcitriol   - cw trend    #Enteritis vs Small bowel ischemia   #Melena - resolved   - physical exam benign   - patient not complaining of abdominal pain, has good PO diet, no bloody BM since 5/13    #Normocytic Anemia   #Acute on chronic MARITZA   - s/p 5U pRBC   - melena on admission, now resolved  - evaluated by heme/onc: likely MAHA 2/2 HTN  - evaluated by GI: would benefit from endoscopy, now brown stools, f/u OP  - evaluated by nephro: likely not just from CKD  - c/w epogen, hold if SBP > 170  - c/w venofer, 3 day course   - c/w folic acid, B12  - c/w PPI BID  -s/p 2 units 5/23 Hgb 6.3. Patient refuses repeat labs    #Mood disorder   #delirium 2/2 to htn encephalopathy   -patient not compliant with treatment, requires frequent reorientation from staff and mother   -does not have capacity to leave AMA   -trazodone 50qhs.  - psychiatry recommendations appreciated - 5/20 f/u psych reeval found pt did not have mental insight to leave AMA    MISC  DVT ppx: ambulation   Diet: CCC/DASH  GI ppx/Bowel regimen: PPI BID   Activity: AAT  GOC: full

## 2025-05-25 LAB
GLUCOSE BLDC GLUCOMTR-MCNC: 135 MG/DL — HIGH (ref 70–99)
GLUCOSE BLDC GLUCOMTR-MCNC: 138 MG/DL — HIGH (ref 70–99)
GLUCOSE BLDC GLUCOMTR-MCNC: 159 MG/DL — HIGH (ref 70–99)
HAPTOGLOB SERPL-MCNC: 99 MG/DL — SIGNIFICANT CHANGE UP (ref 34–200)

## 2025-05-25 PROCEDURE — 99232 SBSQ HOSP IP/OBS MODERATE 35: CPT

## 2025-05-25 RX ADMIN — Medication 0.2 MILLIGRAM(S): at 13:05

## 2025-05-25 RX ADMIN — Medication 40 MILLIGRAM(S): at 17:06

## 2025-05-25 RX ADMIN — Medication 1 APPLICATION(S): at 06:21

## 2025-05-25 RX ADMIN — Medication 40 MILLIGRAM(S): at 06:20

## 2025-05-25 RX ADMIN — LABETALOL HYDROCHLORIDE 300 MILLIGRAM(S): 200 TABLET, FILM COATED ORAL at 13:05

## 2025-05-25 RX ADMIN — CALCITRIOL 0.25 MICROGRAM(S): 0.5 CAPSULE, GELATIN COATED ORAL at 11:46

## 2025-05-25 RX ADMIN — LABETALOL HYDROCHLORIDE 300 MILLIGRAM(S): 200 TABLET, FILM COATED ORAL at 06:17

## 2025-05-25 RX ADMIN — Medication 120 MILLIGRAM(S): at 06:17

## 2025-05-25 RX ADMIN — Medication 0.2 MILLIGRAM(S): at 06:16

## 2025-05-25 RX ADMIN — FUROSEMIDE 40 MILLIGRAM(S): 10 INJECTION INTRAMUSCULAR; INTRAVENOUS at 17:06

## 2025-05-25 RX ADMIN — LABETALOL HYDROCHLORIDE 300 MILLIGRAM(S): 200 TABLET, FILM COATED ORAL at 21:08

## 2025-05-25 RX ADMIN — ATORVASTATIN CALCIUM 80 MILLIGRAM(S): 80 TABLET, FILM COATED ORAL at 21:09

## 2025-05-25 RX ADMIN — CYANOCOBALAMIN 1000 MICROGRAM(S): 1000 INJECTION INTRAMUSCULAR; SUBCUTANEOUS at 11:46

## 2025-05-25 RX ADMIN — Medication 50 MILLIGRAM(S): at 21:08

## 2025-05-25 RX ADMIN — FOLIC ACID 1 MILLIGRAM(S): 1 TABLET ORAL at 11:46

## 2025-05-25 RX ADMIN — FUROSEMIDE 40 MILLIGRAM(S): 10 INJECTION INTRAMUSCULAR; INTRAVENOUS at 06:17

## 2025-05-25 RX ADMIN — Medication 0.2 MILLIGRAM(S): at 21:08

## 2025-05-25 NOTE — PROGRESS NOTE ADULT - ASSESSMENT
38 yo Female w/ a h/o HTN presenting to ER with 5 days of multiple episodes of NBNB vomiting and diarrhea with associated generalized abdominal pain and distention.    # HTN   # JOYCE rule out ATN   # Non nephrotic range proteinuria / hematuria   # thrombocytopenia   cr stable  picture above can be due to malignant HTN  / ADAMTS 13 not low / however patient received steroids and on plasmapheresis   Serology noting normal C3 ( not in favor of a HUS),  nl C4 normal JOSE ANTONIO which rule out active lupus  s/p steroids , s/p plasmapheresis  Renal Artery Duplex without FOUZIA  - document accurate UO   - last hb noted receiving blood tx, also on ANASTACIO hold if BP >170/100  - hematology and GI f/up   - last ph at goal / no binders / check i calcium / pth noted and vit D / replete VIT D/ on calcitriol 0.25 daily   - GN w/up negative   - follow bp readings / clonidine just increased /renin elevated / aldosterone elevated , please repeat ? due to malignant hypertension / no FOUZIA/ ?  renin secreting tumors    Nephrology will follow

## 2025-05-25 NOTE — PROGRESS NOTE ADULT - ASSESSMENT
Case of a 39 year old previously healthy female patient who presented to the ED on 05/06 for evaluation of generalized abdominal pain, nausea, vomiting, and black loose stools, found to have malignant HTN on arrival with evidence of leukocytosis/acute on chronic anemia/thrombocytopenia/elevated LDH and retic/low haptoglobin/schistocytes on smear/proteinuria/JOYCE on blood work and evidence of distal D/proximal J thickening with dilated jejunal loops to 3.4cm and gradual tapering distally wo SBO along with mural enhancement of SB concerning for severe enteritis VS early ischemia VS shock bowel. 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 She was also found to have age indeterminate lacunar infarct on CT 05/13. We are following for above findings.    #malignant HTN - still elevated BPs  #Treatment resistant HTN of unclear etiology   - Patient had BP of 238/135 in the ED on admission   - s/p cardene gtt   - originally thought TTP/HUS picture, s/p plasmapheresis, [id number] was negative and therapy did not improve HTN  - secondary HTN workup: TSH 2.04, cortisol wnl, RA doppler (-), CT scan did not show PKD, Lupus workup negative  - Aldosterone/Renin 105/52, less likely primary aldosteronism but since patient has been treated aggressively for HTN results are confounded  - Target SBP < 160  - c/w hydralazine 50mg q6h, labetalol 300mg TID, procardia 120mg qd, lasix 40mg twice daily - increased clonidine to three times daily, iv hydralazine prn     #JOYCE vs CKD  #proteinuria  #LE edema   -Cr 3.5 on admission unknown baseline   - has been relatively stable throughout admission   -protein/cr urine ratio 2.3  -Glomerular nephritis workup negative   - appreciate nephro recs - following the case   - on Lasix 40mg PO BID - serum Cr ~ 4  - c/w calcitriol     #Age indeterminant lacunar infarct   - evaluated by Neuro  - patient declined MRI brain   - resume ASA next 24 hrs is hb stable - asa/plavix held due to acute anemia   - c/w statin    - BP control     #Melena - resolved   -patient not complaining of abdominal pain, has good oral intake, no bloody BM since 5/13 - eating home cooked meal     #Normocytic Anemia   #Acute on chronic MARITZA - hb ~ 6 this am  - s/p 7U pRBC this admission  - melena on initial presentation, now resolved  - evaluated by heme/onc: likely MAHA 2/2 HTN  - recall GI in lieu of current acute anemia   - evaluated by nephro: likely not just from CKD  - c/w epogen, hold if SBP > 170  - received venofer  - transfused 2 units prbcs Friday - hb > 9  - c/w folic acid, B12  - c/w PPI BID    *** resume ASA/Plavix next 24 hrs if h/h stable     #Mood disorder   #delirium 2/2 to htn encephalopathy   -patient not compliant with treatment, requires frequent reorientation from staff and mother   -does not have capacity to leave AMA   -trazodone 50qhs.  - psychiatry recommendations appreciated - 5/20 psych re eval found pt did not have mental insight to leave AMA - so far agreeable for inpatient stay but difficult with lab work (requires lot of requesting/convincing for blood draws - mother assists)    MISC  DVT ppx: ambulation   Diet: CCC/DASH  GI ppx/Bowel regimen: PPI BID   Activity: AAT  GOC: full - prognosis guarded     DISPO: not discharge ready - cbc monitoring; resume asa/plavix tomorrow if h/h stable - anticipate d/c 48hrs   -spoke to patient this am       Attending Physician Dr. Herminia Daley # [id number]

## 2025-05-25 NOTE — PROGRESS NOTE ADULT - SUBJECTIVE AND OBJECTIVE BOX
Patient is a 39y old  Female who presents with a chief complaint of Hypertensi (12 May 2025 14:24)      Patient seen this am   -no overnight events notified - no active bleeding   -difficult with labs and medical care - requires lot of convincing and family involvement   -hold ASA and Plavix, DAPT recommended by Neurology - neuro follow up - patient declined mri brain, repeated head ct yest with stable findings   -2 units prbc transfusion stat with lasix in between for volume overload - syeda  -check hemolytic work up - icu eval; may need plasma exchange - had plasmapharesis this admission    Vital Signs Last 24 Hrs  T(C): 36.8 (25 May 2025 11:38), Max: 37.1 (24 May 2025 12:14)  T(F): 98.3 (25 May 2025 11:38), Max: 98.7 (24 May 2025 12:14)  HR: 71 (25 May 2025 11:38) (71 - 89)  BP: 152/94 (25 May 2025 11:38) (152/94 - 168/104)  BP(mean): 114 (25 May 2025 04:00) (114 - 125)  RR: 18 (25 May 2025 11:38) (18 - 18)  SpO2: 95% (25 May 2025 07:40) (94% - 95%)    Parameters below as of 25 May 2025 07:40  Patient On (Oxygen Delivery Method): room air        PHYSICAL EXAM:  General: Not in distress - sitting up in bed   ENT: MMM  Neck: Supple, No JVD  Lungs: Clear air entry  Cardio: RRR, S1/S2, No murmurs  Abdomen: Soft abdomen     LABS:                          9.7    12.03 )-----------( 134      ( 24 May 2025 13:10 )             26.5   05-24    139  |  99  |  69[HH]  ----------------------------<  140[H]  3.8   |  22  |  4.2[HH]    Ca    8.9      24 May 2025 13:10  Mg     2.2     05-24    TPro  5.9[L]  /  Alb  3.8  /  TBili  0.5  /  DBili  x   /  AST  12  /  ALT  11  /  AlkPhos  97  05-24            MEDICATIONS  (STANDING):  atorvastatin 80 milliGRAM(s) Oral at bedtime  calcitriol   Capsule 0.25 MICROGram(s) Oral daily  chlorhexidine 2% Cloths 1 Application(s) Topical <User Schedule>  cloNIDine 0.2 milliGRAM(s) Oral every 8 hours  cyanocobalamin 1000 MICROGram(s) Oral daily  dextrose 5%. 1000 milliLiter(s) (100 mL/Hr) IV Continuous <Continuous>  dextrose 5%. 1000 milliLiter(s) (50 mL/Hr) IV Continuous <Continuous>  dextrose 50% Injectable 25 Gram(s) IV Push once  dextrose 50% Injectable 12.5 Gram(s) IV Push once  dextrose 50% Injectable 25 Gram(s) IV Push once  epoetin sergey-epbx (RETACRIT) Injectable 94031 Unit(s) SubCutaneous every 7 days  folic acid 1 milliGRAM(s) Oral daily  furosemide    Tablet 40 milliGRAM(s) Oral every 12 hours  insulin glargine Injectable (LANTUS) 6 Unit(s) SubCutaneous at bedtime  insulin lispro (ADMELOG) corrective regimen sliding scale   SubCutaneous three times a day before meals  insulin lispro (ADMELOG) corrective regimen sliding scale   SubCutaneous at bedtime  labetalol 300 milliGRAM(s) Oral three times a day  lactated ringers. 1000 milliLiter(s) (50 mL/Hr) IV Continuous <Continuous>  melatonin 5 milliGRAM(s) Oral at bedtime  NIFEdipine  milliGRAM(s) Oral daily  pantoprazole    Tablet 40 milliGRAM(s) Oral every 12 hours  traZODone 50 milliGRAM(s) Oral at bedtime    MEDICATIONS  (PRN):  acetaminophen     Tablet .. 650 milliGRAM(s) Oral every 6 hours PRN Mild Pain (1 - 3)  calcium carbonate   1250 mG (OsCal) 1 Tablet(s) Oral every 6 hours PRN Heartburn  dextrose Oral Gel 15 Gram(s) Oral once PRN Blood Glucose LESS THAN 70 milliGRAM(s)/deciliter

## 2025-05-25 NOTE — PROGRESS NOTE ADULT - SUBJECTIVE AND OBJECTIVE BOX
Nephrology progress note    Patient is seen and examined, events over the last 24 h noted.    Allergies:  No Known Allergies    Hospital Medications:   MEDICATIONS  (STANDING):  atorvastatin 80 milliGRAM(s) Oral at bedtime  calcitriol   Capsule 0.25 MICROGram(s) Oral daily  chlorhexidine 2% Cloths 1 Application(s) Topical <User Schedule>  cloNIDine 0.2 milliGRAM(s) Oral every 8 hours  cyanocobalamin 1000 MICROGram(s) Oral daily  dextrose 5%. 1000 milliLiter(s) (100 mL/Hr) IV Continuous <Continuous>  dextrose 5%. 1000 milliLiter(s) (50 mL/Hr) IV Continuous <Continuous>  dextrose 50% Injectable 25 Gram(s) IV Push once  dextrose 50% Injectable 12.5 Gram(s) IV Push once  dextrose 50% Injectable 25 Gram(s) IV Push once  epoetin sergey-epbx (RETACRIT) Injectable 49106 Unit(s) SubCutaneous every 7 days  folic acid 1 milliGRAM(s) Oral daily  furosemide    Tablet 40 milliGRAM(s) Oral every 12 hours  insulin glargine Injectable (LANTUS) 6 Unit(s) SubCutaneous at bedtime  insulin lispro (ADMELOG) corrective regimen sliding scale   SubCutaneous three times a day before meals  insulin lispro (ADMELOG) corrective regimen sliding scale   SubCutaneous at bedtime  labetalol 300 milliGRAM(s) Oral three times a day  lactated ringers. 1000 milliLiter(s) (50 mL/Hr) IV Continuous <Continuous>  melatonin 5 milliGRAM(s) Oral at bedtime  NIFEdipine  milliGRAM(s) Oral daily  pantoprazole    Tablet 40 milliGRAM(s) Oral every 12 hours  traZODone 50 milliGRAM(s) Oral at bedtime        VITALS:  T(F): 98.3 (05-25-25 @ 11:38), Max: 98.7 (05-24-25 @ 12:14)  HR: 71 (05-25-25 @ 11:38)  BP: 152/94 (05-25-25 @ 11:38)  RR: 18 (05-25-25 @ 11:38)  SpO2: 95% (05-25-25 @ 07:40)  Wt(kg): --    05-23 @ 07:01  -  05-24 @ 07:00  --------------------------------------------------------  IN: 456 mL / OUT: 250 mL / NET: 206 mL    05-24 @ 07:01 - 05-25 @ 07:00  --------------------------------------------------------  IN: 887 mL / OUT: 0 mL / NET: 887 mL    05-25 @ 07:01 - 05-25 @ 11:41  --------------------------------------------------------  IN: 221 mL / OUT: 0 mL / NET: 221 mL          PHYSICAL EXAM:  	Gen: NAD  	Pulm: decrease BS  B/L  	CV:  S1S2; no rub  	Abd: +distended      LABS:  05-24    139  |  99  |  69[HH]  ----------------------------<  140[H]  3.8   |  22  |  4.2[HH]    Ca    8.9      24 May 2025 13:10  Mg     2.2     05-24    TPro  5.9[L]  /  Alb  3.8  /  TBili  0.5  /  DBili      /  AST  12  /  ALT  11  /  AlkPhos  97  05-24                          9.7    12.03 )-----------( 134      ( 24 May 2025 13:10 )             26.5       Urine Studies:  Urinalysis Basic - ( 24 May 2025 13:10 )    Color:  / Appearance:  / SG:  / pH:   Gluc: 140 mg/dL / Ketone:   / Bili:  / Urobili:    Blood:  / Protein:  / Nitrite:    Leuk Esterase:  / RBC:  / WBC    Sq Epi:  / Non Sq Epi:  / Bacteria:         RADIOLOGY & ADDITIONAL STUDIES:

## 2025-05-25 NOTE — PROGRESS NOTE ADULT - TIME BILLING
direct patient care and chart review  -coordinated current plan of care with medical staff on MDR    * above timings exclude teaching residents

## 2025-05-26 LAB
ALBUMIN SERPL ELPH-MCNC: 3.6 G/DL — SIGNIFICANT CHANGE UP (ref 3.5–5.2)
ALP SERPL-CCNC: 100 U/L — SIGNIFICANT CHANGE UP (ref 30–115)
ALT FLD-CCNC: 9 U/L — SIGNIFICANT CHANGE UP (ref 0–41)
ANION GAP SERPL CALC-SCNC: 15 MMOL/L — HIGH (ref 7–14)
AST SERPL-CCNC: 10 U/L — SIGNIFICANT CHANGE UP (ref 0–41)
BASOPHILS # BLD AUTO: 0.04 K/UL — SIGNIFICANT CHANGE UP (ref 0–0.2)
BASOPHILS NFR BLD AUTO: 0.4 % — SIGNIFICANT CHANGE UP (ref 0–1)
BILIRUB SERPL-MCNC: 0.4 MG/DL — SIGNIFICANT CHANGE UP (ref 0.2–1.2)
BUN SERPL-MCNC: 79 MG/DL — CRITICAL HIGH (ref 10–20)
CALCIUM SERPL-MCNC: 8.6 MG/DL — SIGNIFICANT CHANGE UP (ref 8.4–10.5)
CHLORIDE SERPL-SCNC: 102 MMOL/L — SIGNIFICANT CHANGE UP (ref 98–110)
CO2 SERPL-SCNC: 23 MMOL/L — SIGNIFICANT CHANGE UP (ref 17–32)
CREAT SERPL-MCNC: 4.3 MG/DL — CRITICAL HIGH (ref 0.7–1.5)
EGFR: 13 ML/MIN/1.73M2 — LOW
EGFR: 13 ML/MIN/1.73M2 — LOW
EOSINOPHIL # BLD AUTO: 0.14 K/UL — SIGNIFICANT CHANGE UP (ref 0–0.7)
EOSINOPHIL NFR BLD AUTO: 1.5 % — SIGNIFICANT CHANGE UP (ref 0–8)
GLUCOSE BLDC GLUCOMTR-MCNC: 144 MG/DL — HIGH (ref 70–99)
GLUCOSE SERPL-MCNC: 135 MG/DL — HIGH (ref 70–99)
HCT VFR BLD CALC: 26.4 % — LOW (ref 37–47)
HGB BLD-MCNC: 9 G/DL — LOW (ref 12–16)
IMM GRANULOCYTES NFR BLD AUTO: 0.3 % — SIGNIFICANT CHANGE UP (ref 0.1–0.3)
LYMPHOCYTES # BLD AUTO: 1.21 K/UL — SIGNIFICANT CHANGE UP (ref 1.2–3.4)
LYMPHOCYTES # BLD AUTO: 12.9 % — LOW (ref 20.5–51.1)
MAGNESIUM SERPL-MCNC: 2.5 MG/DL — HIGH (ref 1.8–2.4)
MCHC RBC-ENTMCNC: 31.1 PG — HIGH (ref 27–31)
MCHC RBC-ENTMCNC: 34.1 G/DL — SIGNIFICANT CHANGE UP (ref 32–37)
MCV RBC AUTO: 91.3 FL — SIGNIFICANT CHANGE UP (ref 81–99)
MONOCYTES # BLD AUTO: 0.85 K/UL — HIGH (ref 0.1–0.6)
MONOCYTES NFR BLD AUTO: 9 % — SIGNIFICANT CHANGE UP (ref 1.7–9.3)
NEUTROPHILS # BLD AUTO: 7.13 K/UL — HIGH (ref 1.4–6.5)
NEUTROPHILS NFR BLD AUTO: 75.9 % — HIGH (ref 42.2–75.2)
NRBC BLD AUTO-RTO: 0 /100 WBCS — SIGNIFICANT CHANGE UP (ref 0–0)
PLATELET # BLD AUTO: 146 K/UL — SIGNIFICANT CHANGE UP (ref 130–400)
PMV BLD: 10.4 FL — SIGNIFICANT CHANGE UP (ref 7.4–10.4)
POTASSIUM SERPL-MCNC: 4.2 MMOL/L — SIGNIFICANT CHANGE UP (ref 3.5–5)
POTASSIUM SERPL-SCNC: 4.2 MMOL/L — SIGNIFICANT CHANGE UP (ref 3.5–5)
PROT SERPL-MCNC: 5.8 G/DL — LOW (ref 6–8)
RBC # BLD: 2.89 M/UL — LOW (ref 4.2–5.4)
RBC # FLD: 14 % — SIGNIFICANT CHANGE UP (ref 11.5–14.5)
SODIUM SERPL-SCNC: 140 MMOL/L — SIGNIFICANT CHANGE UP (ref 135–146)
WBC # BLD: 9.4 K/UL — SIGNIFICANT CHANGE UP (ref 4.8–10.8)
WBC # FLD AUTO: 9.4 K/UL — SIGNIFICANT CHANGE UP (ref 4.8–10.8)

## 2025-05-26 PROCEDURE — 76770 US EXAM ABDO BACK WALL COMP: CPT | Mod: 26

## 2025-05-26 PROCEDURE — 71045 X-RAY EXAM CHEST 1 VIEW: CPT | Mod: 26

## 2025-05-26 PROCEDURE — 99232 SBSQ HOSP IP/OBS MODERATE 35: CPT

## 2025-05-26 RX ORDER — CLOPIDOGREL BISULFATE 75 MG/1
75 TABLET, FILM COATED ORAL EVERY 24 HOURS
Refills: 0 | Status: DISCONTINUED | OUTPATIENT
Start: 2025-05-26 | End: 2025-05-27

## 2025-05-26 RX ORDER — ASPIRIN 325 MG
81 TABLET ORAL DAILY
Refills: 0 | Status: DISCONTINUED | OUTPATIENT
Start: 2025-05-26 | End: 2025-05-27

## 2025-05-26 RX ADMIN — FOLIC ACID 1 MILLIGRAM(S): 1 TABLET ORAL at 11:43

## 2025-05-26 RX ADMIN — CALCITRIOL 0.25 MICROGRAM(S): 0.5 CAPSULE, GELATIN COATED ORAL at 11:43

## 2025-05-26 RX ADMIN — ATORVASTATIN CALCIUM 80 MILLIGRAM(S): 80 TABLET, FILM COATED ORAL at 21:46

## 2025-05-26 RX ADMIN — LABETALOL HYDROCHLORIDE 300 MILLIGRAM(S): 200 TABLET, FILM COATED ORAL at 06:22

## 2025-05-26 RX ADMIN — FUROSEMIDE 40 MILLIGRAM(S): 10 INJECTION INTRAMUSCULAR; INTRAVENOUS at 06:22

## 2025-05-26 RX ADMIN — CLOPIDOGREL BISULFATE 75 MILLIGRAM(S): 75 TABLET, FILM COATED ORAL at 17:29

## 2025-05-26 RX ADMIN — Medication 5 MILLIGRAM(S): at 21:46

## 2025-05-26 RX ADMIN — Medication 50 MILLIGRAM(S): at 21:46

## 2025-05-26 RX ADMIN — Medication 40 MILLIGRAM(S): at 06:21

## 2025-05-26 RX ADMIN — Medication 0.2 MILLIGRAM(S): at 13:28

## 2025-05-26 RX ADMIN — Medication 1 APPLICATION(S): at 06:26

## 2025-05-26 RX ADMIN — Medication 0.2 MILLIGRAM(S): at 21:46

## 2025-05-26 RX ADMIN — Medication 40 MILLIGRAM(S): at 17:29

## 2025-05-26 RX ADMIN — FUROSEMIDE 40 MILLIGRAM(S): 10 INJECTION INTRAMUSCULAR; INTRAVENOUS at 17:29

## 2025-05-26 RX ADMIN — CYANOCOBALAMIN 1000 MICROGRAM(S): 1000 INJECTION INTRAMUSCULAR; SUBCUTANEOUS at 11:43

## 2025-05-26 RX ADMIN — Medication 120 MILLIGRAM(S): at 06:22

## 2025-05-26 RX ADMIN — Medication 81 MILLIGRAM(S): at 17:29

## 2025-05-26 RX ADMIN — LABETALOL HYDROCHLORIDE 300 MILLIGRAM(S): 200 TABLET, FILM COATED ORAL at 21:46

## 2025-05-26 RX ADMIN — LABETALOL HYDROCHLORIDE 300 MILLIGRAM(S): 200 TABLET, FILM COATED ORAL at 13:28

## 2025-05-26 RX ADMIN — Medication 0.2 MILLIGRAM(S): at 06:23

## 2025-05-26 NOTE — PROGRESS NOTE ADULT - SUBJECTIVE AND OBJECTIVE BOX
Patient is a 39y old  Female who presents with a chief complaint of Hypertensi (12 May 2025 14:24)      Patient seen this am   -no overnight events notified - no active bleeding   -resume asa plavix and check cbc next 24 hrs and d/c planning home tomorrow     Vital Signs Last 24 Hrs  T(C): 36.6 (26 May 2025 11:14), Max: 37.2 (26 May 2025 04:00)  T(F): 97.8 (26 May 2025 11:14), Max: 98.9 (26 May 2025 04:00)  HR: 75 (26 May 2025 11:14) (64 - 80)  BP: 126/78 (26 May 2025 11:14) (126/78 - 178/103)  BP(mean): 105 (26 May 2025 04:00) (105 - 128)  RR: 18 (26 May 2025 11:14) (18 - 18)  SpO2: --    PHYSICAL EXAM:  General: Not in distress - sitting up in bed   ENT: MMM  Neck: Supple, No JVD  Lungs: Clear air entry  Cardio: RRR, S1/S2, No murmurs  Abdomen: Soft abdomen     LABS:                        9.0    9.40  )-----------( 146      ( 26 May 2025 06:19 )             26.4       05-26    140  |  102  |  79[HH]  ----------------------------<  135[H]  4.2   |  23  |  4.3[HH]    Ca    8.6      26 May 2025 06:19  Mg     2.5     05-26    TPro  5.8[L]  /  Alb  3.6  /  TBili  0.4  /  DBili  x   /  AST  10  /  ALT  9   /  AlkPhos  100  05-26          MEDICATIONS  (STANDING):  aspirin  chewable 81 milliGRAM(s) Oral daily  atorvastatin 80 milliGRAM(s) Oral at bedtime  calcitriol   Capsule 0.25 MICROGram(s) Oral daily  chlorhexidine 2% Cloths 1 Application(s) Topical <User Schedule>  cloNIDine 0.2 milliGRAM(s) Oral every 8 hours  clopidogrel Tablet 75 milliGRAM(s) Oral every 24 hours  cyanocobalamin 1000 MICROGram(s) Oral daily  dextrose 5%. 1000 milliLiter(s) (100 mL/Hr) IV Continuous <Continuous>  dextrose 5%. 1000 milliLiter(s) (50 mL/Hr) IV Continuous <Continuous>  epoetin sergey-epbx (RETACRIT) Injectable 13953 Unit(s) SubCutaneous every 7 days  folic acid 1 milliGRAM(s) Oral daily  furosemide    Tablet 40 milliGRAM(s) Oral every 12 hours  labetalol 300 milliGRAM(s) Oral three times a day  lactated ringers. 1000 milliLiter(s) (50 mL/Hr) IV Continuous <Continuous>  melatonin 5 milliGRAM(s) Oral at bedtime  NIFEdipine  milliGRAM(s) Oral daily  pantoprazole    Tablet 40 milliGRAM(s) Oral every 12 hours  traZODone 50 milliGRAM(s) Oral at bedtime    MEDICATIONS  (PRN):  acetaminophen     Tablet .. 650 milliGRAM(s) Oral every 6 hours PRN Mild Pain (1 - 3)  calcium carbonate   1250 mG (OsCal) 1 Tablet(s) Oral every 6 hours PRN Heartburn

## 2025-05-26 NOTE — PROGRESS NOTE ADULT - SUBJECTIVE AND OBJECTIVE BOX
SUBJECTIVE/OVERNIGHT EVENTS  Today is hospital day 19d. This morning patient was seen and examined at bedside, resting comfortably in bed. No acute or major events overnight.    MEDICATIONS  STANDING MEDICATIONS  atorvastatin 80 milliGRAM(s) Oral at bedtime  calcitriol   Capsule 0.25 MICROGram(s) Oral daily  chlorhexidine 2% Cloths 1 Application(s) Topical <User Schedule>  cloNIDine 0.2 milliGRAM(s) Oral every 8 hours  cyanocobalamin 1000 MICROGram(s) Oral daily  dextrose 5%. 1000 milliLiter(s) IV Continuous <Continuous>  dextrose 5%. 1000 milliLiter(s) IV Continuous <Continuous>  epoetin sergey-epbx (RETACRIT) Injectable 38041 Unit(s) SubCutaneous every 7 days  folic acid 1 milliGRAM(s) Oral daily  furosemide    Tablet 40 milliGRAM(s) Oral every 12 hours  labetalol 300 milliGRAM(s) Oral three times a day  lactated ringers. 1000 milliLiter(s) IV Continuous <Continuous>  melatonin 5 milliGRAM(s) Oral at bedtime  NIFEdipine  milliGRAM(s) Oral daily  pantoprazole    Tablet 40 milliGRAM(s) Oral every 12 hours  traZODone 50 milliGRAM(s) Oral at bedtime    PRN MEDICATIONS  acetaminophen     Tablet .. 650 milliGRAM(s) Oral every 6 hours PRN  calcium carbonate   1250 mG (OsCal) 1 Tablet(s) Oral every 6 hours PRN    VITALS  T(F): 97.8 (05-26-25 @ 11:14), Max: 98.9 (05-26-25 @ 04:00)  HR: 75 (05-26-25 @ 11:14) (64 - 80)  BP: 126/78 (05-26-25 @ 11:14) (126/78 - 178/103)  RR: 18 (05-26-25 @ 11:14) (18 - 18)  SpO2: --  POCT Blood Glucose.: 144 mg/dL (05-26-25 @ 07:57)  POCT Blood Glucose.: 138 mg/dL (05-25-25 @ 21:25)  POCT Blood Glucose.: 135 mg/dL (05-25-25 @ 16:43)      LABS             9.0    9.40  )-----------( 146      ( 05-26-25 @ 06:19 )             26.4     140  |  102  |  79  -------------------------<  135   05-26-25 @ 06:19  4.2  |  23  |  4.3    Ca      8.6     05-26-25 @ 06:19  Mg     2.5     05-26-25 @ 06:19    TPro  5.8  /  Alb  3.6  /  TBili  0.4  /  DBili  x   /  AST  10  /  ALT  9   /  AlkPhos  100  /  GGT  x     05-26-25 @ 06:19        Urinalysis Basic - ( 26 May 2025 06:19 )    Color: x / Appearance: x / SG: x / pH: x  Gluc: 135 mg/dL / Ketone: x  / Bili: x / Urobili: x   Blood: x / Protein: x / Nitrite: x   Leuk Esterase: x / RBC: x / WBC x   Sq Epi: x / Non Sq Epi: x / Bacteria: x

## 2025-05-26 NOTE — PROGRESS NOTE ADULT - SUBJECTIVE AND OBJECTIVE BOX
Nephrology Progress Note    FRANCISCO DUNLAP  MRN-[id number]  39y  Female    S:  Patient is seen and examined, events over the last 24h noted.    O:  Allergies:  No Known Allergies    Hospital Medications:   MEDICATIONS  (STANDING):  atorvastatin 80 milliGRAM(s) Oral at bedtime  calcitriol   Capsule 0.25 MICROGram(s) Oral daily  chlorhexidine 2% Cloths 1 Application(s) Topical <User Schedule>  cloNIDine 0.2 milliGRAM(s) Oral every 8 hours  cyanocobalamin 1000 MICROGram(s) Oral daily  dextrose 5%. 1000 milliLiter(s) (100 mL/Hr) IV Continuous <Continuous>  dextrose 5%. 1000 milliLiter(s) (50 mL/Hr) IV Continuous <Continuous>  epoetin sergey-epbx (RETACRIT) Injectable 18431 Unit(s) SubCutaneous every 7 days  folic acid 1 milliGRAM(s) Oral daily  furosemide    Tablet 40 milliGRAM(s) Oral every 12 hours  labetalol 300 milliGRAM(s) Oral three times a day  lactated ringers. 1000 milliLiter(s) (50 mL/Hr) IV Continuous <Continuous>  melatonin 5 milliGRAM(s) Oral at bedtime  NIFEdipine  milliGRAM(s) Oral daily  pantoprazole    Tablet 40 milliGRAM(s) Oral every 12 hours  traZODone 50 milliGRAM(s) Oral at bedtime    MEDICATIONS  (PRN):  acetaminophen     Tablet .. 650 milliGRAM(s) Oral every 6 hours PRN Mild Pain (1 - 3)  calcium carbonate   1250 mG (OsCal) 1 Tablet(s) Oral every 6 hours PRN Heartburn    Home Medications:  Norvasc 5 mg oral tablet: 1 tab(s) orally once a day (12 May 2025 11:21)      VITALS:  Daily     Daily Weight in k (26 May 2025 05:00)  T(F): 97.8 (25 @ 11:14), Max: 98.9 (25 @ 04:00)  HR: 75 (25 @ 11:14)  BP: 151/82 (25 @ 04:00)  RR: 18 (25 @ 11:14)  SpO2: --  Wt(kg): --  I&O's Detail    25 May 2025 07:01  -  26 May 2025 07:00  --------------------------------------------------------  IN:    Oral Fluid: 605 mL  Total IN: 605 mL    OUT:  Total OUT: 0 mL    Total NET: 605 mL      26 May 2025 07:  -  26 May 2025 12:17  --------------------------------------------------------  IN:    Oral Fluid: 384 mL  Total IN: 384 mL    OUT:  Total OUT: 0 mL    Total NET: 384 mL        I&O's Summary    25 May 2025 07:  -  26 May 2025 07:00  --------------------------------------------------------  IN: 605 mL / OUT: 0 mL / NET: 605 mL    26 May 2025 07:  -  26 May 2025 12:17  --------------------------------------------------------  IN: 384 mL / OUT: 0 mL / NET: 384 mL          PHYSICAL EXAM:  Gen: NAD  Chest: b/l breath sounds  Abd: soft  Extremities: no edema      LABS:    Blood Gas Profile w/Lytes - Venous: Performed in Lab (25 @ 00:08)  Blood Gas Venous - Sodium: 131 mmol/L (25 @ 00:08)  Blood Gas Venous - Potassium: 3.8 mmol/L (25 @ 00:08)        140  |  102  |  79[HH]  ----------------------------<  135[H]  4.2   |  23  |  4.3[HH]    Ca    8.6      26 May 2025 06:19  Mg     2.5         TPro  5.8[L]  /  Alb  3.6  /  TBili  0.4  /  DBili      /  AST  10  /  ALT  9   /  AlkPhos  100      Phosphorus: 5.1 mg/dL (25 @ 04:40)  Phosphorus: 5.2 mg/dL (25 @ 03:55)    Intact PTH: 194 pg/mL (25 @ 12:08)  Vitamin D, 25-Hydroxy: 12 ng/mL (25 @ 16:48)                          9.0    9.40  )-----------( 146      ( 26 May 2025 06:19 )             26.4     Mean Cell Volume: 91.3 fL (25 @ 06:19)    Urine Studies:  Protein, Urine: 300 mg/dL (25 @ 00:55)  White Blood Cell - Urine: 12 /HPF (25 @ 00:55)  Red Blood Cell - Urine: 1 /HPF (25 @ 00:55)        Creatinine, Random Urine: 16 mg/dL ( @ 12:24)  Protein/Creatinine Ratio Calculation: 2.3 Ratio ( @ 12:24)    Creatinine trend:  Creatinine: 4.3 mg/dL (25 @ 06:19)  Creatinine: 4.2 mg/dL (25 @ 13:10)  Creatinine: 4.1 mg/dL (25 @ 11:15)  Creatinine: 3.8 mg/dL (25 @ 13:19)  Creatinine: 4.0 mg/dL (25 @ 19:27)      US Kidney and Bladder:   ACC: [id number] EXAM:  US KIDNEYS AND BLADDER   ORDERED BY: REYES NGUYEN     PROCEDURE DATE:  2025          INTERPRETATION:  CLINICAL INFORMATION: Acute kidney injury    COMPARISON: May 7, 2025    TECHNIQUE: Sonography of the kidneys and bladder.    FINDINGS:  Right kidney: 10.5 cm. No renal mass, hydronephrosis or calculi.   Diffusely hyperechoic parenchyma.    Left kidney: 10.5 cm. No renal mass, hydronephrosis or calculi. Diffusely   hyperechoic parenchyma.    Urinary bladder: Urinary bladder volume 58 mL (patient voided prior to   examination. Bilateral ureteral jets are visualized.    IMPRESSION:    No hydronephrosis bilaterally. Diffuse hyperechoic parenchyma consistent   with medical renal disease.        --- End of Report ---            ELLIOT LANDAU MD; Attending Radiologist  This document has been electronically signed. May 26 2025  8:41AM (25 @ 07:43)

## 2025-05-26 NOTE — PROGRESS NOTE ADULT - ASSESSMENT
Case of a 39 year old previously healthy female patient who presented to the ED on 05/06 for evaluation of generalized abdominal pain, nausea, vomiting, and black loose stools, found to have malignant HTN on arrival with evidence of leukocytosis/acute on chronic anemia/thrombocytopenia/elevated LDH and retic/low haptoglobin/schistocytes on smear/proteinuria/JOYCE on blood work and evidence of distal D/proximal J thickening with dilated jejunal loops to 3.4cm and gradual tapering distally wo SBO along with mural enhancement of SB concerning for severe enteritis VS early ischemia VS shock bowel. 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 She was also found to have age indeterminate lacunar infarct on CT 05/13. We are following for above findings.    #malignant HTN - still elevated BPs  #Treatment resistant HTN of unclear etiology   - Patient had BP of 238/135 in the ED on admission   - s/p cardene gtt   - originally thought TTP/HUS picture, s/p plasmapheresis, [id number] was negative and therapy did not improve HTN  - secondary HTN workup: TSH 2.04, cortisol wnl, RA doppler (-), CT scan did not show PKD, Lupus workup negative  - Aldosterone/Renin 105/52, less likely primary aldosteronism but since patient has been treated aggressively for HTN results are confounded  - Target SBP < 160  - c/w hydralazine 50mg q6h, labetalol 300mg TID, procardia 120mg qd, lasix 40mg twice daily - increased clonidine to three times daily, iv hydralazine prn     #JOYCE vs CKD  #proteinuria  #LE edema   -Cr 3.5 on admission unknown baseline   - has been relatively stable throughout admission   -protein/cr urine ratio 2.3  -Glomerular nephritis workup negative   - appreciate nephro recs - following the case   - on Lasix 40mg PO BID - serum Cr ~ 4  - c/w calcitriol     #Age indeterminant lacunar infarct   - evaluated by Neuro  - patient declined MRI brain   - resume ASA and Plavix today - cbc stable   - c/w statin    - BP control     #Melena - resolved   -patient not complaining of abdominal pain, has good oral intake, no bloody BM since 5/13 - eating home cooked meal     #Normocytic Anemia   #Acute on chronic MARITZA   - s/p 7U pRBC this admission  - melena on initial presentation, now resolved  - evaluated by heme/onc: likely MAHA 2/2 HTN  - recall GI in lieu of current acute anemia   - evaluated by nephro: likely not just from CKD  - c/w epogen, hold if SBP > 170  - received venofer  - transfused 2 units prbcs Friday - hb > 9  - c/w folic acid, B12  - c/w PPI BID      #Mood disorder   #delirium 2/2 to htn encephalopathy   -patient not compliant with treatment, requires frequent reorientation from staff and mother   -does not have capacity to leave AMA   -trazodone 50qhs.  - psychiatry recommendations appreciated - 5/20 psych re eval found pt did not have mental insight to leave AMA - so far agreeable for inpatient stay but difficult with lab work (requires lot of requesting/convincing for blood draws - mother assists)    MISC  DVT ppx: ambulation   Diet: CCC/DASH  GI ppx/Bowel regimen: PPI BID   Activity: AAT  GOC: full - prognosis guarded     DISPO: not discharge ready today - cbc monitoring next 24 hrs after restarting asa/plavix today   -spoke to patient this am       Attending Physician Dr. Herminia Daley # [id number]

## 2025-05-26 NOTE — PROGRESS NOTE ADULT - ASSESSMENT
Case of a 39 year old previously healthy female patient who presented to the ED on 05/06 for evaluation of generalized abdominal pain, nausea, vomiting, and black loose stools, found to have malignant HTN on arrival with evidence of leukocytosis/acute on chronic anemia/thrombocytopenia/elevated LDH and retic/low haptoglobin/schistocytes on smear/proteinuria/JOYCE on blood work and evidence of distal D/proximal J thickening with dilated jejunal loops to 3.4cm and gradual tapering distally wo SBO along with mural enhancement of SB concerning for severe enteritis VS early ischemia VS shock bowel. 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 She was also found to have age indeterminate lacunar infarct on CT 05/13. We are following for above findings.    #malignant HTN - still elevated BPs  #Treatment resistant HTN of unclear etiology   - Patient had BP of 238/135 in the ED on admission   - s/p cardene gtt   - originally thought TTP/HUS picture, s/p plasmapheresis, [id number] was negative and therapy did not improve HTN  - secondary HTN workup: TSH 2.04, cortisol wnl, RA doppler (-), CT scan did not show PKD, Lupus workup negative  - Aldosterone/Renin 105/52, less likely primary aldosteronism but since patient has been treated aggressively for HTN results are confounded  - Target SBP < 160  - c/w hydralazine 50mg q6h, labetalol 300mg TID, procardia 120mg qd, lasix 40mg twice daily - increased clonidine to three times daily, iv hydralazine prn     #JOYCE vs CKD  #proteinuria  #LE edema   -Cr 3.5 on admission unknown baseline   - has been relatively stable throughout admission   -protein/cr urine ratio 2.3  -Glomerular nephritis workup negative   - appreciate nephro recs - following the case   - on Lasix 40mg PO BID - serum Cr ~ 4  - c/w calcitriol     #Age indeterminant lacunar infarct   - evaluated by Neuro  - patient declined MRI brain   - resume ASA next 24 hrs is hb stable - asa/plavix held due to acute anemia   - c/w statin    - BP control     #Melena - resolved   -patient not complaining of abdominal pain, has good oral intake, no bloody BM since 5/13 - eating home cooked meal     #Normocytic Anemia   #Acute on chronic MARITZA - hb ~ 6 this am  - s/p 7U pRBC this admission  - melena on initial presentation, now resolved  - evaluated by heme/onc: likely MAHA 2/2 HTN  - recall GI in lieu of current acute anemia   - evaluated by nephro: likely not just from CKD  - c/w epogen, hold if SBP > 170  - received venofer  - transfused 2 units prbcs Friday - hb > 9  - c/w folic acid, B12  - c/w PPI BID    *** resume ASA/Plavix next 24 hrs if h/h stable     #Mood disorder   #delirium 2/2 to htn encephalopathy   -patient not compliant with treatment, requires frequent reorientation from staff and mother   -does not have capacity to leave AMA   -trazodone 50qhs.  - psychiatry recommendations appreciated - 5/20 psych re eval found pt did not have mental insight to leave AMA - so far agreeable for inpatient stay but difficult with lab work (requires lot of requesting/convincing for blood draws - mother assists)    MISC  DVT ppx: ambulation   Diet: CCC/DASH  GI ppx/Bowel regimen: PPI BID   Activity: AAT  GOC: full - prognosis guarded     DISPO: not discharge ready - cbc monitoring; resume asa/plavix tomorrow if h/h stable - anticipate d/c 48hrs   -spoke to patient this am

## 2025-05-26 NOTE — CHART NOTE - NSCHARTNOTESELECT_GEN_ALL_CORE
CTH/Event Note
Event Note
RD Follow Up/Nutrition Services
CCU Downgrade/Transfer Note
CTH/Event Note
Event Note
Event Note
Hbg/Event Note
Neurology/Event Note
Pharmacy/Event Note
RD f/u/Nutrition Services
Transfer Note

## 2025-05-26 NOTE — CHART NOTE - NSCHARTNOTEFT_GEN_A_CORE
Registered Dietitian Follow-Up     Patient Profile Reviewed                           Yes [x]   No []     Nutrition History Previously Obtained        Yes [x]  No []       Pertinent Medical Information: Patient is a previously healthy 38yo F presented for generalized abdominal pain, N, V, and loose black stools; found to have malignant HTN. Had BP of 238/135 in ED on admission. Noted with evidence of leukocytosis/acute on chronic anemia/thrombocytopenia/elevated LDH and retic/low haptoglobin/schistocytes on smear/proteinuria/JOYCE on blood work and evidence of distal D/proximal J thickening with dilated jejunal loops to 3.4cm and gradual tapering distally wo SBO along with mural enhancement of SB concerning for severe enteritis VS early ischemia VS shock bowel.      Diet order: Diet, Regular:   Consistent Carbohydrate {Evening Snack} (CSTCHOSN)  DASH/TLC {Sodium & Cholesterol Restricted} (DASH) (25 @ 07:45) [Active]      Patient noted with anemia, elevated BUN, elevated creatinine, low eGFR, mild hypermagnesemia.     Anthropometrics:  Height: 154.9cm  Weight: 65.8kg  IBW: 48kg  BMI: 27.4    Daily Weight in k (-26), Weight in k (-25), Weight in k.1 (-22), Weight in k.1 (-21), Weight in k (-20)  % Weight Change    MEDICATIONS  (STANDING):  atorvastatin 80 milliGRAM(s) Oral at bedtime  calcitriol   Capsule 0.25 MICROGram(s) Oral daily  chlorhexidine 2% Cloths 1 Application(s) Topical <User Schedule>  cloNIDine 0.2 milliGRAM(s) Oral every 8 hours  cyanocobalamin 1000 MICROGram(s) Oral daily  dextrose 5%. 1000 milliLiter(s) (100 mL/Hr) IV Continuous <Continuous>  dextrose 5%. 1000 milliLiter(s) (50 mL/Hr) IV Continuous <Continuous>  epoetin sergey-epbx (RETACRIT) Injectable 16679 Unit(s) SubCutaneous every 7 days  folic acid 1 milliGRAM(s) Oral daily  furosemide    Tablet 40 milliGRAM(s) Oral every 12 hours  labetalol 300 milliGRAM(s) Oral three times a day  lactated ringers. 1000 milliLiter(s) (50 mL/Hr) IV Continuous <Continuous>  melatonin 5 milliGRAM(s) Oral at bedtime  NIFEdipine  milliGRAM(s) Oral daily  pantoprazole    Tablet 40 milliGRAM(s) Oral every 12 hours  traZODone 50 milliGRAM(s) Oral at bedtime    MEDICATIONS  (PRN):  acetaminophen     Tablet .. 650 milliGRAM(s) Oral every 6 hours PRN Mild Pain (1 - 3)  calcium carbonate   1250 mG (OsCal) 1 Tablet(s) Oral every 6 hours PRN Heartburn    Pertinent Labs:  @ 06:19: Na 140, BUN 79[HH], Cr 4.3[HH], [H], K+ 4.2, Phos --, Mg 2.5[H], Alk Phos 100, ALT/SGPT 9, AST/SGOT 10, HbA1c --    Finger Sticks:  POCT Blood Glucose.: 144 mg/dL ( @ 07:57)  POCT Blood Glucose.: 138 mg/dL ( @ 21:25)  POCT Blood Glucose.: 135 mg/dL ( @ 16:43)    Physical Findings:  - Appearance: alert  - GI function: No s/s of dysfunction per patient; last BM was yesterday per patient  - Tubes: none  - Oral/Mouth cavity: tolerating current diet texture  - Edema: moderate edema to B/L LEs  - Skin: intact     Nutrition Requirements: With consideration for age, weight, no longer in critical care setting, persistent HTN  Weight Used: current 65.8kg      Estimated Energy Needs    Continue [x]  Adjust []  1316-1645kcal/day using 20-25kcal/kg   Estimated Protein Needs    Continue []  Adjust [x]   69-78g/day using 1-1.2g/kg    Estimated Fluid Needs        Continue []  Adjust [x]  1500mL Fluid Restriction of per MD recommendations     Nutrient Intake: Patient reports consuming % of meals recently.     [x] Previous Nutrition Diagnosis: Increased Nutrient Need            [] Ongoing          [x] Resolved    [] No active nutrition diagnosis identified at this time     Nutrition Education: Encouraged patient to continue to maximize PO intake as able. Patient verbalized understanding.     Goal/Expected Outcome: Patient to continue to meet 75% or more of estimated nutrient needs via PO intake within 5-7 days    Interventions:  -Continue current diet order     Monitor:  -PO intake  -Diet/Diet texture tolerance  -Weight  -Nutrition related labs  -Nutrition focused physical findings  -GI function     Moderate Nutrition Risk Follow Up    Ministerio Perez RD via Teams

## 2025-05-26 NOTE — PROGRESS NOTE ADULT - ASSESSMENT
38 yo Female w/ a h/o HTN presenting to ER with 5 days of multiple episodes of NBNB vomiting and diarrhea with associated generalized abdominal pain and distention.    # HTN   # JOYCE rule out ATN   # Non nephrotic range proteinuria / hematuria   # thrombocytopenia   cr slowly trending up  picture above can be due to malignant HTN  / ADAMTS 13 not low / however patient received steroids and on plasmapheresis   Serology noting normal C3 ( not in favor of a HUS),  nl C4 normal JOSE ANTONIO which rule out active lupus  s/p steroids , s/p plasmapheresis  Renal Artery Duplex without FOUZIA  - document accurate UO   - last hb noted receiving blood tx, also on ANASTACIO hold if BP >170/100  - hematology and GI f/up   - repeat phos level, calcium corrected wnl, PTh noted  - GN w/up negative   - follow bp readings / clonidine just increased- BP better controlled today, no further increase necessary  -renin elevated / aldosterone elevated , please repeat ? due to malignant hypertension / no FOUZIA/ ?  renin secreting tumors    Nephrology will follow

## 2025-05-27 ENCOUNTER — TRANSCRIPTION ENCOUNTER (OUTPATIENT)
Age: 40
End: 2025-05-27

## 2025-05-27 VITALS
DIASTOLIC BLOOD PRESSURE: 64 MMHG | TEMPERATURE: 99 F | HEART RATE: 88 BPM | OXYGEN SATURATION: 96 % | SYSTOLIC BLOOD PRESSURE: 101 MMHG | RESPIRATION RATE: 20 BRPM

## 2025-05-27 PROBLEM — I10 ESSENTIAL (PRIMARY) HYPERTENSION: Chronic | Status: ACTIVE | Noted: 2025-05-07

## 2025-05-27 LAB
ANION GAP SERPL CALC-SCNC: 16 MMOL/L — HIGH (ref 7–14)
BUN SERPL-MCNC: 75 MG/DL — CRITICAL HIGH (ref 10–20)
CALCIUM SERPL-MCNC: 8.9 MG/DL — SIGNIFICANT CHANGE UP (ref 8.4–10.5)
CHLORIDE SERPL-SCNC: 100 MMOL/L — SIGNIFICANT CHANGE UP (ref 98–110)
CO2 SERPL-SCNC: 23 MMOL/L — SIGNIFICANT CHANGE UP (ref 17–32)
CREAT SERPL-MCNC: 4.4 MG/DL — CRITICAL HIGH (ref 0.7–1.5)
EGFR: 12 ML/MIN/1.73M2 — LOW
EGFR: 12 ML/MIN/1.73M2 — LOW
GLUCOSE SERPL-MCNC: 191 MG/DL — HIGH (ref 70–99)
HCT VFR BLD CALC: 26.7 % — LOW (ref 37–47)
HGB BLD-MCNC: 9.8 G/DL — LOW (ref 12–16)
MCHC RBC-ENTMCNC: 35 PG — HIGH (ref 27–31)
MCHC RBC-ENTMCNC: 36.7 G/DL — SIGNIFICANT CHANGE UP (ref 32–37)
MCV RBC AUTO: 95.4 FL — SIGNIFICANT CHANGE UP (ref 81–99)
NRBC BLD AUTO-RTO: 0 /100 WBCS — SIGNIFICANT CHANGE UP (ref 0–0)
PLATELET # BLD AUTO: 214 K/UL — SIGNIFICANT CHANGE UP (ref 130–400)
PMV BLD: 10.4 FL — SIGNIFICANT CHANGE UP (ref 7.4–10.4)
POTASSIUM SERPL-MCNC: 4.3 MMOL/L — SIGNIFICANT CHANGE UP (ref 3.5–5)
POTASSIUM SERPL-SCNC: 4.3 MMOL/L — SIGNIFICANT CHANGE UP (ref 3.5–5)
RBC # BLD: 2.8 M/UL — LOW (ref 4.2–5.4)
RBC # FLD: 14 % — SIGNIFICANT CHANGE UP (ref 11.5–14.5)
SODIUM SERPL-SCNC: 139 MMOL/L — SIGNIFICANT CHANGE UP (ref 135–146)
WBC # BLD: 8.54 K/UL — SIGNIFICANT CHANGE UP (ref 4.8–10.8)
WBC # FLD AUTO: 8.54 K/UL — SIGNIFICANT CHANGE UP (ref 4.8–10.8)

## 2025-05-27 PROCEDURE — 99239 HOSP IP/OBS DSCHRG MGMT >30: CPT

## 2025-05-27 RX ORDER — ASPIRIN 325 MG
1 TABLET ORAL
Qty: 30 | Refills: 0
Start: 2025-05-27 | End: 2025-06-25

## 2025-05-27 RX ORDER — ATORVASTATIN CALCIUM 80 MG/1
1 TABLET, FILM COATED ORAL
Qty: 30 | Refills: 0
Start: 2025-05-27 | End: 2025-06-25

## 2025-05-27 RX ORDER — NIFEDIPINE 30 MG
2 TABLET, EXTENDED RELEASE 24 HR ORAL
Qty: 60 | Refills: 0
Start: 2025-05-27 | End: 2025-06-25

## 2025-05-27 RX ORDER — TRAZODONE HCL 100 MG
1 TABLET ORAL
Qty: 30 | Refills: 0
Start: 2025-05-27 | End: 2025-06-25

## 2025-05-27 RX ORDER — LABETALOL HYDROCHLORIDE 200 MG/1
1 TABLET, FILM COATED ORAL
Qty: 90 | Refills: 0
Start: 2025-05-27 | End: 2025-06-25

## 2025-05-27 RX ORDER — MELATONIN 5 MG
1 TABLET ORAL
Qty: 30 | Refills: 0
Start: 2025-05-27 | End: 2025-06-25

## 2025-05-27 RX ORDER — AMLODIPINE BESYLATE 10 MG/1
1 TABLET ORAL
Refills: 0 | DISCHARGE

## 2025-05-27 RX ORDER — CYANOCOBALAMIN 1000 UG/ML
1 INJECTION INTRAMUSCULAR; SUBCUTANEOUS
Qty: 30 | Refills: 0
Start: 2025-05-27 | End: 2025-06-25

## 2025-05-27 RX ORDER — CALCITRIOL 0.5 UG/1
1 CAPSULE, GELATIN COATED ORAL
Qty: 30 | Refills: 0
Start: 2025-05-27 | End: 2025-06-25

## 2025-05-27 RX ORDER — FOLIC ACID 1 MG/1
1 TABLET ORAL
Qty: 30 | Refills: 0
Start: 2025-05-27 | End: 2025-06-25

## 2025-05-27 RX ORDER — FUROSEMIDE 10 MG/ML
1.5 INJECTION INTRAMUSCULAR; INTRAVENOUS
Qty: 45 | Refills: 0
Start: 2025-05-27 | End: 2025-06-25

## 2025-05-27 RX ORDER — FUROSEMIDE 10 MG/ML
3 INJECTION INTRAMUSCULAR; INTRAVENOUS
Qty: 90 | Refills: 0
Start: 2025-05-27 | End: 2025-06-25

## 2025-05-27 RX ORDER — CLOPIDOGREL BISULFATE 75 MG/1
1 TABLET, FILM COATED ORAL
Qty: 30 | Refills: 0
Start: 2025-05-27 | End: 2025-06-25

## 2025-05-27 RX ADMIN — LABETALOL HYDROCHLORIDE 300 MILLIGRAM(S): 200 TABLET, FILM COATED ORAL at 14:37

## 2025-05-27 RX ADMIN — EPOETIN ALFA 10000 UNIT(S): 10000 SOLUTION INTRAVENOUS; SUBCUTANEOUS at 14:36

## 2025-05-27 RX ADMIN — CYANOCOBALAMIN 1000 MICROGRAM(S): 1000 INJECTION INTRAMUSCULAR; SUBCUTANEOUS at 11:15

## 2025-05-27 RX ADMIN — FOLIC ACID 1 MILLIGRAM(S): 1 TABLET ORAL at 11:15

## 2025-05-27 RX ADMIN — Medication 120 MILLIGRAM(S): at 06:08

## 2025-05-27 RX ADMIN — Medication 40 MILLIGRAM(S): at 06:08

## 2025-05-27 RX ADMIN — CLOPIDOGREL BISULFATE 75 MILLIGRAM(S): 75 TABLET, FILM COATED ORAL at 14:36

## 2025-05-27 RX ADMIN — Medication 81 MILLIGRAM(S): at 11:16

## 2025-05-27 RX ADMIN — FUROSEMIDE 40 MILLIGRAM(S): 10 INJECTION INTRAMUSCULAR; INTRAVENOUS at 06:07

## 2025-05-27 RX ADMIN — Medication 1 APPLICATION(S): at 06:08

## 2025-05-27 RX ADMIN — Medication 0.2 MILLIGRAM(S): at 14:37

## 2025-05-27 RX ADMIN — Medication 0.2 MILLIGRAM(S): at 06:08

## 2025-05-27 RX ADMIN — LABETALOL HYDROCHLORIDE 300 MILLIGRAM(S): 200 TABLET, FILM COATED ORAL at 06:07

## 2025-05-27 RX ADMIN — CALCITRIOL 0.25 MICROGRAM(S): 0.5 CAPSULE, GELATIN COATED ORAL at 11:15

## 2025-05-27 NOTE — DISCHARGE NOTE PROVIDER - NSDCMRMEDTOKEN_GEN_ALL_CORE_FT
aspirin 81 mg oral tablet, chewable: 1 tab(s) orally once a day  atorvastatin 80 mg oral tablet: 1 tab(s) orally once a day (at bedtime)  calcitriol 0.25 mcg oral capsule: 1 cap(s) orally once a day  cloNIDine 0.2 mg oral tablet: 1 tab(s) orally every 8 hours  clopidogrel 75 mg oral tablet: 1 tab(s) orally every 24 hours  cyanocobalamin 1000 mcg oral tablet: 1 tab(s) orally once a day  folic acid 1 mg oral tablet: 1 tab(s) orally once a day  furosemide 20 mg oral tablet: 3 tab(s) orally once a day  labetalol 300 mg oral tablet: 1 tab(s) orally 3 times a day  melatonin 5 mg oral tablet: 1 tab(s) orally once a day (at bedtime)  NIFEdipine 60 mg oral tablet, extended release: 2 tab(s) orally once a day  pantoprazole 40 mg oral delayed release tablet: 1 tab(s) orally every 12 hours  traZODone 50 mg oral tablet: 1 tab(s) orally once a day (at bedtime)

## 2025-05-27 NOTE — DISCHARGE NOTE PROVIDER - NSDCCPCAREPLAN_GEN_ALL_CORE_FT
PRINCIPAL DISCHARGE DIAGNOSIS  Diagnosis: Hypertensive emergency  Assessment and Plan of Treatment: please take your blood pressure medications as precribed - follow up with your PMD, and neurologist      SECONDARY DISCHARGE DIAGNOSES  Diagnosis: JOYCE (acute kidney injury)  Assessment and Plan of Treatment: please follow up with nephrologist

## 2025-05-27 NOTE — DISCHARGE NOTE NURSING/CASE MANAGEMENT/SOCIAL WORK - NSDCPEFALRISK_GEN_ALL_CORE
For information on Fall & Injury Prevention, visit: [email or web address] OR  [email or web address] OR  https://www.cdc.gov/steadi/patient.html

## 2025-05-27 NOTE — DISCHARGE NOTE PROVIDER - PROVIDER TOKENS
PROVIDER:[TOKEN:[[id number]]] PROVIDER:[TOKEN:[[id number]]],FREE:[LAST:[6/2 W DR MTZ @ 2:30 at [address] [place], [place] [address]PHONE:[(   )    -],FAX:[(   )    -]]

## 2025-05-27 NOTE — DISCHARGE NOTE PROVIDER - NSDCFUSCHEDAPPT_GEN_ALL_CORE_FT
Tc Hernadez  [facility] PreAdmits  Scheduled Appointment: 06/02/2025    [facility]ly [facility]  [address]   Scheduled Appointment: 06/02/2025

## 2025-05-27 NOTE — PROGRESS NOTE ADULT - TIME BILLING
direct patient care and chart review  -coordinated current plan of care with medical staff on MDR  -d/c papers edited     * above timings exclude teaching residents

## 2025-05-27 NOTE — PROGRESS NOTE ADULT - SUBJECTIVE AND OBJECTIVE BOX
seen and examined  24 h events noted   no distress         PAST HISTORY  --------------------------------------------------------------------------------  No significant changes to PMH, PSH, FHx, SHx, unless otherwise noted    ALLERGIES & MEDICATIONS  --------------------------------------------------------------------------------  Allergies    No Known Allergies    Intolerances      Standing Inpatient Medications  aspirin  chewable 81 milliGRAM(s) Oral daily  atorvastatin 80 milliGRAM(s) Oral at bedtime  calcitriol   Capsule 0.25 MICROGram(s) Oral daily  chlorhexidine 2% Cloths 1 Application(s) Topical <User Schedule>  cloNIDine 0.2 milliGRAM(s) Oral every 8 hours  clopidogrel Tablet 75 milliGRAM(s) Oral every 24 hours  cyanocobalamin 1000 MICROGram(s) Oral daily  dextrose 5%. 1000 milliLiter(s) IV Continuous <Continuous>  dextrose 5%. 1000 milliLiter(s) IV Continuous <Continuous>  epoetin sergey-epbx (RETACRIT) Injectable 54503 Unit(s) SubCutaneous every 7 days  folic acid 1 milliGRAM(s) Oral daily  furosemide    Tablet 40 milliGRAM(s) Oral every 12 hours  labetalol 300 milliGRAM(s) Oral three times a day  lactated ringers. 1000 milliLiter(s) IV Continuous <Continuous>  melatonin 5 milliGRAM(s) Oral at bedtime  NIFEdipine  milliGRAM(s) Oral daily  pantoprazole    Tablet 40 milliGRAM(s) Oral every 12 hours  traZODone 50 milliGRAM(s) Oral at bedtime    PRN Inpatient Medications  acetaminophen     Tablet .. 650 milliGRAM(s) Oral every 6 hours PRN  calcium carbonate   1250 mG (OsCal) 1 Tablet(s) Oral every 6 hours PRN        VITALS/PHYSICAL EXAM  --------------------------------------------------------------------------------  T(C): 36.9 (05-27-25 @ 05:00), Max: 36.9 (05-27-25 @ 05:00)  HR: 78 (05-27-25 @ 05:00) (75 - 85)  BP: 182/100 (05-27-25 @ 05:00) (126/78 - 182/100)  RR: 18 (05-27-25 @ 05:00) (18 - 18)  SpO2: 95% (05-27-25 @ 05:00) (95% - 95%)  Wt(kg): --        05-26-25 @ 07:01  -  05-27-25 @ 07:00  --------------------------------------------------------  IN: 1025 mL / OUT: 0 mL / NET: 1025 mL      Physical Exam:  	Gen: NAD  	Pulm: CTA B/L  	CV:  S1S2; no rub  	Abd: +distended  	LE:  edema      LABS/STUDIES  --------------------------------------------------------------------------------              9.8    8.54  >-----------<  214      [05-27-25 @ 08:40]              26.7     139  |  100  |  75  ----------------------------<  191      [05-27-25 @ 08:40]  4.3   |  23  |  4.4        Ca     8.9     [05-27-25 @ 08:40]      Mg     2.5     [05-26-25 @ 06:19]    TPro  5.8  /  Alb  3.6  /  TBili  0.4  /  DBili  x   /  AST  10  /  ALT  9   /  AlkPhos  100  [05-26-25 @ 06:19]    Creatinine Trend:  SCr 4.4 [05-27 @ 08:40]  SCr 4.3 [05-26 @ 06:19]  SCr 4.2 [05-24 @ 13:10]  SCr 4.1 [05-23 @ 11:15]  SCr 3.8 [05-21 @ 13:19]    Urinalysis - [05-27-25 @ 08:40]      Color  / Appearance  / SG  / pH       Gluc 191 / Ketone   / Bili  / Urobili        Blood  / Protein  / Leuk Est  / Nitrite       RBC  / WBC  / Hyaline  / Gran  / Sq Epi  / Non Sq Epi  / Bacteria     Iron 18, TIBC 166, %sat 11      [05-07-25 @ 11:40]  Ferritin 362      [05-07-25 @ 11:40]  PTH -- (Ca --)      [05-13-25 @ 12:08]   194  PTH -- (Ca --)      [05-12-25 @ 16:48]   193  Vitamin D (25OH) 12      [05-12-25 @ 16:48]  TSH 2.05      [05-07-25 @ 11:40]  Lipid: chol 154, , HDL 37, LDL --      [05-14-25 @ 04:20]    HBsAg Nonreact      [05-07-25 @ 11:40]  HCV 0.13, Nonreact      [05-07-25 @ 11:40]  HIV Nonreact      [05-07-25 @ 11:40]    JOSE ANTONIO: titer Negative, pattern --      [05-07-25 @ 11:40]  dsDNA 2      [05-08-25 @ 19:20]  C3 Complement 106      [05-07-25 @ 11:40]  C4 Complement 22      [05-07-25 @ 11:40]  ANCA: cANCA Negative, pANCA Negative, atypical ANCA Negative      [05-07-25 @ 11:40]  anti-GBM <0.2      [05-07-25 @ 11:40]  Free Light Chains: kappa 1.98, lambda 2.29, ratio = 0.86      [05-13 @ 04:40]  Immunofixation Serum:   No Monoclonal Band Identified      Reference Range: None Detected      [05-12-25 @ 16:48]  SPEP Interpretation: Hypogammaglobulinemia      [05-12-25 @ 16:48]

## 2025-05-27 NOTE — DISCHARGE NOTE PROVIDER - CARE PROVIDERS DIRECT ADDRESSES
,[email or web address]

## 2025-05-27 NOTE — PROGRESS NOTE ADULT - ASSESSMENT
Case of a 39 year old previously healthy female patient who presented to the ED on 05/06 for evaluation of generalized abdominal pain, nausea, vomiting, and black loose stools, found to have malignant HTN on arrival with evidence of leukocytosis/acute on chronic anemia/thrombocytopenia/elevated LDH and retic/low haptoglobin/schistocytes on smear/proteinuria/JOYCE on blood work and evidence of distal D/proximal J thickening with dilated jejunal loops to 3.4cm and gradual tapering distally wo SBO along with mural enhancement of SB concerning for severe enteritis VS early ischemia VS shock bowel. 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 She was also found to have age indeterminate lacunar infarct on CT 05/13. We are following for above findings.    #malignant HTN - still elevated BPs  #Treatment resistant HTN of unclear etiology   - Patient had BP of 238/135 in the ED on admission   - s/p cardene gtt   - originally thought TTP/HUS picture, s/p plasmapheresis, [id number] was negative and therapy did not improve HTN  - secondary HTN workup: TSH 2.04, cortisol wnl, RA doppler (-), CT scan did not show PKD, Lupus workup negative  - Aldosterone/Renin 105/52, less likely primary aldosteronism but since patient has been treated aggressively for HTN results are confounded  - Target SBP < 160  - c/w hydralazine 50mg q6h, labetalol 300mg TID, procardia 120mg qd, lasix 40mg twice daily - increased clonidine to three times daily, iv hydralazine prn inpatient - RECHECK BP NOW AND IF ACCEPTABLE, CAN D/C HOME     #JOYCE vs CKD  #proteinuria  #LE edema   -Cr 3.5 on admission unknown baseline   - has been relatively stable throughout admission   -protein/cr urine ratio 2.3  -Glomerular nephritis workup negative   - appreciate nephro recs - following the case   - on Lasix 40mg PO BID - serum Cr ~ 4  - c/w calcitriol     #Age indeterminant lacunar infarct   - evaluated by Neuro  - patient declined MRI brain   - resume ASA and Plavix today - cbc stable   - c/w statin    - BP control - RECHECK; ELEVATED SBP THIS MORNING     #Melena - resolved   -patient not complaining of abdominal pain, has good oral intake, no bloody BM since 5/13 - eatS home cooked meal     #Normocytic Anemia   #Acute on chronic MARITZA   - s/p 7U pRBC this admission  - melena on initial presentation, now resolved  - evaluated by heme/onc: likely MAHA 2/2 HTN  - recall GI in lieu of current acute anemia   - evaluated by nephro: likely not just from CKD  - c/w epogen, hold if SBP > 170  - received venofer  - transfused 2 units prbcs Friday - hb > 9  - c/w folic acid, B12  - c/w PPI BID      #Mood disorder   #delirium 2/2 to htn encephalopathy   -patient not compliant with treatment, requires frequent reorientation from staff and mother   -does not have capacity to leave AMA   -trazodone 50qhs.  - psychiatry recommendations appreciated - 5/20 psych re eval found pt did not have mental insight to leave AMA - so far agreeable for inpatient stay but difficult with lab work (requires lot of requesting/convincing for blood draws - mother assists)    MISC  DVT ppx: ambulation   Diet: CCC/DASH  GI ppx/Bowel regimen: PPI BID   Activity: AAT  GOC: full - prognosis guarded     DISPO: D/C HOME TODAY - recheck BP as am sbp > 180 - asymptomatic; hb 9.8; outpatient follow up with Psych and Nephro - all meds sent to her pharmacy   -spoke to patient this am multiple times   -discussed with CM in rounds       Attending Physician Dr. Herminia Daley # [id number]

## 2025-05-27 NOTE — DISCHARGE NOTE PROVIDER - HOSPITAL COURSE
Case of a 39 year old previously healthy female patient who presented to the ED on 05/06 for evaluation of generalized abdominal pain, nausea, vomiting, and black loose stools, found to have malignant HTN on arrival with evidence of leukocytosis/acute on chronic anemia/thrombocytopenia/elevated LDH and retic/low haptoglobin/schistocytes on smear/proteinuria/JOYCE on blood work and evidence of distal D/proximal J thickening with dilated jejunal loops to 3.4cm and gradual tapering distally wo SBO along with mural enhancement of SB concerning for severe enteritis VS early ischemia VS shock bowel. 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 She was also found to have age indeterminate lacunar infarct on CT 05/13. We are following for above findings.    #malignant HTN - still elevated BPs  #Treatment resistant HTN of unclear etiology   - Patient had BP of 238/135 in the ED on admission   - s/p cardene gtt   - originally thought TTP/HUS picture, s/p plasmapheresis, [id number] was negative and therapy did not improve HTN  - secondary HTN workup: TSH 2.04, cortisol wnl, RA doppler (-), CT scan did not show PKD, Lupus workup negative  - Aldosterone/Renin 105/52, less likely primary aldosteronism but since patient has been treated aggressively for HTN results are confounded  - Target SBP < 160  - c/w hydralazine 50mg q6h, labetalol 300mg TID, procardia 120mg qd, lasix 40mg twice daily - increased clonidine to three times daily, iv hydralazine prn inpatient - RECHECK BP NOW AND IF ACCEPTABLE, CAN D/C HOME     #JOYCE vs CKD  #proteinuria  #LE edema   -Cr 3.5 on admission unknown baseline   - has been relatively stable throughout admission   -protein/cr urine ratio 2.3  -Glomerular nephritis workup negative   - appreciate nephro recs - following the case   - on Lasix 40mg PO BID - serum Cr ~ 4  - c/w calcitriol     #Age indeterminant lacunar infarct   - evaluated by Neuro  - patient declined MRI brain   - resume ASA and Plavix today - cbc stable   - c/w statin    - BP control - RECHECK; ELEVATED SBP THIS MORNING     #Melena - resolved   -patient not complaining of abdominal pain, has good oral intake, no bloody BM since 5/13 - eatS home cooked meal     #Normocytic Anemia   #Acute on chronic MARITZA   - s/p 7U pRBC this admission  - melena on initial presentation, now resolved  - evaluated by heme/onc: likely MAHA 2/2 HTN  - recall GI in lieu of current acute anemia   - evaluated by nephro: likely not just from CKD  - c/w epogen, hold if SBP > 170  - received venofer  - transfused 2 units prbcs Friday - hb > 9  - c/w folic acid, B12  - c/w PPI BID      #Mood disorder   #delirium 2/2 to htn encephalopathy   -patient not compliant with treatment, requires frequent reorientation from staff and mother   -does not have capacity to leave AMA   -trazodone 50qhs.  - psychiatry recommendations appreciated - 5/20 psych re eval found pt did not have mental insight to leave AMA - so far agreeable for inpatient stay but difficult with lab work (requires lot of requesting/convincing for blood draws - mother assists)    MISC  DVT ppx: ambulation   Diet: CCC/DASH  GI ppx/Bowel regimen: PPI BID   Activity: AAT  GOC: full - prognosis guarded     DISPO: D/C HOME TODAY - recheck BP as am sbp > 180 - asymptomatic; hb 9.8; outpatient follow up with Psych and Nephro - all meds sent to her pharmacy   -spoke to patient this am multiple times   -discussed with CM in rounds       Attending Physician Dr. Herminia Daley # [id number]

## 2025-05-27 NOTE — PROGRESS NOTE ADULT - ASSESSMENT
40 yo Female w/ a h/o HTN presenting to ER with 5 days of multiple episodes of NBNB vomiting and diarrhea with associated generalized abdominal pain and distention.  # HTN   # JOYCE rule out ATN   # Non nephrotic range proteinuria / hematuria   # thrombocytopenia   cr slowly trending up  picture above can be due to malignant HTN  / ADAMTS 13 not low / however patient received steroids and on plasmapheresis   Serology noting normal C3 ( not in favor of a HUS),  nl C4 normal JOSE ANTONIO which rule out active lupus  s/p steroids , s/p plasmapheresis  Renal Artery Duplex without FOUZIA  - document accurate UO   - last hb noted receiving blood tx, also on ANASTACIO hold if BP >170/100  - hematology and GI f/up   - repeat phos level, calcium corrected wnl, PTh noted/ replete vit D   - GN w/up negative   - follow bp readings / can increase clonidine if remains elevated   -renin elevated / aldosterone elevated , please repeat ? due to malignant hypertension / no FOUZIA/ ?  renin secreting tumors

## 2025-05-27 NOTE — PROGRESS NOTE ADULT - SUBJECTIVE AND OBJECTIVE BOX
Patient is a 39y old  Female who presents with a chief complaint of Hypertensi (12 May 2025 14:24)      Patient seen this am   -no overnight events notified - no active bleeding   -resumed asa Plavix and cbc is stable   -dc home today   -outpatient follow up with Psych    Vital Signs Last 24 Hrs  T(C): 36.9 (27 May 2025 05:00), Max: 36.9 (27 May 2025 05:00)  T(F): 98.5 (27 May 2025 05:00), Max: 98.5 (27 May 2025 05:00)  HR: 78 (27 May 2025 05:00) (78 - 85)  BP: 182/100 (27 May 2025 05:00) (156/91 - 182/100) - recheck BP  BP(mean): 27 (27 May 2025 05:00) (27 - 27)  RR: 18 (27 May 2025 05:00) (18 - 18)  SpO2: 95% (27 May 2025 05:00) (95% - 95%)      PHYSICAL EXAM:  General: Not in distress - ambulating on medical floor   ENT: MMM  Neck: Supple, No JVD  Lungs: Clear air entry  Cardio: RRR, S1/S2, No murmurs  Abdomen: Soft abdomen     LABS:                                 9.8    8.54  )-----------( 214      ( 27 May 2025 08:40 )             26.7     05-27    139  |  100  |  75[HH]  ----------------------------<  191[H]  4.3   |  23  |  4.4[HH]    Ca    8.9      27 May 2025 08:40  Mg     2.5     05-26    TPro  5.8[L]  /  Alb  3.6  /  TBili  0.4  /  DBili  x   /  AST  10  /  ALT  9   /  AlkPhos  100  05-26             MEDICATIONS  (STANDING):  aspirin  chewable 81 milliGRAM(s) Oral daily  atorvastatin 80 milliGRAM(s) Oral at bedtime  calcitriol   Capsule 0.25 MICROGram(s) Oral daily  chlorhexidine 2% Cloths 1 Application(s) Topical <User Schedule>  cloNIDine 0.2 milliGRAM(s) Oral every 8 hours  clopidogrel Tablet 75 milliGRAM(s) Oral every 24 hours  cyanocobalamin 1000 MICROGram(s) Oral daily  dextrose 5%. 1000 milliLiter(s) (100 mL/Hr) IV Continuous <Continuous>  dextrose 5%. 1000 milliLiter(s) (50 mL/Hr) IV Continuous <Continuous>  epoetin sergey-epbx (RETACRIT) Injectable 72998 Unit(s) SubCutaneous every 7 days  folic acid 1 milliGRAM(s) Oral daily  furosemide    Tablet 40 milliGRAM(s) Oral every 12 hours  labetalol 300 milliGRAM(s) Oral three times a day  lactated ringers. 1000 milliLiter(s) (50 mL/Hr) IV Continuous <Continuous>  melatonin 5 milliGRAM(s) Oral at bedtime  NIFEdipine  milliGRAM(s) Oral daily  pantoprazole    Tablet 40 milliGRAM(s) Oral every 12 hours  traZODone 50 milliGRAM(s) Oral at bedtime    MEDICATIONS  (PRN):  acetaminophen     Tablet .. 650 milliGRAM(s) Oral every 6 hours PRN Mild Pain (1 - 3)

## 2025-05-27 NOTE — DISCHARGE NOTE NURSING/CASE MANAGEMENT/SOCIAL WORK - PATIENT PORTAL LINK FT
You can access the FollowMyHealth Patient Portal offered by Health system by registering at the following website: [email or web address]. By joining [employer]’s FollowMyHealth portal, you will also be able to view your health information using other applications (apps) compatible with our system.

## 2025-05-27 NOTE — PROGRESS NOTE ADULT - REASON FOR ADMISSION
hypertension
Hypertensi
hypertension

## 2025-05-27 NOTE — DISCHARGE NOTE NURSING/CASE MANAGEMENT/SOCIAL WORK - FINANCIAL ASSISTANCE
[facility] provides services at a reduced cost to those who are determined to be eligible through [facility]’s financial assistance program. Information regarding [facility]’s financial assistance program can be found by going to [email or web address] or by calling [phone].

## 2025-05-27 NOTE — DISCHARGE NOTE PROVIDER - CARE PROVIDER_API CALL
Tran Levi  Nephrology  [address] [place] [id number]  Phone: [phone]  Fax: [phone]  Follow Up Time:    Tran Levi  Nephrology  [address] [place] [id number]  Phone: [phone]  Fax: [phone]  Follow Up Time:     6/2 W DR MTZ @ 2:30 at [address] [place] [address],   Phone: (   )    -  Fax: (   )    -  Follow Up Time:

## 2025-06-02 ENCOUNTER — APPOINTMENT (OUTPATIENT)
Dept: INTERNAL MEDICINE | Facility: CLINIC | Age: 40
End: 2025-06-02

## 2025-06-02 NOTE — CDI QUERY NOTE - NSCDIOTHERTXTBX_GEN_ALL_CORE_HH
There is inconsistent documentation in the medical record regarding the patient’s condition.      In order to ensure accurate coding and accuracy of the clinical record, the documentation in this patient’s medical record requires additional clarification.          The diagnosis of SEPSIS has been documented in the medical record on 5/23 by Dr. Nick:    Please review the documentation in the medical record and clarify the appropriate diagnosis (along with any supporting documentation) in your progress note and/or discharge summary.    •	Sepsis has been ruled in.    •	Sepsis  has been ruled out. Non-infectious SIRS with JOYCE d/t other (please specify etiology)    •	Sepsis could not be ruled out at the time of discharge.        Supporting documentation and/or clinical evidence:   5/7 Consult Note Adult-Heme/Onc Resident/Attending: … Admitted for management of hypertensive emergency, bowel ischemia and sepsis in setting of enteritis …    5/23 Progress Note Adult-Critical Care Fellow/Attending: … Early small bowel ischemia likely 2/2 severe enteritis … SIRS/Sepsis. Uncomplicated Cystitis …    Labs:  5/6 WBC 18.72  5/7 WBC 20.12, UCx >3 organisms, RSV +, BCx (-) x2    VS:  5/6 ,   5/7 , RR 22    Meds:  5/6 NS 0.9% 1L IVB x1 dose  5/7-5/8 Zosyn 3.375mg IVPB x3 doses; LR 1L IVB x1 dose    Thank you,  Cindi Levy RN, BSN  [phone]

## 2025-06-28 ENCOUNTER — RESULT REVIEW (OUTPATIENT)
Age: 40
End: 2025-06-28

## 2025-08-01 ENCOUNTER — RESULT REVIEW (OUTPATIENT)
Age: 40
End: 2025-08-01

## 2025-08-13 ENCOUNTER — TRANSCRIPTION ENCOUNTER (OUTPATIENT)
Age: 40
End: 2025-08-13

## 2025-08-15 ENCOUNTER — RESULT REVIEW (OUTPATIENT)
Age: 40
End: 2025-08-15

## 2025-09-17 ENCOUNTER — TRANSCRIPTION ENCOUNTER (OUTPATIENT)
Age: 40
End: 2025-09-17